# Patient Record
Sex: FEMALE | Race: WHITE | Employment: UNEMPLOYED | URBAN - METROPOLITAN AREA
[De-identification: names, ages, dates, MRNs, and addresses within clinical notes are randomized per-mention and may not be internally consistent; named-entity substitution may affect disease eponyms.]

---

## 2017-01-05 ENCOUNTER — TELEPHONE (OUTPATIENT)
Dept: INTERNAL MEDICINE CLINIC | Facility: CLINIC | Age: 27
End: 2017-01-05

## 2017-01-05 NOTE — TELEPHONE ENCOUNTER
Iram Dee from [facility] called to inform Dr Fredy Santos pt had Rx from Dr Matthew Howard dentist for codeine 300/30 #28 filled on 12/21 , Trey Hansen #120 filled from Dr Fredy Santos on 12/15, on 12/11 oral surgeon Dr Maegan Pacheco #12 and today Dr Marybeth Pritchard 10/325 #20.    Al

## 2017-01-05 NOTE — TELEPHONE ENCOUNTER
I agree. Do not fill. Clif Cortez. Jessica Vogel MD  Diplomate, American Board of Internal Medicine  [facility]  [address] [facility],4Th Floor, [address], [facility], [address] [place] [address]  T: [phone]; F: Alen 5

## 2017-01-07 ENCOUNTER — TELEPHONE (OUTPATIENT)
Dept: INTERNAL MEDICINE CLINIC | Facility: CLINIC | Age: 27
End: 2017-01-07

## 2017-01-07 ENCOUNTER — OFFICE VISIT (OUTPATIENT)
Dept: ENDOCRINOLOGY CLINIC | Facility: CLINIC | Age: 27
End: 2017-01-07

## 2017-01-07 ENCOUNTER — PATIENT MESSAGE (OUTPATIENT)
Dept: ENDOCRINOLOGY CLINIC | Facility: CLINIC | Age: 27
End: 2017-01-07

## 2017-01-07 VITALS
RESPIRATION RATE: 18 BRPM | BODY MASS INDEX: 32.6 KG/M2 | HEART RATE: 92 BPM | HEIGHT: 63 IN | TEMPERATURE: 98 F | SYSTOLIC BLOOD PRESSURE: 110 MMHG | DIASTOLIC BLOOD PRESSURE: 60 MMHG | WEIGHT: 184 LBS

## 2017-01-07 DIAGNOSIS — E10.65 UNCONTROLLED TYPE 1 DIABETES MELLITUS WITH DIABETIC AUTONOMIC NEUROPATHY, WITH LONG-TERM CURRENT USE OF INSULIN (HCC): Primary | ICD-10-CM

## 2017-01-07 DIAGNOSIS — E10.43 UNCONTROLLED TYPE 1 DIABETES MELLITUS WITH DIABETIC AUTONOMIC NEUROPATHY, WITH LONG-TERM CURRENT USE OF INSULIN (HCC): Primary | ICD-10-CM

## 2017-01-07 DIAGNOSIS — F32.A DEPRESSION, UNSPECIFIED DEPRESSION TYPE: ICD-10-CM

## 2017-01-07 PROCEDURE — 99214 OFFICE O/P EST MOD 30 MIN: CPT | Performed by: NURSE PRACTITIONER

## 2017-01-07 RX ORDER — ESCITALOPRAM OXALATE 10 MG/1
10 TABLET ORAL DAILY
Qty: 30 TABLET | Refills: 0 | Status: SHIPPED | OUTPATIENT
Start: 2017-01-07 | End: 2017-02-04

## 2017-01-07 NOTE — TELEPHONE ENCOUNTER
Call patient. I ordered up Diabetes-related labs to get a baseline for the start of calendar year 2017. Have patient get these done fasting ASAP. Dennis Nielsen. Christopher Amado MD  Diplomate, American Board of Internal Medicine  [facility]  [address]

## 2017-01-07 NOTE — PROGRESS NOTES
CC: Patient presents with:  Diabetes: follow up. did bring meter, dexcom and new pump not inserted. HPI:   HPI: 27year old y/o female who presents for follow up diabetes visit. Stated she is feeling depressed for the past 2 weeks PHQ9 20.  She does not Medication: none     Hyperlipidemia:  LDL:  102      Medication:  Atorvastatin 20  SE denies     ROS:   Constitutional: Negative for fever and fatigue. No distress. Eyes: Negative for visual disturbance.  Last eye exam 4/27/16  with SAAD Gonzales mg total) by mouth 2 (two) times daily with meals. , Disp: 60 tablet, Rfl: 2  •  gabapentin 250 MG/5ML Oral Solution, Take 300 mg by mouth nightly.  , Disp: , Rfl:   •  Blood Glucose Monitoring Suppl (FREESTYLE FREEDOM LITE) W/DEVICE Does not apply Kit, 1 D Resp 18  Ht 63\"  Wt 184 lb  BMI 32.60 kg/m2  LMP 01/02/2017  Breastfeeding? No  Constitutional: Oriented to person, place, and time. No distress, pale appearance. HEENT: Normocephalic and atraumatic.      Eyes: Conjunctivae are normal.   Neck: Normal ra defined types were placed in this encounter. Meds & Refills for this Visit:  Signed Prescriptions Disp Refills    escitalopram (LEXAPRO) 10 MG Oral Tab 30 tablet 0      Sig: Take 1 tablet (10 mg total) by mouth daily.       Acetone, Urine, Test (KETOS

## 2017-01-09 ENCOUNTER — PATIENT MESSAGE (OUTPATIENT)
Dept: ENDOCRINOLOGY CLINIC | Facility: CLINIC | Age: 27
End: 2017-01-09

## 2017-01-09 ENCOUNTER — TELEPHONE (OUTPATIENT)
Dept: INTERNAL MEDICINE CLINIC | Facility: CLINIC | Age: 27
End: 2017-01-09

## 2017-01-09 DIAGNOSIS — E10.65 UNCONTROLLED TYPE 1 DIABETES MELLITUS WITH DIABETIC AUTONOMIC NEUROPATHY, WITH LONG-TERM CURRENT USE OF INSULIN (HCC): Primary | ICD-10-CM

## 2017-01-09 DIAGNOSIS — E10.43 UNCONTROLLED TYPE 1 DIABETES MELLITUS WITH DIABETIC AUTONOMIC NEUROPATHY, WITH LONG-TERM CURRENT USE OF INSULIN (HCC): Primary | ICD-10-CM

## 2017-01-09 RX ORDER — NORGESTIMATE-ETHINYL ESTRADIOL 7DAYSX3 28
1 TABLET ORAL DAILY
Qty: 28 TABLET | Refills: 5 | Status: SHIPPED | OUTPATIENT
Start: 2017-01-09 | End: 2017-01-13

## 2017-01-09 RX ORDER — INSULIN DETEMIR 100 [IU]/ML
INJECTION, SOLUTION SUBCUTANEOUS
Qty: 90 ML | Refills: 0 | COMMUNITY
Start: 2017-01-09 | End: 2017-02-09 | Stop reason: ALTCHOICE

## 2017-01-09 RX ORDER — NORGESTIMATE-ETHINYL ESTRADIOL 7DAYSX3 28
TABLET ORAL
Qty: 28 TABLET | Refills: 0 | Status: SHIPPED | OUTPATIENT
Start: 2017-01-09 | End: 2017-02-03

## 2017-01-09 NOTE — TELEPHONE ENCOUNTER
Called pt stated that her Nolia Mackintosh is 20 she check last night tania change her levemir to 30 in the morning and 28 at night and for novolog not to take it at night only during the day.     Sugars  1/8/17   4:00 pm -400  6:00 pm-100  10:00 pm 320  11:45 pm-20

## 2017-01-09 NOTE — TELEPHONE ENCOUNTER
From: Subha Monsivais  To: Hayley Weir MD  Sent: 1/9/2017 10:36 AM CST  Subject: Medication Renewal Request    Original authorizing provider: MD Subha Drummond would like a refill of the following medications:  TRINESSA, 28, 0.18

## 2017-01-10 NOTE — TELEPHONE ENCOUNTER
Mat Max RN 1/10/2017 7:49 AM CST        ----- Message -----   From: Ghanshyam Kate   Sent: 1/9/2017 11:13 PM   To: [facility] Clinical Staff  Subject: RE: Other     My sugar is now 400 I just took 18 units of novalog I will email u in morning  ----

## 2017-01-10 NOTE — TELEPHONE ENCOUNTER
Lanre Chin, RN 1/10/2017 7:47 AM CST        ----- Message -----   From: James Rai   Sent: 1/9/2017 10:57 PM   To: [facility] Clinical Staff  Subject: Non-Urgent Medical Question     Just thought I'd message you and let you know after I spoke Elba

## 2017-01-13 ENCOUNTER — OFFICE VISIT (OUTPATIENT)
Dept: SURGERY | Facility: CLINIC | Age: 27
End: 2017-01-13

## 2017-01-13 ENCOUNTER — OFFICE VISIT (OUTPATIENT)
Dept: INTERNAL MEDICINE CLINIC | Facility: CLINIC | Age: 27
End: 2017-01-13

## 2017-01-13 VITALS
SYSTOLIC BLOOD PRESSURE: 130 MMHG | WEIGHT: 196.25 LBS | RESPIRATION RATE: 20 BRPM | BODY MASS INDEX: 34.77 KG/M2 | DIASTOLIC BLOOD PRESSURE: 86 MMHG | TEMPERATURE: 98 F | HEART RATE: 80 BPM | HEIGHT: 63 IN

## 2017-01-13 VITALS
HEART RATE: 95 BPM | RESPIRATION RATE: 15 BRPM | BODY MASS INDEX: 33.66 KG/M2 | WEIGHT: 190 LBS | DIASTOLIC BLOOD PRESSURE: 86 MMHG | SYSTOLIC BLOOD PRESSURE: 130 MMHG | HEIGHT: 63 IN

## 2017-01-13 DIAGNOSIS — G89.29 CHRONIC DENTAL PAIN: ICD-10-CM

## 2017-01-13 DIAGNOSIS — E66.9 OBESITY (BMI 30-39.9): Primary | ICD-10-CM

## 2017-01-13 DIAGNOSIS — M25.511 CHRONIC RIGHT SHOULDER PAIN: ICD-10-CM

## 2017-01-13 DIAGNOSIS — K02.9 DENTAL CARIES: ICD-10-CM

## 2017-01-13 DIAGNOSIS — F11.20 UNCOMPLICATED OPIOID DEPENDENCE (HCC): ICD-10-CM

## 2017-01-13 DIAGNOSIS — G89.29 CHRONIC DENTAL PAIN: Primary | ICD-10-CM

## 2017-01-13 DIAGNOSIS — G89.29 CHRONIC RIGHT SHOULDER PAIN: ICD-10-CM

## 2017-01-13 DIAGNOSIS — M47.816 FACET ARTHROPATHY, LUMBAR: ICD-10-CM

## 2017-01-13 DIAGNOSIS — Z51.81 THERAPEUTIC DRUG MONITORING: ICD-10-CM

## 2017-01-13 DIAGNOSIS — K08.9 CHRONIC DENTAL PAIN: Primary | ICD-10-CM

## 2017-01-13 DIAGNOSIS — M47.812 FACET ARTHROPATHY, CERVICAL: ICD-10-CM

## 2017-01-13 DIAGNOSIS — K08.9 POOR DENTITION: ICD-10-CM

## 2017-01-13 DIAGNOSIS — F33.1 MDD (MAJOR DEPRESSIVE DISORDER), RECURRENT EPISODE, MODERATE (HCC): ICD-10-CM

## 2017-01-13 DIAGNOSIS — K08.9 CHRONIC DENTAL PAIN: ICD-10-CM

## 2017-01-13 PROCEDURE — 99214 OFFICE O/P EST MOD 30 MIN: CPT | Performed by: INTERNAL MEDICINE

## 2017-01-13 PROCEDURE — 99215 OFFICE O/P EST HI 40 MIN: CPT | Performed by: PHYSICIAN ASSISTANT

## 2017-01-13 RX ORDER — PHENTERMINE HYDROCHLORIDE 37.5 MG/1
TABLET ORAL
Qty: 30 TABLET | Refills: 0 | Status: SHIPPED | OUTPATIENT
Start: 2017-01-13 | End: 2017-03-03

## 2017-01-13 RX ORDER — METHOCARBAMOL 500 MG/1
500 TABLET, FILM COATED ORAL 3 TIMES DAILY
Qty: 90 TABLET | Refills: 0 | Status: SHIPPED | OUTPATIENT
Start: 2017-01-13 | End: 2018-08-03 | Stop reason: ALTCHOICE

## 2017-01-13 NOTE — PATIENT INSTRUCTIONS
Refill policies:    • Allow 2 business days for refills; controlled substances may take longer.   • Contact your pharmacy at least 5 days prior to running out of medication and have them send an electronic request or submit request through the “request re your physician has recommended that you have a procedure or additional testing performed. [facility] will contact your insurance carrier to obtain pre-certification or prior authorization.     Unfortunately, JARED has seen an increas

## 2017-01-13 NOTE — PROGRESS NOTES
HPI:    Patient ID: Ryan Arizmendi is a 27year old female.     HPI    Review of Systems         Current Outpatient Prescriptions:  Phentermine HCl 37.5 MG Oral Tab TAKE 1 TABLET BY MOUTH EVERY MORNING BEFORE BREAKFAST Disp: 30 tablet Rfl: 0   methoca Glucose Blood (SUMAN CONTOUR NEXT TEST) In Vitro Strip Test before meals and at bedtime Disp: 400 strip Rfl: 1   Clobetasol Propionate 0.05 % External Ointment Apply 1 Application topically twice a week.  Disp: 1 Tube Rfl: 0   Montelukast Sodium 10 MG Oral

## 2017-01-13 NOTE — PROGRESS NOTES
Patient presents with:  Weight Check: 1-month f/u MD-supervised medical weight loss programming  Other: chronic dental pain/dental caries/poor dentition/opioid dependence/therapeutic drug monitoring  Other: depression, was started on Lexapro 10 mg daily ea started by Diabetes APN. ROS: No CP, SOB, palps, abd pain. No withdrawal symptoms.       Patient Active Problem List:     Uncontrolled type 1 diabetes mellitus with diabetic autonomic neuropathy, with long-term current use of insulin ([address]     Hyperchol times daily. Use as directed., Disp: 1 kit, Rfl: 0  •  Insulin Pen Needle (BD PEN NEEDLE CARLOS U/F) 32G X 4 MM Does not apply Misc, Inject 1 pen into the skin 4 (four) times daily. , Disp: 2 Box, Rfl: 3  •  Albuterol Sulfate HFA (PROAIR HFA) 108 (90 BASE) MC dentition  Dental caries  Uncomplicated opioid dependence (hcc)  Mdd (major depressive disorder), recurrent episode, moderate (hcc)    1. Obesity/Elevated BMI/therapeutic drug monitoring - Overall, she has lost weight over the last 10 months.   We'll keep t

## 2017-01-13 NOTE — H&P
Name: Wayne Sellers   : 1990   DOS: 2017     Chief complaint: Patient presents with:  Low Back Pain  Neck Pain      History of present illness:  Wayne Sellers is a 27year old female complaining of chronic teeth and oral pain, n insulin aspart (NOVOLOG FLEXPEN) 100 UNIT/ML Subcutaneous Solution Pen-injector INJECT 17 UNITS WITH BREAKFAST 17 UNITS WITH LUNCH Disp: 30 mL Rfl: 0   LEVEMIR FLEXTOUCH 100 UNIT/ML Subcutaneous Solution Pen-injector INJECT 30 UNITS IN THE MORNING 28 UNITS Past Surgical History    OTHER SURGICAL HISTORY Right     Comment R arm surgery after trauma          Comment x3      Family History   Problem Relation Age of Onset   • Multiple sclerosis[other] [OTHER] Mother    • Cancer Mother      uterine The patient is awake, alert, oriented and corporative. Pt has a normal affect. The patient ambulates with normal gait. HEENT: No gross lesion noted. PEERL. No icterus.   Neck and Upper Extremity: moderate pain and tenderness over b/l cfj,  Motor Examinatio

## 2017-01-20 ENCOUNTER — MED REC SCAN ONLY (OUTPATIENT)
Dept: INTERNAL MEDICINE CLINIC | Facility: CLINIC | Age: 27
End: 2017-01-20

## 2017-01-26 ENCOUNTER — TELEPHONE (OUTPATIENT)
Dept: ENDOCRINOLOGY CLINIC | Facility: CLINIC | Age: 27
End: 2017-01-26

## 2017-01-26 DIAGNOSIS — E10.43 UNCONTROLLED TYPE 1 DIABETES MELLITUS WITH DIABETIC AUTONOMIC NEUROPATHY, WITH LONG-TERM CURRENT USE OF INSULIN (HCC): Primary | ICD-10-CM

## 2017-01-26 DIAGNOSIS — E10.65 UNCONTROLLED TYPE 1 DIABETES MELLITUS WITH DIABETIC AUTONOMIC NEUROPATHY, WITH LONG-TERM CURRENT USE OF INSULIN (HCC): Primary | ICD-10-CM

## 2017-02-03 RX ORDER — ESCITALOPRAM OXALATE 10 MG/1
TABLET ORAL
Qty: 30 TABLET | Refills: 0 | Status: CANCELLED | OUTPATIENT
Start: 2017-02-03

## 2017-02-03 RX ORDER — NORGESTIMATE-ETHINYL ESTRADIOL 7DAYSX3 28
TABLET ORAL
Qty: 28 TABLET | Refills: 5 | Status: SHIPPED | OUTPATIENT
Start: 2017-02-03 | End: 2017-06-01

## 2017-02-03 NOTE — TELEPHONE ENCOUNTER
Pt does not meet refill protocol for trinessa. Last refill 1/9/17. Medication pending, ok to refill?

## 2017-02-04 ENCOUNTER — DIABETIC EDUCATION (OUTPATIENT)
Dept: ENDOCRINOLOGY CLINIC | Facility: CLINIC | Age: 27
End: 2017-02-04

## 2017-02-04 VITALS — HEIGHT: 63 IN | WEIGHT: 199 LBS | BODY MASS INDEX: 35.26 KG/M2

## 2017-02-04 DIAGNOSIS — E10.65 UNCONTROLLED TYPE 1 DIABETES MELLITUS WITH DIABETIC AUTONOMIC NEUROPATHY, WITH LONG-TERM CURRENT USE OF INSULIN (HCC): Primary | ICD-10-CM

## 2017-02-04 DIAGNOSIS — F32.A DEPRESSION, UNSPECIFIED DEPRESSION TYPE: Primary | ICD-10-CM

## 2017-02-04 DIAGNOSIS — E10.43 UNCONTROLLED TYPE 1 DIABETES MELLITUS WITH DIABETIC AUTONOMIC NEUROPATHY, WITH LONG-TERM CURRENT USE OF INSULIN (HCC): Primary | ICD-10-CM

## 2017-02-04 PROCEDURE — G0108 DIAB MANAGE TRN  PER INDIV: HCPCS | Performed by: DIETITIAN, REGISTERED

## 2017-02-04 RX ORDER — ESCITALOPRAM OXALATE 10 MG/1
10 TABLET ORAL DAILY
Qty: 30 TABLET | Refills: 2 | Status: SHIPPED | OUTPATIENT
Start: 2017-02-04 | End: 2017-06-04

## 2017-02-04 NOTE — PROGRESS NOTES
Ryan Arizmendi  : 1990 was seen for Saline Start:    Date: 2017  Start time: 8:00   End time: 10:00    Currently using insulin pens.    38u Toujeo daily; 20-25 units Novolog at meals  FBS today was 259 mg/dl    Glucose continues to run hi

## 2017-02-06 ENCOUNTER — TELEPHONE (OUTPATIENT)
Dept: SURGERY | Facility: CLINIC | Age: 27
End: 2017-02-06

## 2017-02-07 ENCOUNTER — TELEPHONE (OUTPATIENT)
Dept: INTERNAL MEDICINE CLINIC | Facility: CLINIC | Age: 27
End: 2017-02-07

## 2017-02-09 ENCOUNTER — DIABETIC EDUCATION (OUTPATIENT)
Dept: ENDOCRINOLOGY CLINIC | Facility: CLINIC | Age: 27
End: 2017-02-09

## 2017-02-09 DIAGNOSIS — E10.43 UNCONTROLLED TYPE 1 DIABETES MELLITUS WITH DIABETIC AUTONOMIC NEUROPATHY, WITH LONG-TERM CURRENT USE OF INSULIN (HCC): Primary | ICD-10-CM

## 2017-02-09 DIAGNOSIS — E10.65 UNCONTROLLED TYPE 1 DIABETES MELLITUS WITH DIABETIC AUTONOMIC NEUROPATHY, WITH LONG-TERM CURRENT USE OF INSULIN (HCC): Primary | ICD-10-CM

## 2017-02-09 RX ORDER — INSULIN ASPART 100 [IU]/ML
INJECTION, SOLUTION INTRAVENOUS; SUBCUTANEOUS
Qty: 1 CARTRIDGE | Refills: 0 | COMMUNITY
Start: 2017-02-09 | End: 2017-03-06

## 2017-02-10 ENCOUNTER — TELEPHONE (OUTPATIENT)
Dept: ENDOCRINOLOGY CLINIC | Facility: CLINIC | Age: 27
End: 2017-02-10

## 2017-02-13 DIAGNOSIS — E66.9 OBESITY (BMI 30-39.9): ICD-10-CM

## 2017-02-13 DIAGNOSIS — Z51.81 THERAPEUTIC DRUG MONITORING: ICD-10-CM

## 2017-02-13 NOTE — TELEPHONE ENCOUNTER
From: Tristin Mcdonald  To: Lali Perez MD  Sent: 2/13/2017 10:53 AM CST  Subject: Medication Renewal Request    Original authorizing provider: MD Tristin Mazariegos would like a refill of the following medications:  Phentermine HCl 3

## 2017-02-14 ENCOUNTER — TELEPHONE (OUTPATIENT)
Dept: ENDOCRINOLOGY CLINIC | Facility: CLINIC | Age: 27
End: 2017-02-14

## 2017-02-14 RX ORDER — PHENTERMINE HYDROCHLORIDE 37.5 MG/1
TABLET ORAL
Qty: 30 TABLET | Refills: 0 | OUTPATIENT
Start: 2017-02-14

## 2017-02-16 ENCOUNTER — PATIENT MESSAGE (OUTPATIENT)
Dept: ENDOCRINOLOGY CLINIC | Facility: CLINIC | Age: 27
End: 2017-02-16

## 2017-02-17 ENCOUNTER — TELEPHONE (OUTPATIENT)
Dept: ENDOCRINOLOGY CLINIC | Facility: CLINIC | Age: 27
End: 2017-02-17

## 2017-02-18 NOTE — TELEPHONE ENCOUNTER
Yanira Encarnacion RN 2/16/2017 11:23 AM CST        ----- Message -----   From: My Davis   Sent: 2/16/2017 11:04 AM   To: [facility] Clinical Staff  Subject: Prescription Question     I have used the whole sample of the vial of novolog sample you gav

## 2017-02-22 ENCOUNTER — TELEPHONE (OUTPATIENT)
Dept: INTERNAL MEDICINE CLINIC | Facility: CLINIC | Age: 27
End: 2017-02-22

## 2017-02-22 NOTE — TELEPHONE ENCOUNTER
Called patient, explained she had a missed appointment 02/21/17, it is her second one and there will be a charge. Patient said she thought it was on 02/22/17. Patient has an upcoming appointment on 03/03/17. First no show was on 11/07/16.

## 2017-02-24 ENCOUNTER — TELEPHONE (OUTPATIENT)
Dept: SURGERY | Facility: CLINIC | Age: 27
End: 2017-02-24

## 2017-03-01 ENCOUNTER — TELEPHONE (OUTPATIENT)
Dept: ENDOCRINOLOGY CLINIC | Facility: CLINIC | Age: 27
End: 2017-03-01

## 2017-03-01 NOTE — TELEPHONE ENCOUNTER
Allyson called with glucose readings at 400 mg/dl since early this morning, but that after she did a set change glucose was starting to come down. When I spoke with her it was 340 mg/dl and continuing to decrease.   Instructed her to keep a close eye on he

## 2017-03-03 ENCOUNTER — OFFICE VISIT (OUTPATIENT)
Dept: INTERNAL MEDICINE CLINIC | Facility: CLINIC | Age: 27
End: 2017-03-03

## 2017-03-03 VITALS
OXYGEN SATURATION: 97 % | DIASTOLIC BLOOD PRESSURE: 82 MMHG | BODY MASS INDEX: 36.77 KG/M2 | TEMPERATURE: 98 F | WEIGHT: 207.5 LBS | HEART RATE: 82 BPM | SYSTOLIC BLOOD PRESSURE: 120 MMHG | HEIGHT: 63 IN

## 2017-03-03 DIAGNOSIS — E66.01 SEVERE OBESITY (BMI 35.0-39.9) WITH COMORBIDITY (HCC): Primary | ICD-10-CM

## 2017-03-03 DIAGNOSIS — Z51.81 THERAPEUTIC DRUG MONITORING: ICD-10-CM

## 2017-03-03 DIAGNOSIS — F33.1 MDD (MAJOR DEPRESSIVE DISORDER), RECURRENT EPISODE, MODERATE (HCC): ICD-10-CM

## 2017-03-03 PROCEDURE — 99214 OFFICE O/P EST MOD 30 MIN: CPT | Performed by: INTERNAL MEDICINE

## 2017-03-03 RX ORDER — PHENTERMINE HYDROCHLORIDE 37.5 MG/1
TABLET ORAL
Qty: 30 TABLET | Refills: 0 | Status: SHIPPED | OUTPATIENT
Start: 2017-03-03 | End: 2017-05-01

## 2017-03-04 NOTE — PROGRESS NOTES
Patient presents with: Other: Obesity f/u; MDD f/u       HPI: The pt presents today primarily for physician-supervised medical weight loss programming and assessment for the diagnosis of overweight and/or obesity status, but she's also here for MDD f/u. novolog via T flex insulin pump, Disp: 1 Cartridge, Rfl: 0  •  escitalopram (LEXAPRO) 10 MG Oral Tab, Take 1 tablet (10 mg total) by mouth daily. , Disp: 30 tablet, Rfl: 2  •  TRINESSA, 28, 0.18/0.215/0.25 MG-35 MCG Oral Tab, TAKE 1 TABLET BY MOUTH DAILY, D Never Used                        Alcohol Use: Yes           1.2 oz/week       2 Standard drinks or equivalent per week       Comment: 1-2/month      PE:  /82 mmHg  Pulse 82  Temp(Src) 98.1 °F (36.7 °C) (Oral)  Ht 63\"  Wt 207 lb 8 oz  BMI 36.77 kg/m

## 2017-03-06 ENCOUNTER — TELEPHONE (OUTPATIENT)
Dept: ENDOCRINOLOGY CLINIC | Facility: CLINIC | Age: 27
End: 2017-03-06

## 2017-03-06 ENCOUNTER — OFFICE VISIT (OUTPATIENT)
Dept: ENDOCRINOLOGY CLINIC | Facility: CLINIC | Age: 27
End: 2017-03-06

## 2017-03-06 VITALS
BODY MASS INDEX: 36.14 KG/M2 | RESPIRATION RATE: 18 BRPM | WEIGHT: 204 LBS | DIASTOLIC BLOOD PRESSURE: 72 MMHG | SYSTOLIC BLOOD PRESSURE: 118 MMHG | HEIGHT: 63 IN | TEMPERATURE: 98 F | HEART RATE: 62 BPM

## 2017-03-06 DIAGNOSIS — E10.43 UNCONTROLLED TYPE 1 DIABETES MELLITUS WITH DIABETIC AUTONOMIC NEUROPATHY, WITH LONG-TERM CURRENT USE OF INSULIN (HCC): Primary | ICD-10-CM

## 2017-03-06 DIAGNOSIS — E10.65 UNCONTROLLED TYPE 1 DIABETES MELLITUS WITH DIABETIC AUTONOMIC NEUROPATHY, WITH LONG-TERM CURRENT USE OF INSULIN (HCC): Primary | ICD-10-CM

## 2017-03-06 LAB
CARTRIDGE LOT#: 671 NUMERIC
HEMOGLOBIN A1C: 8.9 % (ref 4.3–5.6)

## 2017-03-06 PROCEDURE — 99214 OFFICE O/P EST MOD 30 MIN: CPT | Performed by: NURSE PRACTITIONER

## 2017-03-06 RX ORDER — INSULIN ASPART 100 [IU]/ML
INJECTION, SOLUTION INTRAVENOUS; SUBCUTANEOUS
Qty: 30 ML | Refills: 2 | Status: SHIPPED | OUTPATIENT
Start: 2017-03-06 | End: 2017-03-06

## 2017-03-06 RX ORDER — INSULIN ASPART 100 [IU]/ML
INJECTION, SOLUTION INTRAVENOUS; SUBCUTANEOUS
Qty: 10 ML | Refills: 2 | COMMUNITY
Start: 2017-03-06 | End: 2017-03-16

## 2017-03-06 NOTE — PROGRESS NOTES
CC: Patient presents with:  Diabetes: follow up. pt did bring pump and dexcom.       Follow up  Visit     HISTORY:  Past Medical History   Diagnosis Date   • Diabetes ([facility] [address]    • Diabetic neuropathy ([facility] [facility]    • Hyperlipidemia       Family History   Problem Re visit/encounter  Has had multiple changes from CDE to help stablilize BG via phone     TDD up to 80 units   Insulin Novolog   Glucometer:Ryan Contour Next      Time  12am 6:00 am 11:30am    ICR 1:10 1:10      ISF 1:25 1:20     Basal 1.25 1.3         Targe 0  •  escitalopram (LEXAPRO) 10 MG Oral Tab, Take 1 tablet (10 mg total) by mouth daily. , Disp: 30 tablet, Rfl: 2  •  TRINESSA, 28, 0.18/0.215/0.25 MG-35 MCG Oral Tab, TAKE 1 TABLET BY MOUTH DAILY, Disp: 28 tablet, Rfl: 5  •  methocarbamol 500 MG Oral Tab, No  Constitutional: Oriented to person, place, and time. No distress. HEENT: Normocephalic and atraumatic. Eyes: Conjunctivae are normal.   Neck: Normal range of motion. Neck supple. Normal carotid pulses. No mass or thyromegaly present.   Jose Angel Daft at goal   Lipids: not at goal recheck lab and cont statin   Tobacco: not ready to quit   Flu vaccine: up to date   Pneumonia vaccine: up to date   Depression screen: up to date f/u with PCP     Check glucoses 4 times daily - fasting, premeals and/or bedtim

## 2017-03-14 ENCOUNTER — APPOINTMENT (OUTPATIENT)
Dept: LAB | Age: 27
End: 2017-03-14
Attending: INTERNAL MEDICINE

## 2017-03-14 DIAGNOSIS — E10.43 UNCONTROLLED TYPE 1 DIABETES MELLITUS WITH DIABETIC AUTONOMIC NEUROPATHY, WITH LONG-TERM CURRENT USE OF INSULIN (HCC): ICD-10-CM

## 2017-03-14 DIAGNOSIS — E10.65 UNCONTROLLED TYPE 1 DIABETES MELLITUS WITH DIABETIC AUTONOMIC NEUROPATHY, WITH LONG-TERM CURRENT USE OF INSULIN (HCC): ICD-10-CM

## 2017-03-14 LAB
ALBUMIN SERPL-MCNC: 3.3 G/DL (ref 3.5–4.8)
ALP LIVER SERPL-CCNC: 76 U/L (ref 37–98)
ALT SERPL-CCNC: 22 U/L (ref 14–54)
AST SERPL-CCNC: 11 U/L (ref 15–41)
BILIRUB SERPL-MCNC: 0.3 MG/DL (ref 0.1–2)
BUN BLD-MCNC: 10 MG/DL (ref 8–20)
CALCIUM BLD-MCNC: 9.2 MG/DL (ref 8.3–10.3)
CHLORIDE: 108 MMOL/L (ref 101–111)
CHOLEST SMN-MCNC: 149 MG/DL (ref ?–190)
CO2: 31 MMOL/L (ref 22–32)
CREAT BLD-MCNC: 0.58 MG/DL (ref 0.55–1.02)
CREAT UR-SCNC: 133 MG/DL
EST. AVERAGE GLUCOSE BLD GHB EST-MCNC: 217 MG/DL (ref 68–126)
GLUCOSE BLD-MCNC: 114 MG/DL (ref 70–99)
HBA1C MFR BLD HPLC: 9.2 % (ref ?–5.7)
HDLC SERPL-MCNC: 50 MG/DL (ref 45–?)
HDLC SERPL: 2.98 {RATIO} (ref ?–4.44)
LDLC SERPL CALC-MCNC: 88 MG/DL (ref ?–120)
M PROTEIN MFR SERPL ELPH: 6.8 G/DL (ref 6.1–8.3)
MICROALBUMIN UR-MCNC: 0.79 MG/DL
MICROALBUMIN/CREAT 24H UR-RTO: 5.9 UG/MG (ref ?–30)
NONHDLC SERPL-MCNC: 99 MG/DL (ref ?–150)
POTASSIUM SERPL-SCNC: 4 MMOL/L (ref 3.6–5.1)
SODIUM SERPL-SCNC: 143 MMOL/L (ref 136–144)
TRIGLYCERIDES: 56 MG/DL (ref ?–115)
VLDL: 11 MG/DL (ref 5–40)

## 2017-03-14 PROCEDURE — 80061 LIPID PANEL: CPT

## 2017-03-14 PROCEDURE — 83036 HEMOGLOBIN GLYCOSYLATED A1C: CPT

## 2017-03-14 PROCEDURE — 80053 COMPREHEN METABOLIC PANEL: CPT

## 2017-03-14 PROCEDURE — 36415 COLL VENOUS BLD VENIPUNCTURE: CPT

## 2017-03-14 PROCEDURE — 82570 ASSAY OF URINE CREATININE: CPT

## 2017-03-14 PROCEDURE — 82043 UR ALBUMIN QUANTITATIVE: CPT

## 2017-03-15 ENCOUNTER — OFFICE VISIT (OUTPATIENT)
Dept: ENDOCRINOLOGY CLINIC | Facility: CLINIC | Age: 27
End: 2017-03-15

## 2017-03-15 VITALS
DIASTOLIC BLOOD PRESSURE: 80 MMHG | HEIGHT: 63 IN | TEMPERATURE: 98 F | RESPIRATION RATE: 18 BRPM | SYSTOLIC BLOOD PRESSURE: 122 MMHG | WEIGHT: 205 LBS | HEART RATE: 72 BPM | BODY MASS INDEX: 36.32 KG/M2

## 2017-03-15 DIAGNOSIS — E10.65 UNCONTROLLED TYPE 1 DIABETES MELLITUS WITH DIABETIC AUTONOMIC NEUROPATHY, WITH LONG-TERM CURRENT USE OF INSULIN (HCC): Primary | ICD-10-CM

## 2017-03-15 DIAGNOSIS — E10.43 UNCONTROLLED TYPE 1 DIABETES MELLITUS WITH DIABETIC AUTONOMIC NEUROPATHY, WITH LONG-TERM CURRENT USE OF INSULIN (HCC): Primary | ICD-10-CM

## 2017-03-15 PROCEDURE — 95251 CONT GLUC MNTR ANALYSIS I&R: CPT | Performed by: NURSE PRACTITIONER

## 2017-03-15 PROCEDURE — 99213 OFFICE O/P EST LOW 20 MIN: CPT | Performed by: NURSE PRACTITIONER

## 2017-03-15 NOTE — PROGRESS NOTES
CC: Patient presents with:  Diabetes: follow up. pt did bring pump and dexcom.       HISTORY:  Past Medical History   Diagnosis Date   • Diabetes ([facility] [address]    • Diabetic neuropathy ([facility] [address]    • Hyperlipidemia       Family History   Problem Relation Age of Onset abdomen mainly for pump sites     Hypertension:  Blood Pressure: At goal    Medication: none      Hyperlipidemia:  LDL:  88 was 102      Medication:  Atorvastatin 20  SE denies     ROS:   Constitutional: Negative for fever, chills and fatigue.  No distress meals., Disp: 60 tablet, Rfl: 2  •  gabapentin 250 MG/5ML Oral Solution, Take 300 mg by mouth nightly.  , Disp: , Rfl:   •  Blood Glucose Monitoring Suppl (FREESTYLE FREEDOM LITE) W/DEVICE Does not apply Kit, 1 Device by Other route 2 (two) times daily.  Us long-term current use of insulin (hcc)  (primary encounter diagnosis): wants disability to not work, asked to come in weekly until BG more stable.  Plan decreased am ICR for breakfast and decreased target BG   New Pump Settings:     Time  12am 6:00 am 1pm

## 2017-03-16 RX ORDER — INSULIN ASPART 100 [IU]/ML
INJECTION, SOLUTION INTRAVENOUS; SUBCUTANEOUS
Qty: 10 ML | Refills: 2 | COMMUNITY
Start: 2017-03-16 | End: 2017-03-22

## 2017-03-22 ENCOUNTER — OFFICE VISIT (OUTPATIENT)
Dept: ENDOCRINOLOGY CLINIC | Facility: CLINIC | Age: 27
End: 2017-03-22

## 2017-03-22 VITALS
BODY MASS INDEX: 37.21 KG/M2 | HEART RATE: 84 BPM | HEIGHT: 63 IN | SYSTOLIC BLOOD PRESSURE: 128 MMHG | RESPIRATION RATE: 18 BRPM | DIASTOLIC BLOOD PRESSURE: 68 MMHG | WEIGHT: 210 LBS | TEMPERATURE: 98 F

## 2017-03-22 DIAGNOSIS — E10.65 UNCONTROLLED TYPE 1 DIABETES MELLITUS WITH DIABETIC AUTONOMIC NEUROPATHY, WITH LONG-TERM CURRENT USE OF INSULIN (HCC): Primary | ICD-10-CM

## 2017-03-22 DIAGNOSIS — E10.43 UNCONTROLLED TYPE 1 DIABETES MELLITUS WITH DIABETIC AUTONOMIC NEUROPATHY, WITH LONG-TERM CURRENT USE OF INSULIN (HCC): Primary | ICD-10-CM

## 2017-03-22 PROCEDURE — 99214 OFFICE O/P EST MOD 30 MIN: CPT | Performed by: NURSE PRACTITIONER

## 2017-03-22 RX ORDER — INSULIN LISPRO 100 [IU]/ML
INJECTION, SOLUTION INTRAVENOUS; SUBCUTANEOUS
Qty: 30 ML | Refills: 3 | Status: SHIPPED | OUTPATIENT
Start: 2017-03-22 | End: 2017-05-19

## 2017-03-22 NOTE — PROGRESS NOTES
CC: Patient presents with:  Diabetes: follow up. pt did bring pump and dexcom.       HISTORY:  Past Medical History   Diagnosis Date   • Diabetes ([facility] [address]    • Diabetic neuropathy ([facility] [address]    • Hyperlipidemia       Family History   Problem Relation Age of Onset download still shows poor control   Average Fasting glucose  mg/dl   Average post meal glucose  's to 400's  's   Basal%/Bolus% overcorrecting  44/56    Hypoglycemia frequency has at erratic times   Lipodystrophy:throughout body, using ab Atorvastatin Calcium 20 MG Oral Tab, Take 1 tablet (20 mg total) by mouth nightly., Disp: 30 tablet, Rfl: 2  •  MetFORMIN HCl 1000 MG Oral Tab, Take 1 tablet (1,000 mg total) by mouth 2 (two) times daily with meals. , Disp: 60 tablet, Rfl: 2  •  gabapentin lipodystrophy deposits throughout extremities, to have surgery to remove. Abdomen, dexom and pump site CDI  Skin: Skin is warm and dry. No rash noted. No erythema. No pallor. No open wounds noted. Psychiatric: Normal mood and affect.      Assessment and Pl

## 2017-03-24 DIAGNOSIS — E10.43 UNCONTROLLED TYPE 1 DIABETES MELLITUS WITH DIABETIC AUTONOMIC NEUROPATHY, WITH LONG-TERM CURRENT USE OF INSULIN (HCC): Primary | ICD-10-CM

## 2017-03-24 DIAGNOSIS — E10.65 UNCONTROLLED TYPE 1 DIABETES MELLITUS WITH DIABETIC AUTONOMIC NEUROPATHY, WITH LONG-TERM CURRENT USE OF INSULIN (HCC): Primary | ICD-10-CM

## 2017-03-28 ENCOUNTER — OFFICE VISIT (OUTPATIENT)
Dept: ENDOCRINOLOGY CLINIC | Facility: CLINIC | Age: 27
End: 2017-03-28

## 2017-03-28 VITALS
HEIGHT: 63 IN | BODY MASS INDEX: 36.32 KG/M2 | SYSTOLIC BLOOD PRESSURE: 110 MMHG | DIASTOLIC BLOOD PRESSURE: 78 MMHG | RESPIRATION RATE: 18 BRPM | TEMPERATURE: 98 F | WEIGHT: 205 LBS | HEART RATE: 88 BPM

## 2017-03-28 DIAGNOSIS — E10.65 UNCONTROLLED TYPE 1 DIABETES MELLITUS WITH DIABETIC AUTONOMIC NEUROPATHY, WITH LONG-TERM CURRENT USE OF INSULIN (HCC): Primary | ICD-10-CM

## 2017-03-28 DIAGNOSIS — E10.43 UNCONTROLLED TYPE 1 DIABETES MELLITUS WITH DIABETIC AUTONOMIC NEUROPATHY, WITH LONG-TERM CURRENT USE OF INSULIN (HCC): Primary | ICD-10-CM

## 2017-03-28 PROCEDURE — 99213 OFFICE O/P EST LOW 20 MIN: CPT | Performed by: NURSE PRACTITIONER

## 2017-03-28 NOTE — PROGRESS NOTES
CC: Patient presents with:  Diabetes: follow up. pt does have pump and meter. not using dexcom right now.       HISTORY:  Past Medical History   Diagnosis Date   • Diabetes ([facility] [address]    • Diabetic neuropathy ([facility] [address]    • Hyperlipidemia       Family History   Prob 's (498) -356 mg/dl   PD  mg/dl (30, 471)   Basal%/Bolus% overcorrecting has impoved   42/53    Hypoglycemia frequency has at erratic times but significantly less   Lipodystrophy:throughout body, using abdomen mainly for pump sites last p Rfl: 0  •  Insulin Lispro (HUMALOG) 100 UNIT/ML Subcutaneous Solution, Use up to 80 units daily via insulin pump, Disp: 30 mL, Rfl: 3  •  Phentermine HCl 37.5 MG Oral Tab, TAKE 1 TABLET BY MOUTH EVERY MORNING BEFORE BREAKFAST, Disp: 30 tablet, Rfl: 0  •  e Breastfeeding? No  Constitutional: Oriented to person, place, and time. No distress. HEENT: Normocephalic and atraumatic. Eyes: Conjunctivae are normal.   Neck: Normal range of motion.  Neck supple  Cardiovascular: Normal rate, regular rhythm and intac date f/u with PCP     Check glucoses 4 times daily - fasting, premeals and/or bedtime. Call/fax/email pump download or return for f/u in 7 days. Return in about 10 days (around 4/7/2017) for diabetes.     Pt verbalized understanding and has no further

## 2017-03-31 ENCOUNTER — TELEPHONE (OUTPATIENT)
Dept: INTERNAL MEDICINE CLINIC | Facility: CLINIC | Age: 27
End: 2017-03-31

## 2017-03-31 NOTE — TELEPHONE ENCOUNTER
Called patient regarding no show/missed appointment. Patient stated she thought she had cancelled it through [address], but pt was notified that her appointment was still on the doctors schedule.  Told pt she could always call the office to confirm cancellati

## 2017-04-06 ENCOUNTER — PATIENT MESSAGE (OUTPATIENT)
Dept: INTERNAL MEDICINE CLINIC | Facility: CLINIC | Age: 27
End: 2017-04-06

## 2017-04-07 RX ORDER — FLUCONAZOLE 150 MG/1
TABLET ORAL
Qty: 2 TABLET | Refills: 0 | Status: SHIPPED | OUTPATIENT
Start: 2017-04-07 | End: 2017-05-01 | Stop reason: ALTCHOICE

## 2017-04-07 NOTE — TELEPHONE ENCOUNTER
Script for Diflucan sent to her pharmacy. Alexis Miller. Deep Tinsley MD  Diplomate, American Board of Internal Medicine  [facility]  [address] [facility],[address], [address], [facility], [address] [place] [address]  T: [phone]; F: Joseeti 5

## 2017-04-07 NOTE — TELEPHONE ENCOUNTER
From: Edwin Gross  To: Jaiden Henry MD  Sent: 4/6/2017 6:41 PM CDT  Subject: Non-Urgent Medical Question    Hello is there anything you can prescribe me for yeast infection it hurts when I pee and I'm really itchy and aggervated in vaginal area.

## 2017-04-08 ENCOUNTER — HOSPITAL ENCOUNTER (OUTPATIENT)
Age: 27
Discharge: HOME | End: 2017-04-08
Attending: FAMILY MEDICINE
Payer: COMMERCIAL

## 2017-04-08 VITALS
BODY MASS INDEX: 36.32 KG/M2 | HEIGHT: 63 IN | DIASTOLIC BLOOD PRESSURE: 90 MMHG | WEIGHT: 205 LBS | HEART RATE: 87 BPM | SYSTOLIC BLOOD PRESSURE: 141 MMHG | OXYGEN SATURATION: 96 % | TEMPERATURE: 99 F | RESPIRATION RATE: 20 BRPM

## 2017-04-08 DIAGNOSIS — R30.0 DYSURIA: ICD-10-CM

## 2017-04-08 DIAGNOSIS — K02.9 DENTAL CARIES: ICD-10-CM

## 2017-04-08 DIAGNOSIS — B37.3 YEAST VAGINITIS: ICD-10-CM

## 2017-04-08 DIAGNOSIS — J20.9 ACUTE BRONCHITIS, UNSPECIFIED ORGANISM: Primary | ICD-10-CM

## 2017-04-08 PROCEDURE — 87510 GARDNER VAG DNA DIR PROBE: CPT | Performed by: FAMILY MEDICINE

## 2017-04-08 PROCEDURE — 81025 URINE PREGNANCY TEST: CPT | Performed by: FAMILY MEDICINE

## 2017-04-08 PROCEDURE — 99204 OFFICE O/P NEW MOD 45 MIN: CPT

## 2017-04-08 PROCEDURE — 99214 OFFICE O/P EST MOD 30 MIN: CPT

## 2017-04-08 PROCEDURE — 87480 CANDIDA DNA DIR PROBE: CPT | Performed by: FAMILY MEDICINE

## 2017-04-08 PROCEDURE — 87086 URINE CULTURE/COLONY COUNT: CPT | Performed by: FAMILY MEDICINE

## 2017-04-08 PROCEDURE — 81002 URINALYSIS NONAUTO W/O SCOPE: CPT | Performed by: FAMILY MEDICINE

## 2017-04-08 PROCEDURE — 87660 TRICHOMONAS VAGIN DIR PROBE: CPT | Performed by: FAMILY MEDICINE

## 2017-04-08 RX ORDER — CEFUROXIME AXETIL 250 MG/1
250 TABLET ORAL 2 TIMES DAILY
Qty: 14 TABLET | Refills: 0 | Status: SHIPPED | OUTPATIENT
Start: 2017-04-08 | End: 2017-05-01 | Stop reason: ALTCHOICE

## 2017-04-08 NOTE — ED INITIAL ASSESSMENT (HPI)
Patient presents with trifold complaints:firstly,vaginal itching,dysuria,white disharge;secondly,top left three teeth rotten and unable to see dentist.Cold/hot sensitive. +Cough after URI-prod of green sputum. No fever. +Flu shot.

## 2017-04-09 NOTE — ED NOTES
Positive vaginitis panel sent to physician to review. Patient was prescribed Ceftin from [facility] provider and had a prescription for Diflucan from her primary physician.

## 2017-04-11 ENCOUNTER — OFFICE VISIT (OUTPATIENT)
Dept: ENDOCRINOLOGY CLINIC | Facility: CLINIC | Age: 27
End: 2017-04-11

## 2017-04-11 VITALS
BODY MASS INDEX: 35.97 KG/M2 | HEIGHT: 63 IN | RESPIRATION RATE: 18 BRPM | SYSTOLIC BLOOD PRESSURE: 110 MMHG | TEMPERATURE: 98 F | DIASTOLIC BLOOD PRESSURE: 80 MMHG | WEIGHT: 203 LBS | HEART RATE: 80 BPM

## 2017-04-11 DIAGNOSIS — E10.43 UNCONTROLLED TYPE 1 DIABETES MELLITUS WITH DIABETIC AUTONOMIC NEUROPATHY, WITH LONG-TERM CURRENT USE OF INSULIN (HCC): Primary | ICD-10-CM

## 2017-04-11 DIAGNOSIS — E10.65 UNCONTROLLED TYPE 1 DIABETES MELLITUS WITH DIABETIC AUTONOMIC NEUROPATHY, WITH LONG-TERM CURRENT USE OF INSULIN (HCC): Primary | ICD-10-CM

## 2017-04-11 PROCEDURE — 99213 OFFICE O/P EST LOW 20 MIN: CPT | Performed by: NURSE PRACTITIONER

## 2017-04-11 NOTE — PROGRESS NOTES
CC: Patient presents with:  Diabetes: follow up.       HISTORY:  Past Medical History   Diagnosis Date   • Diabetes ([facility] [address]    • Diabetic neuropathy ([facility] [address]    • Hyperlipidemia       Family History   Problem Relation Age of Onset   • Multiple sclerosis[other] [ -360 mg/dl   Basal%/Bolus% 54/46 only 3% food bolus    Hypoglycemia frequency has at erratic times but significantly less   Lipodystrophy:throughout body, using abdomen mainly for pump sites    Hypertension:  Blood Pressure:   At goal    Medication: n USE TO TEST BLOOD SUGAR BEFORE MEALS AND AT BEDTIME, Disp: 400 strip, Rfl: 0  •  Insulin Lispro (HUMALOG) 100 UNIT/ML Subcutaneous Solution, Use up to 80 units daily via insulin pump, Disp: 30 mL, Rfl: 3  •  escitalopram (LEXAPRO) 10 MG Oral Tab, Take 1 ta atraumatic. Eyes: Conjunctivae are normal.   Neck: Normal range of motion. Neck supple  Cardiovascular: Normal rate, regular rhythm and intact distal pulses. No murmur, rubs or gallops.    Pulmonary/Chest: Effort normal and breath sounds normal. No respi time.    Drew DUARTE,IVETTE

## 2017-04-15 ENCOUNTER — CHARTING TRANS (OUTPATIENT)
Dept: URGENT CARE | Age: 27
End: 2017-04-15

## 2017-04-15 ASSESSMENT — PAIN SCALES - GENERAL: PAINLEVEL_OUTOF10: 8

## 2017-04-27 ENCOUNTER — TELEPHONE (OUTPATIENT)
Dept: INTERNAL MEDICINE CLINIC | Facility: CLINIC | Age: 27
End: 2017-04-27

## 2017-04-27 RX ORDER — PHENTERMINE HYDROCHLORIDE 37.5 MG/1
TABLET ORAL
Qty: 30 TABLET | Refills: 0 | OUTPATIENT
Start: 2017-04-27

## 2017-04-27 NOTE — TELEPHONE ENCOUNTER
Patient had an appointment scheduled today at 1:00 PM. Patient called earlier and stated she was running late and the person whom she spoke with confirmed w/dr that pt can still come in and she will be seen.  Patient later called in around 1:50 PM and state

## 2017-05-01 ENCOUNTER — TELEPHONE (OUTPATIENT)
Dept: INTERNAL MEDICINE CLINIC | Facility: CLINIC | Age: 27
End: 2017-05-01

## 2017-05-01 NOTE — PROGRESS NOTES
Calvin Martinez is a 27year old female. HPI:   Patient presents with:   Follow - Up: pt have really bad panic attack and anxiety, can't sleep, head is spinning  along with a back pain   Medication Request: phentermine refill  Patient presents to d ULTRAFINE) 31G X 15/64\" 0.5 ML Does not apply Misc, 1 each by Does not apply route daily as needed.  In case of insulin pump failure, Disp: 100 each, Rfl: 0  •  SUMAN CONTOUR NEXT TEST In Vitro Strip, USE TO TEST BLOOD SUGAR BEFORE MEALS AND AT BEDTIME, Logan Poe Encounters:  05/01/17 : 212 lb 8 oz  04/11/17 : 203 lb  04/08/17 : 205 lb  03/28/17 : 205 lb  03/22/17 : 210 lb  03/15/17 : 205 lb    EXAM:   /80 mmHg  Pulse 120  Temp(Src) 97.6 °F (36.4 °C) (Oral)  Resp 16  Ht 63\"  Wt 212 lb 8 oz  BMI 37.65 kg/m2 issues, namely treatment options for anxiety and depression, lack of utility of long-term benzodiazepines for anxiety.       Patient Care Team:  Jaiden Henry MD as PCP - General (Internal Medicine)  Yenifer Pappas MD ([facility])  Rasheed Thakkar

## 2017-05-01 NOTE — TELEPHONE ENCOUNTER
Patient had an appointment scheduled today and did not come in. Status: No show. Called patient to inform of no show/charge fee. No answer. Unable to leave voice message; Mailbox is full.

## 2017-05-01 NOTE — PATIENT INSTRUCTIONS
- Stop Lexapro  - Start Duloxetine (Cymbalta). Take 1 capsule twice daily  - Take xanax as needed for acute anxiety/panic attacks, no more than two tablets in a day  - I have refilled your phentermine.    - Follow up with Dr. Justice Zavaleta in 1 month.

## 2017-05-01 NOTE — TELEPHONE ENCOUNTER
Patient called back stating she received a phone call with no voice message. Informed pt the reasoning of the call. Also told pt that since she no showed once again for her appointment, she will be charged another $50.00 fee.    Patient understood and said

## 2017-05-08 PROBLEM — Z72.0 CURRENT TOBACCO USE: Status: ACTIVE | Noted: 2017-05-08

## 2017-05-08 PROBLEM — G47.00 INSOMNIA: Status: ACTIVE | Noted: 2017-05-08

## 2017-05-08 NOTE — PATIENT INSTRUCTIONS
Vira,    1. Stop the Lexapro. 2. Continue the Phentermine. 3. Add new agent of Wellbutrin which is designed for both depression and weight loss.   4. Double up on the Alprazolam (2 pills at a time) to increase the milligrams which will hopefully improv

## 2017-05-08 NOTE — PROGRESS NOTES
Patient presents with: Follow - Up: 1-month f/u depression and anxiety      HPI: The pt presents today for 1-month f/u mainly for depression, but she also has anxiety as well:    1. MDD, moderate and recurrent - No change in status.   After her last visit oz  05/01/17 : 212 lb 8 oz  04/11/17 : 203 lb  04/08/17 : 205 lb  03/28/17 : 205 lb  03/22/17 : 210 lb  Gen - A&Ox3, NAD  Lungs - CTAB  CV - RRR, nl s1, s2  Abd - soft, NABS, NT, ND  Ext - no c/c/e  Neuro - CNs 2-12 grossly intact, no focal deficits; 2+ DT

## 2017-05-11 ENCOUNTER — TELEPHONE (OUTPATIENT)
Dept: INTERNAL MEDICINE CLINIC | Facility: CLINIC | Age: 27
End: 2017-05-11

## 2017-05-11 NOTE — TELEPHONE ENCOUNTER
Pt stated that she can not come in today so she will be no show her car broke down on the way to the appointment.

## 2017-05-17 ENCOUNTER — TELEPHONE (OUTPATIENT)
Dept: INTERNAL MEDICINE CLINIC | Facility: CLINIC | Age: 27
End: 2017-05-17

## 2017-05-17 NOTE — TELEPHONE ENCOUNTER
I don't know who accepts Medicaid as far as local physicians. She'll need to contact her insurance to see who is contracted. Another option would be to try her local [facility]. Sometimes the Optometrists there may take her insurance. Dave Winston.  Alexa Shah MD

## 2017-05-17 NOTE — TELEPHONE ENCOUNTER
Patient was called to schedule her Diabetic eye exam. She has not schedule it and she needs a referral to someone that accept medicaid. Can you please write this referral for her and send it to her through her my chart.

## 2017-05-18 ENCOUNTER — PATIENT MESSAGE (OUTPATIENT)
Dept: ENDOCRINOLOGY CLINIC | Facility: CLINIC | Age: 27
End: 2017-05-18

## 2017-05-18 DIAGNOSIS — E10.65 UNCONTROLLED TYPE 1 DIABETES MELLITUS WITH DIABETIC AUTONOMIC NEUROPATHY, WITH LONG-TERM CURRENT USE OF INSULIN (HCC): Primary | ICD-10-CM

## 2017-05-18 DIAGNOSIS — E10.43 UNCONTROLLED TYPE 1 DIABETES MELLITUS WITH DIABETIC AUTONOMIC NEUROPATHY, WITH LONG-TERM CURRENT USE OF INSULIN (HCC): Primary | ICD-10-CM

## 2017-05-19 ENCOUNTER — OFFICE VISIT (OUTPATIENT)
Dept: ENDOCRINOLOGY CLINIC | Facility: CLINIC | Age: 27
End: 2017-05-19

## 2017-05-19 VITALS
HEIGHT: 63 IN | RESPIRATION RATE: 18 BRPM | HEART RATE: 84 BPM | BODY MASS INDEX: 37.56 KG/M2 | DIASTOLIC BLOOD PRESSURE: 80 MMHG | TEMPERATURE: 98 F | WEIGHT: 212 LBS | SYSTOLIC BLOOD PRESSURE: 124 MMHG

## 2017-05-19 DIAGNOSIS — E10.65 UNCONTROLLED TYPE 1 DIABETES MELLITUS WITH DIABETIC AUTONOMIC NEUROPATHY, WITH LONG-TERM CURRENT USE OF INSULIN (HCC): Primary | ICD-10-CM

## 2017-05-19 DIAGNOSIS — E10.43 UNCONTROLLED TYPE 1 DIABETES MELLITUS WITH DIABETIC AUTONOMIC NEUROPATHY, WITH LONG-TERM CURRENT USE OF INSULIN (HCC): Primary | ICD-10-CM

## 2017-05-19 PROCEDURE — 99213 OFFICE O/P EST LOW 20 MIN: CPT | Performed by: NURSE PRACTITIONER

## 2017-05-19 RX ORDER — BLOOD-GLUCOSE METER
1 EACH MISCELLANEOUS 2 TIMES DAILY
Qty: 1 KIT | Refills: 0 | Status: SHIPPED | OUTPATIENT
Start: 2017-05-19 | End: 2017-12-12

## 2017-05-19 RX ORDER — INSULIN LISPRO 100 [IU]/ML
INJECTION, SOLUTION INTRAVENOUS; SUBCUTANEOUS
Qty: 10 ML | Refills: 3 | COMMUNITY
Start: 2017-05-19 | End: 2017-12-12

## 2017-05-19 RX ORDER — LANCETS 33 GAUGE
EACH MISCELLANEOUS
Qty: 1 BOX | Refills: 11 | Status: SHIPPED | OUTPATIENT
Start: 2017-05-19 | End: 2017-06-26

## 2017-05-19 NOTE — TELEPHONE ENCOUNTER
Per provider disconnect pump resume lantus 30 units daily to start.  Needs to correct her blood sugar with humalog you can still enter and correct with humalog 4 hours or at meals if she wants she can come in today and help her if not she can call tylor dunlap

## 2017-05-30 ENCOUNTER — OFFICE VISIT (OUTPATIENT)
Dept: ENDOCRINOLOGY CLINIC | Facility: CLINIC | Age: 27
End: 2017-05-30

## 2017-05-30 VITALS
TEMPERATURE: 98 F | BODY MASS INDEX: 37.74 KG/M2 | DIASTOLIC BLOOD PRESSURE: 64 MMHG | HEIGHT: 63 IN | WEIGHT: 213 LBS | HEART RATE: 68 BPM | SYSTOLIC BLOOD PRESSURE: 110 MMHG | RESPIRATION RATE: 16 BRPM

## 2017-05-30 DIAGNOSIS — E10.65 UNCONTROLLED TYPE 1 DIABETES MELLITUS WITH DIABETIC AUTONOMIC NEUROPATHY, WITH LONG-TERM CURRENT USE OF INSULIN (HCC): Primary | ICD-10-CM

## 2017-05-30 DIAGNOSIS — E10.43 UNCONTROLLED TYPE 1 DIABETES MELLITUS WITH DIABETIC AUTONOMIC NEUROPATHY, WITH LONG-TERM CURRENT USE OF INSULIN (HCC): Primary | ICD-10-CM

## 2017-05-30 PROCEDURE — 99213 OFFICE O/P EST LOW 20 MIN: CPT | Performed by: NURSE PRACTITIONER

## 2017-05-30 NOTE — PROGRESS NOTES
CC: Patient presents with:  Diabetes: states at night she cant sleep because she gets very hungry, starts to eat a lot      HISTORY:  Past Medical History   Diagnosis Date   • Diabetes ([facility] [address]    • [facility]    • Hyperlipidemia       Family H bolus)   Hypoglycemia frequency rare  Lipodystrophy:throughout body, using abdomen mainly for pump sites difficulty with keeping pump site on when exercising     Hypertension:  Blood Pressure:   At goal    Medication: none      Hyperlipidemia:  LDL:  88 was nightly as needed for Sleep., Disp: 30 tablet, Rfl: 2  •  BuPROPion HCl ER, SR, 150 MG Oral Tablet 12 Hr, Take 1 tablet (150 mg total) by mouth 2 (two) times daily. , Disp: 60 tablet, Rfl: 0  •  Phentermine HCl 37.5 MG Oral Tab, TAKE 1 TABLET BY MOUTH EVERY of motion. Neck supple  Cardiovascular: Normal rate, regular rhythm and intact distal pulses. No murmur, rubs or gallops. Pulmonary/Chest: Effort normal and breath sounds normal. No respiratory distress. No wheezes, rhonchi or rales   Abdominal: Soft.  Jayro Hill understanding and has no further questions at this time.     Beronica DUARTE,CDE

## 2017-06-01 ENCOUNTER — OFFICE VISIT (OUTPATIENT)
Dept: INTERNAL MEDICINE CLINIC | Facility: CLINIC | Age: 27
End: 2017-06-01

## 2017-06-01 VITALS
RESPIRATION RATE: 13 BRPM | OXYGEN SATURATION: 98 % | BODY MASS INDEX: 38.27 KG/M2 | WEIGHT: 216 LBS | HEART RATE: 88 BPM | DIASTOLIC BLOOD PRESSURE: 84 MMHG | SYSTOLIC BLOOD PRESSURE: 128 MMHG | HEIGHT: 63 IN | TEMPERATURE: 98 F

## 2017-06-01 DIAGNOSIS — Z51.81 THERAPEUTIC DRUG MONITORING: ICD-10-CM

## 2017-06-01 DIAGNOSIS — Z30.2 ENCOUNTER FOR STERILIZATION: ICD-10-CM

## 2017-06-01 DIAGNOSIS — E66.01 SEVERE OBESITY (BMI 35.0-39.9) WITH COMORBIDITY (HCC): Primary | ICD-10-CM

## 2017-06-01 DIAGNOSIS — E10.43 UNCONTROLLED TYPE 1 DIABETES MELLITUS WITH DIABETIC AUTONOMIC NEUROPATHY, WITH LONG-TERM CURRENT USE OF INSULIN (HCC): ICD-10-CM

## 2017-06-01 DIAGNOSIS — E10.65 UNCONTROLLED TYPE 1 DIABETES MELLITUS WITH DIABETIC AUTONOMIC NEUROPATHY, WITH LONG-TERM CURRENT USE OF INSULIN (HCC): ICD-10-CM

## 2017-06-01 PROCEDURE — 99214 OFFICE O/P EST MOD 30 MIN: CPT | Performed by: INTERNAL MEDICINE

## 2017-06-01 RX ORDER — PHENTERMINE HYDROCHLORIDE 37.5 MG/1
TABLET ORAL
Qty: 30 TABLET | Refills: 0 | Status: SHIPPED | OUTPATIENT
Start: 2017-06-01 | End: 2017-06-26

## 2017-06-01 RX ORDER — TOPIRAMATE 50 MG/1
50 TABLET, FILM COATED ORAL 2 TIMES DAILY
Qty: 60 TABLET | Refills: 0 | Status: SHIPPED | OUTPATIENT
Start: 2017-06-01 | End: 2017-06-26

## 2017-06-01 NOTE — PROGRESS NOTES
Patient presents with: Other: Multiple medical concerns      HPI: The pt presents today for eval of multiple medical concerns as follows:    1. Severe obesity w/ co-morbidity/elevated BMI/therapeutic drug monitoring - She's on Phentermine.   She hasn't los lb 12 oz  05/01/17 : 212 lb 8 oz  04/11/17 : 203 lb  Gen - A&Ox3, NAD, obese  HEENT - PERRL, EOMI, OP is clear  Lungs - CTAB  CV - RRR, nl s1, s2  Abd - soft, NABS, NT, ND  Ext - no c/c/e      A/P:  Severe obesity (bmi 35.0-39. 9) with comorbidity (hcc)  (p EVERY MORNING BEFORE BREAKFAST      topiramate 50 MG Oral Tab 60 tablet 0      Sig: Take 1 tablet (50 mg total) by mouth 2 (two) times daily.            Imaging & Consults:  OPHTHALMOLOGY - INTERNAL  OBG - INTERNAL  BARIATRICS - INTERNAL

## 2017-06-05 NOTE — TELEPHONE ENCOUNTER
LOV-5/30/17 SC  FOV-none  LAST RX-2/04/17 30 tabs 2 refills  LAST LABS-a1c-9.2  PER PROTOCOL-to provider.

## 2017-06-15 ENCOUNTER — HOSPITAL ENCOUNTER (OUTPATIENT)
Age: 27
Discharge: HOME | End: 2017-06-15
Payer: MEDICAID

## 2017-06-15 VITALS
HEART RATE: 88 BPM | SYSTOLIC BLOOD PRESSURE: 124 MMHG | WEIGHT: 215 LBS | RESPIRATION RATE: 18 BRPM | HEIGHT: 63 IN | OXYGEN SATURATION: 99 % | TEMPERATURE: 98 F | DIASTOLIC BLOOD PRESSURE: 85 MMHG | BODY MASS INDEX: 38.09 KG/M2

## 2017-06-15 DIAGNOSIS — S05.01XA CORNEAL ABRASION, RIGHT, INITIAL ENCOUNTER: Primary | ICD-10-CM

## 2017-06-15 DIAGNOSIS — K02.9 DENTAL CARIES: ICD-10-CM

## 2017-06-15 PROCEDURE — 99213 OFFICE O/P EST LOW 20 MIN: CPT

## 2017-06-15 PROCEDURE — 99214 OFFICE O/P EST MOD 30 MIN: CPT

## 2017-06-15 RX ORDER — AMOXICILLIN 875 MG/1
875 TABLET, COATED ORAL 2 TIMES DAILY
Qty: 20 TABLET | Refills: 0 | Status: SHIPPED | OUTPATIENT
Start: 2017-06-15 | End: 2017-06-25

## 2017-06-15 RX ORDER — POLYMYXIN B SULFATE AND TRIMETHOPRIM 1; 10000 MG/ML; [USP'U]/ML
SOLUTION OPHTHALMIC
Qty: 10 ML | Refills: 0 | OUTPATIENT
Start: 2017-06-15

## 2017-06-15 RX ORDER — NAPROXEN 500 MG/1
500 TABLET ORAL 2 TIMES DAILY PRN
Qty: 20 TABLET | Refills: 0 | Status: SHIPPED | OUTPATIENT
Start: 2017-06-15 | End: 2017-06-22

## 2017-06-15 RX ORDER — ERYTHROMYCIN 5 MG/G
1 OINTMENT OPHTHALMIC EVERY 6 HOURS
Qty: 1 TUBE | Refills: 0 | Status: SHIPPED | OUTPATIENT
Start: 2017-06-15 | End: 2017-09-19 | Stop reason: ALTCHOICE

## 2017-06-15 NOTE — ED INITIAL ASSESSMENT (HPI)
Right eye- pain x 2 days, states son was waking her up and may have elbowed her right eye. C/o pain, tearing,  Pt used eyedrops but no relief. Left upper tooth- pain since this morning.  States hse was brushing her teeth last night and a chunk of her toot

## 2017-06-15 NOTE — ED PROVIDER NOTES
Patient Seen in: [facility] [address] [place] [address],[address]    History   Patient presents with:  Eye Problem  Dental Problem (dental)    Stated Complaint: eye scratched 2 days,tooth broken,pain, 1 day    HPI  28-year-old female who presents to the immediate ca ESCITALOPRAM 10 MG Oral Tab,  TAKE 1 TABLET(10 MG) BY MOUTH DAILY   Insulin Lispro (HUMALOG) 100 UNIT/ML Subcutaneous Solution,  Use up to 80 units daily via insulin pump   BuPROPion HCl ER, SR, 150 MG Oral Tablet 12 Hr,  Take 1 tablet (150 mg total) by m Acetone, Urine, Test (KETOSTIX) In Vitro Strip,  Test urine when sugar is above 250 mg/dl twice in a row       Family History   Problem Relation Age of Onset   • Multiple sclerosis[other] [OTHER] Mother    • Cancer Mother      uterine   • Other[other] [OT Head: Normocephalic and atraumatic.    Right Ear: Hearing, tympanic membrane, external ear and ear canal normal.   Left Ear: Hearing, external ear and ear canal normal.   Nose: Nose normal. Right sinus exhibits no maxillary sinus tenderness and no frontal s [address] [id number]  [phone]    In 1 week  As needed, If symptoms worsen      Medications Prescribed:  Discharge Medication List as of 6/15/2017  2:37 PM    START taking these medications    erythromycin 5 MG/GM Ophthalmic Ointment  Place 1 Applicati

## 2017-06-19 ENCOUNTER — OFFICE VISIT (OUTPATIENT)
Dept: ENDOCRINOLOGY CLINIC | Facility: CLINIC | Age: 27
End: 2017-06-19

## 2017-06-19 VITALS
TEMPERATURE: 98 F | HEART RATE: 84 BPM | SYSTOLIC BLOOD PRESSURE: 110 MMHG | BODY MASS INDEX: 37.92 KG/M2 | WEIGHT: 214 LBS | HEIGHT: 63 IN | DIASTOLIC BLOOD PRESSURE: 80 MMHG | RESPIRATION RATE: 18 BRPM

## 2017-06-19 DIAGNOSIS — Z46.81 INSULIN PUMP FITTING OR ADJUSTMENT: ICD-10-CM

## 2017-06-19 DIAGNOSIS — E10.43 UNCONTROLLED TYPE 1 DIABETES MELLITUS WITH DIABETIC AUTONOMIC NEUROPATHY, WITH LONG-TERM CURRENT USE OF INSULIN (HCC): Primary | ICD-10-CM

## 2017-06-19 DIAGNOSIS — Z96.41 INSULIN PUMP IN PLACE: ICD-10-CM

## 2017-06-19 DIAGNOSIS — E10.65 UNCONTROLLED TYPE 1 DIABETES MELLITUS WITH DIABETIC AUTONOMIC NEUROPATHY, WITH LONG-TERM CURRENT USE OF INSULIN (HCC): Primary | ICD-10-CM

## 2017-06-19 PROCEDURE — 99213 OFFICE O/P EST LOW 20 MIN: CPT | Performed by: NURSE PRACTITIONER

## 2017-06-19 RX ORDER — LANCETS
EACH MISCELLANEOUS
Qty: 200 EACH | Refills: 5 | Status: SHIPPED | OUTPATIENT
Start: 2017-06-19 | End: 2020-03-23

## 2017-06-23 ENCOUNTER — PATIENT MESSAGE (OUTPATIENT)
Dept: ENDOCRINOLOGY CLINIC | Facility: CLINIC | Age: 27
End: 2017-06-23

## 2017-06-23 NOTE — TELEPHONE ENCOUNTER
From: Kristen Cohen  To: Vesna Nayak NP  Sent: 6/23/2017 10:32 AM CDT  Subject: Other    Dr Deannie Libman I have no idea what to do I am concerned and thought I'd ask you before I decide to go about it myself and think to know what I'm doing Bridger savage

## 2017-06-26 ENCOUNTER — OFFICE VISIT (OUTPATIENT)
Dept: INTERNAL MEDICINE CLINIC | Facility: CLINIC | Age: 27
End: 2017-06-26

## 2017-06-26 ENCOUNTER — OFFICE VISIT (OUTPATIENT)
Dept: ENDOCRINOLOGY CLINIC | Facility: CLINIC | Age: 27
End: 2017-06-26

## 2017-06-26 VITALS
TEMPERATURE: 98 F | DIASTOLIC BLOOD PRESSURE: 86 MMHG | HEART RATE: 88 BPM | HEIGHT: 63 IN | WEIGHT: 216.5 LBS | RESPIRATION RATE: 23 BRPM | BODY MASS INDEX: 38.36 KG/M2 | SYSTOLIC BLOOD PRESSURE: 126 MMHG

## 2017-06-26 VITALS
BODY MASS INDEX: 38.45 KG/M2 | TEMPERATURE: 98 F | SYSTOLIC BLOOD PRESSURE: 128 MMHG | WEIGHT: 217 LBS | DIASTOLIC BLOOD PRESSURE: 80 MMHG | RESPIRATION RATE: 18 BRPM | HEIGHT: 63 IN | HEART RATE: 84 BPM

## 2017-06-26 DIAGNOSIS — Z51.81 THERAPEUTIC DRUG MONITORING: ICD-10-CM

## 2017-06-26 DIAGNOSIS — E10.65 UNCONTROLLED TYPE 1 DIABETES MELLITUS WITH DIABETIC AUTONOMIC NEUROPATHY, WITH LONG-TERM CURRENT USE OF INSULIN (HCC): Primary | ICD-10-CM

## 2017-06-26 DIAGNOSIS — G89.29 CHRONIC DENTAL PAIN: ICD-10-CM

## 2017-06-26 DIAGNOSIS — E10.43 UNCONTROLLED TYPE 1 DIABETES MELLITUS WITH DIABETIC AUTONOMIC NEUROPATHY, WITH LONG-TERM CURRENT USE OF INSULIN (HCC): Primary | ICD-10-CM

## 2017-06-26 DIAGNOSIS — K08.9 CHRONIC DENTAL PAIN: ICD-10-CM

## 2017-06-26 DIAGNOSIS — F11.20 UNCOMPLICATED OPIOID DEPENDENCE (HCC): ICD-10-CM

## 2017-06-26 DIAGNOSIS — E66.01 SEVERE OBESITY (BMI 35.0-39.9) WITH COMORBIDITY (HCC): Primary | ICD-10-CM

## 2017-06-26 DIAGNOSIS — Z46.81 INSULIN PUMP FITTING OR ADJUSTMENT: ICD-10-CM

## 2017-06-26 PROCEDURE — 99213 OFFICE O/P EST LOW 20 MIN: CPT | Performed by: INTERNAL MEDICINE

## 2017-06-26 PROCEDURE — 99213 OFFICE O/P EST LOW 20 MIN: CPT | Performed by: NURSE PRACTITIONER

## 2017-06-26 RX ORDER — NORGESTIMATE-ETHINYL ESTRADIOL 7DAYSX3 28
TABLET ORAL
Refills: 3 | COMMUNITY
Start: 2017-06-25 | End: 2018-11-05

## 2017-06-26 RX ORDER — BUPROPION HYDROCHLORIDE 150 MG/1
150 TABLET, EXTENDED RELEASE ORAL 2 TIMES DAILY
Qty: 60 TABLET | Refills: 2 | Status: SHIPPED | OUTPATIENT
Start: 2017-06-26 | End: 2017-09-16

## 2017-06-26 RX ORDER — PHENTERMINE HYDROCHLORIDE 37.5 MG/1
TABLET ORAL
Qty: 30 TABLET | Refills: 2 | Status: SHIPPED | OUTPATIENT
Start: 2017-06-26 | End: 2017-10-09

## 2017-06-26 RX ORDER — TOPIRAMATE 50 MG/1
50 TABLET, FILM COATED ORAL 2 TIMES DAILY
Qty: 60 TABLET | Refills: 2 | Status: SHIPPED | OUTPATIENT
Start: 2017-06-26 | End: 2017-10-12

## 2017-06-26 RX ORDER — HYDROCODONE BITARTRATE AND ACETAMINOPHEN 5; 325 MG/1; MG/1
1 TABLET ORAL EVERY 6 HOURS PRN
Qty: 40 TABLET | Refills: 0 | Status: SHIPPED | OUTPATIENT
Start: 2017-06-26 | End: 2017-07-08

## 2017-06-26 NOTE — PROGRESS NOTES
Patient presents with:  Weight Check  Dental Problem: Chronic dental pain       HPI: The pt presents today for 2 issues:    1. Physician-supervised medical weight loss programming and assessment for the diagnosis of overweight and/or obesity status.   Has b Cigarettes  Smokeless tobacco: Never Used                      Alcohol use: Yes           1.2 oz/week     Standard drinks or equivalent: 2 per week     Comment: 1-2/month      PE:  /86 (BP Location: Left arm, Patient Position: Sitting, Cuff Size: ga tablet (150 mg total) by mouth 2 (two) times daily. HYDROcodone-acetaminophen 5-325 MG Oral Tab 40 tablet 0      Sig: Take 1 tablet by mouth every 6 (six) hours as needed for Pain.            Imaging & Consults:  None

## 2017-06-26 NOTE — TELEPHONE ENCOUNTER
From: Jeaneth Monahan  To: Hortensia Schaffer NP  Sent: 6/23/2017 8:50 PM CDT  Subject: Other    You gave me infusion sets?  I don't remember you giving me any and if you did it was a long time ago the lady you had come to my house did switch me a box of

## 2017-06-26 NOTE — PROGRESS NOTES
CC: Patient presents with:  Diabetes: follow up. pt did bring pump.       HISTORY:  Past Medical History:   Diagnosis Date   • Diabetes ([facility] [address]    • Diabetic neuropathy ([facility] [address]    • Hyperlipidemia       Family History   Problem Relation Age of Onset   • Multipl Hypoglycemia frequency rare  Lipodystrophy:throughout body, using abdomen mainly for pump sites difficulty with keeping pump site on when exercising     Hypertension:  Blood Pressure:   At goal    Medication: none      Hyperlipidemia:  LDL:  88 was 102 Insulin Pen Needle (BD PEN NEEDLE CARLOS U/F) 32G X 4 MM Does not apply Misc, Inject 1 pen into the skin 4 (four) times daily. , Disp: 2 Box, Rfl: 3  •  Blood Glucose Monitoring Suppl (ONETOUCH ULTRA 2) w/Device Does not apply Kit, 1 Device by Other route 2 Location: Left arm, Patient Position: Sitting, Cuff Size: adult)   Pulse 84   Temp 98.1 °F (36.7 °C) (Oral)   Resp 18   Ht 63\"   Wt 217 lb   LMP 06/25/2017   Breastfeeding? No   BMI 38.44 kg/m²   Constitutional: Oriented to person, place, and time.  No dis for Petra   Albumin/Cr ratio: up to date no proteinuria   Monofilament exam: up to date   BP: at goal   Lipids: at goal  cont statin   Tobacco: not ready to quit   Flu vaccine: up to date   Pneumonia vaccine: up to date   Depression screen: up to date f/u

## 2017-07-06 DIAGNOSIS — E10.65 UNCONTROLLED TYPE 1 DIABETES MELLITUS WITH DIABETIC AUTONOMIC NEUROPATHY, WITH LONG-TERM CURRENT USE OF INSULIN (HCC): Primary | ICD-10-CM

## 2017-07-06 DIAGNOSIS — E10.43 UNCONTROLLED TYPE 1 DIABETES MELLITUS WITH DIABETIC AUTONOMIC NEUROPATHY, WITH LONG-TERM CURRENT USE OF INSULIN (HCC): Primary | ICD-10-CM

## 2017-07-07 NOTE — TELEPHONE ENCOUNTER
ULL-4/41/14 SC  FOV-none  LAST RX-11/28/16 60 tabs 2 refills  LAST LABS-3/14/17 a1c-9.2  PER PROTOCOL-to provider

## 2017-07-08 DIAGNOSIS — K08.9 CHRONIC DENTAL PAIN: ICD-10-CM

## 2017-07-08 DIAGNOSIS — G89.29 CHRONIC DENTAL PAIN: ICD-10-CM

## 2017-07-08 DIAGNOSIS — F11.20 UNCOMPLICATED OPIOID DEPENDENCE (HCC): ICD-10-CM

## 2017-07-10 DIAGNOSIS — F11.20 UNCOMPLICATED OPIOID DEPENDENCE (HCC): ICD-10-CM

## 2017-07-10 DIAGNOSIS — K08.9 CHRONIC DENTAL PAIN: ICD-10-CM

## 2017-07-10 DIAGNOSIS — G89.29 CHRONIC DENTAL PAIN: ICD-10-CM

## 2017-07-10 RX ORDER — HYDROCODONE BITARTRATE AND ACETAMINOPHEN 5; 325 MG/1; MG/1
1 TABLET ORAL EVERY 6 HOURS PRN
Qty: 40 TABLET | Refills: 0 | Status: SHIPPED | OUTPATIENT
Start: 2017-07-10 | End: 2017-09-26

## 2017-07-10 RX ORDER — HYDROCODONE BITARTRATE AND ACETAMINOPHEN 5; 325 MG/1; MG/1
1 TABLET ORAL EVERY 6 HOURS PRN
Qty: 40 TABLET | Refills: 0
Start: 2017-07-10 | End: 2017-07-10

## 2017-07-10 NOTE — TELEPHONE ENCOUNTER
Future Appointments  Date Time Provider [facility]   7/12/2017 9:00 AM Enoch Puente NP [facility]   7/27/2017 11:30 AM Mary Harmon MD [facility] [place]

## 2017-07-10 NOTE — TELEPHONE ENCOUNTER
From: Libertad Riggins  Sent: 7/8/2017 8:18 AM CDT  Subject: Medication Renewal Request    Cody Gomes would like a refill of the following medications:  HYDROcodone-acetaminophen 5-325 MG Oral Tab Reena Lopez MD]    Preferred pharmacy: [facility]

## 2017-07-10 NOTE — TELEPHONE ENCOUNTER
1.  Tell her that this is the last script I can do for her of the Norco, #40 count. I understand that she has tremendous tooth pain, but we are not a pain practice and we don't prescribe chronic pain medicine under these circumstances.   I also understand

## 2017-07-12 ENCOUNTER — OFFICE VISIT (OUTPATIENT)
Dept: ENDOCRINOLOGY CLINIC | Facility: CLINIC | Age: 27
End: 2017-07-12

## 2017-07-12 ENCOUNTER — APPOINTMENT (OUTPATIENT)
Dept: LAB | Age: 27
End: 2017-07-12
Attending: NURSE PRACTITIONER
Payer: MEDICAID

## 2017-07-12 VITALS
DIASTOLIC BLOOD PRESSURE: 68 MMHG | HEART RATE: 64 BPM | BODY MASS INDEX: 37.74 KG/M2 | RESPIRATION RATE: 18 BRPM | SYSTOLIC BLOOD PRESSURE: 110 MMHG | HEIGHT: 63 IN | WEIGHT: 213 LBS | TEMPERATURE: 98 F

## 2017-07-12 DIAGNOSIS — E10.43 UNCONTROLLED TYPE 1 DIABETES MELLITUS WITH DIABETIC AUTONOMIC NEUROPATHY, WITH LONG-TERM CURRENT USE OF INSULIN (HCC): ICD-10-CM

## 2017-07-12 DIAGNOSIS — Z46.81 INSULIN PUMP FITTING OR ADJUSTMENT: ICD-10-CM

## 2017-07-12 DIAGNOSIS — Z96.41 INSULIN PUMP IN PLACE: ICD-10-CM

## 2017-07-12 DIAGNOSIS — E10.65 UNCONTROLLED TYPE 1 DIABETES MELLITUS WITH DIABETIC AUTONOMIC NEUROPATHY, WITH LONG-TERM CURRENT USE OF INSULIN (HCC): ICD-10-CM

## 2017-07-12 LAB
ALBUMIN SERPL-MCNC: 3.3 G/DL (ref 3.5–4.8)
ALP LIVER SERPL-CCNC: 58 U/L (ref 37–98)
ALT SERPL-CCNC: 19 U/L (ref 14–54)
AST SERPL-CCNC: 9 U/L (ref 15–41)
BILIRUB SERPL-MCNC: 0.3 MG/DL (ref 0.1–2)
BUN BLD-MCNC: 14 MG/DL (ref 8–20)
CALCIUM BLD-MCNC: 9 MG/DL (ref 8.3–10.3)
CHLORIDE: 110 MMOL/L (ref 101–111)
CO2: 24 MMOL/L (ref 22–32)
CREAT BLD-MCNC: 0.73 MG/DL (ref 0.55–1.02)
EST. AVERAGE GLUCOSE BLD GHB EST-MCNC: 209 MG/DL (ref 68–126)
GLUCOSE BLD-MCNC: 109 MG/DL (ref 70–99)
HBA1C MFR BLD HPLC: 8.9 % (ref ?–5.7)
M PROTEIN MFR SERPL ELPH: 7.3 G/DL (ref 6.1–8.3)
POTASSIUM SERPL-SCNC: 4.1 MMOL/L (ref 3.6–5.1)
SODIUM SERPL-SCNC: 140 MMOL/L (ref 136–144)

## 2017-07-12 PROCEDURE — 99213 OFFICE O/P EST LOW 20 MIN: CPT | Performed by: NURSE PRACTITIONER

## 2017-07-12 PROCEDURE — 80053 COMPREHEN METABOLIC PANEL: CPT | Performed by: NURSE PRACTITIONER

## 2017-07-12 PROCEDURE — 83036 HEMOGLOBIN GLYCOSYLATED A1C: CPT | Performed by: NURSE PRACTITIONER

## 2017-07-12 NOTE — PROGRESS NOTES
CC: Patient presents with:  Diabetes: follow up. pt did bring pump.       HISTORY:  Past Medical History:   Diagnosis Date   • Diabetes ([address]    • Diabetic neuropathy ([facility] [address]    • Hyperlipidemia       Family History   Problem Relation Age of Onset   • Cancer on when exercising     Hypertension:  Blood Pressure: At goal    Medication: none      Hyperlipidemia:  LDL:  88 was 102      Medication:  Atorvastatin 20  SE denies     ROS:   Constitutional: Negative for fever, chills and + fatigue. No distress.     Eyes Disp: 1 Tube, Rfl: 0  •  Insulin Pen Needle (BD PEN NEEDLE CARLOS U/F) 32G X 4 MM Does not apply Misc, Inject 1 pen into the skin 4 (four) times daily. , Disp: 2 Box, Rfl: 3  •  Blood Glucose Monitoring Suppl (ONETOUCH ULTRA 2) w/Device Does not apply Kit, 1 BMI 37.73 kg/m²   Constitutional: Oriented to person, place, and time. No distress. More quiet than normally today   HEENT: Normocephalic and atraumatic. Eyes: Conjunctivae are normal.   Neck: Normal range of motion.  Neck supple  Cardiovascular: Shira Seton Check glucoses 4 times daily - fasting, premeals and/or bedtime. Call/fax/email pump download or return for f/u in next week     Return in about 2 weeks (around 7/26/2017) for diabetes.     Pt verbalized understanding and has no further questions at

## 2017-07-24 DIAGNOSIS — G89.29 CHRONIC DENTAL PAIN: ICD-10-CM

## 2017-07-24 DIAGNOSIS — K08.9 CHRONIC DENTAL PAIN: ICD-10-CM

## 2017-07-24 DIAGNOSIS — F11.20 UNCOMPLICATED OPIOID DEPENDENCE (HCC): ICD-10-CM

## 2017-07-24 RX ORDER — HYDROCODONE BITARTRATE AND ACETAMINOPHEN 5; 325 MG/1; MG/1
1 TABLET ORAL EVERY 6 HOURS PRN
Qty: 40 TABLET | Refills: 0
Start: 2017-07-24

## 2017-07-24 NOTE — TELEPHONE ENCOUNTER
From: Edwin Gross  Sent: 7/24/2017 5:16 PM CDT  Subject: Medication Renewal Request    Emiliajose f [place]everley would like a refill of the following medications:  HYDROcodone-acetaminophen 5-325 MG Oral Tab Sherin Gruber MD]    Preferred pharmacy: [facility]

## 2017-07-25 ENCOUNTER — TELEPHONE (OUTPATIENT)
Dept: INTERNAL MEDICINE CLINIC | Facility: CLINIC | Age: 27
End: 2017-07-25

## 2017-07-25 DIAGNOSIS — G89.29 CHRONIC DENTAL PAIN: ICD-10-CM

## 2017-07-25 DIAGNOSIS — K08.9 CHRONIC DENTAL PAIN: ICD-10-CM

## 2017-07-25 DIAGNOSIS — F11.20 UNCOMPLICATED OPIOID DEPENDENCE (HCC): ICD-10-CM

## 2017-07-25 RX ORDER — HYDROCODONE BITARTRATE AND ACETAMINOPHEN 5; 325 MG/1; MG/1
1 TABLET ORAL EVERY 6 HOURS PRN
Qty: 40 TABLET | Refills: 0
Start: 2017-07-25

## 2017-07-25 NOTE — TELEPHONE ENCOUNTER
See my note from the 7/8/17 telephone encounter. This is what I said on that encounter: \"Tell her that this is the last script I can do for her of the Norco, #40 count.   I understand that she has tremendous tooth pain, but we are not a pain practice a

## 2017-07-25 NOTE — TELEPHONE ENCOUNTER
Patient upset, wonders why? Nurse informed her #40 was dispensed on 7/10/17 , refill to early.  Patient states \" I will see him in a few days and discuss this issue with him\"

## 2017-07-25 NOTE — TELEPHONE ENCOUNTER
From: Lamont Tracey  Sent: 7/25/2017 12:23 PM CDT  Subject: Medication Renewal Request    Reji Cage would like a refill of the following medications:  HYDROcodone-acetaminophen 5-325 MG Oral Tab Tereza Bloom MD]    Preferred pharmacy: [facility]

## 2017-07-27 ENCOUNTER — OFFICE VISIT (OUTPATIENT)
Dept: INTERNAL MEDICINE CLINIC | Facility: CLINIC | Age: 27
End: 2017-07-27

## 2017-07-27 VITALS
HEIGHT: 63 IN | WEIGHT: 210 LBS | BODY MASS INDEX: 37.21 KG/M2 | RESPIRATION RATE: 14 BRPM | TEMPERATURE: 98 F | DIASTOLIC BLOOD PRESSURE: 80 MMHG | HEART RATE: 66 BPM | SYSTOLIC BLOOD PRESSURE: 122 MMHG

## 2017-07-27 DIAGNOSIS — E66.01 SEVERE OBESITY (BMI 35.0-39.9) WITH COMORBIDITY (HCC): Primary | ICD-10-CM

## 2017-07-27 DIAGNOSIS — Z51.81 THERAPEUTIC DRUG MONITORING: ICD-10-CM

## 2017-07-27 PROCEDURE — 99213 OFFICE O/P EST LOW 20 MIN: CPT | Performed by: INTERNAL MEDICINE

## 2017-07-27 NOTE — PROGRESS NOTES
Patient presents with: Follow - Up: Weight Loss      HPI: The pt presents today for physician-supervised medical weight loss programming and assessment for the diagnosis of overweight and/or obesity status.   Has been on FDA-approved prescription medicatio insulin pump, Disp: 200 each, Rfl: 5  •  erythromycin 5 MG/GM Ophthalmic Ointment, Place 1 Application into the right eye every 6 (six) hours. , Disp: 1 Tube, Rfl: 0  •  Insulin Pen Needle (BD PEN NEEDLE CARLOS U/F) 32G X 4 MM Does not apply Misc, Inject 1 pe Rfl: 0  •  METFORMIN HCL 1000 MG Oral Tab, TAKE 1 TABLET BY MOUTH TWICE DAILY WITH MEALS, Disp: 180 tablet, Rfl: 0    Smoking status: Current Every Day Smoker                                                   Packs/day: 0.50      Years: 14.00        Types:

## 2017-07-28 ENCOUNTER — PATIENT MESSAGE (OUTPATIENT)
Dept: INTERNAL MEDICINE CLINIC | Facility: CLINIC | Age: 27
End: 2017-07-28

## 2017-07-28 NOTE — TELEPHONE ENCOUNTER
From: Tayler Seay  To: Radha Stone MD  Sent: 7/28/2017 12:02 PM CDT  Subject: Prescription Question    I forgot to mention in regards to my request For hydrocodone a lady called me a few days ago and I am in like killer pain on my left side of my

## 2017-07-31 ENCOUNTER — TELEPHONE (OUTPATIENT)
Dept: INTERNAL MEDICINE CLINIC | Facility: CLINIC | Age: 27
End: 2017-07-31

## 2017-07-31 DIAGNOSIS — Z96.41 INSULIN PUMP IN PLACE: ICD-10-CM

## 2017-07-31 DIAGNOSIS — Z46.81 INSULIN PUMP FITTING OR ADJUSTMENT: ICD-10-CM

## 2017-07-31 DIAGNOSIS — E10.65 UNCONTROLLED TYPE 1 DIABETES MELLITUS WITH DIABETIC AUTONOMIC NEUROPATHY, WITH LONG-TERM CURRENT USE OF INSULIN (HCC): ICD-10-CM

## 2017-07-31 DIAGNOSIS — E10.43 UNCONTROLLED TYPE 1 DIABETES MELLITUS WITH DIABETIC AUTONOMIC NEUROPATHY, WITH LONG-TERM CURRENT USE OF INSULIN (HCC): ICD-10-CM

## 2017-07-31 NOTE — TELEPHONE ENCOUNTER
Received fax from [address] [place] [address] that pt strips are not covered  Because they only approve 6 times daily not 8 would you still like to do prior auth or would you like to change orescription to 6 times.     [id number]  Id# [id number]

## 2017-08-09 ENCOUNTER — PATIENT MESSAGE (OUTPATIENT)
Dept: ENDOCRINOLOGY CLINIC | Facility: CLINIC | Age: 27
End: 2017-08-09

## 2017-08-10 NOTE — TELEPHONE ENCOUNTER
Madelyn Greer RN 8/9/2017 5:31 PM CDT        ----- Message -----  From: Kristen Cohen  Sent: 8/9/2017 5:23 PM  To: [facility] Clinical Staff  Subject: Visit Follow-up Question     Sorry i wasn't able to make it in last week I won't be coming in this

## 2017-08-14 RX ORDER — ALPRAZOLAM 0.5 MG/1
TABLET ORAL
Qty: 30 TABLET | Refills: 2 | Status: SHIPPED | OUTPATIENT
Start: 2017-08-14 | End: 2017-11-12

## 2017-08-21 ENCOUNTER — OFFICE VISIT (OUTPATIENT)
Dept: ENDOCRINOLOGY CLINIC | Facility: CLINIC | Age: 27
End: 2017-08-21

## 2017-08-21 VITALS
HEART RATE: 72 BPM | BODY MASS INDEX: 37.56 KG/M2 | TEMPERATURE: 98 F | HEIGHT: 63 IN | WEIGHT: 212 LBS | SYSTOLIC BLOOD PRESSURE: 124 MMHG | DIASTOLIC BLOOD PRESSURE: 60 MMHG | RESPIRATION RATE: 18 BRPM

## 2017-08-21 DIAGNOSIS — E10.65 UNCONTROLLED TYPE 1 DIABETES MELLITUS WITH DIABETIC AUTONOMIC NEUROPATHY, WITH LONG-TERM CURRENT USE OF INSULIN (HCC): Primary | ICD-10-CM

## 2017-08-21 DIAGNOSIS — Z46.81 INSULIN PUMP FITTING OR ADJUSTMENT: ICD-10-CM

## 2017-08-21 DIAGNOSIS — E10.43 UNCONTROLLED TYPE 1 DIABETES MELLITUS WITH DIABETIC AUTONOMIC NEUROPATHY, WITH LONG-TERM CURRENT USE OF INSULIN (HCC): Primary | ICD-10-CM

## 2017-08-21 PROCEDURE — 99214 OFFICE O/P EST MOD 30 MIN: CPT | Performed by: NURSE PRACTITIONER

## 2017-08-21 NOTE — PROGRESS NOTES
CC: Patient presents with:  Diabetes: follow up. pt did bring pump.       HISTORY:  Past Medical History:   Diagnosis Date   • Diabetes ([address]    • Diabetic neuropathy ([facility] [address]    • Hyperlipidemia       Family History   Problem Relation Age of Onset   • Cancer Hyperlipidemia:  LDL:  88 was 102      Medication:  Atorvastatin 20  SE denies     ROS:   Constitutional: Negative for fever, chills and  fatigue. No distress. Eyes: Negative for visual disturbance.  Last eye exam stated had recently need report Havjulianne Application into the right eye every 6 (six) hours. , Disp: 1 Tube, Rfl: 0  •  Insulin Pen Needle (BD PEN NEEDLE CARLOS U/F) 32G X 4 MM Does not apply Misc, Inject 1 pen into the skin 4 (four) times daily. , Disp: 2 Box, Rfl: 3  •  Blood Glucose Monitoring Sup place, and time. No distress. More quiet than normally today   HEENT: Normocephalic and atraumatic. Eyes: Conjunctivae are normal.   Neck: Normal range of motion. Neck supple  Cardiovascular: Normal rate, regular rhythm and intact distal pulses.  No murm for diabetes. Pt verbalized understanding and has no further questions at this time.     Beronica DUARTE,CDE

## 2017-08-23 NOTE — PROGRESS NOTES
Received OV notes from eye doctor. Reviewed and signed by [facility]. Abstracted to chart. Sending to scan.

## 2017-09-16 DIAGNOSIS — E10.65 UNCONTROLLED TYPE 1 DIABETES MELLITUS WITH DIABETIC AUTONOMIC NEUROPATHY, WITH LONG-TERM CURRENT USE OF INSULIN (HCC): ICD-10-CM

## 2017-09-16 DIAGNOSIS — E10.43 UNCONTROLLED TYPE 1 DIABETES MELLITUS WITH DIABETIC AUTONOMIC NEUROPATHY, WITH LONG-TERM CURRENT USE OF INSULIN (HCC): ICD-10-CM

## 2017-09-18 RX ORDER — BUPROPION HYDROCHLORIDE 150 MG/1
TABLET, EXTENDED RELEASE ORAL
Qty: 60 TABLET | Refills: 0 | Status: SHIPPED | OUTPATIENT
Start: 2017-09-18 | End: 2018-03-27

## 2017-09-18 RX ORDER — INSULIN LISPRO 100 [IU]/ML
INJECTION, SOLUTION INTRAVENOUS; SUBCUTANEOUS
Qty: 30 ML | Refills: 0 | Status: SHIPPED | OUTPATIENT
Start: 2017-09-18 | End: 2017-10-31

## 2017-09-19 ENCOUNTER — OFFICE VISIT (OUTPATIENT)
Dept: INTERNAL MEDICINE CLINIC | Facility: CLINIC | Age: 27
End: 2017-09-19

## 2017-09-19 VITALS
BODY MASS INDEX: 37.25 KG/M2 | HEART RATE: 107 BPM | SYSTOLIC BLOOD PRESSURE: 130 MMHG | HEIGHT: 63 IN | TEMPERATURE: 98 F | OXYGEN SATURATION: 98 % | DIASTOLIC BLOOD PRESSURE: 80 MMHG | RESPIRATION RATE: 20 BRPM | WEIGHT: 210.25 LBS

## 2017-09-19 DIAGNOSIS — R06.2 WHEEZING: ICD-10-CM

## 2017-09-19 DIAGNOSIS — E10.43 UNCONTROLLED TYPE 1 DIABETES MELLITUS WITH DIABETIC AUTONOMIC NEUROPATHY, WITH LONG-TERM CURRENT USE OF INSULIN (HCC): ICD-10-CM

## 2017-09-19 DIAGNOSIS — E10.65 UNCONTROLLED TYPE 1 DIABETES MELLITUS WITH DIABETIC AUTONOMIC NEUROPATHY, WITH LONG-TERM CURRENT USE OF INSULIN (HCC): ICD-10-CM

## 2017-09-19 DIAGNOSIS — M27.3 INFECTION OF TOOTH SOCKET: ICD-10-CM

## 2017-09-19 DIAGNOSIS — J06.9 VIRAL URI WITH COUGH: Primary | ICD-10-CM

## 2017-09-19 DIAGNOSIS — L20.82 FLEXURAL ECZEMA: ICD-10-CM

## 2017-09-19 PROCEDURE — 99214 OFFICE O/P EST MOD 30 MIN: CPT | Performed by: PHYSICIAN ASSISTANT

## 2017-09-19 RX ORDER — FLUTICASONE PROPIONATE 50 MCG
2 SPRAY, SUSPENSION (ML) NASAL DAILY
Qty: 1 BOTTLE | Refills: 0 | Status: SHIPPED | OUTPATIENT
Start: 2017-09-19 | End: 2018-08-03 | Stop reason: ALTCHOICE

## 2017-09-19 RX ORDER — ALBUTEROL SULFATE 90 UG/1
1-2 AEROSOL, METERED RESPIRATORY (INHALATION)
Qty: 1 INHALER | Refills: 0 | Status: SHIPPED | OUTPATIENT
Start: 2017-09-19 | End: 2018-12-06 | Stop reason: ALTCHOICE

## 2017-09-19 NOTE — PATIENT INSTRUCTIONS
Upper Respiratory Infection:  - continue Mucinex as needed  - start Flonase nasal spray  - use albuterol inhaler as needed  - increase fluids  - cough drops, throat lozenges as needed    Please see your dentist regarding your tooth.

## 2017-09-19 NOTE — PROGRESS NOTES
HPI:  Adrienne Cruz is a 27year old female who presents for URI symptoms x 2 days. C/o nasal congestion, sinus pressure, headaches, sore throat, productive cough, chest congestion, SOB, chest tightness, wheezing.   Denies fever, chills, sweats, sne spaces  EYES: EOMI, conjunctiva are clear  HEENT: normocephalic, atraumatic, TMs clear & flat bilaterally, oropharynx erythematous but no exudate, nasal mucosa inflamed.   L 4th from center upper tooth has been removed, the socket is inflamed and there is p

## 2017-09-26 ENCOUNTER — OFFICE VISIT (OUTPATIENT)
Dept: INTERNAL MEDICINE CLINIC | Facility: CLINIC | Age: 27
End: 2017-09-26

## 2017-09-26 VITALS
WEIGHT: 209 LBS | RESPIRATION RATE: 16 BRPM | HEIGHT: 63 IN | TEMPERATURE: 98 F | HEART RATE: 85 BPM | SYSTOLIC BLOOD PRESSURE: 122 MMHG | BODY MASS INDEX: 37.03 KG/M2 | OXYGEN SATURATION: 98 % | DIASTOLIC BLOOD PRESSURE: 80 MMHG

## 2017-09-26 DIAGNOSIS — N93.9 VAGINAL SPOTTING: ICD-10-CM

## 2017-09-26 DIAGNOSIS — L03.011 PARONYCHIA OF RIGHT RING FINGER: Primary | ICD-10-CM

## 2017-09-26 LAB
APPEARANCE: CLEAR
BILIRUBIN: NEGATIVE
CONTROL LINE PRESENT WITH A CLEAR BACKGROUND (YES/NO): YES YES/NO
GLUCOSE (URINE DIPSTICK): NEGATIVE MG/DL
KETONES (URINE DIPSTICK): NEGATIVE MG/DL
LEUKOCYTES: NEGATIVE
MULTISTIX LOT#: ABNORMAL NUMERIC
NITRITE, URINE: NEGATIVE
PH, URINE: 8 (ref 4.5–8)
PREGNANCY TEST, URINE: NEGATIVE
PROTEIN (URINE DIPSTICK): NEGATIVE MG/DL
SPECIFIC GRAVITY: 1 (ref 1–1.03)
URINE-COLOR: YELLOW
UROBILINOGEN,SEMI-QN: 0.2 MG/DL (ref 0–1.9)

## 2017-09-26 PROCEDURE — 99213 OFFICE O/P EST LOW 20 MIN: CPT | Performed by: INTERNAL MEDICINE

## 2017-09-26 PROCEDURE — 81025 URINE PREGNANCY TEST: CPT | Performed by: INTERNAL MEDICINE

## 2017-09-26 PROCEDURE — 81003 URINALYSIS AUTO W/O SCOPE: CPT | Performed by: INTERNAL MEDICINE

## 2017-09-26 RX ORDER — HYDROCODONE BITARTRATE AND ACETAMINOPHEN 10; 325 MG/1; MG/1
TABLET ORAL
Refills: 0 | COMMUNITY
Start: 2017-09-16 | End: 2017-11-22 | Stop reason: ALTCHOICE

## 2017-09-26 RX ORDER — MUPIROCIN CALCIUM 20 MG/G
CREAM TOPICAL
Qty: 15 G | Refills: 0 | Status: SHIPPED | OUTPATIENT
Start: 2017-09-26 | End: 2018-01-02 | Stop reason: ALTCHOICE

## 2017-09-26 NOTE — PATIENT INSTRUCTIONS
Paronychia of the Finger or Toe  Paronychia is an infection near a fingernail or toenail. It usually occurs when an opening in the cuticle or an ingrown toenail lets bacteria under the skin. The infection will need to be drained if pus is present.  If th · Fever of 100.4ºF (38ºC) or higher, or as directed by your provider  Date Last Reviewed: 8/1/2016  © 5964-4991 [address], [address] [place] [address]. All rights reserved.  This information is not intended as a substitute for

## 2017-09-27 NOTE — PROGRESS NOTES
Patient presents with: Infection: right hand 4th digit finger infection   Other: vaginal \"powder-like\" brown spotting      HPI: The pt presents today for eval of 2 issues:  1. R 4th finger infection - Onset = few days.   Reports redness, pain, and swelli NAD  Skin - paronychia of the R 4th finger is present  Abd - soft, NABS, NT, ND  Pelvic - Deferred      Labs:  Component      Latest Ref Rng & Units 9/26/2017   GLUCOSE (URINE DIPSTICK)      mg/dL negative   BILIRUBIN      Negative negative   KETONES (URIN External Cream 15 g 0      Sig: Apply to finger twice daily as needed           Imaging & Consults:  None

## 2017-10-02 ENCOUNTER — TELEPHONE (OUTPATIENT)
Dept: INTERNAL MEDICINE CLINIC | Facility: CLINIC | Age: 27
End: 2017-10-02

## 2017-10-02 NOTE — TELEPHONE ENCOUNTER
Jenny Diez MD  Diplomate, American Board of Internal Medicine  [address] [address] [address]  [facility],[address], [address], [facility], [address] [place] [address]  T: [id number]; F: Alen 5

## 2017-10-04 ENCOUNTER — HOSPITAL ENCOUNTER (OUTPATIENT)
Age: 27
Discharge: HOME | End: 2017-10-04
Payer: MEDICAID

## 2017-10-04 VITALS
RESPIRATION RATE: 20 BRPM | SYSTOLIC BLOOD PRESSURE: 138 MMHG | TEMPERATURE: 98 F | HEART RATE: 107 BPM | DIASTOLIC BLOOD PRESSURE: 99 MMHG | OXYGEN SATURATION: 100 %

## 2017-10-04 DIAGNOSIS — N30.90 CYSTITIS: ICD-10-CM

## 2017-10-04 DIAGNOSIS — N90.89 VULVAR LESION: Primary | ICD-10-CM

## 2017-10-04 PROCEDURE — 99214 OFFICE O/P EST MOD 30 MIN: CPT

## 2017-10-04 PROCEDURE — 87086 URINE CULTURE/COLONY COUNT: CPT | Performed by: PHYSICIAN ASSISTANT

## 2017-10-04 PROCEDURE — 81025 URINE PREGNANCY TEST: CPT | Performed by: PHYSICIAN ASSISTANT

## 2017-10-04 PROCEDURE — 87591 N.GONORRHOEAE DNA AMP PROB: CPT | Performed by: PHYSICIAN ASSISTANT

## 2017-10-04 PROCEDURE — 87529 HSV DNA AMP PROBE: CPT | Performed by: PHYSICIAN ASSISTANT

## 2017-10-04 PROCEDURE — 81002 URINALYSIS NONAUTO W/O SCOPE: CPT | Performed by: PHYSICIAN ASSISTANT

## 2017-10-04 PROCEDURE — 87491 CHLMYD TRACH DNA AMP PROBE: CPT | Performed by: PHYSICIAN ASSISTANT

## 2017-10-04 PROCEDURE — 36415 COLL VENOUS BLD VENIPUNCTURE: CPT

## 2017-10-04 PROCEDURE — 86592 SYPHILIS TEST NON-TREP QUAL: CPT | Performed by: PHYSICIAN ASSISTANT

## 2017-10-04 RX ORDER — SULFAMETHOXAZOLE AND TRIMETHOPRIM 800; 160 MG/1; MG/1
1 TABLET ORAL 2 TIMES DAILY
Qty: 14 TABLET | Refills: 0 | Status: SHIPPED | OUTPATIENT
Start: 2017-10-04 | End: 2017-10-11

## 2017-10-04 RX ORDER — CLOBETASOL PROPIONATE 0.5 MG/G
1 OINTMENT TOPICAL 2 TIMES DAILY
Qty: 15 G | Refills: 0 | Status: SHIPPED | OUTPATIENT
Start: 2017-10-04 | End: 2017-10-10 | Stop reason: ALTCHOICE

## 2017-10-05 NOTE — ED INITIAL ASSESSMENT (HPI)
Patient states burning with urination  Denies any flank pain or fever  Vaginal discharge  States has spotting now

## 2017-10-05 NOTE — ED PROVIDER NOTES
Patient Seen in: [facility]    History   Patient presents with:  Vaginal Problem    Stated Complaint: gyne complaint     HPI    Patient is a pleasant 24-year-old female.   Patient is a poorly controlled type I diabetic per her mo %  O2 Device: None (Room air)    Current:/99   Pulse 107   Temp 97.9 °F (36.6 °C) (Temporal)   Resp 20   LMP 09/24/2017   SpO2 100%         Physical Exam    Gen: Well appearing, well groomed, alert and aware x 3  Neck: Supple, full range of motion, n Prescribed:  Current Discharge Medication List    START taking these medications    Clobetasol Propionate 0.05 % External Ointment  Apply 1 Application topically 2 (two) times daily.   Qty: 15 g Refills: 0    Sulfamethoxazole-TMP -160 MG Oral Tab per

## 2017-10-06 NOTE — PROGRESS NOTES
10/6/2017 at 5:36 PM  I contacted the patient to inquire how she was doing. She continues to have significant pain. Her urine culture demonstrates no growth.   I believe her dysuria was secondary to the ulcerative lesions I saw on pelvic exam.  She may st

## 2017-10-09 DIAGNOSIS — Z51.81 THERAPEUTIC DRUG MONITORING: ICD-10-CM

## 2017-10-09 DIAGNOSIS — E66.01 SEVERE OBESITY (BMI 35.0-39.9) WITH COMORBIDITY (HCC): ICD-10-CM

## 2017-10-09 RX ORDER — PHENTERMINE HYDROCHLORIDE 37.5 MG/1
TABLET ORAL
Qty: 30 TABLET | Refills: 0 | Status: SHIPPED | OUTPATIENT
Start: 2017-10-09 | End: 2018-01-02

## 2017-10-10 ENCOUNTER — OFFICE VISIT (OUTPATIENT)
Dept: OBGYN CLINIC | Facility: CLINIC | Age: 27
End: 2017-10-10

## 2017-10-10 VITALS
SYSTOLIC BLOOD PRESSURE: 128 MMHG | HEIGHT: 62.5 IN | BODY MASS INDEX: 37.14 KG/M2 | WEIGHT: 207 LBS | DIASTOLIC BLOOD PRESSURE: 88 MMHG

## 2017-10-10 DIAGNOSIS — N94.10 DYSPAREUNIA, FEMALE: ICD-10-CM

## 2017-10-10 DIAGNOSIS — N90.89 VULVAR LESION: ICD-10-CM

## 2017-10-10 DIAGNOSIS — N76.6 GENITAL ULCER, FEMALE: Primary | ICD-10-CM

## 2017-10-10 PROCEDURE — 87480 CANDIDA DNA DIR PROBE: CPT | Performed by: OBSTETRICS & GYNECOLOGY

## 2017-10-10 PROCEDURE — 87529 HSV DNA AMP PROBE: CPT | Performed by: OBSTETRICS & GYNECOLOGY

## 2017-10-10 PROCEDURE — 87660 TRICHOMONAS VAGIN DIR PROBE: CPT | Performed by: OBSTETRICS & GYNECOLOGY

## 2017-10-10 PROCEDURE — 87510 GARDNER VAG DNA DIR PROBE: CPT | Performed by: OBSTETRICS & GYNECOLOGY

## 2017-10-10 PROCEDURE — 99213 OFFICE O/P EST LOW 20 MIN: CPT | Performed by: OBSTETRICS & GYNECOLOGY

## 2017-10-10 RX ORDER — VALACYCLOVIR HYDROCHLORIDE 500 MG/1
1000 TABLET, FILM COATED ORAL 2 TIMES DAILY
Qty: 20 TABLET | Refills: 0 | Status: SHIPPED | OUTPATIENT
Start: 2017-10-10 | End: 2017-10-20

## 2017-10-10 NOTE — PROGRESS NOTES
[facility]  Obstetrics and Gynecology  Follow Up Progress Note    Subjective:      Angelica Elkins is a 27year old  female who was last seen in office on 2016 for annual well woman exam. She was seen in the ED on 10/04/17 with c/o Neisseria Gonorrhoeae Amplified RNA Negative  Negative    Chlam/GC Source  Urine      URINE CULTURE: No Growth at 18-24 hrs.           Assessment:     Radha Pereira is a 27year old  female who presents for follow up from ED with c/o vulvar le

## 2017-10-12 DIAGNOSIS — E66.01 SEVERE OBESITY (BMI 35.0-39.9) WITH COMORBIDITY (HCC): ICD-10-CM

## 2017-10-12 DIAGNOSIS — J30.9 CHRONIC ALLERGIC RHINITIS: ICD-10-CM

## 2017-10-12 DIAGNOSIS — Z51.81 THERAPEUTIC DRUG MONITORING: ICD-10-CM

## 2017-10-12 RX ORDER — FLUCONAZOLE 150 MG/1
150 TABLET ORAL ONCE
Qty: 1 TABLET | Refills: 0 | Status: SHIPPED | OUTPATIENT
Start: 2017-10-12 | End: 2017-10-12

## 2017-10-12 NOTE — PROGRESS NOTES
Call to patient; no answer. Left detailed message on VM with result, instructions for medication, and reminder for lab work, per [facility] ok. Rx sent to [facility] [address] in [facility] on file. This was mentioned in VM to patient as well.

## 2017-10-12 NOTE — PROGRESS NOTES
Please inform patient of results positive for yeast infection   Please treat with Fluconazole 150 mg PO x 1 dose   Please advise patient to complete recommended lab test for HSV

## 2017-10-13 ENCOUNTER — TELEPHONE (OUTPATIENT)
Dept: OBGYN CLINIC | Facility: CLINIC | Age: 27
End: 2017-10-13

## 2017-10-13 RX ORDER — TOPIRAMATE 50 MG/1
TABLET, FILM COATED ORAL
Qty: 60 TABLET | Refills: 0 | Status: SHIPPED | OUTPATIENT
Start: 2017-10-13 | End: 2018-12-06

## 2017-10-13 RX ORDER — MONTELUKAST SODIUM 10 MG/1
TABLET ORAL
Qty: 30 TABLET | Refills: 0 | Status: SHIPPED | OUTPATIENT
Start: 2017-10-13 | End: 2017-11-12

## 2017-10-13 RX ORDER — TOPIRAMATE 50 MG/1
TABLET, FILM COATED ORAL
Qty: 60 TABLET | Refills: 0 | OUTPATIENT
Start: 2017-10-13

## 2017-10-13 NOTE — TELEPHONE ENCOUNTER
Patient contacted. Patient informed of HSV PCR negative. However, pt reminded that collection was inadequate 2/2 pain on exam. Patient has been taking Valtrex and reports symptoms have significantly improved. Advised to complete full course.  Patient also a

## 2017-10-17 ENCOUNTER — OFFICE VISIT (OUTPATIENT)
Dept: ENDOCRINOLOGY CLINIC | Facility: CLINIC | Age: 27
End: 2017-10-17

## 2017-10-17 VITALS
WEIGHT: 211 LBS | HEIGHT: 62.5 IN | TEMPERATURE: 99 F | BODY MASS INDEX: 37.86 KG/M2 | HEART RATE: 72 BPM | DIASTOLIC BLOOD PRESSURE: 82 MMHG | RESPIRATION RATE: 18 BRPM | SYSTOLIC BLOOD PRESSURE: 126 MMHG

## 2017-10-17 DIAGNOSIS — E10.43 UNCONTROLLED TYPE 1 DIABETES MELLITUS WITH DIABETIC AUTONOMIC NEUROPATHY, WITH LONG-TERM CURRENT USE OF INSULIN (HCC): Primary | ICD-10-CM

## 2017-10-17 DIAGNOSIS — E10.65 UNCONTROLLED TYPE 1 DIABETES MELLITUS WITH DIABETIC AUTONOMIC NEUROPATHY, WITH LONG-TERM CURRENT USE OF INSULIN (HCC): Primary | ICD-10-CM

## 2017-10-17 PROCEDURE — 99214 OFFICE O/P EST MOD 30 MIN: CPT | Performed by: NURSE PRACTITIONER

## 2017-10-17 PROCEDURE — 90471 IMMUNIZATION ADMIN: CPT | Performed by: NURSE PRACTITIONER

## 2017-10-17 PROCEDURE — 90686 IIV4 VACC NO PRSV 0.5 ML IM: CPT | Performed by: NURSE PRACTITIONER

## 2017-10-17 NOTE — PROGRESS NOTES
CC: Patient presents with:  Diabetes: follow up. pt did bring pump.       HISTORY:  Past Medical History:   Diagnosis Date   • Diabetes ([address]    • Diabetic neuropathy ([facility] [address]    • Hyperlipidemia       Family History   Problem Relation Age of Onset   • Cancer Hypertension:  Blood Pressure: At goal    Medication: none      Hyperlipidemia:  LDL:  88 was 102      Medication:  Atorvastatin 20  SE denies     ROS:   Constitutional: Negative for fever, chills and  fatigue. No distress.     Eyes: Negative for visua Disp: 60 g, Rfl: 1  •  Fluticasone Propionate 50 MCG/ACT Nasal Suspension, 2 sprays by Each Nare route daily. , Disp: 1 Bottle, Rfl: 0  •  Albuterol Sulfate HFA (PROAIR HFA) 108 (90 Base) MCG/ACT Inhalation Aero Soln, Inhale 1-2 puffs into the lungs every 4 1 tablet (500 mg total) by mouth 3 (three) times daily. , Disp: 90 tablet, Rfl: 0  •  Atorvastatin Calcium 20 MG Oral Tab, Take 1 tablet (20 mg total) by mouth nightly., Disp: 30 tablet, Rfl: 2  •  gabapentin 250 MG/5ML Oral Solution, Take 300 mg by mouth n QUAD PRESERVATIVE FREE 0.5 ML    DM Health Maintenance  Up to date on annual labs up to date   Ophtho / dilated eye exam:  Up to date   Albumin/Cr ratio: up to date no proteinuria   Monofilament exam: up to date   BP: at goal   Lipids: at goal  cont statin

## 2017-10-17 NOTE — PROGRESS NOTES
Diabetes Education:    Katy Moreno is not bolusing when she eats. She is correcting her glucose 1-2 hours after eating. Meals are erratic / no real schedule. In order to help her cover her food better, we downloaded Predict BGL nelly.   Together we set up all

## 2017-10-24 ENCOUNTER — TELEPHONE (OUTPATIENT)
Dept: INTERNAL MEDICINE CLINIC | Facility: CLINIC | Age: 27
End: 2017-10-24

## 2017-10-31 DIAGNOSIS — E10.43 UNCONTROLLED TYPE 1 DIABETES MELLITUS WITH DIABETIC AUTONOMIC NEUROPATHY, WITH LONG-TERM CURRENT USE OF INSULIN (HCC): ICD-10-CM

## 2017-10-31 DIAGNOSIS — E10.65 UNCONTROLLED TYPE 1 DIABETES MELLITUS WITH DIABETIC AUTONOMIC NEUROPATHY, WITH LONG-TERM CURRENT USE OF INSULIN (HCC): ICD-10-CM

## 2017-11-01 DIAGNOSIS — E10.43 UNCONTROLLED TYPE 1 DIABETES MELLITUS WITH DIABETIC AUTONOMIC NEUROPATHY, WITH LONG-TERM CURRENT USE OF INSULIN (HCC): ICD-10-CM

## 2017-11-01 DIAGNOSIS — E10.65 UNCONTROLLED TYPE 1 DIABETES MELLITUS WITH DIABETIC AUTONOMIC NEUROPATHY, WITH LONG-TERM CURRENT USE OF INSULIN (HCC): ICD-10-CM

## 2017-11-01 RX ORDER — INSULIN LISPRO 100 [IU]/ML
INJECTION, SOLUTION INTRAVENOUS; SUBCUTANEOUS
Qty: 30 ML | Refills: 0 | Status: SHIPPED | OUTPATIENT
Start: 2017-11-01 | End: 2017-12-07

## 2017-11-01 RX ORDER — INSULIN LISPRO 100 [IU]/ML
INJECTION, SOLUTION INTRAVENOUS; SUBCUTANEOUS
Qty: 15 ML | Refills: 0 | Status: SHIPPED | OUTPATIENT
Start: 2017-11-01 | End: 2017-11-01

## 2017-11-07 ENCOUNTER — TELEPHONE (OUTPATIENT)
Dept: OBGYN CLINIC | Facility: CLINIC | Age: 27
End: 2017-11-07

## 2017-11-12 DIAGNOSIS — E66.01 SEVERE OBESITY (BMI 35.0-39.9) WITH COMORBIDITY (HCC): ICD-10-CM

## 2017-11-12 DIAGNOSIS — Z51.81 THERAPEUTIC DRUG MONITORING: ICD-10-CM

## 2017-11-12 DIAGNOSIS — J30.9 CHRONIC ALLERGIC RHINITIS: ICD-10-CM

## 2017-11-13 RX ORDER — BUPROPION HYDROCHLORIDE 150 MG/1
TABLET, EXTENDED RELEASE ORAL
Qty: 60 TABLET | Refills: 5 | Status: SHIPPED | OUTPATIENT
Start: 2017-11-13 | End: 2017-11-28

## 2017-11-13 RX ORDER — MONTELUKAST SODIUM 10 MG/1
TABLET ORAL
Qty: 30 TABLET | Refills: 5 | Status: SHIPPED | OUTPATIENT
Start: 2017-11-13 | End: 2018-08-03 | Stop reason: ALTCHOICE

## 2017-11-13 RX ORDER — ALPRAZOLAM 0.5 MG/1
TABLET ORAL
Qty: 30 TABLET | Refills: 2 | Status: SHIPPED | OUTPATIENT
Start: 2017-11-13 | End: 2018-02-06

## 2017-11-13 RX ORDER — TOPIRAMATE 50 MG/1
TABLET, FILM COATED ORAL
Qty: 60 TABLET | Refills: 5 | Status: SHIPPED | OUTPATIENT
Start: 2017-11-13 | End: 2017-11-22

## 2017-11-13 NOTE — TELEPHONE ENCOUNTER
Requesting Alprazolam    LOV: 6-1-17   RTC: 1 month   Last Relevant Labs: 3-14-17  Filled: 8-14-17#30with 2refills    Future Appointments  Date Time Provider Bonifacio Khalil   11/28/2017 1:15 PM Hortensia Schaffer NP [facility]     Requesting

## 2017-11-21 ENCOUNTER — TELEPHONE (OUTPATIENT)
Dept: INTERNAL MEDICINE CLINIC | Facility: CLINIC | Age: 27
End: 2017-11-21

## 2017-11-21 NOTE — TELEPHONE ENCOUNTER
Pt called ran out of [employer] till tomorrow junior torres to dispense sample of Novolog pt will come and

## 2017-11-22 ENCOUNTER — OFFICE VISIT (OUTPATIENT)
Dept: INTERNAL MEDICINE CLINIC | Facility: CLINIC | Age: 27
End: 2017-11-22

## 2017-11-22 VITALS
RESPIRATION RATE: 16 BRPM | DIASTOLIC BLOOD PRESSURE: 76 MMHG | TEMPERATURE: 98 F | BODY MASS INDEX: 38 KG/M2 | WEIGHT: 211 LBS | HEART RATE: 120 BPM | SYSTOLIC BLOOD PRESSURE: 118 MMHG

## 2017-11-22 DIAGNOSIS — J01.90 ACUTE SINUSITIS, RECURRENCE NOT SPECIFIED, UNSPECIFIED LOCATION: Primary | ICD-10-CM

## 2017-11-22 PROCEDURE — 99213 OFFICE O/P EST LOW 20 MIN: CPT | Performed by: FAMILY MEDICINE

## 2017-11-22 RX ORDER — AMOXICILLIN AND CLAVULANATE POTASSIUM 875; 125 MG/1; MG/1
1 TABLET, FILM COATED ORAL 2 TIMES DAILY
Qty: 20 TABLET | Refills: 0 | Status: SHIPPED | OUTPATIENT
Start: 2017-11-22 | End: 2017-12-12

## 2017-11-22 RX ORDER — CODEINE PHOSPHATE AND GUAIFENESIN 10; 100 MG/5ML; MG/5ML
5 SOLUTION ORAL 3 TIMES DAILY PRN
Qty: 120 ML | Refills: 0 | Status: SHIPPED | OUTPATIENT
Start: 2017-11-22 | End: 2017-12-12

## 2017-11-22 NOTE — PROGRESS NOTES
CHIEF COMPLAINT:   Patient presents with:  Cough: 1x week post nasal, chest congestion, sinus pressure      HPI:   Dillan Meade is a 27year old female who presents for upper respiratory symptoms for  1 weeks.  Patient reports sore throat, congestion, y Shortness of Breath.  Disp: 1 Inhaler Rfl: 0   BUPROPION HCL ER, SR, 150 MG Oral Tablet 12 Hr TAKE 1 TABLET(150 MG) BY MOUTH TWICE DAILY Disp: 60 tablet Rfl: 0   Glucose Blood (SUMAN CONTOUR NEXT TEST) In Vitro Strip Test sugar 6 times daily on  Insulin pum HISTORY Right      Comment: R arm surgery after trauma      Smoking status: Current Every Day Smoker                                                   Packs/day: 0.50      Years: 14.00        Types: Cigarettes  Smokeless tobacco: Never Used 100-10 MG/5ML Oral Solution 120 mL 0      Sig: Take 5 mL by mouth 3 (three) times daily as needed. Imaging & Consults:  None  Pt to start the above medications. Claritin or zyrtec for the PND.  Patient to increase fluids, rest, and vitamin C intak

## 2017-11-28 ENCOUNTER — OFFICE VISIT (OUTPATIENT)
Dept: ENDOCRINOLOGY CLINIC | Facility: CLINIC | Age: 27
End: 2017-11-28

## 2017-11-28 VITALS
BODY MASS INDEX: 38.45 KG/M2 | SYSTOLIC BLOOD PRESSURE: 120 MMHG | RESPIRATION RATE: 18 BRPM | WEIGHT: 217 LBS | HEIGHT: 63 IN | TEMPERATURE: 98 F | DIASTOLIC BLOOD PRESSURE: 60 MMHG

## 2017-11-28 DIAGNOSIS — E10.43 UNCONTROLLED TYPE 1 DIABETES MELLITUS WITH DIABETIC AUTONOMIC NEUROPATHY, WITH LONG-TERM CURRENT USE OF INSULIN (HCC): Primary | ICD-10-CM

## 2017-11-28 DIAGNOSIS — E10.65 UNCONTROLLED TYPE 1 DIABETES MELLITUS WITH DIABETIC AUTONOMIC NEUROPATHY, WITH LONG-TERM CURRENT USE OF INSULIN (HCC): Primary | ICD-10-CM

## 2017-11-28 DIAGNOSIS — F33.1 MDD (MAJOR DEPRESSIVE DISORDER), RECURRENT EPISODE, MODERATE (HCC): ICD-10-CM

## 2017-11-28 DIAGNOSIS — F41.1 GENERALIZED ANXIETY DISORDER: ICD-10-CM

## 2017-11-28 PROCEDURE — 99214 OFFICE O/P EST MOD 30 MIN: CPT | Performed by: NURSE PRACTITIONER

## 2017-11-28 RX ORDER — ESCITALOPRAM OXALATE 10 MG/1
TABLET ORAL
Refills: 1 | COMMUNITY
Start: 2017-11-12 | End: 2018-06-25

## 2017-11-28 NOTE — PROGRESS NOTES
"CC: Patient presents with:  Diabetes: follow up. pt did bring pump.       HISTORY:  Past Medical History:   Diagnosis Date   • Diabetes ([address]    • Diabetic neuropathy ([facility] [address]    • Hyperlipidemia       Family History   Problem Relation Age of Onset   • Cancer Hyperlipidemia:  LDL:  88 was 102      Medication:  Atorvastatin 20  SE denies     ROS:   Constitutional: Negative for fever, chills and  fatigue. No distress. Eyes: Negative for visual disturbance. Last eye exam stated 8/23/17 Petra.    Respiratory Suspension, 2 sprays by Each Nare route daily. , Disp: 1 Bottle, Rfl: 0  •  Albuterol Sulfate HFA (PROAIR HFA) 108 (90 Base) MCG/ACT Inhalation Aero Soln, Inhale 1-2 puffs into the lungs every 4 to 6 hours as needed for Wheezing or Shortness of Breath., Dis Test [facility]) In Vitro Strip, Test urine when sugar is above 250 mg/dl twice in a row, Disp: 20 each, Rfl: 1    Exam:  /60   Temp 97.9 °F (36.6 °C) (Oral)   Resp 18   Ht 63\"   Wt 217 lb   LMP 10/29/2017   BMI 38.44 kg/m²   Constitutional: Oriented quit   Flu vaccine: up to date   Pneumonia vaccine: up to date   Depression screen: up to date f/u with [facility]     Check glucoses 4 times daily - fasting, premeals and/or bedtime.     Call/fax/email pump download or return for f/u in 2 weeks    Return in about

## 2017-11-30 ENCOUNTER — PATIENT MESSAGE (OUTPATIENT)
Dept: ENDOCRINOLOGY CLINIC | Facility: CLINIC | Age: 27
End: 2017-11-30

## 2017-12-01 NOTE — TELEPHONE ENCOUNTER
From: Lashanda Carrillo  To: Hanna Torres NP  Sent: 11/30/2017 6:10 PM CST  Subject: Other    My sugar is 48 since frank left I been putting my sugar in my carb my sugar was at 378 I put it in 2hrs ago n since than it dropped to 48

## 2017-12-01 NOTE — TELEPHONE ENCOUNTER
Called pt, she changed I:C from 1:8 to 1:15 and CF from 1:26 to 1:35 for all 5 time segments: 12A, 4A, 1P, 7P and 11P. She asked what about the basal rate--any changes and told her no, then she asked what a basal rate was so explained that to her.

## 2017-12-01 NOTE — TELEPHONE ENCOUNTER
Spoke to [facility], CB will talk to pt and walk through setting changes on pt's pump. Transferred call to CB.

## 2017-12-07 DIAGNOSIS — E10.65 UNCONTROLLED TYPE 1 DIABETES MELLITUS WITH DIABETIC AUTONOMIC NEUROPATHY, WITH LONG-TERM CURRENT USE OF INSULIN (HCC): ICD-10-CM

## 2017-12-07 DIAGNOSIS — E10.43 UNCONTROLLED TYPE 1 DIABETES MELLITUS WITH DIABETIC AUTONOMIC NEUROPATHY, WITH LONG-TERM CURRENT USE OF INSULIN (HCC): ICD-10-CM

## 2017-12-07 RX ORDER — INSULIN LISPRO 100 [IU]/ML
INJECTION, SOLUTION INTRAVENOUS; SUBCUTANEOUS
Qty: 30 ML | Refills: 0 | Status: SHIPPED | OUTPATIENT
Start: 2017-12-07 | End: 2018-01-09

## 2017-12-12 ENCOUNTER — OFFICE VISIT (OUTPATIENT)
Dept: ENDOCRINOLOGY CLINIC | Facility: CLINIC | Age: 27
End: 2017-12-12

## 2017-12-12 VITALS
WEIGHT: 207 LBS | SYSTOLIC BLOOD PRESSURE: 100 MMHG | HEART RATE: 72 BPM | BODY MASS INDEX: 38.09 KG/M2 | RESPIRATION RATE: 18 BRPM | TEMPERATURE: 98 F | DIASTOLIC BLOOD PRESSURE: 64 MMHG | HEIGHT: 62 IN

## 2017-12-12 DIAGNOSIS — E10.65 UNCONTROLLED TYPE 1 DIABETES MELLITUS WITH DIABETIC AUTONOMIC NEUROPATHY, WITH LONG-TERM CURRENT USE OF INSULIN (HCC): ICD-10-CM

## 2017-12-12 DIAGNOSIS — E10.43 UNCONTROLLED TYPE 1 DIABETES MELLITUS WITH DIABETIC AUTONOMIC NEUROPATHY, WITH LONG-TERM CURRENT USE OF INSULIN (HCC): ICD-10-CM

## 2017-12-12 PROCEDURE — 99214 OFFICE O/P EST MOD 30 MIN: CPT | Performed by: NURSE PRACTITIONER

## 2017-12-12 NOTE — PROGRESS NOTES
CC: Patient presents with:  Diabetes: follow up. pt did bring pump.       HISTORY:  Past Medical History:   Diagnosis Date   • Diabetes ([address]    • Diabetic neuropathy ([facility] [address]    • Hyperlipidemia       Family History   Problem Relation Age of Onset   • Cancer notices BG higher when she is in these areas. Hypertension:  Blood Pressure:   At goal    Medication: none      Hyperlipidemia:  LDL:  88 was 102      Medication:  Atorvastatin 20  SE denies     ROS:   Constitutional: Negative for fever, chills and  fat affected area twice a day as needed, Disp: 60 g, Rfl: 1  •  Fluticasone Propionate 50 MCG/ACT Nasal Suspension, 2 sprays by Each Nare route daily. , Disp: 1 Bottle, Rfl: 0  •  BUPROPION HCL ER, SR, 150 MG Oral Tablet 12 Hr, TAKE 1 TABLET(150 MG) BY MOUTH TW HEENT: Normocephalic and atraumatic. Eyes: Conjunctivae are normal.   Neck: Normal range of motion. Neck supple  Cardiovascular: Normal rate, regular rhythm and intact distal pulses. No murmur, rubs or gallops.    Pulmonary/Chest: Effort normal and jose for f/u in 2 weeks    Return in about 3 weeks (around 1/2/2018) for diabetes. Pt verbalized understanding and has no further questions at this time.     Beronica DUARTE,PARTHE

## 2018-01-02 ENCOUNTER — OFFICE VISIT (OUTPATIENT)
Dept: INTERNAL MEDICINE CLINIC | Facility: CLINIC | Age: 28
End: 2018-01-02

## 2018-01-02 VITALS
SYSTOLIC BLOOD PRESSURE: 110 MMHG | BODY MASS INDEX: 36.11 KG/M2 | HEIGHT: 62.5 IN | HEART RATE: 103 BPM | TEMPERATURE: 98 F | DIASTOLIC BLOOD PRESSURE: 80 MMHG | RESPIRATION RATE: 23 BRPM | WEIGHT: 201.25 LBS

## 2018-01-02 DIAGNOSIS — G98.8 NEUROLOGIC DISORDER: ICD-10-CM

## 2018-01-02 DIAGNOSIS — E66.01 SEVERE OBESITY (BMI 35.0-39.9) WITH COMORBIDITY (HCC): Primary | ICD-10-CM

## 2018-01-02 DIAGNOSIS — H10.33 ACUTE CONJUNCTIVITIS OF BOTH EYES, UNSPECIFIED ACUTE CONJUNCTIVITIS TYPE: ICD-10-CM

## 2018-01-02 DIAGNOSIS — L30.9 ECZEMA, UNSPECIFIED TYPE: ICD-10-CM

## 2018-01-02 DIAGNOSIS — Z51.81 THERAPEUTIC DRUG MONITORING: ICD-10-CM

## 2018-01-02 PROCEDURE — 99214 OFFICE O/P EST MOD 30 MIN: CPT | Performed by: INTERNAL MEDICINE

## 2018-01-02 RX ORDER — BETAMETHASONE DIPROPIONATE 0.5 MG/G
CREAM TOPICAL
Qty: 45 G | Refills: 0 | Status: SHIPPED | OUTPATIENT
Start: 2018-01-02 | End: 2018-08-03 | Stop reason: ALTCHOICE

## 2018-01-02 RX ORDER — PHENTERMINE HYDROCHLORIDE 37.5 MG/1
TABLET ORAL
Qty: 30 TABLET | Refills: 0 | Status: SHIPPED | OUTPATIENT
Start: 2018-01-02 | End: 2018-02-27

## 2018-01-02 RX ORDER — NEOMYCIN/POLYMYXIN B/HYDROCORT 3.5-10K-1
1 SUSPENSION, DROPS(FINAL DOSAGE FORM)(ML) OPHTHALMIC (EYE) 3 TIMES DAILY
Qty: 7.5 ML | Refills: 0 | Status: SHIPPED | OUTPATIENT
Start: 2018-01-02 | End: 2018-05-29 | Stop reason: ALTCHOICE

## 2018-01-02 NOTE — PROGRESS NOTES
Patient presents with: Other: multiple medical concerns      HPI: The pt presents today for eval of the following medical concerns as follows:    1.  Severe obesity w/ co-morbidity and elevated BMI along w/ therapeutic drug monitoring - Continues w/ Phente or equivalent: 2 per week     Comment: 1-2/month      PE:  /80 (BP Location: Left arm, Patient Position: Sitting, Cuff Size: adult)   Pulse 103   Temp 97.7 °F (36.5 °C) (Oral)   Resp 23   Ht 62.5\"   Wt 201 lb 4 oz   LMP 12/26/2017   BMI 36.22 kg/m² Sig: Place 1 drop into both eyes 3 (three) times daily.       betamethasone dipropionate 0.05 % External Cream 45 g 0      Sig: Apply to affected skin up to twice per day as needed for eczema           Imaging & Consults:  None

## 2018-01-09 DIAGNOSIS — E10.65 UNCONTROLLED TYPE 1 DIABETES MELLITUS WITH DIABETIC AUTONOMIC NEUROPATHY, WITH LONG-TERM CURRENT USE OF INSULIN (HCC): ICD-10-CM

## 2018-01-09 DIAGNOSIS — E10.43 UNCONTROLLED TYPE 1 DIABETES MELLITUS WITH DIABETIC AUTONOMIC NEUROPATHY, WITH LONG-TERM CURRENT USE OF INSULIN (HCC): ICD-10-CM

## 2018-01-10 RX ORDER — INSULIN LISPRO 100 [IU]/ML
INJECTION, SOLUTION INTRAVENOUS; SUBCUTANEOUS
Qty: 30 ML | Refills: 0 | Status: SHIPPED | OUTPATIENT
Start: 2018-01-10 | End: 2018-01-30

## 2018-01-18 ENCOUNTER — TELEPHONE (OUTPATIENT)
Dept: INTERNAL MEDICINE CLINIC | Facility: CLINIC | Age: 28
End: 2018-01-18

## 2018-01-18 NOTE — TELEPHONE ENCOUNTER
Patient spoke to Dr. Kadie Philip regarding getting the Dexcom or FreeStyle and Dr. Kadie Philip stated that patient needed to contact [place].   Please advise

## 2018-01-22 NOTE — TELEPHONE ENCOUNTER
I can't get it covered, she may be able to through her , if so I will gladly sign whatever is required.  I have looked into this multiple times and nothing has changed with coverage according to dexcom and [facility], but can try to order it f

## 2018-01-30 ENCOUNTER — OFFICE VISIT (OUTPATIENT)
Dept: ENDOCRINOLOGY CLINIC | Facility: CLINIC | Age: 28
End: 2018-01-30

## 2018-01-30 VITALS
SYSTOLIC BLOOD PRESSURE: 120 MMHG | HEIGHT: 62.5 IN | TEMPERATURE: 98 F | WEIGHT: 198 LBS | HEART RATE: 72 BPM | RESPIRATION RATE: 18 BRPM | BODY MASS INDEX: 35.52 KG/M2 | DIASTOLIC BLOOD PRESSURE: 70 MMHG

## 2018-01-30 DIAGNOSIS — E10.43 UNCONTROLLED TYPE 1 DIABETES MELLITUS WITH DIABETIC AUTONOMIC NEUROPATHY, WITH LONG-TERM CURRENT USE OF INSULIN (HCC): Primary | ICD-10-CM

## 2018-01-30 DIAGNOSIS — E78.00 HYPERCHOLESTEROLEMIA: ICD-10-CM

## 2018-01-30 DIAGNOSIS — E10.65 UNCONTROLLED TYPE 1 DIABETES MELLITUS WITH DIABETIC AUTONOMIC NEUROPATHY, WITH LONG-TERM CURRENT USE OF INSULIN (HCC): Primary | ICD-10-CM

## 2018-01-30 LAB
CARTRIDGE LOT#: ABNORMAL NUMERIC
HEMOGLOBIN A1C: 8.9 % (ref 4.3–5.6)

## 2018-01-30 PROCEDURE — 99214 OFFICE O/P EST MOD 30 MIN: CPT | Performed by: NURSE PRACTITIONER

## 2018-01-30 PROCEDURE — 83036 HEMOGLOBIN GLYCOSYLATED A1C: CPT | Performed by: NURSE PRACTITIONER

## 2018-01-30 RX ORDER — INSULIN LISPRO 100 [IU]/ML
INJECTION, SOLUTION INTRAVENOUS; SUBCUTANEOUS
Qty: 30 ML | Refills: 1 | Status: SHIPPED | OUTPATIENT
Start: 2018-01-30 | End: 2018-04-13

## 2018-01-30 NOTE — PROGRESS NOTES
CC: Patient presents with:  Diabetes: follow up. pt did bring pump.       HISTORY:  Past Medical History:   Diagnosis Date   • Diabetes ([address]    • Diabetic neuropathy ([address]    • Hyperlipidemia       Family History   Problem Relation Age of Onset   • Cancer abdomen mainly for pump sites difficulty with keeping pump site on, must change site after 2 days or gets hardening under skin and BG spike. Hypertension:  Blood Pressure:   At goal    Medication: none      Hyperlipidemia:  LDL:  88 was 102      Medica Oral Tab, TAKE 1 TABLET BY MOUTH EVERY NIGHT AT BEDTIME, Disp: 30 tablet, Rfl: 2  •  MONTELUKAST SODIUM 10 MG Oral Tab, TAKE 1 TABLET(10 MG) BY MOUTH EVERY NIGHT, Disp: 30 tablet, Rfl: 5  •  TOPIRAMATE 50 MG Oral Tab, TAKE 1 TABLET(50 MG) BY MOUTH TWICE DA Exam:  /70   Pulse 72   Temp 97.6 °F (36.4 °C) (Oral)   Resp 18   Ht 62.5\"   Wt 198 lb   LMP 01/28/2018   BMI 35.64 kg/m²   Constitutional: Oriented to person, place, and time. No distress. HEENT: Normocephalic and atraumatic.     Eyes: Conjunc Pen-injector 9 mL 0      Sig: Inject 1.8 mg into the skin daily.       Insulin Lispro (HUMALOG) 100 UNIT/ML Subcutaneous Solution 30 mL 1      Sig: INJECT UP TO 80 UNITS DAILY VIA INSULIN PUMP      Insulin Pen Needle 32G X 5 MM Does not apply Misc 100 each

## 2018-02-07 ENCOUNTER — TELEPHONE (OUTPATIENT)
Dept: INTERNAL MEDICINE CLINIC | Facility: CLINIC | Age: 28
End: 2018-02-07

## 2018-02-07 RX ORDER — NORGESTIMATE-ETHINYL ESTRADIOL 7DAYSX3 28
1 TABLET ORAL DAILY
Qty: 28 TABLET | Refills: 5 | Status: SHIPPED | OUTPATIENT
Start: 2018-02-07 | End: 2018-05-29

## 2018-02-07 NOTE — TELEPHONE ENCOUNTER
Patient would like to speak with nurse in regards to not taking her birth control for 4 days.  Patient states due to not having her refill she has not taken birth control for 4 days and would like a suggestion on what to do

## 2018-02-07 NOTE — TELEPHONE ENCOUNTER
Script sent, notify pt. Take as directed. Alexis Miller. Deep Tinsley MD  Diplomate, American Board of Internal Medicine  [address] [facility]  [address]  [address] [facility],4Th Floor, [address], [facility], [address]  T: [phone]; F: Alen 5

## 2018-02-07 NOTE — TELEPHONE ENCOUNTER
From: Amparo Molina  Sent: 2/6/2018 9:55 PM CST  Subject: Medication Renewal Request    Amparo Molina would like a refill of the following medications:     ALPRAZOLAM 0.5 MG Oral Tab Yasmani Pike MD]    [facility], [address] [facility] [facility] [place] [place], [phone], [phone]

## 2018-02-08 RX ORDER — ALPRAZOLAM 0.5 MG/1
0.5 TABLET ORAL
Qty: 30 TABLET | Refills: 2
Start: 2018-02-08

## 2018-02-08 RX ORDER — ALPRAZOLAM 0.5 MG/1
TABLET ORAL
Qty: 30 TABLET | Refills: 5 | Status: SHIPPED | OUTPATIENT
Start: 2018-02-08 | End: 2018-05-29 | Stop reason: DRUGHIGH

## 2018-02-15 ENCOUNTER — PATIENT MESSAGE (OUTPATIENT)
Dept: INTERNAL MEDICINE CLINIC | Facility: CLINIC | Age: 28
End: 2018-02-15

## 2018-02-16 ENCOUNTER — HOSPITAL ENCOUNTER (OUTPATIENT)
Age: 28
Discharge: HOME | End: 2018-02-16
Attending: FAMILY MEDICINE
Payer: MEDICAID

## 2018-02-16 VITALS
HEART RATE: 94 BPM | DIASTOLIC BLOOD PRESSURE: 80 MMHG | TEMPERATURE: 99 F | RESPIRATION RATE: 16 BRPM | SYSTOLIC BLOOD PRESSURE: 125 MMHG | OXYGEN SATURATION: 100 %

## 2018-02-16 DIAGNOSIS — N39.0 UTI (URINARY TRACT INFECTION), UNCOMPLICATED: Primary | ICD-10-CM

## 2018-02-16 LAB
POCT BILIRUBIN URINE: NEGATIVE
POCT GLUCOSE URINE: NEGATIVE MG/DL
POCT KETONE URINE: NEGATIVE MG/DL
POCT NITRITE URINE: NEGATIVE
POCT PH URINE: 6 (ref 5–8)
POCT SPECIFIC GRAVITY URINE: 1.03
POCT URINE COLOR: YELLOW
POCT URINE PREGNANCY: NEGATIVE
POCT UROBILINOGEN URINE: 0.2 MG/DL

## 2018-02-16 PROCEDURE — 87186 SC STD MICRODIL/AGAR DIL: CPT | Performed by: FAMILY MEDICINE

## 2018-02-16 PROCEDURE — 87077 CULTURE AEROBIC IDENTIFY: CPT | Performed by: FAMILY MEDICINE

## 2018-02-16 PROCEDURE — 99214 OFFICE O/P EST MOD 30 MIN: CPT

## 2018-02-16 PROCEDURE — 81025 URINE PREGNANCY TEST: CPT | Performed by: FAMILY MEDICINE

## 2018-02-16 PROCEDURE — 87086 URINE CULTURE/COLONY COUNT: CPT | Performed by: FAMILY MEDICINE

## 2018-02-16 PROCEDURE — 81002 URINALYSIS NONAUTO W/O SCOPE: CPT | Performed by: FAMILY MEDICINE

## 2018-02-16 RX ORDER — PHENAZOPYRIDINE HYDROCHLORIDE 200 MG/1
200 TABLET, FILM COATED ORAL 3 TIMES DAILY PRN
Qty: 6 TABLET | Refills: 0 | Status: SHIPPED | OUTPATIENT
Start: 2018-02-16 | End: 2018-02-18

## 2018-02-16 RX ORDER — CEFUROXIME AXETIL 250 MG/1
250 TABLET ORAL 2 TIMES DAILY
Qty: 14 TABLET | Refills: 0 | Status: SHIPPED | OUTPATIENT
Start: 2018-02-16 | End: 2018-05-29 | Stop reason: ALTCHOICE

## 2018-02-16 RX ORDER — PHENAZOPYRIDINE HYDROCHLORIDE 200 MG/1
200 TABLET, FILM COATED ORAL ONCE
Status: COMPLETED | OUTPATIENT
Start: 2018-02-16 | End: 2018-02-16

## 2018-02-16 NOTE — ED PROVIDER NOTES
Patient Seen in: [address] [facility]    History   Patient presents with:  Urinary Symptoms (urologic)    Stated Complaint: UTI X 3 DAYS    HPI    This 37-year-old female presents the office with 3 days of dysuria, urinary urgency and karen normal.  NOSE: Turbinates normal, no bleeding noted. PHARYNX:  No eythema or exudates, tonsils normal size, airway patent, uvula midline  NECK:  No cervical lymphadenopathy. No thyromegaly,  HEART: Regular rate and rhythm, no S3, S4 or murmur noted.   LUNG (250 mg total) by mouth 2 (two) times daily. TAKE WITH FOOD.   Qty: 14 tablet Refills: 0  Associated Diagnoses:UTI (urinary tract infection), uncomplicated    Phenazopyridine HCl 200 MG Oral Tab  Take 1 tablet (200 mg total) by mouth 3 (three) times daily a

## 2018-02-27 DIAGNOSIS — E66.01 SEVERE OBESITY (BMI 35.0-39.9) WITH COMORBIDITY (HCC): ICD-10-CM

## 2018-02-27 DIAGNOSIS — Z51.81 THERAPEUTIC DRUG MONITORING: ICD-10-CM

## 2018-02-28 NOTE — TELEPHONE ENCOUNTER
From: Javier De Leon  Sent: 2/27/2018 11:39 PM CST  Subject: Medication Renewal Request    Javier De Leon would like a refill of the following medications:     Phentermine HCl 37.5 MG Oral Tab Carlos Sutton MD]    Preferred pharmacy: [facility]

## 2018-03-01 RX ORDER — PHENTERMINE HYDROCHLORIDE 37.5 MG/1
TABLET ORAL
Qty: 30 TABLET | Refills: 0
Start: 2018-03-01 | End: 2018-03-06

## 2018-03-06 ENCOUNTER — OFFICE VISIT (OUTPATIENT)
Dept: INTERNAL MEDICINE CLINIC | Facility: CLINIC | Age: 28
End: 2018-03-06

## 2018-03-06 ENCOUNTER — LAB ENCOUNTER (OUTPATIENT)
Dept: LAB | Age: 28
End: 2018-03-06
Attending: NURSE PRACTITIONER
Payer: MEDICAID

## 2018-03-06 VITALS
WEIGHT: 197.5 LBS | DIASTOLIC BLOOD PRESSURE: 78 MMHG | BODY MASS INDEX: 35.43 KG/M2 | SYSTOLIC BLOOD PRESSURE: 122 MMHG | HEART RATE: 64 BPM | RESPIRATION RATE: 20 BRPM | HEIGHT: 62.5 IN | TEMPERATURE: 98 F

## 2018-03-06 DIAGNOSIS — K92.1 BLACK STOOLS: ICD-10-CM

## 2018-03-06 DIAGNOSIS — R53.83 FATIGUE, UNSPECIFIED TYPE: ICD-10-CM

## 2018-03-06 DIAGNOSIS — Z51.81 THERAPEUTIC DRUG MONITORING: ICD-10-CM

## 2018-03-06 DIAGNOSIS — K52.9 GASTROENTERITIS: Primary | ICD-10-CM

## 2018-03-06 DIAGNOSIS — E66.01 SEVERE OBESITY (BMI 35.0-39.9) WITH COMORBIDITY (HCC): ICD-10-CM

## 2018-03-06 LAB
BASOPHILS # BLD AUTO: 0.05 X10(3) UL (ref 0–0.1)
BASOPHILS NFR BLD AUTO: 0.5 %
EOSINOPHIL # BLD AUTO: 0.79 X10(3) UL (ref 0–0.3)
EOSINOPHIL NFR BLD AUTO: 7.1 %
ERYTHROCYTE [DISTWIDTH] IN BLOOD BY AUTOMATED COUNT: 13.4 % (ref 11.5–16)
HAV AB SERPL IA-ACNC: 364 PG/ML (ref 193–986)
HCT VFR BLD AUTO: 42 % (ref 34–50)
HGB BLD-MCNC: 13.2 G/DL (ref 12–16)
IMMATURE GRANULOCYTE COUNT: 0.03 X10(3) UL (ref 0–1)
IMMATURE GRANULOCYTE RATIO %: 0.3 %
LYMPHOCYTES # BLD AUTO: 2.74 X10(3) UL (ref 0.9–4)
LYMPHOCYTES NFR BLD AUTO: 24.8 %
MCH RBC QN AUTO: 28 PG (ref 27–33.2)
MCHC RBC AUTO-ENTMCNC: 31.4 G/DL (ref 31–37)
MCV RBC AUTO: 89.2 FL (ref 81–100)
MONOCYTES # BLD AUTO: 0.78 X10(3) UL (ref 0.1–1)
MONOCYTES NFR BLD AUTO: 7 %
NEUTROPHIL ABS PRELIM: 6.68 X10 (3) UL (ref 1.3–6.7)
NEUTROPHILS # BLD AUTO: 6.68 X10(3) UL (ref 1.3–6.7)
NEUTROPHILS NFR BLD AUTO: 60.3 %
PLATELET # BLD AUTO: 212 10(3)UL (ref 150–450)
RBC # BLD AUTO: 4.71 X10(6)UL (ref 3.8–5.1)
RED CELL DISTRIBUTION WIDTH-SD: 44.1 FL (ref 35.1–46.3)
WBC # BLD AUTO: 11.1 X10(3) UL (ref 4–13)

## 2018-03-06 PROCEDURE — 82306 VITAMIN D 25 HYDROXY: CPT | Performed by: NURSE PRACTITIONER

## 2018-03-06 PROCEDURE — 82607 VITAMIN B-12: CPT | Performed by: NURSE PRACTITIONER

## 2018-03-06 PROCEDURE — 85025 COMPLETE CBC W/AUTO DIFF WBC: CPT | Performed by: NURSE PRACTITIONER

## 2018-03-06 PROCEDURE — 99213 OFFICE O/P EST LOW 20 MIN: CPT | Performed by: NURSE PRACTITIONER

## 2018-03-06 PROCEDURE — 36415 COLL VENOUS BLD VENIPUNCTURE: CPT | Performed by: NURSE PRACTITIONER

## 2018-03-06 RX ORDER — PHENAZOPYRIDINE HYDROCHLORIDE 200 MG/1
TABLET, FILM COATED ORAL
Refills: 0 | COMMUNITY
Start: 2018-02-17 | End: 2018-08-03 | Stop reason: ALTCHOICE

## 2018-03-06 RX ORDER — PHENTERMINE HYDROCHLORIDE 37.5 MG/1
TABLET ORAL
Qty: 30 TABLET | Refills: 0 | Status: SHIPPED | OUTPATIENT
Start: 2018-03-06 | End: 2018-06-25

## 2018-03-06 NOTE — PROGRESS NOTES
Sara Song is a 24year old female who presents with . HPI:   The patient complaints of diarrhea. Has had for 3 days. Stool is described as watery. Pt started treating symptoms with pepto bismol and then noted stools were black.  Pt has pain, locat Axetil (CEFTIN) 250 MG Oral Tab Take 1 tablet (250 mg total) by mouth 2 (two) times daily. TAKE WITH FOOD.  Disp: 14 tablet Rfl: 0   ALPRAZOLAM 0.5 MG Oral Tab TAKE 1 TABLET BY MOUTH EVERY NIGHT AT BEDTIME Disp: 30 tablet Rfl: 5   Liraglutide (VICTOZA) 18 M failure Disp: 100 each Rfl: 0   methocarbamol 500 MG Oral Tab Take 1 tablet (500 mg total) by mouth 3 (three) times daily.  Disp: 90 tablet Rfl: 0   Acetone, Urine, Test (KETOSTIX) In Vitro Strip Test urine when sugar is above 250 mg/dl twice in a row Disp: headaches      EXAM:   /78 (BP Location: Left arm, Patient Position: Sitting, Cuff Size: adult)   Pulse 64   Temp 97.8 °F (36.6 °C) (Oral)   Resp 20   Ht 62.5\"   Wt 197 lb 8 oz   LMP 03/03/2018   BMI 35.55 kg/m²   Body mass index is 35.55 kg/m².    G

## 2018-03-07 LAB — 25-HYDROXYVITAMIN D (TOTAL): 17 NG/ML (ref 30–100)

## 2018-03-09 ENCOUNTER — TELEPHONE (OUTPATIENT)
Dept: INTERNAL MEDICINE CLINIC | Facility: CLINIC | Age: 28
End: 2018-03-09

## 2018-03-09 NOTE — TELEPHONE ENCOUNTER
Patient called requesting a call back from nurse. Faxed form from [place] and patient would like to discuss. Please call.

## 2018-03-12 NOTE — TELEPHONE ENCOUNTER
Kaya Horta received paperwork from [place] and  ashley in [employer] in [place].

## 2018-03-15 ENCOUNTER — TELEPHONE (OUTPATIENT)
Dept: INTERNAL MEDICINE CLINIC | Facility: CLINIC | Age: 28
End: 2018-03-15

## 2018-03-16 ENCOUNTER — LAB ENCOUNTER (OUTPATIENT)
Dept: LAB | Age: 28
End: 2018-03-16
Attending: NURSE PRACTITIONER
Payer: MEDICAID

## 2018-03-16 ENCOUNTER — OFFICE VISIT (OUTPATIENT)
Dept: ENDOCRINOLOGY CLINIC | Facility: CLINIC | Age: 28
End: 2018-03-16

## 2018-03-16 VITALS
SYSTOLIC BLOOD PRESSURE: 110 MMHG | WEIGHT: 203 LBS | HEART RATE: 68 BPM | DIASTOLIC BLOOD PRESSURE: 68 MMHG | RESPIRATION RATE: 18 BRPM | TEMPERATURE: 97 F | HEIGHT: 62.5 IN | BODY MASS INDEX: 36.42 KG/M2

## 2018-03-16 DIAGNOSIS — E10.65 UNCONTROLLED TYPE 1 DIABETES MELLITUS WITH DIABETIC AUTONOMIC NEUROPATHY, WITH LONG-TERM CURRENT USE OF INSULIN (HCC): ICD-10-CM

## 2018-03-16 DIAGNOSIS — E55.9 VITAMIN D DEFICIENCY: Primary | ICD-10-CM

## 2018-03-16 DIAGNOSIS — E10.43 UNCONTROLLED TYPE 1 DIABETES MELLITUS WITH DIABETIC AUTONOMIC NEUROPATHY, WITH LONG-TERM CURRENT USE OF INSULIN (HCC): Primary | ICD-10-CM

## 2018-03-16 DIAGNOSIS — E78.00 HYPERCHOLESTEROLEMIA: ICD-10-CM

## 2018-03-16 DIAGNOSIS — Z13.0 ENCOUNTER FOR SCREENING FOR DISEASES OF THE BLOOD AND BLOOD-FORMING ORGANS AND CERTAIN DISORDERS INVOLVING THE IMMUNE MECHANISM: ICD-10-CM

## 2018-03-16 DIAGNOSIS — E10.43 UNCONTROLLED TYPE 1 DIABETES MELLITUS WITH DIABETIC AUTONOMIC NEUROPATHY, WITH LONG-TERM CURRENT USE OF INSULIN (HCC): ICD-10-CM

## 2018-03-16 DIAGNOSIS — E10.65 UNCONTROLLED TYPE 1 DIABETES MELLITUS WITH DIABETIC AUTONOMIC NEUROPATHY, WITH LONG-TERM CURRENT USE OF INSULIN (HCC): Primary | ICD-10-CM

## 2018-03-16 LAB
ALBUMIN SERPL-MCNC: 3.3 G/DL (ref 3.5–4.8)
ALP LIVER SERPL-CCNC: 76 U/L (ref 37–98)
ALT SERPL-CCNC: 16 U/L (ref 14–54)
AST SERPL-CCNC: 11 U/L (ref 15–41)
BASOPHILS # BLD AUTO: 0.04 X10(3) UL (ref 0–0.1)
BASOPHILS NFR BLD AUTO: 0.6 %
BILIRUB SERPL-MCNC: 0.3 MG/DL (ref 0.1–2)
BUN BLD-MCNC: 10 MG/DL (ref 8–20)
CALCIUM BLD-MCNC: 8.8 MG/DL (ref 8.3–10.3)
CHLORIDE: 106 MMOL/L (ref 101–111)
CHOLEST SMN-MCNC: 177 MG/DL (ref ?–200)
CO2: 27 MMOL/L (ref 22–32)
CREAT BLD-MCNC: 0.64 MG/DL (ref 0.55–1.02)
CREAT UR-SCNC: 250 MG/DL
EOSINOPHIL # BLD AUTO: 0.72 X10(3) UL (ref 0–0.3)
EOSINOPHIL NFR BLD AUTO: 10.7 %
ERYTHROCYTE [DISTWIDTH] IN BLOOD BY AUTOMATED COUNT: 13.6 % (ref 11.5–16)
GLUCOSE BLD-MCNC: 183 MG/DL (ref 70–99)
HCT VFR BLD AUTO: 39.8 % (ref 34–50)
HDLC SERPL-MCNC: 55 MG/DL (ref 45–?)
HDLC SERPL: 3.22 {RATIO} (ref ?–4.44)
HGB BLD-MCNC: 12.7 G/DL (ref 12–16)
IMMATURE GRANULOCYTE COUNT: 0.01 X10(3) UL (ref 0–1)
IMMATURE GRANULOCYTE RATIO %: 0.1 %
LDLC SERPL CALC-MCNC: 110 MG/DL (ref ?–130)
LYMPHOCYTES # BLD AUTO: 2.52 X10(3) UL (ref 0.9–4)
LYMPHOCYTES NFR BLD AUTO: 37.3 %
M PROTEIN MFR SERPL ELPH: 7.1 G/DL (ref 6.1–8.3)
MCH RBC QN AUTO: 28.8 PG (ref 27–33.2)
MCHC RBC AUTO-ENTMCNC: 31.9 G/DL (ref 31–37)
MCV RBC AUTO: 90.2 FL (ref 81–100)
MICROALBUMIN UR-MCNC: 1.61 MG/DL
MICROALBUMIN/CREAT 24H UR-RTO: 6.4 UG/MG (ref ?–30)
MONOCYTES # BLD AUTO: 0.57 X10(3) UL (ref 0.1–1)
MONOCYTES NFR BLD AUTO: 8.4 %
NEUTROPHIL ABS PRELIM: 2.9 X10 (3) UL (ref 1.3–6.7)
NEUTROPHILS # BLD AUTO: 2.9 X10(3) UL (ref 1.3–6.7)
NEUTROPHILS NFR BLD AUTO: 42.9 %
NONHDLC SERPL-MCNC: 122 MG/DL (ref ?–130)
PLATELET # BLD AUTO: 199 10(3)UL (ref 150–450)
POTASSIUM SERPL-SCNC: 4 MMOL/L (ref 3.6–5.1)
RBC # BLD AUTO: 4.41 X10(6)UL (ref 3.8–5.1)
RED CELL DISTRIBUTION WIDTH-SD: 45.1 FL (ref 35.1–46.3)
SODIUM SERPL-SCNC: 141 MMOL/L (ref 136–144)
TRIGL SERPL-MCNC: 59 MG/DL (ref ?–150)
VLDLC SERPL CALC-MCNC: 12 MG/DL (ref 5–40)
WBC # BLD AUTO: 6.8 X10(3) UL (ref 4–13)

## 2018-03-16 PROCEDURE — 82570 ASSAY OF URINE CREATININE: CPT | Performed by: NURSE PRACTITIONER

## 2018-03-16 PROCEDURE — 80053 COMPREHEN METABOLIC PANEL: CPT | Performed by: NURSE PRACTITIONER

## 2018-03-16 PROCEDURE — 85025 COMPLETE CBC W/AUTO DIFF WBC: CPT | Performed by: NURSE PRACTITIONER

## 2018-03-16 PROCEDURE — 99214 OFFICE O/P EST MOD 30 MIN: CPT | Performed by: NURSE PRACTITIONER

## 2018-03-16 PROCEDURE — 82043 UR ALBUMIN QUANTITATIVE: CPT | Performed by: NURSE PRACTITIONER

## 2018-03-16 PROCEDURE — 80061 LIPID PANEL: CPT | Performed by: NURSE PRACTITIONER

## 2018-03-16 RX ORDER — ERGOCALCIFEROL 1.25 MG/1
50000 CAPSULE ORAL WEEKLY
Qty: 12 CAPSULE | Refills: 0 | Status: SHIPPED | OUTPATIENT
Start: 2018-03-16 | End: 2018-08-03 | Stop reason: ALTCHOICE

## 2018-03-16 RX ORDER — FLASH GLUCOSE SENSOR
1 KIT MISCELLANEOUS ONCE
Qty: 1 DEVICE | Refills: 0 | Status: SHIPPED | OUTPATIENT
Start: 2018-03-16 | End: 2018-03-16

## 2018-03-16 RX ORDER — FLASH GLUCOSE SENSOR
3 KIT MISCELLANEOUS
Qty: 1 EACH | Refills: 11 | Status: SHIPPED | OUTPATIENT
Start: 2018-03-16 | End: 2019-11-20

## 2018-03-16 NOTE — PROGRESS NOTES
CC: Patient presents with:  Diabetes: follow up. pt did bring pump.       HISTORY:  Past Medical History:   Diagnosis Date   • Anxiety    • Depression    • Diabetes ([address]    • Diabetic neuropathy ([address]    • Hyperlipidemia       Family History   Problem Rela none since lov   Lipodystrophy:throughout body, using abdomen mainly for pump sites difficulty with keeping pump site on, must change site after 2 days or gets hardening under skin and BG spike. Hypertension:  Blood Pressure:   At goal    Medication: n MOUTH EVERY NIGHT AT BEDTIME, Disp: 30 tablet, Rfl: 5  •  TRINESSA, 28, 0.18/0.215/0.25 MG-35 MCG Oral Tab, TAKE 1 TABLET BY MOUTH DAILY, Disp: 28 tablet, Rfl: 5  •  Liraglutide (VICTOZA) 18 MG/3ML Subcutaneous Solution Pen-injector, Inject 1.8 mg into the 32G X 4 MM Does not apply Misc, Inject 1 pen into the skin 4 (four) times daily. , Disp: 2 Box, Rfl: 3  •  Insulin Syringe-Needle U-100 (BD INSULIN SYRINGE ULTRAFINE) 31G X 15/64\" 0.5 ML Does not apply Misc, 1 each by Does not apply route daily as needed. prior cbc. Return in 2 weeks after getting back into med routine, gave script for out of pocket sridevi sensor at her request, will need training. Stressed needs to test BG minimal 4 times daily. Fasting labs today.          Orders Placed This Encounter

## 2018-03-22 ENCOUNTER — OFFICE VISIT (OUTPATIENT)
Dept: INTERNAL MEDICINE CLINIC | Facility: CLINIC | Age: 28
End: 2018-03-22

## 2018-03-22 VITALS
BODY MASS INDEX: 37.81 KG/M2 | DIASTOLIC BLOOD PRESSURE: 80 MMHG | HEIGHT: 62 IN | HEART RATE: 84 BPM | WEIGHT: 205.5 LBS | RESPIRATION RATE: 12 BRPM | SYSTOLIC BLOOD PRESSURE: 120 MMHG | TEMPERATURE: 99 F

## 2018-03-22 DIAGNOSIS — T50.905A MEDICATION SIDE EFFECT, INITIAL ENCOUNTER: Primary | ICD-10-CM

## 2018-03-22 PROCEDURE — 99213 OFFICE O/P EST LOW 20 MIN: CPT | Performed by: INTERNAL MEDICINE

## 2018-03-22 NOTE — PROGRESS NOTES
Patient presents with:   Follow - Up: stopped victoza, causing diahrrhea/belching that smells like eggs -- Believes this is a medication side effect      HPI: The pt presents today for evaluation of a constellation of symptoms that she's attributing to Vict Medication SE from Victoza. Start Trulicty which has reported less GI side effects. 2. RTC PRN or at next regularly scheduled OV. Patient verbally agrees to and understands the plan as outlined above.   Patient was also afforded the time and opportunity

## 2018-03-26 ENCOUNTER — TELEPHONE (OUTPATIENT)
Dept: INTERNAL MEDICINE CLINIC | Facility: CLINIC | Age: 28
End: 2018-03-26

## 2018-03-27 DIAGNOSIS — E10.43 UNCONTROLLED TYPE 1 DIABETES MELLITUS WITH DIABETIC AUTONOMIC NEUROPATHY, WITH LONG-TERM CURRENT USE OF INSULIN (HCC): ICD-10-CM

## 2018-03-27 DIAGNOSIS — E10.65 UNCONTROLLED TYPE 1 DIABETES MELLITUS WITH DIABETIC AUTONOMIC NEUROPATHY, WITH LONG-TERM CURRENT USE OF INSULIN (HCC): ICD-10-CM

## 2018-03-27 RX ORDER — LIRAGLUTIDE 6 MG/ML
INJECTION SUBCUTANEOUS
Qty: 9 ML | Refills: 0 | OUTPATIENT
Start: 2018-03-27

## 2018-03-27 RX ORDER — BUPROPION HYDROCHLORIDE 150 MG/1
TABLET, EXTENDED RELEASE ORAL
Qty: 60 TABLET | Refills: 0 | Status: SHIPPED | OUTPATIENT
Start: 2018-03-27 | End: 2020-01-09

## 2018-03-27 NOTE — TELEPHONE ENCOUNTER
Bupropion  mg 1 tab BID filled 9-18-17 60 with 0 refills     LOV 1-2-18     MDD (major depressive disorder), recurrent episode, moderate

## 2018-04-05 ENCOUNTER — MED REC SCAN ONLY (OUTPATIENT)
Dept: INTERNAL MEDICINE CLINIC | Facility: CLINIC | Age: 28
End: 2018-04-05

## 2018-04-11 ENCOUNTER — TELEPHONE (OUTPATIENT)
Dept: INTERNAL MEDICINE CLINIC | Facility: CLINIC | Age: 28
End: 2018-04-11

## 2018-04-11 NOTE — TELEPHONE ENCOUNTER
Patient was scheduled for an appointment today and then rescheduled because of the meeting.  She agreed to it but had another appointment  Could someone give her a call their is no availability for a long time

## 2018-04-13 DIAGNOSIS — E10.65 UNCONTROLLED TYPE 1 DIABETES MELLITUS WITH DIABETIC AUTONOMIC NEUROPATHY, WITH LONG-TERM CURRENT USE OF INSULIN (HCC): ICD-10-CM

## 2018-04-13 DIAGNOSIS — E10.43 UNCONTROLLED TYPE 1 DIABETES MELLITUS WITH DIABETIC AUTONOMIC NEUROPATHY, WITH LONG-TERM CURRENT USE OF INSULIN (HCC): ICD-10-CM

## 2018-04-13 RX ORDER — INSULIN LISPRO 100 [IU]/ML
INJECTION, SOLUTION INTRAVENOUS; SUBCUTANEOUS
Qty: 30 ML | Refills: 0 | Status: SHIPPED | OUTPATIENT
Start: 2018-04-13 | End: 2018-05-12

## 2018-04-13 NOTE — TELEPHONE ENCOUNTER
Medication(s) to Refill:   Pending Prescriptions Disp Refills    HUMALOG 100 UNIT/ML Subcutaneous Solution [Pharmacy Med Name: HUMALOG INSULIN (VL-7510) 10ML] 30 mL 0     Sig: INJECT UP TO 80 UNITS DAILY VIA INSULIN PUMP             Reason for Medication R

## 2018-04-24 ENCOUNTER — TELEPHONE (OUTPATIENT)
Dept: INTERNAL MEDICINE CLINIC | Facility: CLINIC | Age: 28
End: 2018-04-24

## 2018-04-24 NOTE — TELEPHONE ENCOUNTER
Patient called stating that she called the office at 11:01am to cancel her appointment and spoke to [facility]" who stated that there was no one in [facility] office today? ?  There was no record of call and patient was told that we have been answering calls since

## 2018-04-24 NOTE — TELEPHONE ENCOUNTER
I think at this point she at least should see an educator but no shows are an issue and continuing to see her is becoming hard with her level of no shows and late arrivals as it is unfair to other patients who need those slots and are on time.    Thanks   S

## 2018-05-12 DIAGNOSIS — E10.43 UNCONTROLLED TYPE 1 DIABETES MELLITUS WITH DIABETIC AUTONOMIC NEUROPATHY, WITH LONG-TERM CURRENT USE OF INSULIN (HCC): ICD-10-CM

## 2018-05-12 DIAGNOSIS — E10.65 UNCONTROLLED TYPE 1 DIABETES MELLITUS WITH DIABETIC AUTONOMIC NEUROPATHY, WITH LONG-TERM CURRENT USE OF INSULIN (HCC): ICD-10-CM

## 2018-05-12 DIAGNOSIS — Z51.81 THERAPEUTIC DRUG MONITORING: ICD-10-CM

## 2018-05-12 DIAGNOSIS — E66.01 SEVERE OBESITY (BMI 35.0-39.9) WITH COMORBIDITY (HCC): ICD-10-CM

## 2018-05-12 RX ORDER — INSULIN LISPRO 100 [IU]/ML
INJECTION, SOLUTION INTRAVENOUS; SUBCUTANEOUS
Qty: 30 ML | Refills: 0 | Status: SHIPPED | OUTPATIENT
Start: 2018-05-12 | End: 2018-05-12

## 2018-05-12 NOTE — TELEPHONE ENCOUNTER
Medication(s) to Refill:   Pending Prescriptions Disp Refills    Phentermine HCl 37.5 MG Oral Tab 30 tablet 0     Sig: TAKE 1 TABLET BY MOUTH EVERY MORNING BEFORE BREAKFAST             Reason for Medication Refill being sent to Provider / Reason Protocol Failed:  [] 90 day refill has already been granted  [] Blood Pressure out of range  [] Labs Abnormal  [] Medication not previously prescribed by Provider  [x] Non-Protocol Medication  [] Prescription needs to be printed at site and faxed to pharmacy  [] Controlled Substance - needs to be printed at the site, site to notify patient when ready    Last Time Medication was Filled: 3/22/2018      Last Office Visit with PCP: 3/22/2018      Future Appointments:  No future appointments.       Last Blood Pressures:  BP Readings from Last 2 Encounters:  03/22/18 : 120/80  03/16/18 : 110/68

## 2018-05-12 NOTE — PROGRESS NOTES
Patient out of cartridges for insulin pump - needs Humalog prescription. Prescription sent in to [facility].

## 2018-05-12 NOTE — TELEPHONE ENCOUNTER
From: Amanda Valladares  Sent: 5/12/2018 7:32 AM CDT  Subject: Medication Renewal Request    Amanda Valladares would like a refill of the following medications:     Phentermine HCl 37.5 MG Oral Tab Heather Flores MD]    Preferred pharmacy: [id number] - [facility], [address] [phone], [phone]

## 2018-05-12 NOTE — PROGRESS NOTES
Patient ran out of cartridges for insulin pump, requesting temporary Humalog pen prescription. Sent to [facility].

## 2018-05-14 RX ORDER — INSULIN LISPRO 100 [IU]/ML
INJECTION, SOLUTION INTRAVENOUS; SUBCUTANEOUS
Qty: 30 ML | Refills: 0 | Status: SHIPPED | OUTPATIENT
Start: 2018-05-14 | End: 2018-06-12

## 2018-05-14 RX ORDER — INSULIN LISPRO 100 [IU]/ML
INJECTION, SOLUTION INTRAVENOUS; SUBCUTANEOUS
Qty: 30 ML | Refills: 0 | Status: SHIPPED | OUTPATIENT
Start: 2018-05-14 | End: 2018-05-14

## 2018-05-14 NOTE — TELEPHONE ENCOUNTER
Call pt. I received a communication for refill of Phentermine. She will need an updated OV for weight loss f/u.  15 min slot. Letta Space. Rodolfo Verdugo MD  Diplomate, American Board of Internal Medicine  [address] [address] [address]  [address] [place] [address],4Th Floor, [address], [facility], [address] [place] [address]  T: [phone]; F: [address]

## 2018-05-15 RX ORDER — PHENTERMINE HYDROCHLORIDE 37.5 MG/1
TABLET ORAL
Qty: 30 TABLET | Refills: 0
Start: 2018-05-15

## 2018-05-16 NOTE — TELEPHONE ENCOUNTER
Patient scheduled appointment with Ramy Freitas.     Future Appointments  Date Time Provider Bonifacio Khalil   5/21/2018 11:15 AM Camden Mcdaniel MD [facility] [place]   6/12/2018 10:45 AM Flavia Al NP [facility] [address]

## 2018-05-29 ENCOUNTER — OFFICE VISIT (OUTPATIENT)
Dept: INTERNAL MEDICINE CLINIC | Facility: CLINIC | Age: 28
End: 2018-05-29

## 2018-05-29 VITALS
RESPIRATION RATE: 16 BRPM | SYSTOLIC BLOOD PRESSURE: 112 MMHG | HEIGHT: 62 IN | HEART RATE: 72 BPM | TEMPERATURE: 98 F | WEIGHT: 208.75 LBS | BODY MASS INDEX: 38.42 KG/M2 | DIASTOLIC BLOOD PRESSURE: 74 MMHG

## 2018-05-29 DIAGNOSIS — E10.65 UNCONTROLLED TYPE 1 DIABETES MELLITUS WITH DIABETIC AUTONOMIC NEUROPATHY, WITH LONG-TERM CURRENT USE OF INSULIN (HCC): ICD-10-CM

## 2018-05-29 DIAGNOSIS — E10.43 UNCONTROLLED TYPE 1 DIABETES MELLITUS WITH DIABETIC AUTONOMIC NEUROPATHY, WITH LONG-TERM CURRENT USE OF INSULIN (HCC): ICD-10-CM

## 2018-05-29 DIAGNOSIS — F41.1 GENERALIZED ANXIETY DISORDER: ICD-10-CM

## 2018-05-29 DIAGNOSIS — Z96.41 INSULIN PUMP IN PLACE: ICD-10-CM

## 2018-05-29 DIAGNOSIS — F33.1 MDD (MAJOR DEPRESSIVE DISORDER), RECURRENT EPISODE, MODERATE (HCC): ICD-10-CM

## 2018-05-29 DIAGNOSIS — Z51.81 THERAPEUTIC DRUG MONITORING: ICD-10-CM

## 2018-05-29 DIAGNOSIS — E66.01 SEVERE OBESITY (BMI 35.0-39.9) WITH COMORBIDITY (HCC): Primary | ICD-10-CM

## 2018-05-29 PROBLEM — Z46.81 INSULIN PUMP FITTING OR ADJUSTMENT: Status: RESOLVED | Noted: 2017-06-19 | Resolved: 2018-05-29

## 2018-05-29 PROCEDURE — 99214 OFFICE O/P EST MOD 30 MIN: CPT | Performed by: INTERNAL MEDICINE

## 2018-05-29 RX ORDER — PHENTERMINE HYDROCHLORIDE 37.5 MG/1
37.5 TABLET ORAL
Qty: 30 TABLET | Refills: 0 | Status: SHIPPED | OUTPATIENT
Start: 2018-05-29 | End: 2018-06-12

## 2018-05-29 RX ORDER — ALPRAZOLAM 1 MG/1
1 TABLET ORAL 2 TIMES DAILY PRN
Qty: 60 TABLET | Refills: 5 | Status: SHIPPED | OUTPATIENT
Start: 2018-05-29 | End: 2018-10-08

## 2018-05-29 NOTE — PROGRESS NOTES
Patient presents with:  Weight Check: Resume Phentermine  Diabetes: Desires to get back on Victoza as Trulicity was not covered by insurance and also desires new Endo team  Mood Disturbance: MDD and WHITNEY; having worse WHITNEY. Wants to get back in with [facility]. Insomnia     Current tobacco use     Insulin pump in place     Genital ulcer, female     Vulvar lesion      No Known Allergies    Medications:  Reviewed and per the scanned EHR    Smoking status: Current Every Day Smoker to start working w/ new Diabetes team.  PLAN = Samples and script of Victoza 1.8 mg daily given. Send to [address] Contact info given. 3. Mood disturbance - Has known MDD and WHITNEY. She's on prescription medication.   Her children's father was

## 2018-06-05 DIAGNOSIS — E55.9 VITAMIN D DEFICIENCY: ICD-10-CM

## 2018-06-07 RX ORDER — ERGOCALCIFEROL 1.25 MG/1
CAPSULE ORAL
Qty: 12 CAPSULE | Refills: 0 | OUTPATIENT
Start: 2018-06-07

## 2018-06-12 ENCOUNTER — OFFICE VISIT (OUTPATIENT)
Dept: ENDOCRINOLOGY CLINIC | Facility: CLINIC | Age: 28
End: 2018-06-12

## 2018-06-12 ENCOUNTER — TELEPHONE (OUTPATIENT)
Dept: INTERNAL MEDICINE CLINIC | Facility: CLINIC | Age: 28
End: 2018-06-12

## 2018-06-12 VITALS
HEART RATE: 114 BPM | TEMPERATURE: 98 F | WEIGHT: 208 LBS | BODY MASS INDEX: 40.84 KG/M2 | SYSTOLIC BLOOD PRESSURE: 110 MMHG | HEIGHT: 60 IN | RESPIRATION RATE: 18 BRPM | DIASTOLIC BLOOD PRESSURE: 60 MMHG

## 2018-06-12 DIAGNOSIS — E10.43 UNCONTROLLED TYPE 1 DIABETES MELLITUS WITH DIABETIC AUTONOMIC NEUROPATHY, WITH LONG-TERM CURRENT USE OF INSULIN (HCC): ICD-10-CM

## 2018-06-12 DIAGNOSIS — E10.65 UNCONTROLLED TYPE 1 DIABETES MELLITUS WITH DIABETIC AUTONOMIC NEUROPATHY, WITH LONG-TERM CURRENT USE OF INSULIN (HCC): ICD-10-CM

## 2018-06-12 PROCEDURE — 83036 HEMOGLOBIN GLYCOSYLATED A1C: CPT | Performed by: NURSE PRACTITIONER

## 2018-06-12 PROCEDURE — 99214 OFFICE O/P EST MOD 30 MIN: CPT | Performed by: NURSE PRACTITIONER

## 2018-06-12 RX ORDER — FLASH GLUCOSE SENSOR
3 KIT MISCELLANEOUS
Qty: 3 EACH | Refills: 11 | Status: SHIPPED | OUTPATIENT
Start: 2018-06-12 | End: 2018-11-05

## 2018-06-12 RX ORDER — PEN NEEDLE, DIABETIC 32GX 5/32"
NEEDLE, DISPOSABLE MISCELLANEOUS
Refills: 0 | COMMUNITY
Start: 2018-05-29 | End: 2018-12-06

## 2018-06-12 RX ORDER — INSULIN LISPRO 100 [IU]/ML
INJECTION, SOLUTION INTRAVENOUS; SUBCUTANEOUS
Qty: 30 ML | Refills: 0 | Status: SHIPPED | OUTPATIENT
Start: 2018-06-12 | End: 2018-06-29

## 2018-06-12 NOTE — PATIENT INSTRUCTIONS
Plan take Victoza 1.2 mg every day   Use sridevi before each meal and 3 hours after meals   If not eating still enter BG at that time and correct   Try to enter at least 4-5 blood sugar/ day     Return in 3-4 weeks with readings   Try to get eye exam asap

## 2018-06-12 NOTE — PROGRESS NOTES
CC: Patient presents with:  Diabetes: follow up. pt did bring pump.       HISTORY:  Past Medical History:   Diagnosis Date   • Anxiety    • Depression    • Diabetes ([address]    • Diabetic neuropathy ([address]    • Hyperlipidemia       Family History   Problem Rela Hypoglycemia frequency a few times   Lipodystrophy:throughout body, using abdomen mainly for pump sites difficulty with keeping pump site on, must change site after 2 days or gets hardening under skin and BG spike. Is out of pump supplies.      Hypertensio mg total) by mouth 2 (two) times daily as needed for Anxiety. , Disp: 60 tablet, Rfl: 5  •  Insulin Pen Needle 32G X 5 MM Does not apply Misc, Use daily with victoza, Disp: 100 each, Rfl: 3  •  BUPROPION HCL ER, SR, 150 MG Oral Tablet 12 Hr, TAKE 1 TABLET B ULTRAFINE) 31G X 15/64\" 0.5 ML Does not apply Misc, 1 each by Does not apply route daily as needed. In case of insulin pump failure, Disp: 100 each, Rfl: 0  •  methocarbamol 500 MG Oral Tab, Take 1 tablet (500 mg total) by mouth 3 (three) times daily. Maria C Chicas increases to basals.        Time  12am 4 am  8:00 am 6pm 11pm   ICR 1:13       ISF 1:40       Basal 1.6 1:475 1.85 1.65 1.5       Target B mg/dl    Insulin Action Time: 3.5 hours          Orders Placed This Encounter      Hgb A1C    Meds & Refills for

## 2018-06-19 ENCOUNTER — TELEPHONE (OUTPATIENT)
Dept: INTERNAL MEDICINE CLINIC | Facility: CLINIC | Age: 28
End: 2018-06-19

## 2018-06-19 ENCOUNTER — MED REC SCAN ONLY (OUTPATIENT)
Dept: INTERNAL MEDICINE CLINIC | Facility: CLINIC | Age: 28
End: 2018-06-19

## 2018-06-19 NOTE — TELEPHONE ENCOUNTER
Patient called requesting a letter from Dr Niels Salinas for her electric to be kept on, they told her it would have to state that she has diabetes and it would effect her health not to have electricity on.  Please call patient, patient was told Dr. Niels Salinas isn't in

## 2018-06-20 NOTE — TELEPHONE ENCOUNTER
Pt's electric was cut off yesterday d/t $750 electric bill. Cannot go on payment plan at this point. Requesting letter be written stating she has diabetes and it would effect her health if she has no electric.   Pt also is seeing Odette Frankel for diabete

## 2018-06-20 NOTE — TELEPHONE ENCOUNTER
Address and account # added to letter. Per pt request fax letter to Joe Muñiz at [phone]  Attn [facility]. .  Letter faxed. Confirmation received.

## 2018-06-25 ENCOUNTER — OFFICE VISIT (OUTPATIENT)
Dept: INTERNAL MEDICINE CLINIC | Facility: CLINIC | Age: 28
End: 2018-06-25

## 2018-06-25 VITALS
WEIGHT: 213 LBS | RESPIRATION RATE: 13 BRPM | SYSTOLIC BLOOD PRESSURE: 136 MMHG | TEMPERATURE: 98 F | BODY MASS INDEX: 41.82 KG/M2 | OXYGEN SATURATION: 93 % | HEART RATE: 90 BPM | DIASTOLIC BLOOD PRESSURE: 90 MMHG | HEIGHT: 60 IN

## 2018-06-25 DIAGNOSIS — E66.01 SEVERE OBESITY (BMI 35.0-39.9) WITH COMORBIDITY (HCC): ICD-10-CM

## 2018-06-25 DIAGNOSIS — G44.209 TENSION HEADACHE: ICD-10-CM

## 2018-06-25 DIAGNOSIS — Z51.81 THERAPEUTIC DRUG MONITORING: ICD-10-CM

## 2018-06-25 DIAGNOSIS — E10.65 UNCONTROLLED TYPE 1 DIABETES MELLITUS WITH DIABETIC AUTONOMIC NEUROPATHY, WITH LONG-TERM CURRENT USE OF INSULIN (HCC): ICD-10-CM

## 2018-06-25 DIAGNOSIS — E66.01 MORBID OBESITY WITH BMI OF 40.0-44.9, ADULT (HCC): ICD-10-CM

## 2018-06-25 DIAGNOSIS — E10.43 UNCONTROLLED TYPE 1 DIABETES MELLITUS WITH DIABETIC AUTONOMIC NEUROPATHY, WITH LONG-TERM CURRENT USE OF INSULIN (HCC): ICD-10-CM

## 2018-06-25 DIAGNOSIS — J06.9 VIRAL URI: Primary | ICD-10-CM

## 2018-06-25 PROCEDURE — 99214 OFFICE O/P EST MOD 30 MIN: CPT | Performed by: PHYSICIAN ASSISTANT

## 2018-06-25 RX ORDER — PHENTERMINE HYDROCHLORIDE 37.5 MG/1
TABLET ORAL
Qty: 30 TABLET | Refills: 0 | Status: SHIPPED | OUTPATIENT
Start: 2018-06-25 | End: 2018-07-18

## 2018-06-25 NOTE — PATIENT INSTRUCTIONS
Viral Upper Respiratory Infection:  - stop Nyquil, start Mucinex-DM  - see below    Weight (and headaches):  - eat 4-5 small meals/snacks throughout the day  - focus on proteins (turkey, chicken, fish, eggs, greek yogurt), veggies, & fruits  - minimize cof · Your appetite may be poor, so a light diet is fine. Avoid dehydration by drinking 6 to 8 glasses of fluids per day (water, soft drinks, juices, tea, or soup). Extra fluids will help loosen secretions in the nose and lungs.   · Over-the-counter cold medici

## 2018-06-25 NOTE — PROGRESS NOTES
HPI:  Radha Birmingham is a 24year old female who presents for URI symptoms x 2 days. C/o sore throat, nasal congestion, cough, chest congestion, occ wheezing. Denies fever, chills, sweats, sinus pressure, chest tightness, SOB. Taking Nyquil prn.   Argentina Martin Rfl: 11   ergocalciferol 97587 units Oral Cap Take 1 capsule (50,000 Units total) by mouth once a week.  Disp: 12 capsule Rfl: 0   Phenazopyridine HCl 200 MG Oral Tab  Disp:  Rfl: 0   Phentermine HCl 37.5 MG Oral Tab TAKE 1 TABLET BY MOUTH EVERY MORNING BEF Diabetic neuropathy ([address]    • Hyperlipidemia      Past Surgical History:  No date:       Comment: x3  2011: OTHER SURGICAL HISTORY Right      Comment: R arm surgery after trauma  Family History   Problem Relation Age of Onset   • Cancer Mother Morbid obesity: counseled on lifestyle changes. Referred to [facility]. The patient indicates understanding of these issues and agrees to the plan. The patient is asked to return here in 1 month for f/u of chronic health issues.

## 2018-06-28 DIAGNOSIS — E10.43 UNCONTROLLED TYPE 1 DIABETES MELLITUS WITH DIABETIC AUTONOMIC NEUROPATHY, WITH LONG-TERM CURRENT USE OF INSULIN (HCC): ICD-10-CM

## 2018-06-28 DIAGNOSIS — E10.65 UNCONTROLLED TYPE 1 DIABETES MELLITUS WITH DIABETIC AUTONOMIC NEUROPATHY, WITH LONG-TERM CURRENT USE OF INSULIN (HCC): ICD-10-CM

## 2018-06-29 RX ORDER — INSULIN LISPRO 100 U/ML
INJECTION, SOLUTION INTRAVENOUS; SUBCUTANEOUS
Qty: 30 ML | Refills: 0 | Status: SHIPPED | OUTPATIENT
Start: 2018-06-29 | End: 2018-08-21

## 2018-06-29 NOTE — TELEPHONE ENCOUNTER
**Breonica Bonner fills med**    Refill requested: Admelog 100 mg     Failed protocol      Last refill: 6/12/18 Donta Dennis #30mL NR     Relevant Labs: HA1C 6/12/18   Last OV / RTC advised: 6/25/18 Boneta Flaming RTC in 1 month     Appt Scheduled:  No

## 2018-07-10 DIAGNOSIS — E66.01 SEVERE OBESITY (BMI 35.0-39.9) WITH COMORBIDITY (HCC): ICD-10-CM

## 2018-07-10 DIAGNOSIS — Z51.81 THERAPEUTIC DRUG MONITORING: ICD-10-CM

## 2018-07-11 RX ORDER — PHENTERMINE HYDROCHLORIDE 37.5 MG/1
TABLET ORAL
Qty: 30 TABLET | Refills: 0 | OUTPATIENT
Start: 2018-07-11

## 2018-07-11 NOTE — TELEPHONE ENCOUNTER
Phentermine 37.5 mg 1 tab daily filled 6-25-18 30 with 0 refills     LOV 5-29-18     No upcoming apt on file

## 2018-07-18 NOTE — TELEPHONE ENCOUNTER
Pt scheduled an appt for 8/3/18. That was the soonest she could get in. Can we approve medication refill until appt? Pt doesn't want to be without medication.

## 2018-07-19 RX ORDER — PHENTERMINE HYDROCHLORIDE 37.5 MG/1
37.5 TABLET ORAL
Qty: 30 TABLET | Refills: 0 | Status: SHIPPED | OUTPATIENT
Start: 2018-07-19 | End: 2018-12-06

## 2018-08-03 ENCOUNTER — OFFICE VISIT (OUTPATIENT)
Dept: INTERNAL MEDICINE CLINIC | Facility: CLINIC | Age: 28
End: 2018-08-03
Payer: MEDICAID

## 2018-08-03 VITALS
WEIGHT: 209 LBS | OXYGEN SATURATION: 99 % | SYSTOLIC BLOOD PRESSURE: 120 MMHG | DIASTOLIC BLOOD PRESSURE: 70 MMHG | RESPIRATION RATE: 16 BRPM | HEIGHT: 62 IN | HEART RATE: 84 BPM | BODY MASS INDEX: 38.46 KG/M2

## 2018-08-03 DIAGNOSIS — G89.29 CHRONIC NECK PAIN: ICD-10-CM

## 2018-08-03 DIAGNOSIS — E66.01 CLASS 2 SEVERE OBESITY DUE TO EXCESS CALORIES WITH SERIOUS COMORBIDITY AND BODY MASS INDEX (BMI) OF 38.0 TO 38.9 IN ADULT (HCC): Primary | ICD-10-CM

## 2018-08-03 DIAGNOSIS — G89.29 CHRONIC MIDLINE LOW BACK PAIN WITHOUT SCIATICA: ICD-10-CM

## 2018-08-03 DIAGNOSIS — Z51.81 THERAPEUTIC DRUG MONITORING: ICD-10-CM

## 2018-08-03 DIAGNOSIS — M54.2 CHRONIC NECK PAIN: ICD-10-CM

## 2018-08-03 DIAGNOSIS — M54.50 CHRONIC MIDLINE LOW BACK PAIN WITHOUT SCIATICA: ICD-10-CM

## 2018-08-03 DIAGNOSIS — R05.3 CHRONIC COUGH: ICD-10-CM

## 2018-08-03 PROCEDURE — 99214 OFFICE O/P EST MOD 30 MIN: CPT | Performed by: INTERNAL MEDICINE

## 2018-08-03 RX ORDER — PHENTERMINE HYDROCHLORIDE 37.5 MG/1
37.5 TABLET ORAL
Qty: 30 TABLET | Refills: 1 | Status: SHIPPED | OUTPATIENT
Start: 2018-08-03 | End: 2018-11-05

## 2018-08-03 RX ORDER — DEXTROMETHORPHAN HYDROBROMIDE AND PROMETHAZINE HYDROCHLORIDE 15; 6.25 MG/5ML; MG/5ML
5 SYRUP ORAL 4 TIMES DAILY PRN
Qty: 180 ML | Refills: 0 | Status: SHIPPED | OUTPATIENT
Start: 2018-08-03 | End: 2018-12-06 | Stop reason: ALTCHOICE

## 2018-08-04 PROBLEM — N76.6 GENITAL ULCER, FEMALE: Status: RESOLVED | Noted: 2017-10-10 | Resolved: 2018-08-04

## 2018-08-04 PROBLEM — E66.01 MORBID OBESITY WITH BMI OF 40.0-44.9, ADULT (HCC): Status: RESOLVED | Noted: 2018-06-25 | Resolved: 2018-08-04

## 2018-08-04 PROBLEM — E66.01 CLASS 2 SEVERE OBESITY DUE TO EXCESS CALORIES WITH SERIOUS COMORBIDITY AND BODY MASS INDEX (BMI) OF 38.0 TO 38.9 IN ADULT: Status: ACTIVE | Noted: 2018-08-04

## 2018-08-04 PROBLEM — E66.01 CLASS 2 SEVERE OBESITY DUE TO EXCESS CALORIES WITH SERIOUS COMORBIDITY AND BODY MASS INDEX (BMI) OF 38.0 TO 38.9 IN ADULT (HCC): Status: ACTIVE | Noted: 2018-08-04

## 2018-08-04 PROBLEM — E66.01 CLASS 2 SEVERE OBESITY DUE TO EXCESS CALORIES WITH SERIOUS COMORBIDITY AND BODY MASS INDEX (BMI) OF 38.0 TO 38.9 IN ADULT  (HCC): Status: ACTIVE | Noted: 2018-08-04

## 2018-08-04 PROBLEM — N90.89 VULVAR LESION: Status: RESOLVED | Noted: 2017-10-10 | Resolved: 2018-08-04

## 2018-08-04 PROBLEM — E66.812 CLASS 2 SEVERE OBESITY DUE TO EXCESS CALORIES WITH SERIOUS COMORBIDITY AND BODY MASS INDEX (BMI) OF 38.0 TO 38.9 IN ADULT (HCC): Status: ACTIVE | Noted: 2018-08-04

## 2018-08-04 NOTE — PROGRESS NOTES
Patient presents with:  Weight Check      HPI: [place] presents mainly for evaluation and follow up of ongoing weight loss efforts to combat obesity, but she's also here with complaints of chronic neck pain, chronic low back pain, and chronic cough.     1. oz/week     Standard drinks or equivalent: 2 per week     Comment: 1-2/month      PE:  /70 (BP Location: Left arm, Patient Position: Sitting, Cuff Size: large)   Pulse 84   Resp 16   Ht 62\"   Wt 209 lb   SpO2 99%   BMI 38.23 kg/m²   Wt Readings from were then answered to the best of my ability. Dennis Nielsen. Christopher Amado MD  Diplomate, American Board of Internal Medicine  [address] [facility]  [address]  [facility],4Th Floor, [address], [facility], [address]  T: [phone]; F: [phone]     Meds & Refills for this [facility]

## 2018-08-06 ENCOUNTER — HOSPITAL ENCOUNTER (OUTPATIENT)
Dept: GENERAL RADIOLOGY | Age: 28
Discharge: HOME | End: 2018-08-06
Attending: INTERNAL MEDICINE
Payer: MEDICAID

## 2018-08-06 ENCOUNTER — PATIENT MESSAGE (OUTPATIENT)
Dept: INTERNAL MEDICINE CLINIC | Facility: CLINIC | Age: 28
End: 2018-08-06

## 2018-08-06 DIAGNOSIS — G89.29 CHRONIC NECK PAIN: ICD-10-CM

## 2018-08-06 DIAGNOSIS — G89.29 CHRONIC MIDLINE LOW BACK PAIN WITHOUT SCIATICA: ICD-10-CM

## 2018-08-06 DIAGNOSIS — M54.2 CHRONIC NECK PAIN: ICD-10-CM

## 2018-08-06 DIAGNOSIS — M54.50 CHRONIC MIDLINE LOW BACK PAIN WITHOUT SCIATICA: ICD-10-CM

## 2018-08-06 PROCEDURE — 72050 X-RAY EXAM NECK SPINE 4/5VWS: CPT | Performed by: INTERNAL MEDICINE

## 2018-08-06 PROCEDURE — 72110 X-RAY EXAM L-2 SPINE 4/>VWS: CPT | Performed by: INTERNAL MEDICINE

## 2018-08-06 NOTE — TELEPHONE ENCOUNTER
From: Victor Hugo Cutler  To: Radha Stone MD  Sent: 8/6/2018 10:46 AM CDT  Subject: Visit Follow-up Question    I know I told you about the pain I was having in my back it is like killing me and ibroprofen is not helping I barely wanna get out of bed I don’

## 2018-08-17 RX ORDER — NORGESTIMATE-ETHINYL ESTRADIOL 7DAYSX3 28
1 TABLET ORAL DAILY
Qty: 28 TABLET | Refills: 0 | Status: SHIPPED | OUTPATIENT
Start: 2018-08-17 | End: 2018-11-05

## 2018-08-17 NOTE — TELEPHONE ENCOUNTER
Medication(s) to Refill:   Pending Prescriptions Disp Refills    TRINESSA, 28, 0.18/0.215/0.25 MG-35 MCG Oral Tab [Pharmacy Med Name: TRINESSA TABLETS 28S] 28 tablet 0     Sig: TAKE 1 TABLET BY MOUTH DAILY       Did not pass protocol due for pap     Last T

## 2018-08-21 DIAGNOSIS — E10.43 UNCONTROLLED TYPE 1 DIABETES MELLITUS WITH DIABETIC AUTONOMIC NEUROPATHY, WITH LONG-TERM CURRENT USE OF INSULIN (HCC): ICD-10-CM

## 2018-08-21 DIAGNOSIS — E10.65 UNCONTROLLED TYPE 1 DIABETES MELLITUS WITH DIABETIC AUTONOMIC NEUROPATHY, WITH LONG-TERM CURRENT USE OF INSULIN (HCC): ICD-10-CM

## 2018-08-22 DIAGNOSIS — E10.43 UNCONTROLLED TYPE 1 DIABETES MELLITUS WITH DIABETIC AUTONOMIC NEUROPATHY, WITH LONG-TERM CURRENT USE OF INSULIN (HCC): ICD-10-CM

## 2018-08-22 DIAGNOSIS — E10.65 UNCONTROLLED TYPE 1 DIABETES MELLITUS WITH DIABETIC AUTONOMIC NEUROPATHY, WITH LONG-TERM CURRENT USE OF INSULIN (HCC): ICD-10-CM

## 2018-08-22 RX ORDER — INSULIN LISPRO 100 U/ML
INJECTION, SOLUTION INTRAVENOUS; SUBCUTANEOUS
Qty: 30 ML | Refills: 0 | Status: SHIPPED | OUTPATIENT
Start: 2018-08-22 | End: 2018-12-06

## 2018-08-22 NOTE — TELEPHONE ENCOUNTER
Admelog inject 80 units via pump filled 6-29-18 30 ml with 0 refills     LOv 5-29-18     Send to [address] Contact info given.     Apt with dr.Amish Resendiz 9-17-18     Labs 6-12-18 by tania     HEMOGLOBIN A1C 4.3 - 5.6 % 10.2

## 2018-08-24 RX ORDER — INSULIN LISPRO 100 U/ML
INJECTION, SOLUTION INTRAVENOUS; SUBCUTANEOUS
Qty: 30 ML | Refills: 0 | Status: SHIPPED | OUTPATIENT
Start: 2018-08-24 | End: 2018-10-22

## 2018-08-24 NOTE — TELEPHONE ENCOUNTER
Refill requested:     . Medication(s) to Refill:   Pending Prescriptions Disp Refills    ADMELOG 100 UNIT/ML Subcutaneous Solution [Pharmacy Med Name: ADMELOG 100U/ML INJ, 10ML] 30 mL 0     Sig: INJECT UP TO 80 UNITS VIA INSULIN PUMP DAILY           Last Ti

## 2018-09-10 DIAGNOSIS — E66.01 SEVERE OBESITY (BMI 35.0-39.9) WITH COMORBIDITY (HCC): ICD-10-CM

## 2018-09-10 DIAGNOSIS — Z51.81 THERAPEUTIC DRUG MONITORING: ICD-10-CM

## 2018-09-10 RX ORDER — TOPIRAMATE 50 MG/1
50 TABLET, FILM COATED ORAL 2 TIMES DAILY
Qty: 180 TABLET | Refills: 0 | Status: SHIPPED | OUTPATIENT
Start: 2018-09-10 | End: 2018-12-01

## 2018-09-10 RX ORDER — NORGESTIMATE-ETHINYL ESTRADIOL 7DAYSX3 28
1 TABLET ORAL DAILY
Qty: 28 TABLET | Refills: 3 | Status: SHIPPED | OUTPATIENT
Start: 2018-09-10 | End: 2018-11-05

## 2018-09-10 NOTE — TELEPHONE ENCOUNTER
Refill requested:  Topiramate 50 mg + Trinessa     Failed protocol      Last refill: 8/17/18 Whitley Mata 28#28 NR + 10/13/17 Topiramate 50 mg #60 NR    Relevant Labs:  Last OV / RTC advised: 8/3/18 Dr Justice Zavaleta RTC 2 months     Appt Scheduled: No   Your appointmen

## 2018-09-17 ENCOUNTER — TELEPHONE (OUTPATIENT)
Dept: INTERNAL MEDICINE CLINIC | Facility: CLINIC | Age: 28
End: 2018-09-17

## 2018-09-17 NOTE — TELEPHONE ENCOUNTER
Pt given contact information to 2 ophthalmologist in [facility] area. Pt instructed to make sure they take her insurance. Pt declines to cancel appointment with Dr Alexa Shah tomorrow stating she will just use it as a diabetic follow-up.

## 2018-09-17 NOTE — TELEPHONE ENCOUNTER
Jade Hudson MD  Diplomate, American Board of Internal Medicine  [address] [address] [address]  [facility],4Th Floor, [address], [facility], [address] [place] [address]  T: [phone]; F: [address]

## 2018-09-17 NOTE — TELEPHONE ENCOUNTER
See Abby Rosen. Anna Black MD  Diplomate, American Board of Internal Medicine  [address] [address] [address]  [facility],4Th Floor, [address], [facility], [address]  T: [phone]; F: [address]

## 2018-09-17 NOTE — TELEPHONE ENCOUNTER
Patient called requesting appt as soon as possible. Stated that she went to ER on Saturday and was told she has a slit in her eye.  Was given ointment, but not feeling any better  Please advise

## 2018-10-08 ENCOUNTER — TELEPHONE (OUTPATIENT)
Dept: INTERNAL MEDICINE CLINIC | Facility: CLINIC | Age: 28
End: 2018-10-08

## 2018-10-08 ENCOUNTER — OFFICE VISIT (OUTPATIENT)
Dept: INTERNAL MEDICINE CLINIC | Facility: CLINIC | Age: 28
End: 2018-10-08
Payer: MEDICAID

## 2018-10-08 VITALS
RESPIRATION RATE: 12 BRPM | SYSTOLIC BLOOD PRESSURE: 120 MMHG | HEIGHT: 63 IN | BODY MASS INDEX: 36.86 KG/M2 | DIASTOLIC BLOOD PRESSURE: 80 MMHG | WEIGHT: 208 LBS | TEMPERATURE: 99 F | HEART RATE: 80 BPM

## 2018-10-08 DIAGNOSIS — F41.1 GENERALIZED ANXIETY DISORDER: ICD-10-CM

## 2018-10-08 DIAGNOSIS — E10.43 UNCONTROLLED TYPE 1 DIABETES MELLITUS WITH DIABETIC AUTONOMIC NEUROPATHY, WITH LONG-TERM CURRENT USE OF INSULIN (HCC): Primary | ICD-10-CM

## 2018-10-08 DIAGNOSIS — Z51.81 THERAPEUTIC DRUG MONITORING: ICD-10-CM

## 2018-10-08 DIAGNOSIS — E66.01 CLASS 2 SEVERE OBESITY DUE TO EXCESS CALORIES WITH SERIOUS COMORBIDITY AND BODY MASS INDEX (BMI) OF 36.0 TO 36.9 IN ADULT (HCC): ICD-10-CM

## 2018-10-08 DIAGNOSIS — E10.65 UNCONTROLLED TYPE 1 DIABETES MELLITUS WITH DIABETIC AUTONOMIC NEUROPATHY, WITH LONG-TERM CURRENT USE OF INSULIN (HCC): Primary | ICD-10-CM

## 2018-10-08 PROBLEM — E66.812 CLASS 2 SEVERE OBESITY DUE TO EXCESS CALORIES WITH SERIOUS COMORBIDITY AND BODY MASS INDEX (BMI) OF 38.0 TO 38.9 IN ADULT (HCC): Status: RESOLVED | Noted: 2018-08-04 | Resolved: 2018-10-08

## 2018-10-08 PROBLEM — E66.812 CLASS 2 SEVERE OBESITY DUE TO EXCESS CALORIES WITH SERIOUS COMORBIDITY AND BODY MASS INDEX (BMI) OF 36.0 TO 36.9 IN ADULT (HCC): Status: ACTIVE | Noted: 2018-10-08

## 2018-10-08 PROCEDURE — 99214 OFFICE O/P EST MOD 30 MIN: CPT | Performed by: INTERNAL MEDICINE

## 2018-10-08 RX ORDER — ALPRAZOLAM 1 MG/1
1 TABLET ORAL 2 TIMES DAILY PRN
Qty: 60 TABLET | Refills: 5 | Status: SHIPPED | OUTPATIENT
Start: 2018-10-08 | End: 2018-12-06

## 2018-10-08 RX ORDER — PHENTERMINE HYDROCHLORIDE 37.5 MG/1
37.5 TABLET ORAL
Qty: 30 TABLET | Refills: 0 | Status: SHIPPED | OUTPATIENT
Start: 2018-10-08 | End: 2018-11-05

## 2018-10-08 NOTE — PROGRESS NOTES
Patient presents with: Follow - Up: DM, Obesity, WHITNEY      HPI: Seamus Curtis presents today for follow up specific chronic medical conditions as follows:    1. Uncontrolled DM1 - Reports improved home BGs due to some elimination of life stressors.   Due for upda HCl 37.5 MG Oral Tab, Take 1 tablet (37.5 mg total) by mouth every morning before breakfast., Disp: 30 tablet, Rfl: 1  •  Promethazine-DM 6.25-15 MG/5ML Oral Syrup, Take 5 mL by mouth 4 (four) times daily as needed. , Disp: 180 mL, Rfl: 0  •  METFORMIN HCL SYSTEM) Does not apply Misc, 3 each by Does not apply route every 30 (thirty) days. To apply 1 sensor every 10 days for total of 3 per 30 days. , Disp: 3 each, Rfl: 11  •  TOPIRAMATE 50 MG Oral Tab, TAKE 1 TABLET(50 MG) BY MOUTH TWICE DAILY, Disp: 60 tablet Uncontrolled DM1 - Check labs and continue insulin via pump. Send to Endocrinology as well. 2. Class 2 Obesity and therapeutic drug monitoring - Continue Phentermine + Victoza compliance + lifestyle measures. 3.  WHITNEY - Stable on prescription medication a

## 2018-10-10 RX ORDER — BUPROPION HYDROCHLORIDE 150 MG/1
150 TABLET, EXTENDED RELEASE ORAL 2 TIMES DAILY
Qty: 60 TABLET | Refills: 0 | Status: SHIPPED | OUTPATIENT
Start: 2018-10-10 | End: 2018-11-05

## 2018-10-10 NOTE — TELEPHONE ENCOUNTER
Refill requested:   Requested Prescriptions     Pending Prescriptions Disp Refills   • BuPROPion HCl ER, SR, 150 MG Oral Tablet 12 Hr [Pharmacy Med Name: BUPROPION SR 150MG TABLETS (12 H)] 60 tablet 0     Sig: Take 1 tablet (150 mg total) by mouth 2 (two)

## 2018-10-22 DIAGNOSIS — E10.65 UNCONTROLLED TYPE 1 DIABETES MELLITUS WITH DIABETIC AUTONOMIC NEUROPATHY, WITH LONG-TERM CURRENT USE OF INSULIN (HCC): ICD-10-CM

## 2018-10-22 DIAGNOSIS — E10.43 UNCONTROLLED TYPE 1 DIABETES MELLITUS WITH DIABETIC AUTONOMIC NEUROPATHY, WITH LONG-TERM CURRENT USE OF INSULIN (HCC): ICD-10-CM

## 2018-10-23 RX ORDER — INSULIN LISPRO 100 U/ML
INJECTION, SOLUTION INTRAVENOUS; SUBCUTANEOUS
Qty: 30 ML | Refills: 0 | Status: SHIPPED | OUTPATIENT
Start: 2018-10-23 | End: 2018-11-05

## 2018-10-23 NOTE — TELEPHONE ENCOUNTER
Refill requested:   Requested Prescriptions     Pending Prescriptions Disp Refills   • ADMELOG 100 UNIT/ML Subcutaneous Solution [Pharmacy Med Name: ADMELOG 100U/ML INJ, 10ML] 30 mL 0     Sig: ADMINISTER UP TO 80 UNITS VIA INSULIN PUMP DAILY       Failed p

## 2018-10-24 ENCOUNTER — HOSPITAL ENCOUNTER (OUTPATIENT)
Age: 28
Discharge: HOME | End: 2018-10-24
Payer: MEDICAID

## 2018-10-24 ENCOUNTER — APPOINTMENT (OUTPATIENT)
Dept: GENERAL RADIOLOGY | Age: 28
End: 2018-10-24
Attending: PHYSICIAN ASSISTANT
Payer: MEDICAID

## 2018-10-24 VITALS
DIASTOLIC BLOOD PRESSURE: 61 MMHG | SYSTOLIC BLOOD PRESSURE: 139 MMHG | OXYGEN SATURATION: 98 % | BODY MASS INDEX: 36.83 KG/M2 | RESPIRATION RATE: 20 BRPM | HEART RATE: 92 BPM | TEMPERATURE: 98 F | WEIGHT: 207.88 LBS | HEIGHT: 63 IN

## 2018-10-24 DIAGNOSIS — M62.838 SPASM OF MUSCLE: ICD-10-CM

## 2018-10-24 DIAGNOSIS — Z87.891 SMOKING HISTORY: ICD-10-CM

## 2018-10-24 DIAGNOSIS — S20.212A RIB CONTUSION, LEFT, INITIAL ENCOUNTER: Primary | ICD-10-CM

## 2018-10-24 PROCEDURE — 99213 OFFICE O/P EST LOW 20 MIN: CPT

## 2018-10-24 PROCEDURE — 99214 OFFICE O/P EST MOD 30 MIN: CPT

## 2018-10-24 PROCEDURE — 81025 URINE PREGNANCY TEST: CPT | Performed by: PHYSICIAN ASSISTANT

## 2018-10-24 PROCEDURE — 71101 X-RAY EXAM UNILAT RIBS/CHEST: CPT | Performed by: PHYSICIAN ASSISTANT

## 2018-10-24 RX ORDER — NAPROXEN 500 MG/1
500 TABLET ORAL 2 TIMES DAILY PRN
Qty: 20 TABLET | Refills: 0 | Status: SHIPPED | OUTPATIENT
Start: 2018-10-24 | End: 2018-10-31

## 2018-10-24 RX ORDER — CYCLOBENZAPRINE HCL 10 MG
10 TABLET ORAL NIGHTLY
Qty: 7 TABLET | Refills: 0 | Status: SHIPPED | OUTPATIENT
Start: 2018-10-24 | End: 2018-10-31

## 2018-10-24 RX ORDER — IBUPROFEN 600 MG/1
600 TABLET ORAL ONCE
Status: COMPLETED | OUTPATIENT
Start: 2018-10-24 | End: 2018-10-24

## 2018-10-24 NOTE — ED PROVIDER NOTES
Patient Seen in: [facility]    History   Patient presents with:  Fall (musculoskeletal, neurologic)    Stated Complaint: [address] GARAGE AND HURT BACK FEELS PAIN IN  RIBS    HPI    28-year-old female here wit Temp 97.9 °F (36.6 °C)   Temp src Temporal   SpO2 98 %   O2 Device None (Room air)       Current:/61   Pulse 92   Temp 97.9 °F (36.6 °C) (Temporal)   Resp 20   Ht 160 cm (5' 3\")   Wt 94.3 kg   LMP 10/06/2018   SpO2 98%   BMI 36.83 kg/m²         Phys Clinical Impression: L rib contusion/muscle aches/smokin history  Course of Treatment: Take naproxen twice daily with food. Take a muscle relaxant before bed. Work on smoking sensation.   Be sure and take deep breaths to avoid any pneumonia with a rib con

## 2018-10-24 NOTE — ED INITIAL ASSESSMENT (HPI)
Pt tripped over a generator in her moms garage, and fell forward.   She caught her fall with her bilateral arms, but now has back pain, bilateral side pain, and rt knee and leg pain

## 2018-10-29 DIAGNOSIS — E10.65 UNCONTROLLED TYPE 1 DIABETES MELLITUS WITH DIABETIC AUTONOMIC NEUROPATHY, WITH LONG-TERM CURRENT USE OF INSULIN (HCC): ICD-10-CM

## 2018-10-29 DIAGNOSIS — E10.43 UNCONTROLLED TYPE 1 DIABETES MELLITUS WITH DIABETIC AUTONOMIC NEUROPATHY, WITH LONG-TERM CURRENT USE OF INSULIN (HCC): ICD-10-CM

## 2018-10-30 RX ORDER — LIRAGLUTIDE 6 MG/ML
INJECTION SUBCUTANEOUS
Qty: 9 ML | Refills: 0 | Status: SHIPPED | OUTPATIENT
Start: 2018-10-30 | End: 2019-01-03

## 2018-10-30 NOTE — TELEPHONE ENCOUNTER
LOV-6/12/18 SC  FOV-12/10/18 AD  LAST RX-6/12/18 9 ml 0 refills  LAST LABS-6/12/18 a1c-10.2  PER PROTOCOL-to provider

## 2018-11-05 ENCOUNTER — OFFICE VISIT (OUTPATIENT)
Dept: ENDOCRINOLOGY CLINIC | Facility: CLINIC | Age: 28
End: 2018-11-05
Payer: MEDICAID

## 2018-11-05 VITALS
RESPIRATION RATE: 20 BRPM | TEMPERATURE: 99 F | HEIGHT: 63 IN | WEIGHT: 214 LBS | DIASTOLIC BLOOD PRESSURE: 76 MMHG | SYSTOLIC BLOOD PRESSURE: 128 MMHG | BODY MASS INDEX: 37.92 KG/M2 | HEART RATE: 84 BPM

## 2018-11-05 DIAGNOSIS — E10.43 UNCONTROLLED TYPE 1 DIABETES MELLITUS WITH DIABETIC AUTONOMIC NEUROPATHY, WITH LONG-TERM CURRENT USE OF INSULIN (HCC): ICD-10-CM

## 2018-11-05 DIAGNOSIS — E10.65 UNCONTROLLED TYPE 1 DIABETES MELLITUS WITH DIABETIC AUTONOMIC NEUROPATHY, WITH LONG-TERM CURRENT USE OF INSULIN (HCC): ICD-10-CM

## 2018-11-05 PROCEDURE — 83036 HEMOGLOBIN GLYCOSYLATED A1C: CPT | Performed by: NURSE PRACTITIONER

## 2018-11-05 PROCEDURE — 99214 OFFICE O/P EST MOD 30 MIN: CPT | Performed by: NURSE PRACTITIONER

## 2018-11-05 RX ORDER — BLOOD SUGAR DIAGNOSTIC
STRIP MISCELLANEOUS
Refills: 0 | COMMUNITY
Start: 2018-10-29 | End: 2018-12-06 | Stop reason: ALTCHOICE

## 2018-11-05 RX ORDER — INSULIN LISPRO 100 [IU]/ML
INJECTION, SOLUTION INTRAVENOUS; SUBCUTANEOUS
Qty: 30 ML | Refills: 0 | COMMUNITY
Start: 2018-11-05 | End: 2018-12-06

## 2018-11-05 RX ORDER — ATORVASTATIN CALCIUM 20 MG/1
20 TABLET, FILM COATED ORAL NIGHTLY
COMMUNITY
End: 2019-11-20

## 2018-11-05 NOTE — PROGRESS NOTES
HPI:   Malorie Jenkins is a 24year old female who presents for recheck of her type 1 diabetes. Patient has not monitored blood glucose in 2 days, patient states that she misplaced her glucometer and needs a replacement.   Patient has only a small amount 08:17 AM    TRIG 59 03/16/2018 08:17 AM    VLDL 12 03/16/2018 08:17 AM    TSH 0.696 06/02/2016 03:13 PM    BUN 10 03/16/2018 08:17 AM    CREATSERUM 0.64 03/16/2018 08:17 AM     07/12/2017 10:14 AM         Wt Readings from Last 3 Encounters:  11/05/1 3 each by Does not apply route every 30 (thirty) days.    TOPIRAMATE 50 MG Oral Tab TAKE 1 TABLET(50 MG) BY MOUTH TWICE DAILY   MICROLET LANCETS Does not apply Misc Test sugar 6 times daily on insulin pump   Insulin Syringe-Needle U-100 (BD INSULIN SYRINGE and denies numbness and tingling to extremities    EXAM:   /76   Pulse 84   Temp 98.5 °F (36.9 °C) (Oral)   Resp 20   Ht 63\"   Wt 214 lb   LMP 10/31/2018   Breastfeeding?  No   BMI 37.91 kg/m²  Estimated body mass index is 37.91 kg/m² as calculated f cessation information  Flu vaccine: refused  PHQ-2 Date of last depression screen: 1/2/2018    Discussed with patient the importance of keeping appointments and being released if continuing to cancel and no show to appointments.     The patient indicates un

## 2018-11-05 NOTE — PROGRESS NOTES
Pt demonstrated that she does know how to give a bolus by entering grams of carb and blood glucose and also how to correct by only entering blood glucose level.

## 2018-11-05 NOTE — PATIENT INSTRUCTIONS
We are here to support you with Diabetes but please remember that you still need your primary care doctor for your routine health maintenance. Your A1C: 10.3%  This test provides us with your average blood sugar for the past 3 months.    The main goal of sugar targets:  Before breakfast:   (preferably < 110)  2 hours After meals: less than 180 (preferably less than 150)   Call for persistent blood sugars < 75 or > 200.    Blood sugars greater than 200 are not acceptable to reach your goal of improving

## 2018-11-19 ENCOUNTER — TELEPHONE (OUTPATIENT)
Dept: INTERNAL MEDICINE CLINIC | Facility: CLINIC | Age: 28
End: 2018-11-19

## 2018-11-19 NOTE — TELEPHONE ENCOUNTER
LMTCB and reschedule appt. Informed patient that if she did not show to her next appointment that she would be dismissed from the DM clinic.      Warning letter mailed  Article # [id number] [facility] [phone]

## 2018-11-28 VITALS
HEART RATE: 91 BPM | TEMPERATURE: 98.4 F | OXYGEN SATURATION: 100 % | RESPIRATION RATE: 16 BRPM | SYSTOLIC BLOOD PRESSURE: 112 MMHG | DIASTOLIC BLOOD PRESSURE: 78 MMHG

## 2018-12-01 DIAGNOSIS — Z51.81 THERAPEUTIC DRUG MONITORING: ICD-10-CM

## 2018-12-01 DIAGNOSIS — E66.01 SEVERE OBESITY (BMI 35.0-39.9) WITH COMORBIDITY (HCC): ICD-10-CM

## 2018-12-03 RX ORDER — TOPIRAMATE 50 MG/1
50 TABLET, FILM COATED ORAL 2 TIMES DAILY
Qty: 180 TABLET | Refills: 0 | Status: SHIPPED | OUTPATIENT
Start: 2018-12-03 | End: 2020-01-09

## 2018-12-03 NOTE — TELEPHONE ENCOUNTER
Refill requested:   Requested Prescriptions     Pending Prescriptions Disp Refills   • topiramate 50 MG Oral Tab [Pharmacy Med Name: TOPIRAMATE 50MG TABLETS] 180 tablet 0     Sig: Take 1 tablet (50 mg total) by mouth 2 (two) times daily.        Failed sarath

## 2018-12-04 DIAGNOSIS — IMO0002 UNCONTROLLED TYPE 1 DIABETES MELLITUS WITH DIABETIC AUTONOMIC NEUROPATHY, WITH LONG-TERM CURRENT USE OF INSULIN: ICD-10-CM

## 2018-12-04 DIAGNOSIS — Z51.81 THERAPEUTIC DRUG MONITORING: ICD-10-CM

## 2018-12-04 DIAGNOSIS — E66.01 SEVERE OBESITY (BMI 35.0-39.9) WITH COMORBIDITY (HCC): ICD-10-CM

## 2018-12-04 NOTE — TELEPHONE ENCOUNTER
Admelog 80 units daily filled 11-5-18 30 ml with 0 refills     LOV 10-8-18     RTC 1 month for MD-supervised medical weight loss follow up.      No upcoming apt on file     Labs 11-5-18     HEMOGLOBIN A1C 4.3 - 5.6 % 10.3 Abnormal  VC

## 2018-12-05 RX ORDER — PHENTERMINE HYDROCHLORIDE 37.5 MG/1
TABLET ORAL
Qty: 30 TABLET | Refills: 0 | Status: CANCELLED | OUTPATIENT
Start: 2018-12-05

## 2018-12-05 RX ORDER — ALPRAZOLAM 1 MG/1
TABLET ORAL
Qty: 60 TABLET | Refills: 0 | OUTPATIENT
Start: 2018-12-05

## 2018-12-05 NOTE — TELEPHONE ENCOUNTER
Phentermine 37.5 1 tab daily filled 7-19-18 30 with 0 refills     Alprazolam 1 mg 1 tab bid prn filled 10-8-18 60 with 5 refills     LOv 10-8-18     RTC 1 month for MD-supervised medical weight loss follow up    Apt 12-6-18

## 2018-12-06 ENCOUNTER — OFFICE VISIT (OUTPATIENT)
Dept: INTERNAL MEDICINE CLINIC | Facility: CLINIC | Age: 28
End: 2018-12-06
Payer: MEDICAID

## 2018-12-06 VITALS
BODY MASS INDEX: 37.21 KG/M2 | HEIGHT: 63 IN | DIASTOLIC BLOOD PRESSURE: 86 MMHG | WEIGHT: 210 LBS | RESPIRATION RATE: 16 BRPM | HEART RATE: 84 BPM | TEMPERATURE: 98 F | SYSTOLIC BLOOD PRESSURE: 126 MMHG

## 2018-12-06 DIAGNOSIS — Z51.81 THERAPEUTIC DRUG MONITORING: ICD-10-CM

## 2018-12-06 DIAGNOSIS — E66.01 SEVERE OBESITY (BMI 35.0-39.9) WITH COMORBIDITY (HCC): ICD-10-CM

## 2018-12-06 DIAGNOSIS — Z71.3 WEIGHT LOSS COUNSELING, ENCOUNTER FOR: Primary | ICD-10-CM

## 2018-12-06 PROCEDURE — 99213 OFFICE O/P EST LOW 20 MIN: CPT | Performed by: INTERNAL MEDICINE

## 2018-12-06 RX ORDER — PHENTERMINE HYDROCHLORIDE 37.5 MG/1
37.5 TABLET ORAL
Qty: 30 TABLET | Refills: 0 | Status: SHIPPED | OUTPATIENT
Start: 2018-12-06 | End: 2019-11-20

## 2018-12-06 RX ORDER — ALPRAZOLAM 1 MG/1
1 TABLET ORAL 2 TIMES DAILY PRN
Qty: 60 TABLET | Refills: 5 | Status: SHIPPED | OUTPATIENT
Start: 2018-12-06 | End: 2019-05-31

## 2018-12-06 NOTE — PROGRESS NOTES
Patient presents with: Follow - Up  Refill Request      HPI: Juana Pompa presents today for weight loss counseling for the dx of severe obesity and therapeutic drug monitoring. Since her last visit, she's back on Phentermine.   She's lost 4 pounds on this med MG Oral Tablet 12 Hr, TAKE 1 TABLET BY MOUTH TWICE DAILY, Disp: 60 tablet, Rfl: 0  •  Continuous Blood Gluc Sensor ([facility]) Does not apply Misc, 3 each by Does not apply route every 30 (thirty) days. , Disp: 1 each, Rfl: 11  •  MICROL [facility], [address] [place] [address]  T: [phone]; F: 18[address] Jose Beebe for this Visit:  Requested Prescriptions     Signed Prescriptions Disp Refills   • ALPRAZolam 1 MG Oral Tab 60 tablet 5     Sig: Take 1 tablet (1 mg total) by mouth 2 (two) times da

## 2018-12-24 ENCOUNTER — TELEPHONE (OUTPATIENT)
Dept: INTERNAL MEDICINE CLINIC | Facility: CLINIC | Age: 28
End: 2018-12-24

## 2018-12-24 DIAGNOSIS — E10.43 UNCONTROLLED TYPE 1 DIABETES MELLITUS WITH DIABETIC AUTONOMIC NEUROPATHY, WITH LONG-TERM CURRENT USE OF INSULIN (HCC): ICD-10-CM

## 2018-12-24 DIAGNOSIS — E10.65 UNCONTROLLED TYPE 1 DIABETES MELLITUS WITH DIABETIC AUTONOMIC NEUROPATHY, WITH LONG-TERM CURRENT USE OF INSULIN (HCC): ICD-10-CM

## 2018-12-25 NOTE — TELEPHONE ENCOUNTER
I was paged by the pharmacist @ [facility] on this patient. Patient is insisting on insulin now so here's the plan:    1. Admelog pen 26 units TID AC.  2. Toujeo Solostar pen 48 units q HS. 3. Lizett pen needles, #1 box = quantity #100 count.     Verbal order

## 2018-12-25 NOTE — TELEPHONE ENCOUNTER
I was paged by the patient. She calls me to tell me that she's completely out of her Lispro (Admelog) for her insulin pump. She tells me that her pharmacy does not have anymore vials.   I then called her pharmacist.  She last filled her insulin on 12/5/18

## 2019-01-03 ENCOUNTER — TELEPHONE (OUTPATIENT)
Dept: ENDOCRINOLOGY CLINIC | Facility: CLINIC | Age: 29
End: 2019-01-03

## 2019-01-03 ENCOUNTER — OFFICE VISIT (OUTPATIENT)
Dept: ENDOCRINOLOGY CLINIC | Facility: CLINIC | Age: 29
End: 2019-01-03
Payer: MEDICAID

## 2019-01-03 VITALS
WEIGHT: 206 LBS | HEART RATE: 108 BPM | HEIGHT: 62 IN | BODY MASS INDEX: 37.91 KG/M2 | DIASTOLIC BLOOD PRESSURE: 70 MMHG | TEMPERATURE: 98 F | SYSTOLIC BLOOD PRESSURE: 108 MMHG | RESPIRATION RATE: 20 BRPM

## 2019-01-03 DIAGNOSIS — E10.43 UNCONTROLLED TYPE 1 DIABETES MELLITUS WITH DIABETIC AUTONOMIC NEUROPATHY, WITH LONG-TERM CURRENT USE OF INSULIN (HCC): ICD-10-CM

## 2019-01-03 DIAGNOSIS — E10.65 UNCONTROLLED TYPE 1 DIABETES MELLITUS WITH DIABETIC AUTONOMIC NEUROPATHY, WITH LONG-TERM CURRENT USE OF INSULIN (HCC): ICD-10-CM

## 2019-01-03 PROCEDURE — 99214 OFFICE O/P EST MOD 30 MIN: CPT | Performed by: NURSE PRACTITIONER

## 2019-01-03 RX ORDER — BLOOD SUGAR DIAGNOSTIC
STRIP MISCELLANEOUS
Refills: 0 | COMMUNITY
Start: 2018-12-28 | End: 2020-08-12 | Stop reason: ALTCHOICE

## 2019-01-03 NOTE — TELEPHONE ENCOUNTER
Pt. Signed & dated AOB & Release of information form. Faxed to [employer] : Latasha Multani @[phone]. Confirmation received.

## 2019-01-03 NOTE — PROGRESS NOTES
HPI:   Neftaly Arenas is a 34year old female who presents for recheck of her type 1 diabetes. Patient wears Tslim insulin pump. Is interested in [employer] CGM. Patient presents with:  Diabetes: room-14 cf. pt did bring pump.     Diabetes: uncontrolled, alcohol per week. She reports that she does not use drugs. She has No Known Allergies.      Current Outpatient Medications on File Prior to Visit:  Phentermine HCl 37.5 MG Oral Tab Take 1 tablet (37.5 mg total) by mouth every morning before breakfast. 03/16/2018 08:17 AM    TRIG 59 03/16/2018 08:17 AM    HDL 55 03/16/2018 08:17 AM     03/16/2018 08:17 AM    NONHDLC 122 03/16/2018 08:17 AM          Diabetes  (most recent labs)   Lab Results   Component Value Date/Time    A1C 10.3 (A) 11/05/2018 01 observation, needs improvement, patient poorly compliant, needs to follow diet more regularly. Recommendations are: lose weight by increased dietary compliance and exercise, see ophthalmology soon and check feet daily.     Changes to insulin pump settin current use of insulin (HCC)  -     Liraglutide (VICTOZA) 18 MG/3ML Subcutaneous Solution Pen-injector; ADMINISTER 1.8 MG UNDER THE SKIN DAILY  -     MetFORMIN HCl 1000 MG Oral Tab; Take 1 tablet (1,000 mg total) by mouth 2 (two) times daily with meals.

## 2019-01-03 NOTE — PROGRESS NOTES
Pt. Referred for [facility] insertion:    [place] SN:[id number]  Sensor Lot: [id number]  Sensor SN: [id number]  Expiration: 19  Sensor placed: Lt. arm    Patient verbalized understanding of the followin.  Sensor should be worn for a maximum of 14 d

## 2019-01-04 NOTE — TELEPHONE ENCOUNTER
Declined pt should have refills with pharmacy.     Refill requested:   Requested Prescriptions     Pending Prescriptions Disp Refills   • Insulin Lispro (ADMELOG SOLOSTAR) 100 UNIT/ML Subcutaneous Solution Pen-injector [Pharmacy Med Name: ADMELOG 100U/ML SO

## 2019-01-16 ENCOUNTER — TELEPHONE (OUTPATIENT)
Dept: ENDOCRINOLOGY CLINIC | Facility: CLINIC | Age: 29
End: 2019-01-16

## 2019-01-16 NOTE — TELEPHONE ENCOUNTER
Submitted PA for Victoza 1.8mg/3ml at [facility]. Key [id number]  Sent to plan at . Jonnie 122.

## 2019-01-24 ENCOUNTER — OFFICE VISIT (OUTPATIENT)
Dept: ENDOCRINOLOGY CLINIC | Facility: CLINIC | Age: 29
End: 2019-01-24
Payer: MEDICAID

## 2019-01-24 VITALS
TEMPERATURE: 98 F | DIASTOLIC BLOOD PRESSURE: 76 MMHG | WEIGHT: 206 LBS | HEART RATE: 100 BPM | SYSTOLIC BLOOD PRESSURE: 120 MMHG | RESPIRATION RATE: 20 BRPM | BODY MASS INDEX: 36.5 KG/M2 | HEIGHT: 63 IN

## 2019-01-24 DIAGNOSIS — Z51.81 THERAPEUTIC DRUG MONITORING: ICD-10-CM

## 2019-01-24 DIAGNOSIS — Z96.41 INSULIN PUMP IN PLACE: ICD-10-CM

## 2019-01-24 DIAGNOSIS — E10.65 TYPE 1 DIABETES MELLITUS WITH HYPERGLYCEMIA (HCC): Primary | ICD-10-CM

## 2019-01-24 DIAGNOSIS — E66.01 SEVERE OBESITY (BMI 35.0-39.9) WITH COMORBIDITY (HCC): ICD-10-CM

## 2019-01-24 PROCEDURE — 99214 OFFICE O/P EST MOD 30 MIN: CPT | Performed by: NURSE PRACTITIONER

## 2019-01-24 RX ORDER — FLASH GLUCOSE SENSOR
1 KIT MISCELLANEOUS
Qty: 2 EACH | Refills: 12 | Status: SHIPPED | OUTPATIENT
Start: 2019-01-24 | End: 2019-11-20

## 2019-01-24 NOTE — PATIENT INSTRUCTIONS
We are here to support you with Diabetes but please remember that you still need your primary care doctor for your routine health maintenance. Your current A1C: 10.3%  This test provides us with your average blood sugar for the past 3 months.    The main your goal of improving diabetes    Health Maintenance:   1. LABS: It is important to monitor your kidney function (blood and urine protein levels) , liver function tests and cholesterol levels when you have diabetes.      2. FOOT EXAMS:  daily foot inspecti

## 2019-01-24 NOTE — PROGRESS NOTES
HPI:   Dillan Meade is a 34year old female who presents for recheck of her type 1 diabetes. Professional Kwesi Yanfisher was placed on 1/3/2019, patient was to return 2 weeks later for interpretation.   Patient arrived to appointment without sensor or She  has a past medical history of Anxiety, Depression, Diabetes ([address] Diabetic neuropathy ([facility] [address] and Hyperlipidemia. Her family history includes Cancer in her mother; Multiple sclerosis in her mother; Other in her father.    She  reports that she has Lab Results   Component Value Date/Time     (H) 03/16/2018 08:17 AM    BUN 10 03/16/2018 08:17 AM    CREATSERUM 0.64 03/16/2018 08:17 AM    GFRNAA 124 03/16/2018 08:17 AM    GFRAA 143 03/16/2018 08:17 AM    CA 8.8 03/16/2018 08:17 AM    ALKPHO 76 affect. ASSESSMENT AND PLAN:   As for her Diabetes, it is poorly controlled, needs further observation, needs improvement, patient poorly compliant, needs to follow diet more regularly.      Recommendations are: continue present meds, lose weight by IVETTE Degroot

## 2019-01-24 NOTE — PROGRESS NOTES
Pt. referred for [address] [place] [address] CGM training:  . Werner Malcolm  : 1990 was seen for Personal Continuous Glucose Monitoring Training/Start:    Sensor Type: [address] [place] [id number] Exp.date: 19    Patient verbalized understanding of the fo

## 2019-01-25 RX ORDER — PHENTERMINE HYDROCHLORIDE 37.5 MG/1
37.5 TABLET ORAL
Qty: 30 TABLET | Refills: 0 | OUTPATIENT
Start: 2019-01-25

## 2019-01-28 DIAGNOSIS — E10.65 UNCONTROLLED TYPE 1 DIABETES MELLITUS WITH DIABETIC AUTONOMIC NEUROPATHY, WITH LONG-TERM CURRENT USE OF INSULIN (HCC): ICD-10-CM

## 2019-01-28 DIAGNOSIS — Z46.81 INSULIN PUMP FITTING OR ADJUSTMENT: ICD-10-CM

## 2019-01-28 DIAGNOSIS — E10.43 UNCONTROLLED TYPE 1 DIABETES MELLITUS WITH DIABETIC AUTONOMIC NEUROPATHY, WITH LONG-TERM CURRENT USE OF INSULIN (HCC): ICD-10-CM

## 2019-01-28 DIAGNOSIS — Z96.41 INSULIN PUMP IN PLACE: ICD-10-CM

## 2019-01-28 RX ORDER — BLOOD SUGAR DIAGNOSTIC
STRIP MISCELLANEOUS
Qty: 200 STRIP | Refills: 0 | Status: SHIPPED | OUTPATIENT
Start: 2019-01-28 | End: 2019-03-25

## 2019-02-07 ENCOUNTER — TELEPHONE (OUTPATIENT)
Dept: ENDOCRINOLOGY CLINIC | Facility: CLINIC | Age: 29
End: 2019-02-07

## 2019-02-07 NOTE — TELEPHONE ENCOUNTER
GARTH and resched as pt had appt with Ann Marie STEELE today. Also reminded her that she has appt to see [facility] in 30 min and that I should be able to see her afterwards if she is able to stay.

## 2019-03-02 DIAGNOSIS — Z51.81 THERAPEUTIC DRUG MONITORING: ICD-10-CM

## 2019-03-02 DIAGNOSIS — E66.01 SEVERE OBESITY (BMI 35.0-39.9) WITH COMORBIDITY (HCC): ICD-10-CM

## 2019-03-04 RX ORDER — TOPIRAMATE 50 MG/1
TABLET, FILM COATED ORAL
Qty: 180 TABLET | Refills: 0 | OUTPATIENT
Start: 2019-03-04

## 2019-03-04 RX ORDER — BUPROPION HYDROCHLORIDE 150 MG/1
TABLET, EXTENDED RELEASE ORAL
Qty: 60 TABLET | Refills: 0 | OUTPATIENT
Start: 2019-03-04

## 2019-03-04 NOTE — TELEPHONE ENCOUNTER
Bupropion er 150 mg 1 tab bid filled 3-27-18 60 with 0 refills     Topiramate 50 mg 1 tab bid filled 12-3-18 180 with 0 refills     LOV 12-6-18     . RTC 1 month for f/u.     No upcoming apt on file

## 2019-03-15 ENCOUNTER — TELEPHONE (OUTPATIENT)
Dept: ENDOCRINOLOGY CLINIC | Facility: CLINIC | Age: 29
End: 2019-03-15

## 2019-03-15 NOTE — TELEPHONE ENCOUNTER
PT has been dismissed from the DM Clinic due to non-compliance and multiple reschedules, cancels and/or no shows. Certified letter has been sent to patient - Article # [phone].

## 2019-03-16 NOTE — TELEPHONE ENCOUNTER
Luis Payan MD  Diplomate, American Board of Internal Medicine  [address] [address] [address]  [facility],4Th Floor, [address], [facility], [address] [place] [address]  T: [phone]; F: Alen 5

## 2019-03-25 DIAGNOSIS — Z96.41 INSULIN PUMP IN PLACE: ICD-10-CM

## 2019-03-25 DIAGNOSIS — E10.43 UNCONTROLLED TYPE 1 DIABETES MELLITUS WITH DIABETIC AUTONOMIC NEUROPATHY, WITH LONG-TERM CURRENT USE OF INSULIN (HCC): ICD-10-CM

## 2019-03-25 DIAGNOSIS — Z46.81 INSULIN PUMP FITTING OR ADJUSTMENT: ICD-10-CM

## 2019-03-25 DIAGNOSIS — E10.65 UNCONTROLLED TYPE 1 DIABETES MELLITUS WITH DIABETIC AUTONOMIC NEUROPATHY, WITH LONG-TERM CURRENT USE OF INSULIN (HCC): ICD-10-CM

## 2019-03-25 RX ORDER — BLOOD SUGAR DIAGNOSTIC
STRIP MISCELLANEOUS
Qty: 200 STRIP | Refills: 0 | Status: SHIPPED | OUTPATIENT
Start: 2019-03-25 | End: 2020-08-12 | Stop reason: ALTCHOICE

## 2019-04-18 DIAGNOSIS — E10.43 UNCONTROLLED TYPE 1 DIABETES MELLITUS WITH DIABETIC AUTONOMIC NEUROPATHY, WITH LONG-TERM CURRENT USE OF INSULIN (HCC): ICD-10-CM

## 2019-04-18 DIAGNOSIS — E10.65 UNCONTROLLED TYPE 1 DIABETES MELLITUS WITH DIABETIC AUTONOMIC NEUROPATHY, WITH LONG-TERM CURRENT USE OF INSULIN (HCC): ICD-10-CM

## 2019-04-18 RX ORDER — INSULIN LISPRO 100 U/ML
INJECTION, SOLUTION INTRAVENOUS; SUBCUTANEOUS
Qty: 30 ML | Refills: 0 | Status: SHIPPED | OUTPATIENT
Start: 2019-04-18 | End: 2019-11-20

## 2019-04-18 NOTE — TELEPHONE ENCOUNTER
Admelog 100 units administer up to 80 units via insulin pump daily filled 12-4-18 30 ml with 3 refills     LOv 12-6-18     RTC 1 month for f/u.     No upcoming apt on file     Labs 11-5-18     HEMOGLOBIN A1C 4.3 - 5.6 % 10.3Abnormal  VC

## 2019-04-23 RX ORDER — PHENTERMINE HYDROCHLORIDE 37.5 MG/1
TABLET ORAL
Qty: 30 TABLET | Refills: 0 | OUTPATIENT
Start: 2019-04-23

## 2019-05-22 RX ORDER — ALPRAZOLAM 1 MG/1
TABLET ORAL
Qty: 60 TABLET | Refills: 0 | OUTPATIENT
Start: 2019-05-22

## 2019-05-22 NOTE — TELEPHONE ENCOUNTER
Alprazolam 1 mg 1 tab bid prn filled 12/6/18 60 with 5 refills     LOV 12/6/18   RTC 1 month for f/u.    No upcoming apt on file   Labs 3/16/18

## 2019-05-31 RX ORDER — ALPRAZOLAM 1 MG/1
TABLET ORAL
Qty: 60 TABLET | Refills: 0 | Status: SHIPPED | OUTPATIENT
Start: 2019-05-31 | End: 2019-11-20

## 2019-05-31 NOTE — TELEPHONE ENCOUNTER
Refill requested:   Requested Prescriptions     Pending Prescriptions Disp Refills   • ALPRAZOLAM 1 MG Oral Tab [Pharmacy Med Name: ALPRAZOLAM 1MG TABLETS] 60 tablet 0     Sig: TAKE 1 TABLET BY MOUTH TWICE DAILY AS NEEDED FOR ANXIETY     Non protocol medic

## 2019-06-18 ENCOUNTER — TELEPHONE (OUTPATIENT)
Dept: ENDOCRINOLOGY CLINIC | Facility: CLINIC | Age: 29
End: 2019-06-18

## 2019-06-18 ENCOUNTER — MED REC SCAN ONLY (OUTPATIENT)
Dept: ENDOCRINOLOGY CLINIC | Facility: CLINIC | Age: 29
End: 2019-06-18

## 2019-06-18 NOTE — TELEPHONE ENCOUNTER
Received Urgent request for new CMN for insulin pump supplies from [facility]. Pt. has been dismissed from Diabetes Services due to non-compliance & multiple missed appt.'s. The request will be faxed to your attention. Thank you.

## 2019-06-18 NOTE — PROGRESS NOTES
Received faxed LMN  from [facility]. Pt. Discharged from Diabetes Services. Forms faxed to PCP w/confirmation received.

## 2019-06-19 NOTE — TELEPHONE ENCOUNTER
Did we receive anything? Travon Medal. Zena Payan MD  Diplomate, American Board of Internal Medicine  [address] [address] [address]  [address] [facility],4Th Floor, [address], [facility], [address] [place] [address]  T: [phone]; F: Haf[address] 5

## 2019-06-25 RX ORDER — ALPRAZOLAM 1 MG/1
TABLET ORAL
Qty: 60 TABLET | Refills: 0 | OUTPATIENT
Start: 2019-06-25

## 2019-06-26 RX ORDER — ALPRAZOLAM 0.5 MG/1
TABLET ORAL
Qty: 30 TABLET | Refills: 0 | OUTPATIENT
Start: 2019-06-26

## 2019-11-20 ENCOUNTER — OFFICE VISIT (OUTPATIENT)
Dept: INTERNAL MEDICINE CLINIC | Facility: CLINIC | Age: 29
End: 2019-11-20
Payer: MEDICAID

## 2019-11-20 VITALS
HEART RATE: 92 BPM | WEIGHT: 209.5 LBS | DIASTOLIC BLOOD PRESSURE: 64 MMHG | BODY MASS INDEX: 37.12 KG/M2 | RESPIRATION RATE: 16 BRPM | TEMPERATURE: 98 F | HEIGHT: 63 IN | SYSTOLIC BLOOD PRESSURE: 110 MMHG

## 2019-11-20 DIAGNOSIS — R21 RASH AND NONSPECIFIC SKIN ERUPTION: ICD-10-CM

## 2019-11-20 DIAGNOSIS — F41.1 GENERALIZED ANXIETY DISORDER: ICD-10-CM

## 2019-11-20 DIAGNOSIS — E10.43 UNCONTROLLED TYPE 1 DIABETES MELLITUS WITH DIABETIC AUTONOMIC NEUROPATHY, WITH LONG-TERM CURRENT USE OF INSULIN (HCC): Primary | ICD-10-CM

## 2019-11-20 DIAGNOSIS — Z51.81 THERAPEUTIC DRUG MONITORING: ICD-10-CM

## 2019-11-20 DIAGNOSIS — E10.65 UNCONTROLLED TYPE 1 DIABETES MELLITUS WITH DIABETIC AUTONOMIC NEUROPATHY, WITH LONG-TERM CURRENT USE OF INSULIN (HCC): Primary | ICD-10-CM

## 2019-11-20 DIAGNOSIS — E66.01 SEVERE OBESITY (BMI 35.0-39.9) WITH COMORBIDITY (HCC): ICD-10-CM

## 2019-11-20 DIAGNOSIS — E78.00 HYPERCHOLESTEROLEMIA: ICD-10-CM

## 2019-11-20 DIAGNOSIS — Z96.41 INSULIN PUMP IN PLACE: ICD-10-CM

## 2019-11-20 PROCEDURE — 99214 OFFICE O/P EST MOD 30 MIN: CPT | Performed by: INTERNAL MEDICINE

## 2019-11-20 RX ORDER — INSULIN LISPRO 100 [IU]/ML
INJECTION, SOLUTION INTRAVENOUS; SUBCUTANEOUS
Qty: 30 ML | Refills: 0 | Status: SHIPPED | OUTPATIENT
Start: 2019-11-20 | End: 2019-12-29

## 2019-11-20 RX ORDER — ALPRAZOLAM 1 MG/1
1 TABLET ORAL 2 TIMES DAILY PRN
Qty: 60 TABLET | Refills: 0 | Status: SHIPPED | OUTPATIENT
Start: 2019-11-20 | End: 2019-12-13

## 2019-11-20 RX ORDER — PHENTERMINE HYDROCHLORIDE 37.5 MG/1
37.5 TABLET ORAL
Qty: 30 TABLET | Refills: 0 | Status: SHIPPED | OUTPATIENT
Start: 2019-11-20 | End: 2019-12-13

## 2019-11-20 NOTE — PROGRESS NOTES
Dilip Avendano is a 34year old female. HPI:   Patient presents with: Follow - Up    Patient presents for follow up on several issues. She was at the [facility] emergency department 3 weeks ago, was having issues with her insulin pump.   They check l times daily. , Disp: 80 g, Rfl: 0  •  CONTOUR NEXT TEST In Vitro Strip, USE AS DIRECTED SIX TIMES A DAY, Disp: 200 strip, Rfl: 0  •  CONTOUR NEXT TEST In Vitro Strip, TEST SIX TIMES DAILY, Disp: , Rfl: 0  •  Liraglutide (VICTOZA) 18 MG/3ML Subcutaneous Solu 110/64 (BP Location: Left arm, Patient Position: Sitting, Cuff Size: adult)   Pulse 92   Temp 97.8 °F (36.6 °C) (Oral)   Resp 16   Ht 63\"   Wt 209 lb 8 oz (95 kg)   LMP 10/15/2019   BMI 37.11 kg/m²   GENERAL: Alert and oriented, well developed, well lucy Medicine)  Danielle Clay MD ([facility])  Regina Hong MD (Anesthesiology)  BALDO Gallo (Nurse Practitioner)  Janis Adair PA-C (Physician Assistant)  BALDO Barrera (Nurse Practitioner Family)  Isabel Reveles, [address] [place] [address] (

## 2019-11-20 NOTE — PATIENT INSTRUCTIONS
- Insulin, Xanax, phentermine refilled  - Schedule appointment with Dr. Fredy Santos for 1-2 months  - Schedule appointment with Margoth Sweeney (Diabetes education) to look into your insulin pump. You will need to see her here in [facility].   She is here every W

## 2019-11-21 NOTE — PROGRESS NOTES
Sintia Hughes is a 34year old female. HPI:   Patient presents with: Follow - Up    Patient presents for follow up on several issues. She was at the [facility] emergency department 3 weeks ago, was having issues with her insulin pump.   They check l times daily. , Disp: 80 g, Rfl: 0  •  CONTOUR NEXT TEST In Vitro Strip, USE AS DIRECTED SIX TIMES A DAY, Disp: 200 strip, Rfl: 0  •  CONTOUR NEXT TEST In Vitro Strip, TEST SIX TIMES DAILY, Disp: , Rfl: 0  •  Liraglutide (VICTOZA) 18 MG/3ML Subcutaneous Solu 110/64 (BP Location: Left arm, Patient Position: Sitting, Cuff Size: adult)   Pulse 92   Temp 97.8 °F (36.6 °C) (Oral)   Resp 16   Ht 63\"   Wt 209 lb 8 oz (95 kg)   LMP 10/15/2019   BMI 37.11 kg/m²   GENERAL: Alert and oriented, well developed, well lucy monitoring  Body mass index is 37.11 kg/m². Will resume phentermine therapy. Follow up with Dr. Alejandra Charlton in 1 month. - Phentermine HCl 37.5 MG Oral Tab; Take 1 tablet (37.5 mg total) by mouth every morning before breakfast.  Dispense: 30 tablet;  Refill: 0

## 2019-11-25 ENCOUNTER — TELEPHONE (OUTPATIENT)
Dept: INTERNAL MEDICINE CLINIC | Facility: CLINIC | Age: 29
End: 2019-11-25

## 2019-11-25 NOTE — TELEPHONE ENCOUNTER
Patient faxing paperwork to office to be completed. Paperwork from [employer]. Fax number to return [employer] when completed included in paperwork.

## 2019-12-13 DIAGNOSIS — F41.1 GENERALIZED ANXIETY DISORDER: ICD-10-CM

## 2019-12-13 DIAGNOSIS — Z51.81 THERAPEUTIC DRUG MONITORING: ICD-10-CM

## 2019-12-13 DIAGNOSIS — E66.01 SEVERE OBESITY (BMI 35.0-39.9) WITH COMORBIDITY (HCC): ICD-10-CM

## 2019-12-16 DIAGNOSIS — E66.01 SEVERE OBESITY (BMI 35.0-39.9) WITH COMORBIDITY (HCC): ICD-10-CM

## 2019-12-16 DIAGNOSIS — Z51.81 THERAPEUTIC DRUG MONITORING: ICD-10-CM

## 2019-12-16 RX ORDER — PHENTERMINE HYDROCHLORIDE 37.5 MG/1
TABLET ORAL
Qty: 30 TABLET | Refills: 0 | OUTPATIENT
Start: 2019-12-16

## 2019-12-16 RX ORDER — ALPRAZOLAM 1 MG/1
1 TABLET ORAL 2 TIMES DAILY PRN
Qty: 60 TABLET | Refills: 0 | Status: SHIPPED | OUTPATIENT
Start: 2019-12-16 | End: 2020-01-09

## 2019-12-16 RX ORDER — PHENTERMINE HYDROCHLORIDE 37.5 MG/1
37.5 TABLET ORAL
Qty: 30 TABLET | Refills: 0 | Status: SHIPPED | OUTPATIENT
Start: 2019-12-16 | End: 2020-01-09 | Stop reason: ALTCHOICE

## 2019-12-16 NOTE — TELEPHONE ENCOUNTER
ALPRAZolam 1 MG Oral Tab    Last OV relevant to medication: 11/20/2019  Last refill date: 11/20/2019     #/refills: #60 w/ 0 refills   When pt was asked to return for OV: 1 month  Upcoming appt/reason: no future appointments         Phentermine HCl 37.5 MG

## 2019-12-29 ENCOUNTER — MOBILE ENCOUNTER (OUTPATIENT)
Dept: FAMILY MEDICINE CLINIC | Facility: CLINIC | Age: 29
End: 2019-12-29

## 2019-12-29 DIAGNOSIS — E10.65 UNCONTROLLED TYPE 1 DIABETES MELLITUS WITH DIABETIC AUTONOMIC NEUROPATHY, WITH LONG-TERM CURRENT USE OF INSULIN (HCC): ICD-10-CM

## 2019-12-29 DIAGNOSIS — E10.43 UNCONTROLLED TYPE 1 DIABETES MELLITUS WITH DIABETIC AUTONOMIC NEUROPATHY, WITH LONG-TERM CURRENT USE OF INSULIN (HCC): ICD-10-CM

## 2019-12-29 RX ORDER — INSULIN LISPRO 100 [IU]/ML
INJECTION, SOLUTION INTRAVENOUS; SUBCUTANEOUS
Qty: 30 ML | Refills: 0 | Status: SHIPPED | OUTPATIENT
Start: 2019-12-29 | End: 2020-01-06

## 2019-12-31 ENCOUNTER — TELEPHONE (OUTPATIENT)
Dept: INTERNAL MEDICINE CLINIC | Facility: CLINIC | Age: 29
End: 2019-12-31

## 2019-12-31 NOTE — TELEPHONE ENCOUNTER
NotedBoby Jefferson Solano MD  Diplomate, American Board of Internal Medicine  Member, [employer] of [address] [facility]  [address]  [address] [facility],4Th Floor, [address], [address]  T: [phone]; F: Alen 5

## 2020-01-03 ENCOUNTER — TELEPHONE (OUTPATIENT)
Dept: INTERNAL MEDICINE CLINIC | Facility: CLINIC | Age: 30
End: 2020-01-03

## 2020-01-03 DIAGNOSIS — Z96.41 INSULIN PUMP IN PLACE: ICD-10-CM

## 2020-01-03 DIAGNOSIS — E10.43 UNCONTROLLED TYPE 1 DIABETES MELLITUS WITH DIABETIC AUTONOMIC NEUROPATHY, WITH LONG-TERM CURRENT USE OF INSULIN (HCC): ICD-10-CM

## 2020-01-03 DIAGNOSIS — E10.65 UNCONTROLLED TYPE 1 DIABETES MELLITUS WITH DIABETIC AUTONOMIC NEUROPATHY, WITH LONG-TERM CURRENT USE OF INSULIN (HCC): ICD-10-CM

## 2020-01-03 DIAGNOSIS — Z46.81 INSULIN PUMP FITTING OR ADJUSTMENT: ICD-10-CM

## 2020-01-03 NOTE — TELEPHONE ENCOUNTER
Patient had to cancel appointment she was squeezed into today with Dr Jose Luis Parra because she is taking her grandfather to appointment at cancer center and will not get out in time to be at our office at 11:30am.  Cancelled this appointment and per triage need t

## 2020-01-03 NOTE — TELEPHONE ENCOUNTER
Patient called to follow up. Notified that Dr. Harjit Muñoz is out of the office. Requesting to be seen tomorrow if possible.  Please advise

## 2020-01-03 NOTE — TELEPHONE ENCOUNTER
I'm already overbooked for tomorrow. 15 min slot for next week if available, otherwise can see APC. Anna Campos. Gus Diez MD  Diplomate, American Board of Internal Medicine  Member, [employer] of [address]  [address]

## 2020-01-05 RX ORDER — BLOOD SUGAR DIAGNOSTIC
STRIP MISCELLANEOUS
Qty: 200 STRIP | Refills: 0 | OUTPATIENT
Start: 2020-01-05

## 2020-01-06 ENCOUNTER — TELEPHONE (OUTPATIENT)
Dept: INTERNAL MEDICINE CLINIC | Facility: CLINIC | Age: 30
End: 2020-01-06

## 2020-01-06 ENCOUNTER — NURSE ONLY (OUTPATIENT)
Dept: INTERNAL MEDICINE CLINIC | Facility: CLINIC | Age: 30
End: 2020-01-06
Payer: MEDICAID

## 2020-01-06 DIAGNOSIS — Z11.1 SCREENING-PULMONARY TB: Primary | ICD-10-CM

## 2020-01-06 PROCEDURE — 86580 TB INTRADERMAL TEST: CPT | Performed by: INTERNAL MEDICINE

## 2020-01-06 RX ORDER — INSULIN GLARGINE 100 [IU]/ML
25 INJECTION, SOLUTION SUBCUTANEOUS 2 TIMES DAILY
Qty: 45 ML | Refills: 0 | Status: SHIPPED | OUTPATIENT
Start: 2020-01-06 | End: 2020-03-13

## 2020-01-06 NOTE — TELEPHONE ENCOUNTER
Patient was at the [address] [place] [address] on 1/1/20 w/high sugar because of Fentimine according to the hospital. Wanted her to f/u w/ but doesn't have an appt until 1/9/20 w/[place]. Sugar is still in the 200's and needs to be triaged per Nguyễn Maldonado.

## 2020-01-06 NOTE — TELEPHONE ENCOUNTER
Pt needs long acting insulin to pharmacy. Pt discharged on  Levemir 25 units in am and 25 units pm which is not covered buy insurance. Pt no longer using insulin pump and has enough of her other meds until office visit on 1/9.    Please send to [facility]

## 2020-01-07 ENCOUNTER — TELEPHONE (OUTPATIENT)
Dept: INTERNAL MEDICINE CLINIC | Facility: CLINIC | Age: 30
End: 2020-01-07

## 2020-01-07 NOTE — TELEPHONE ENCOUNTER
JAMIA Dyer(did not want to leave first name) would like to inform Dr. Damien Golden that Pt was discharged from [facility] 12.31.19

## 2020-01-07 NOTE — TELEPHONE ENCOUNTER
Aaron Vogel MD  Diplomate, American Board of Internal Medicine  Member, [employer] of [address] [facility]  [address]  [address] [facility],4Th Floor, [address], [facility], [address] [place] [address]  T: [phone]; F: Alen 5

## 2020-01-07 NOTE — TELEPHONE ENCOUNTER
Script for 10X10 Room 25 units BID sent to pharmacy, notify pt. Hopefully, this medication is formulary preferred. Please just verify that she has needles. Benjamin Crow.  Alejandra Charlton MD  Diplomate, American Board of Internal Medicine  Member, [facility] [employer] [place]

## 2020-01-08 DIAGNOSIS — F41.1 GENERALIZED ANXIETY DISORDER: ICD-10-CM

## 2020-01-09 ENCOUNTER — OFFICE VISIT (OUTPATIENT)
Dept: INTERNAL MEDICINE CLINIC | Facility: CLINIC | Age: 30
End: 2020-01-09
Payer: MEDICAID

## 2020-01-09 VITALS
SYSTOLIC BLOOD PRESSURE: 106 MMHG | TEMPERATURE: 98 F | BODY MASS INDEX: 37.83 KG/M2 | DIASTOLIC BLOOD PRESSURE: 76 MMHG | HEART RATE: 96 BPM | WEIGHT: 213.5 LBS | HEIGHT: 63 IN

## 2020-01-09 DIAGNOSIS — E10.43 UNCONTROLLED TYPE 1 DIABETES MELLITUS WITH DIABETIC AUTONOMIC NEUROPATHY, WITH LONG-TERM CURRENT USE OF INSULIN (HCC): ICD-10-CM

## 2020-01-09 DIAGNOSIS — E10.65 TYPE 1 DIABETES MELLITUS WITH HYPERGLYCEMIA (HCC): Primary | ICD-10-CM

## 2020-01-09 DIAGNOSIS — F41.1 GENERALIZED ANXIETY DISORDER: ICD-10-CM

## 2020-01-09 DIAGNOSIS — E10.65 UNCONTROLLED TYPE 1 DIABETES MELLITUS WITH DIABETIC AUTONOMIC NEUROPATHY, WITH LONG-TERM CURRENT USE OF INSULIN (HCC): ICD-10-CM

## 2020-01-09 DIAGNOSIS — E66.01 CLASS 2 SEVERE OBESITY DUE TO EXCESS CALORIES WITH SERIOUS COMORBIDITY AND BODY MASS INDEX (BMI) OF 37.0 TO 37.9 IN ADULT (HCC): ICD-10-CM

## 2020-01-09 DIAGNOSIS — G47.30 SLEEP APNEA, UNSPECIFIED TYPE: ICD-10-CM

## 2020-01-09 DIAGNOSIS — Z51.81 THERAPEUTIC DRUG MONITORING: ICD-10-CM

## 2020-01-09 PROBLEM — E66.812 CLASS 2 SEVERE OBESITY DUE TO EXCESS CALORIES WITH SERIOUS COMORBIDITY AND BODY MASS INDEX (BMI) OF 37.0 TO 37.9 IN ADULT (HCC): Status: ACTIVE | Noted: 2018-10-08

## 2020-01-09 PROBLEM — Z96.41 INSULIN PUMP IN PLACE: Status: RESOLVED | Noted: 2017-06-19 | Resolved: 2020-01-09

## 2020-01-09 PROCEDURE — 99214 OFFICE O/P EST MOD 30 MIN: CPT | Performed by: INTERNAL MEDICINE

## 2020-01-09 RX ORDER — INSULIN LISPRO 100 U/ML
12 INJECTION, SOLUTION SUBCUTANEOUS
COMMUNITY
End: 2020-01-09

## 2020-01-09 RX ORDER — TOPIRAMATE 50 MG/1
50 TABLET, FILM COATED ORAL 2 TIMES DAILY
Qty: 180 TABLET | Refills: 0 | Status: SHIPPED | OUTPATIENT
Start: 2020-01-09 | End: 2020-03-31

## 2020-01-09 RX ORDER — ALPRAZOLAM 1 MG/1
TABLET ORAL
Qty: 60 TABLET | Refills: 0 | OUTPATIENT
Start: 2020-01-09

## 2020-01-09 RX ORDER — ALPRAZOLAM 1 MG/1
1 TABLET ORAL 2 TIMES DAILY PRN
Qty: 180 TABLET | Refills: 0 | Status: SHIPPED | OUTPATIENT
Start: 2020-01-09 | End: 2020-03-03

## 2020-01-09 RX ORDER — BUPROPION HYDROCHLORIDE 150 MG/1
150 TABLET, EXTENDED RELEASE ORAL 2 TIMES DAILY
Qty: 180 TABLET | Refills: 0 | Status: SHIPPED | OUTPATIENT
Start: 2020-01-09 | End: 2020-03-31

## 2020-01-09 RX ORDER — LIRAGLUTIDE 6 MG/ML
1.8 INJECTION SUBCUTANEOUS DAILY
Qty: 9 ML | Refills: 0 | Status: SHIPPED | OUTPATIENT
Start: 2020-01-09 | End: 2020-02-05

## 2020-01-09 RX ORDER — INSULIN LISPRO 100 U/ML
12 INJECTION, SOLUTION SUBCUTANEOUS
Qty: 15 ML | Refills: 0 | Status: SHIPPED | OUTPATIENT
Start: 2020-01-09 | End: 2020-03-03

## 2020-01-10 ENCOUNTER — TELEPHONE (OUTPATIENT)
Dept: INTERNAL MEDICINE CLINIC | Facility: CLINIC | Age: 30
End: 2020-01-10

## 2020-01-10 LAB — INDURATION (): 0 MM (ref 0–11)

## 2020-01-10 NOTE — TELEPHONE ENCOUNTER
Per MA forms not sign yet by Dr Chandrika Butts. Will try to get them completed today. TB results given to pt yesterday at appointment.

## 2020-01-10 NOTE — TELEPHONE ENCOUNTER
Patient was seen yesterday office visit and states that she was not given paperwork for her employer before she left. She is calling today because she was \"told yesterday by nurse? That we would call her on status of TB results. \"  Not clear on specifics

## 2020-01-11 NOTE — PROGRESS NOTES
Patient presents with: Follow - Up      HPI: Zaira Alexander presents for ongoing f/u of select chronic medical conditions as follows:    1.  Uncontrolled Type 1 DM w/ hyperglycemia, neuropathy, and insulin - Since her last visit w/ me, she's been off of her insul Rfl: 0  •  VICTOZA 18 MG/3ML Subcutaneous Solution Pen-injector, Inject 1.8 mg into the skin daily.  ADMINISTER 1.8 MG UNDER THE SKIN DAILY, Disp: 9 mL, Rfl: 0  •  metFORMIN HCl 1000 MG Oral Tab, Take 1 tablet (1,000 mg total) by mouth 2 (two) times daily w (96.8 kg)   LMP 12/26/2019   BMI 37.82 kg/m²   Wt Readings from Last 6 Encounters:  01/09/20 : 213 lb 8 oz (96.8 kg)  11/20/19 : 209 lb 8 oz (95 kg)  01/24/19 : 206 lb (93.4 kg)  01/03/19 : 206 lb (93.4 kg)  12/06/18 : 210 lb (95.3 kg)  11/05/18 : 214 lb ( occasions that she has sleep apnea but has never gone for formal confirmatory testing. Here for next steps. Send for sleep study. 5. RTC 3 months. Vira verbally agrees to and understands the plan as outlined above.   She was also afforded the time an

## 2020-01-13 ENCOUNTER — HOSPITAL ENCOUNTER (OUTPATIENT)
Age: 30
Discharge: HOME | End: 2020-01-13
Attending: EMERGENCY MEDICINE
Payer: MEDICAID

## 2020-01-13 ENCOUNTER — MOBILE ENCOUNTER (OUTPATIENT)
Dept: INTERNAL MEDICINE CLINIC | Facility: CLINIC | Age: 30
End: 2020-01-13

## 2020-01-13 VITALS
TEMPERATURE: 98 F | DIASTOLIC BLOOD PRESSURE: 79 MMHG | RESPIRATION RATE: 18 BRPM | SYSTOLIC BLOOD PRESSURE: 111 MMHG | OXYGEN SATURATION: 100 % | HEART RATE: 94 BPM

## 2020-01-13 DIAGNOSIS — K02.9 DENTAL CARIES: Primary | ICD-10-CM

## 2020-01-13 DIAGNOSIS — K08.89 PAIN, DENTAL: ICD-10-CM

## 2020-01-13 PROCEDURE — 99213 OFFICE O/P EST LOW 20 MIN: CPT

## 2020-01-13 PROCEDURE — 64400 NJX AA&/STRD TRIGEMINAL NRV: CPT

## 2020-01-13 RX ORDER — ACETAMINOPHEN AND CODEINE PHOSPHATE 300; 30 MG/1; MG/1
1-2 TABLET ORAL EVERY 6 HOURS PRN
Qty: 10 TABLET | Refills: 0 | Status: SHIPPED | OUTPATIENT
Start: 2020-01-13 | End: 2020-01-20

## 2020-01-13 RX ORDER — AMOXICILLIN 875 MG/1
875 TABLET, COATED ORAL 2 TIMES DAILY
Qty: 20 TABLET | Refills: 0 | Status: SHIPPED | OUTPATIENT
Start: 2020-01-13 | End: 2020-03-10

## 2020-01-13 RX ORDER — LIDOCAINE HYDROCHLORIDE 20 MG/ML
10 SOLUTION OROPHARYNGEAL ONCE
Status: COMPLETED | OUTPATIENT
Start: 2020-01-13 | End: 2020-01-13

## 2020-01-13 RX ORDER — BUPIVACAINE HYDROCHLORIDE 5 MG/ML
10 INJECTION, SOLUTION EPIDURAL; INTRACAUDAL ONCE
Status: COMPLETED | OUTPATIENT
Start: 2020-01-13 | End: 2020-01-13

## 2020-01-13 RX ORDER — LIDOCAINE HYDROCHLORIDE 10 MG/ML
10 INJECTION, SOLUTION EPIDURAL; INFILTRATION; INTRACAUDAL; PERINEURAL ONCE
Status: COMPLETED | OUTPATIENT
Start: 2020-01-13 | End: 2020-01-13

## 2020-01-13 RX ORDER — ACETAMINOPHEN AND CODEINE PHOSPHATE 300; 30 MG/1; MG/1
1-2 TABLET ORAL EVERY 6 HOURS PRN
Qty: 10 TABLET | Refills: 0 | Status: SHIPPED | OUTPATIENT
Start: 2020-01-13 | End: 2020-01-13

## 2020-01-13 RX ORDER — AMOXICILLIN 875 MG/1
875 TABLET, COATED ORAL 2 TIMES DAILY
Qty: 20 TABLET | Refills: 0 | Status: SHIPPED | OUTPATIENT
Start: 2020-01-13 | End: 2020-01-13

## 2020-01-13 NOTE — ED INITIAL ASSESSMENT (HPI)
States right bottom tooth cracked six months ago. Here today with complaints of tooth pain since this morning.

## 2020-01-14 NOTE — ED PROVIDER NOTES
Patient Seen in: [facility] In [facility]      History   Patient presents with:  Dental Problem    Stated Complaint: RIGHT SIDED FACIAL PAIN     HPI    43-year-old presents to the care with a chief complaint of right sided lower dental pain. No significant swelling or periapical abscesses noted. No trismus    ED Course   Labs Reviewed - No data to display       Patient given some viscous lidocaine to swish, right inferior alveolar block placed.   Total of 8 cc of mixed 1% lidocaine with epinep

## 2020-01-14 NOTE — PROGRESS NOTES
Received call from patient’s mother at 18:05   stating patient having severe tooth pain. Requesting antibiotic and pain medication be called in to pharmacy. States patient’s BS has been high all day due to likely infection. Hx of DKA.   Per [facility] records it

## 2020-01-27 ENCOUNTER — TELEPHONE (OUTPATIENT)
Dept: INTERNAL MEDICINE CLINIC | Facility: CLINIC | Age: 30
End: 2020-01-27

## 2020-01-27 NOTE — TELEPHONE ENCOUNTER
Pt with cracked lower right tooth and seen in [facility] on 1/13 and [facility] dental on 1/23. [facility] will not extract tooth d/t no hospital facility on campus. Pt high risk d/t diabetes and A1C level.   Pt has not seen oral surgeon as insurance w

## 2020-01-27 NOTE — TELEPHONE ENCOUNTER
Pt with severe teeth and jaw pain. No fever, using OTC motrin 600mg every 6 hours, no longer helping with pain. [facility] 1/13/20. [facility], 1/23/20, dentist refused to treat d/t uncontrolled DM. Using insulin as directed.

## 2020-01-28 RX ORDER — TRAMADOL HYDROCHLORIDE 50 MG/1
50 TABLET ORAL EVERY 12 HOURS PRN
Qty: 20 TABLET | Refills: 0 | Status: SHIPPED | OUTPATIENT
Start: 2020-01-28 | End: 2020-02-01

## 2020-01-28 NOTE — TELEPHONE ENCOUNTER
Tough situation. I can only do a short-term supply of Tramadol 1 tab PO BID PRN #20 and I can't do any refills since it is considered an opioid. We do have an [facility] as well too that helps manage patients with severe pain.   This is the best I

## 2020-01-28 NOTE — TELEPHONE ENCOUNTER
Script sent. Lieutenant Cornell. Sarita Martin MD  Diplomate, American Board of Internal Medicine  Member, [employer] of [address] [facility]  [address]  [address] [facility],4Th Floor, [address], [facility], [address] [place] [address]  T: [phone]; F: Joseeti 5

## 2020-02-01 RX ORDER — TRAMADOL HYDROCHLORIDE 50 MG/1
50 TABLET ORAL EVERY 12 HOURS PRN
Qty: 20 TABLET | Refills: 0 | Status: SHIPPED | OUTPATIENT
Start: 2020-02-01 | End: 2020-03-31

## 2020-02-04 DIAGNOSIS — E10.65 UNCONTROLLED TYPE 1 DIABETES MELLITUS WITH DIABETIC AUTONOMIC NEUROPATHY, WITH LONG-TERM CURRENT USE OF INSULIN (HCC): ICD-10-CM

## 2020-02-04 DIAGNOSIS — E10.43 UNCONTROLLED TYPE 1 DIABETES MELLITUS WITH DIABETIC AUTONOMIC NEUROPATHY, WITH LONG-TERM CURRENT USE OF INSULIN (HCC): ICD-10-CM

## 2020-02-05 RX ORDER — LIRAGLUTIDE 6 MG/ML
INJECTION SUBCUTANEOUS
Qty: 9 ML | Refills: 0 | Status: SHIPPED | OUTPATIENT
Start: 2020-02-05 | End: 2020-03-03

## 2020-02-18 ENCOUNTER — TELEPHONE (OUTPATIENT)
Dept: INTERNAL MEDICINE CLINIC | Facility: CLINIC | Age: 30
End: 2020-02-18

## 2020-02-18 NOTE — TELEPHONE ENCOUNTER
1. Needs resources from [facility] regarding therapy/counseling for anxiety in relation to her having Medicaid for insurance. 2. Last Phentermine script on 12/16/19. Refill not appropriate.   I think until she gets her stress/anxiety under control this medicat

## 2020-02-18 NOTE — TELEPHONE ENCOUNTER
From: Jennyfer King  To:  Veronica Mckeon MD  Sent: 2/17/2020  9:53 PM CST  Subject: Non-Urgent Medical Question    I have been having bad last couple weeks I am restless I don’t find the want to do nothing than I’m getting anxious at times and I’m just feel

## 2020-03-01 DIAGNOSIS — E10.43 UNCONTROLLED TYPE 1 DIABETES MELLITUS WITH DIABETIC AUTONOMIC NEUROPATHY, WITH LONG-TERM CURRENT USE OF INSULIN (HCC): ICD-10-CM

## 2020-03-01 DIAGNOSIS — E10.65 UNCONTROLLED TYPE 1 DIABETES MELLITUS WITH DIABETIC AUTONOMIC NEUROPATHY, WITH LONG-TERM CURRENT USE OF INSULIN (HCC): ICD-10-CM

## 2020-03-03 DIAGNOSIS — F41.1 GENERALIZED ANXIETY DISORDER: ICD-10-CM

## 2020-03-03 RX ORDER — ALPRAZOLAM 1 MG/1
TABLET ORAL
Qty: 180 TABLET | Refills: 0 | Status: SHIPPED | OUTPATIENT
Start: 2020-03-03 | End: 2020-06-02

## 2020-03-03 RX ORDER — LIRAGLUTIDE 6 MG/ML
INJECTION SUBCUTANEOUS
Qty: 9 ML | Refills: 0 | Status: SHIPPED | OUTPATIENT
Start: 2020-03-03 | End: 2020-03-31

## 2020-03-03 RX ORDER — INSULIN LISPRO 100 U/ML
INJECTION, SOLUTION SUBCUTANEOUS
Qty: 15 ML | Refills: 0 | Status: SHIPPED | OUTPATIENT
Start: 2020-03-03 | End: 2020-04-20

## 2020-03-03 NOTE — TELEPHONE ENCOUNTER
ALPRAZOLAM 1 MG     Last OV relevant to medication: 1-9-2020    Last refill date: 1-9-2020 # 180 tabs with 0 refills     When pt was asked to return for OV: 3 months     Upcoming appt/reason:  3-    Labs: 3/2018    WHITNEY - Still dealing w/ a lot of on

## 2020-03-03 NOTE — TELEPHONE ENCOUNTER
Admelog inject 12 units tid filled 1-9-20 15 ml with 0 refills. onetouch test strips four times a day filled 2-5-20 100 strips with 0 refills   Victoza 1.8 mg once a day filled 2-5-20 9 ml with 0 refills     LOV 1/9/20   RTC 3 months.    Next apt 3/31/20

## 2020-03-10 ENCOUNTER — OFFICE VISIT (OUTPATIENT)
Dept: INTERNAL MEDICINE CLINIC | Facility: CLINIC | Age: 30
End: 2020-03-10
Payer: MEDICAID

## 2020-03-10 VITALS
WEIGHT: 208 LBS | RESPIRATION RATE: 18 BRPM | BODY MASS INDEX: 36.86 KG/M2 | HEIGHT: 63 IN | SYSTOLIC BLOOD PRESSURE: 120 MMHG | OXYGEN SATURATION: 99 % | HEART RATE: 91 BPM | TEMPERATURE: 98 F | DIASTOLIC BLOOD PRESSURE: 78 MMHG

## 2020-03-10 DIAGNOSIS — H60.501 ACUTE OTITIS EXTERNA OF RIGHT EAR, UNSPECIFIED TYPE: Primary | ICD-10-CM

## 2020-03-10 DIAGNOSIS — H66.90 ACUTE OTITIS MEDIA, UNSPECIFIED OTITIS MEDIA TYPE: ICD-10-CM

## 2020-03-10 PROCEDURE — 99213 OFFICE O/P EST LOW 20 MIN: CPT | Performed by: INTERNAL MEDICINE

## 2020-03-10 RX ORDER — AMOXICILLIN 875 MG/1
875 TABLET, COATED ORAL 2 TIMES DAILY
Qty: 20 TABLET | Refills: 0 | Status: SHIPPED | OUTPATIENT
Start: 2020-03-10 | End: 2020-03-20

## 2020-03-10 RX ORDER — BLOOD-GLUCOSE METER
EACH MISCELLANEOUS
COMMUNITY
Start: 2020-01-08 | End: 2020-12-11

## 2020-03-10 NOTE — PATIENT INSTRUCTIONS
- Start antibiotics capsules (amoxicillin). Take 1 capsule twice daily with food for the next 10 days.    - Start antibiotics drops (neomycin-polymyxin).   Apply 3 drops in right ear 3 times a day  - Use over the counter saline nasal spray ([place] Efren

## 2020-03-10 NOTE — PROGRESS NOTES
Dillan Meade is a 34year old female. HPI:   Patient presents with:  Ear Pain: ear pain lasting 1 week  Sore Throat: 3 days ago  Nose Problem: dry and sore    Patient presents with acute upper respiratory symptoms for the past week.   She has been anisha (twelve) hours as needed for Pain., Disp: 20 tablet, Rfl: 0  •  metFORMIN HCl 1000 MG Oral Tab, Take 1 tablet (1,000 mg total) by mouth 2 (two) times daily with meals. , Disp: 180 tablet, Rfl: 0  •  topiramate 50 MG Oral Tab, Take 1 tablet (50 mg total) by lb (94.3 kg)  01/09/20 : 213 lb 8 oz (96.8 kg)  11/20/19 : 209 lb 8 oz (95 kg)  01/24/19 : 206 lb (93.4 kg)  01/03/19 : 206 lb (93.4 kg)  12/06/18 : 210 lb (95.3 kg)    EXAM:   /78   Pulse 91   Temp 97.5 °F (36.4 °C) (Oral)   Resp 18   Ht 63\"   Wt 2 plan.  The patient is asked to return to clinic in 1 month with Dr. Reena Lopez MD for follow up on chronic issues, or earlier if acute issues arise.     Lamar Guo MD

## 2020-03-13 RX ORDER — INSULIN GLARGINE 100 [IU]/ML
25 INJECTION, SOLUTION SUBCUTANEOUS 2 TIMES DAILY
Qty: 45 ML | Refills: 0 | Status: SHIPPED | OUTPATIENT
Start: 2020-03-13 | End: 2020-03-31

## 2020-03-13 RX ORDER — INSULIN GLARGINE 100 [IU]/ML
25 INJECTION, SOLUTION SUBCUTANEOUS 2 TIMES DAILY
Qty: 45 ML | Refills: 0 | Status: CANCELLED | OUTPATIENT
Start: 2020-03-13 | End: 2020-06-11

## 2020-03-13 NOTE — TELEPHONE ENCOUNTER
Basaglar inject 25 units bid 1-6-20 45 ml with 0 refills     LOV 1-9-20   .  RTC 3 months  Next apt 3-31-20   Labs 11-5-18   HEMOGLOBIN A1C  4.3 - 5.6 % 10.3Abnormal  VC

## 2020-03-13 NOTE — TELEPHONE ENCOUNTER
Patient called requesting refill for: [facility] 100 UNIT/ML Subcutaneous Solution Pen-injector    [facility] #[id number] Kiyaeduardo Peralesburt, [address] [place] [address] Andry KRUSE, [phone], [phone]

## 2020-03-21 DIAGNOSIS — Z46.81 INSULIN PUMP FITTING OR ADJUSTMENT: ICD-10-CM

## 2020-03-21 DIAGNOSIS — E10.65 UNCONTROLLED TYPE 1 DIABETES MELLITUS WITH DIABETIC AUTONOMIC NEUROPATHY, WITH LONG-TERM CURRENT USE OF INSULIN (HCC): ICD-10-CM

## 2020-03-21 DIAGNOSIS — Z96.41 INSULIN PUMP IN PLACE: ICD-10-CM

## 2020-03-21 DIAGNOSIS — E10.43 UNCONTROLLED TYPE 1 DIABETES MELLITUS WITH DIABETIC AUTONOMIC NEUROPATHY, WITH LONG-TERM CURRENT USE OF INSULIN (HCC): ICD-10-CM

## 2020-03-23 RX ORDER — LANCETS 33 GAUGE
EACH MISCELLANEOUS
Qty: 400 EACH | Refills: 2 | Status: SHIPPED | OUTPATIENT
Start: 2020-03-23

## 2020-03-23 RX ORDER — NORGESTIMATE AND ETHINYL ESTRADIOL 7DAYSX3 28
KIT ORAL
Qty: 28 TABLET | Refills: 3 | Status: SHIPPED | OUTPATIENT
Start: 2020-03-23 | End: 2020-06-30

## 2020-03-23 NOTE — TELEPHONE ENCOUNTER
Tri-sprintec filled   onetouch lancets filled test sugar 6 times daily on insulin pump filled 6-19-17 200 each with 5 refills     LOV 1-9-20   RTC 3 months  Next apt 3-31-20   Labs

## 2020-03-31 ENCOUNTER — VIRTUAL PHONE E/M (OUTPATIENT)
Dept: INTERNAL MEDICINE CLINIC | Facility: CLINIC | Age: 30
End: 2020-03-31
Payer: MEDICAID

## 2020-03-31 DIAGNOSIS — F41.1 GENERALIZED ANXIETY DISORDER: ICD-10-CM

## 2020-03-31 DIAGNOSIS — E10.65 UNCONTROLLED TYPE 1 DIABETES MELLITUS WITH DIABETIC AUTONOMIC NEUROPATHY, WITH LONG-TERM CURRENT USE OF INSULIN (HCC): ICD-10-CM

## 2020-03-31 DIAGNOSIS — E10.43 UNCONTROLLED TYPE 1 DIABETES MELLITUS WITH DIABETIC AUTONOMIC NEUROPATHY, WITH LONG-TERM CURRENT USE OF INSULIN (HCC): ICD-10-CM

## 2020-03-31 DIAGNOSIS — F33.1 MDD (MAJOR DEPRESSIVE DISORDER), RECURRENT EPISODE, MODERATE (HCC): ICD-10-CM

## 2020-03-31 DIAGNOSIS — E66.01 CLASS 2 SEVERE OBESITY DUE TO EXCESS CALORIES WITH SERIOUS COMORBIDITY AND BODY MASS INDEX (BMI) OF 37.0 TO 37.9 IN ADULT (HCC): ICD-10-CM

## 2020-03-31 PROCEDURE — 99213 OFFICE O/P EST LOW 20 MIN: CPT | Performed by: INTERNAL MEDICINE

## 2020-03-31 RX ORDER — LIRAGLUTIDE 6 MG/ML
INJECTION SUBCUTANEOUS
Qty: 9 ML | Refills: 2 | Status: SHIPPED | OUTPATIENT
Start: 2020-03-31 | End: 2021-01-05

## 2020-03-31 RX ORDER — BUPROPION HYDROCHLORIDE 150 MG/1
150 TABLET, EXTENDED RELEASE ORAL 2 TIMES DAILY
Qty: 180 TABLET | Refills: 0 | Status: SHIPPED | OUTPATIENT
Start: 2020-03-31 | End: 2021-02-18

## 2020-03-31 RX ORDER — PHENTERMINE HYDROCHLORIDE 37.5 MG/1
37.5 TABLET ORAL
Qty: 30 TABLET | Refills: 2 | Status: SHIPPED | OUTPATIENT
Start: 2020-03-31 | End: 2020-09-20

## 2020-03-31 RX ORDER — INSULIN GLARGINE 100 [IU]/ML
25 INJECTION, SOLUTION SUBCUTANEOUS 2 TIMES DAILY
Qty: 45 ML | Refills: 0 | Status: SHIPPED | OUTPATIENT
Start: 2020-03-31 | End: 2020-05-24

## 2020-03-31 RX ORDER — TOPIRAMATE 50 MG/1
50 TABLET, FILM COATED ORAL 2 TIMES DAILY
Qty: 180 TABLET | Refills: 0 | Status: SHIPPED | OUTPATIENT
Start: 2020-03-31 | End: 2020-07-02

## 2020-03-31 NOTE — PROGRESS NOTES
Virtual/Telephone Check-In    Celina Lal verbally consents to a Virtual/Telephone Check-In service on 03/31/20. Patient understands and accepts financial responsibility for any deductible, co-insurance and/or co-pays associated with this service.

## 2020-04-06 ENCOUNTER — PATIENT MESSAGE (OUTPATIENT)
Dept: INTERNAL MEDICINE CLINIC | Facility: CLINIC | Age: 30
End: 2020-04-06

## 2020-04-06 NOTE — TELEPHONE ENCOUNTER
From: Radha Birmingham  To:  Latia Muñiz MD  Sent: 4/6/2020 4:07 PM CDT  Subject: Other    I am having a little ache behind my right breast as of 2 days ago and today a little heavier breathing hurts I’m scared I don’t know maybe smoking to much I was wonde

## 2020-04-08 RX ORDER — NICOTINE 21-14-7MG
KIT TRANSDERMAL
Qty: 1 KIT | Refills: 0 | Status: SHIPPED | OUTPATIENT
Start: 2020-04-08 | End: 2020-08-12

## 2020-04-19 DIAGNOSIS — E10.43 UNCONTROLLED TYPE 1 DIABETES MELLITUS WITH DIABETIC AUTONOMIC NEUROPATHY, WITH LONG-TERM CURRENT USE OF INSULIN (HCC): ICD-10-CM

## 2020-04-19 DIAGNOSIS — E10.65 UNCONTROLLED TYPE 1 DIABETES MELLITUS WITH DIABETIC AUTONOMIC NEUROPATHY, WITH LONG-TERM CURRENT USE OF INSULIN (HCC): ICD-10-CM

## 2020-04-20 RX ORDER — INSULIN LISPRO 100 U/ML
INJECTION, SOLUTION SUBCUTANEOUS
Qty: 15 ML | Refills: 5 | Status: SHIPPED | OUTPATIENT
Start: 2020-04-20 | End: 2021-01-11

## 2020-04-20 RX ORDER — INSULIN LISPRO 100 [IU]/ML
INJECTION, SOLUTION INTRAVENOUS; SUBCUTANEOUS
Qty: 30 ML | Refills: 0 | OUTPATIENT
Start: 2020-04-20

## 2020-04-20 NOTE — TELEPHONE ENCOUNTER
Spoke to pt and she states she uses the pens.      Failed protocol     Last refill:  3/3/2020 Admelog 15 mL NR - 12 units TID    A1C - 11/5/2018     Last OV -   3/31/2020 Virtual Visit - RTC 2 months  No FOV scheduled

## 2020-04-29 ENCOUNTER — VIRTUAL PHONE E/M (OUTPATIENT)
Dept: INTERNAL MEDICINE CLINIC | Facility: CLINIC | Age: 30
End: 2020-04-29

## 2020-04-29 DIAGNOSIS — R51.9 ACUTE NONINTRACTABLE HEADACHE, UNSPECIFIED HEADACHE TYPE: ICD-10-CM

## 2020-04-29 DIAGNOSIS — K08.89 TOOTH PAIN: Primary | ICD-10-CM

## 2020-04-29 PROCEDURE — 99214 OFFICE O/P EST MOD 30 MIN: CPT | Performed by: INTERNAL MEDICINE

## 2020-04-29 RX ORDER — ACETAMINOPHEN AND CODEINE PHOSPHATE 300; 30 MG/1; MG/1
1 TABLET ORAL EVERY 6 HOURS PRN
Qty: 40 TABLET | Refills: 0 | Status: SHIPPED | OUTPATIENT
Start: 2020-04-29 | End: 2020-08-12 | Stop reason: ALTCHOICE

## 2020-04-29 RX ORDER — AMOXICILLIN AND CLAVULANATE POTASSIUM 875; 125 MG/1; MG/1
1 TABLET, FILM COATED ORAL 2 TIMES DAILY
Qty: 14 TABLET | Refills: 0 | Status: SHIPPED | OUTPATIENT
Start: 2020-04-29 | End: 2020-05-06

## 2020-04-29 NOTE — PATIENT INSTRUCTIONS
- Start antibiotics (Augmentin). Take 1 capsule twice daily with food. - Consider eating yogurt daily or taking over the counter probiotics (take 3-4 hours after antibiotic dose) to help prevent side effects from the antibiotic.   - Take prescription Tyle

## 2020-04-29 NOTE — PROGRESS NOTES
Virtual Telephone Check-In    Sen Colby verbally consents to a Virtual/Telephone Check-In service on 04/29/20. Patient understands and accepts financial responsibility for any deductible, co-insurance and/or co-pays associated with this service. persist/worsen, patient may need immediate care or emergency department evaluation.  - Amoxicillin-Pot Clavulanate 875-125 MG Oral Tab; Take 1 tablet by mouth 2 (two) times daily for 7 days. Dispense: 14 tablet;  Refill: 0  - Acetaminophen-Codeine 300-30 M

## 2020-05-04 ENCOUNTER — E-VISIT (OUTPATIENT)
Dept: FAMILY MEDICINE CLINIC | Facility: CLINIC | Age: 30
End: 2020-05-04

## 2020-05-04 DIAGNOSIS — H57.9 EYE PROBLEM: Primary | ICD-10-CM

## 2020-05-04 PROCEDURE — 99421 OL DIG E/M SVC 5-10 MIN: CPT | Performed by: NURSE PRACTITIONER

## 2020-05-04 RX ORDER — ERYTHROMYCIN 5 MG/G
1 OINTMENT OPHTHALMIC 3 TIMES DAILY
Qty: 1 TUBE | Refills: 0 | Status: SHIPPED | OUTPATIENT
Start: 2020-05-04 | End: 2020-05-11

## 2020-05-04 NOTE — PROGRESS NOTES
Allan Arzate is a 34year old female. HPI:   See answers to questions above. Current Outpatient Medications   Medication Sig Dispense Refill   • erythromycin 5 MG/GM Ophthalmic Ointment Apply 1 Application to eye 3 (three) times daily for 7 days. ALPRAZOLAM 1 MG Oral Tab TAKE 1 TABLET(1 MG) BY MOUTH TWICE DAILY AS NEEDED FOR ANXIETY 180 tablet 0   • triamcinolone acetonide 0.1 % External Ointment Apply 1 Application topically 2 (two) times daily.  80 g 0   • CONTOUR NEXT TEST In Vitro Strip USE AS D

## 2020-05-13 ENCOUNTER — TELEPHONE (OUTPATIENT)
Dept: INTERNAL MEDICINE CLINIC | Facility: CLINIC | Age: 30
End: 2020-05-13

## 2020-05-13 NOTE — TELEPHONE ENCOUNTER
We received notification from [facility] Patricia Jackson  Phone # [phone]   Fax# [phone]    Pt is having urgent surgery on 5-15-20 at 8am   Pt requires the surgical removal of 3 teeth with IV sedation.  Please provide any pre-operati

## 2020-05-14 ENCOUNTER — TELEPHONE (OUTPATIENT)
Dept: INTERNAL MEDICINE CLINIC | Facility: CLINIC | Age: 30
End: 2020-05-14

## 2020-05-14 NOTE — TELEPHONE ENCOUNTER
Per TE from 5/13/2020 form faxed this morning. Spoke with Naima Love and she stated they have not received any forms. Verified fax number. She stated she would contact office by 1 today if had not received anything. Per MA form faxed again.

## 2020-05-14 NOTE — TELEPHONE ENCOUNTER
[facility] office calling to see if doctor will authorize oral surgery tomorrow; they faxed over information for clearance from our office this week.   Surgery is tomorrow ; please advise

## 2020-05-14 NOTE — TELEPHONE ENCOUNTER
Called patients cell phone and LvM to call office home phone number just rings.    Diipkaelizabeth Agee was able to fill the Medical consultation request form     Phentermine, alprazolam; hold admelog on day of surgery; take 1/2 dose basaglar (12units) on morning of

## 2020-05-24 RX ORDER — INSULIN GLARGINE 100 [IU]/ML
25 INJECTION, SOLUTION SUBCUTANEOUS 2 TIMES DAILY
Qty: 45 ML | Refills: 0 | Status: SHIPPED | OUTPATIENT
Start: 2020-05-24 | End: 2020-08-22

## 2020-05-31 DIAGNOSIS — F41.1 GENERALIZED ANXIETY DISORDER: ICD-10-CM

## 2020-06-01 DIAGNOSIS — F41.1 GENERALIZED ANXIETY DISORDER: ICD-10-CM

## 2020-06-02 RX ORDER — ALPRAZOLAM 1 MG/1
TABLET ORAL
Qty: 60 TABLET | Refills: 0 | Status: SHIPPED | OUTPATIENT
Start: 2020-06-02 | End: 2020-06-04

## 2020-06-02 NOTE — TELEPHONE ENCOUNTER
Alprazolam 1 mg 1 tab bid prn filled 3-3-20 180 with 0 refills     LOV 3-31-20   We will plan on connecting her again in 2 months.   No upcoming apt on file   Labs 3-16-18

## 2020-06-02 NOTE — TELEPHONE ENCOUNTER
Patient called again stating that her [facility] is closed and that she would like it to be sent to [facility] [address] instead

## 2020-06-02 NOTE — TELEPHONE ENCOUNTER
Patient called stating she needs her medication, she said her grandfather past away and with all that is going on she says she needs her medication asap.  Please advise

## 2020-06-04 RX ORDER — BLOOD SUGAR DIAGNOSTIC
STRIP MISCELLANEOUS
Qty: 400 STRIP | Refills: 0 | Status: SHIPPED | OUTPATIENT
Start: 2020-06-04 | End: 2020-11-02

## 2020-06-04 RX ORDER — PHENTERMINE HYDROCHLORIDE 37.5 MG/1
37.5 TABLET ORAL
Qty: 30 TABLET | Refills: 0 | OUTPATIENT
Start: 2020-06-04

## 2020-06-04 RX ORDER — ALPRAZOLAM 1 MG/1
1 TABLET ORAL 2 TIMES DAILY PRN
Qty: 180 TABLET | Refills: 0 | OUTPATIENT
Start: 2020-06-04

## 2020-06-04 RX ORDER — ALPRAZOLAM 1 MG/1
TABLET ORAL
Qty: 180 TABLET | Refills: 0 | OUTPATIENT
Start: 2020-06-04

## 2020-06-04 NOTE — TELEPHONE ENCOUNTER
Phentermine 37.5 mg 1 tab daily filled 3-31-20 30 with 2 refills   Alprazolam 1 mg 1 tab bid filled 6-2-60 with 0 refills     LOV 3-31-20   connecting her again in 2 months.   No upcoming apt on file   Labs 3-16-28

## 2020-06-05 RX ORDER — PHENTERMINE HYDROCHLORIDE 37.5 MG/1
37.5 TABLET ORAL
Qty: 30 TABLET | Refills: 2 | OUTPATIENT
Start: 2020-06-05

## 2020-06-05 RX ORDER — ALPRAZOLAM 1 MG/1
1 TABLET ORAL 2 TIMES DAILY PRN
Qty: 60 TABLET | Refills: 2 | Status: SHIPPED | OUTPATIENT
Start: 2020-06-05 | End: 2020-09-20

## 2020-06-08 ENCOUNTER — TELEPHONE (OUTPATIENT)
Dept: INTERNAL MEDICINE CLINIC | Facility: CLINIC | Age: 30
End: 2020-06-08

## 2020-06-08 NOTE — TELEPHONE ENCOUNTER
[place] (Key: [id number])     Alprazolam     Your information has been submitted to [employer]. [employer] is reviewing the PA request and you will receive an electronic response.  You may check for the updated outcome later by reopening this reques

## 2020-06-09 NOTE — TELEPHONE ENCOUNTER
NotedEarneskaye Agarwal MD  Diplomate, American Board of Internal Medicine  Member, American College of [address] [facility]  [address]  [facility],[address] Floor, [address], [facility], [address] [place] [address]  T: [id number]; F: Alen 5

## 2020-06-22 DIAGNOSIS — IMO0002 UNCONTROLLED TYPE 1 DIABETES MELLITUS WITH DIABETIC AUTONOMIC NEUROPATHY, WITH LONG-TERM CURRENT USE OF INSULIN: ICD-10-CM

## 2020-06-24 RX ORDER — PHENTERMINE HYDROCHLORIDE 37.5 MG/1
TABLET ORAL
Qty: 30 TABLET | Refills: 0 | OUTPATIENT
Start: 2020-06-24

## 2020-06-24 RX ORDER — NORGESTIMATE AND ETHINYL ESTRADIOL 7DAYSX3 28
KIT ORAL
Qty: 28 TABLET | Refills: 3 | OUTPATIENT
Start: 2020-06-24

## 2020-06-24 RX ORDER — LIRAGLUTIDE 6 MG/ML
INJECTION SUBCUTANEOUS
Qty: 9 ML | Refills: 2 | OUTPATIENT
Start: 2020-06-24

## 2020-06-30 RX ORDER — NORGESTIMATE AND ETHINYL ESTRADIOL 7DAYSX3 28
1 KIT ORAL DAILY
Qty: 28 TABLET | Refills: 3 | Status: SHIPPED | OUTPATIENT
Start: 2020-06-30 | End: 2020-09-20

## 2020-06-30 NOTE — TELEPHONE ENCOUNTER
Failed protocol     Last refill:  3/23/2020 Tri Sprintec #28 3R    LOV:   4/29/2020 Dr Maisha Medina Virtual Visit RTC 4 weeks    No FOV scheduled

## 2020-07-02 DIAGNOSIS — E10.65 UNCONTROLLED TYPE 1 DIABETES MELLITUS WITH DIABETIC AUTONOMIC NEUROPATHY, WITH LONG-TERM CURRENT USE OF INSULIN (HCC): ICD-10-CM

## 2020-07-02 DIAGNOSIS — E10.43 UNCONTROLLED TYPE 1 DIABETES MELLITUS WITH DIABETIC AUTONOMIC NEUROPATHY, WITH LONG-TERM CURRENT USE OF INSULIN (HCC): ICD-10-CM

## 2020-07-02 DIAGNOSIS — E66.01 CLASS 2 SEVERE OBESITY DUE TO EXCESS CALORIES WITH SERIOUS COMORBIDITY AND BODY MASS INDEX (BMI) OF 37.0 TO 37.9 IN ADULT (HCC): ICD-10-CM

## 2020-07-02 RX ORDER — TOPIRAMATE 50 MG/1
TABLET, FILM COATED ORAL
Qty: 180 TABLET | Refills: 0 | Status: SHIPPED | OUTPATIENT
Start: 2020-07-02 | End: 2020-10-01

## 2020-07-02 NOTE — TELEPHONE ENCOUNTER
Topiramate 50 mg 1 tab bid field 3-31-20 180 with 0 refills     LOV 4-29-20   No upcoming apt on file   Labs 3-26-18

## 2020-07-02 NOTE — TELEPHONE ENCOUNTER
Metformin 1000 mg 1 tab bid filled 3-21-20 180 with 0 refills     LOV 4-29-20   No upcoming apt on file   Labs 11-5-18   HEMOGLOBIN A1C  4.3 - 5.6 % 10.3Abnormal  VC

## 2020-07-13 DIAGNOSIS — Z46.81 INSULIN PUMP FITTING OR ADJUSTMENT: ICD-10-CM

## 2020-07-13 DIAGNOSIS — E10.65 UNCONTROLLED TYPE 1 DIABETES MELLITUS WITH DIABETIC AUTONOMIC NEUROPATHY, WITH LONG-TERM CURRENT USE OF INSULIN (HCC): ICD-10-CM

## 2020-07-13 DIAGNOSIS — Z96.41 INSULIN PUMP IN PLACE: ICD-10-CM

## 2020-07-13 DIAGNOSIS — E10.43 UNCONTROLLED TYPE 1 DIABETES MELLITUS WITH DIABETIC AUTONOMIC NEUROPATHY, WITH LONG-TERM CURRENT USE OF INSULIN (HCC): ICD-10-CM

## 2020-07-13 RX ORDER — PHENTERMINE HYDROCHLORIDE 37.5 MG/1
37.5 TABLET ORAL
Qty: 30 TABLET | Refills: 2 | OUTPATIENT
Start: 2020-07-13

## 2020-07-13 NOTE — TELEPHONE ENCOUNTER
Phentermine 37.5 mg 1 tab daily filled 3-31-20 30 with 2 refills    LOV 3-31-20   plan on connecting her again in 2 months.   No upcoming apt on file

## 2020-07-22 NOTE — TELEPHONE ENCOUNTER
Department of Anesthesiology  Postprocedure Note    Patient: Semaj Cleveland  MRN: [id number]  YOB: 1927  Date of evaluation: 7/22/2020  Time:  5:34 PM     Procedure Summary     Date:  07/22/20 Room / Location:  [address] [place] [address] / Critical access hospital [facility]    Anesthesia Start:  5160 Anesthesia Stop:  8913    Procedure:  EGD BIOPSY (N/A ) Diagnosis:  (ANEMIA, GI BLEED)    Surgeon:  Bri Marvin MD Responsible Provider:  Christin Castillo MD    Anesthesia Type:  MAC ASA Status:  3          Anesthesia Type: MAC    Raisa Phase I:      Raisa Phase II: Raisa Score: 9    Last vitals: Reviewed and per EMR flowsheets.        Anesthesia Post Evaluation    Patient location during evaluation: PACU  Patient participation: complete - patient participated  Level of consciousness: awake and alert  Airway patency: patent  Nausea & Vomiting: no nausea and no vomiting  Complications: no  Cardiovascular status: hemodynamically stable  Respiratory status: acceptable  Hydration status: euvolemic From: Deonte Jasso  To: Sukh Rodriguez MD  Sent: 2/15/2018 7:05 AM CST  Subject: Other    I am having so much pain when I go to urinate and I feel like something is sitting on my bladder and it making me run to the br every 5 mins and nothing or very lit

## 2020-07-28 NOTE — TELEPHONE ENCOUNTER
Failed protocol     Last refill:  3/31/2020 Phentermine 37.5 mg #30 2R    Last virtual visit - 4/29/2020 RTC 4 weeks   No FOV scheduled

## 2020-07-29 RX ORDER — PHENTERMINE HYDROCHLORIDE 37.5 MG/1
TABLET ORAL
Qty: 30 TABLET | Refills: 0 | OUTPATIENT
Start: 2020-07-29

## 2020-08-01 DIAGNOSIS — E10.65 UNCONTROLLED TYPE 1 DIABETES MELLITUS WITH DIABETIC AUTONOMIC NEUROPATHY, WITH LONG-TERM CURRENT USE OF INSULIN (HCC): ICD-10-CM

## 2020-08-01 DIAGNOSIS — E10.43 UNCONTROLLED TYPE 1 DIABETES MELLITUS WITH DIABETIC AUTONOMIC NEUROPATHY, WITH LONG-TERM CURRENT USE OF INSULIN (HCC): ICD-10-CM

## 2020-08-03 NOTE — TELEPHONE ENCOUNTER
Failed protocol     Last refill:  7/2/2020 Metformin 1000 mg #60 NR    Televisit - 4/29/2020 Dr Maisha Medina RTC 4 weeks      3/31/2020 Dr Harjit Muñoz RTC 2 months    No FOV scheduled

## 2020-08-12 ENCOUNTER — OFFICE VISIT (OUTPATIENT)
Dept: INTERNAL MEDICINE CLINIC | Facility: CLINIC | Age: 30
End: 2020-08-12
Payer: MEDICAID

## 2020-08-12 ENCOUNTER — TELEPHONE (OUTPATIENT)
Dept: ENDOCRINOLOGY CLINIC | Facility: CLINIC | Age: 30
End: 2020-08-12

## 2020-08-12 VITALS
BODY MASS INDEX: 35.53 KG/M2 | HEART RATE: 94 BPM | HEIGHT: 63 IN | WEIGHT: 200.5 LBS | SYSTOLIC BLOOD PRESSURE: 116 MMHG | TEMPERATURE: 99 F | RESPIRATION RATE: 16 BRPM | DIASTOLIC BLOOD PRESSURE: 86 MMHG | OXYGEN SATURATION: 98 %

## 2020-08-12 DIAGNOSIS — E10.43 UNCONTROLLED TYPE 1 DIABETES MELLITUS WITH DIABETIC AUTONOMIC NEUROPATHY, WITH LONG-TERM CURRENT USE OF INSULIN (HCC): ICD-10-CM

## 2020-08-12 DIAGNOSIS — R82.81 PYURIA: ICD-10-CM

## 2020-08-12 DIAGNOSIS — R32 URINARY INCONTINENCE, UNSPECIFIED TYPE: Primary | ICD-10-CM

## 2020-08-12 DIAGNOSIS — E10.65 UNCONTROLLED TYPE 1 DIABETES MELLITUS WITH DIABETIC AUTONOMIC NEUROPATHY, WITH LONG-TERM CURRENT USE OF INSULIN (HCC): ICD-10-CM

## 2020-08-12 LAB
BILIRUBIN: NEGATIVE
GLUCOSE (URINE DIPSTICK): NEGATIVE MG/DL
KETONES (URINE DIPSTICK): NEGATIVE MG/DL
MULTISTIX LOT#: ABNORMAL NUMERIC
NITRITE, URINE: NEGATIVE
OCCULT BLOOD: NEGATIVE
PH, URINE: 8.5 (ref 4.5–8)
PROTEIN (URINE DIPSTICK): 30 MG/DL
SPECIFIC GRAVITY: 1.02 (ref 1–1.03)
URINE-COLOR: YELLOW
UROBILINOGEN,SEMI-QN: 0.2 MG/DL (ref 0–1.9)

## 2020-08-12 PROCEDURE — 87086 URINE CULTURE/COLONY COUNT: CPT | Performed by: NURSE PRACTITIONER

## 2020-08-12 PROCEDURE — 3008F BODY MASS INDEX DOCD: CPT | Performed by: NURSE PRACTITIONER

## 2020-08-12 PROCEDURE — 99214 OFFICE O/P EST MOD 30 MIN: CPT | Performed by: NURSE PRACTITIONER

## 2020-08-12 PROCEDURE — 87147 CULTURE TYPE IMMUNOLOGIC: CPT | Performed by: NURSE PRACTITIONER

## 2020-08-12 PROCEDURE — 3079F DIAST BP 80-89 MM HG: CPT | Performed by: NURSE PRACTITIONER

## 2020-08-12 PROCEDURE — 3074F SYST BP LT 130 MM HG: CPT | Performed by: NURSE PRACTITIONER

## 2020-08-12 PROCEDURE — 81003 URINALYSIS AUTO W/O SCOPE: CPT | Performed by: NURSE PRACTITIONER

## 2020-08-12 RX ORDER — PHENTERMINE HYDROCHLORIDE 37.5 MG/1
37.5 TABLET ORAL
Qty: 30 TABLET | Refills: 2 | Status: CANCELLED | OUTPATIENT
Start: 2020-08-12

## 2020-08-12 NOTE — PROGRESS NOTES
CHIEF COMPLAINT:     Patient presents with:  Urinary: Patient wet the bed and is worried because this has never happened before. HPI:   Miladis Wright is a 34year old female here with concerns of enuresis.  Pt has never had issues before but woke u every morning before breakfast. 30 tablet 2   • ONETOUCH DELICA PLUS [id number] Does not apply Misc USE AS DIRECTED 400 each 2   • Blood Glucose Monitoring Suppl (ONETOUCH ULTRA 2) w/Device Does not apply Kit TEST BEFORE MEALS AND HS AND PRN     • Insulin P palpable masses or hepatosplenomegaly. no tenderness on palpation.    : Mild suprapubic tenderness, no CVAT    Results for orders placed or performed in visit on 08/12/20   URINALYSIS, AUTO, W/O SCOPE   Result Value Ref Range    Glucose Urine Negative mg

## 2020-08-12 NOTE — TELEPHONE ENCOUNTER
Bree Guzman ordered a consult for you today for a continuous glucose monitor. I tried to call you to discuss this but your phone doesn't allow me to leave a message. Can you please call me [phone]?     Thank you,  Sandra Boland

## 2020-08-19 ENCOUNTER — TELEPHONE (OUTPATIENT)
Dept: INTERNAL MEDICINE CLINIC | Facility: CLINIC | Age: 30
End: 2020-08-19

## 2020-08-19 RX ORDER — AMOXICILLIN AND CLAVULANATE POTASSIUM 875; 125 MG/1; MG/1
1 TABLET, FILM COATED ORAL 2 TIMES DAILY
Qty: 14 TABLET | Refills: 0 | Status: SHIPPED | OUTPATIENT
Start: 2020-08-19 | End: 2020-08-26

## 2020-08-19 NOTE — TELEPHONE ENCOUNTER
Idalmis Meier, APRN  8/18/2020 11:30 AM      + UTI with group B strep. Please send Augmentin 875 mg PO BID x 7 days. Attempt #1 - phone rang several times and then went to busy tone. Rx sent to pharmacy. See result note.

## 2020-08-26 RX ORDER — PHENTERMINE HYDROCHLORIDE 37.5 MG/1
TABLET ORAL
Qty: 30 TABLET | Refills: 0 | OUTPATIENT
Start: 2020-08-26

## 2020-08-26 NOTE — TELEPHONE ENCOUNTER
Phentermine 37.5 mg 1 tab daily filled 3/31/20 30 with 2 refills     LOV 3/31/20  We will plan on connecting her again in 2 months.   No upcoming apt on file   Labs 3/16/18

## 2020-09-01 ENCOUNTER — LAB ENCOUNTER (OUTPATIENT)
Dept: LAB | Age: 30
End: 2020-09-01
Attending: NURSE PRACTITIONER
Payer: MEDICAID

## 2020-09-01 ENCOUNTER — TELEPHONE (OUTPATIENT)
Dept: INTERNAL MEDICINE CLINIC | Facility: CLINIC | Age: 30
End: 2020-09-01

## 2020-09-01 DIAGNOSIS — R32 URINARY INCONTINENCE, UNSPECIFIED TYPE: ICD-10-CM

## 2020-09-01 DIAGNOSIS — E10.43 UNCONTROLLED TYPE 1 DIABETES MELLITUS WITH DIABETIC AUTONOMIC NEUROPATHY, WITH LONG-TERM CURRENT USE OF INSULIN (HCC): Primary | ICD-10-CM

## 2020-09-01 DIAGNOSIS — E10.43 UNCONTROLLED TYPE 1 DIABETES MELLITUS WITH DIABETIC AUTONOMIC NEUROPATHY, WITH LONG-TERM CURRENT USE OF INSULIN (HCC): ICD-10-CM

## 2020-09-01 DIAGNOSIS — E10.65 UNCONTROLLED TYPE 1 DIABETES MELLITUS WITH DIABETIC AUTONOMIC NEUROPATHY, WITH LONG-TERM CURRENT USE OF INSULIN (HCC): ICD-10-CM

## 2020-09-01 DIAGNOSIS — E10.65 UNCONTROLLED TYPE 1 DIABETES MELLITUS WITH DIABETIC AUTONOMIC NEUROPATHY, WITH LONG-TERM CURRENT USE OF INSULIN (HCC): Primary | ICD-10-CM

## 2020-09-01 LAB
ALBUMIN SERPL-MCNC: 3.2 G/DL (ref 3.4–5)
ALBUMIN/GLOB SERPL: 0.8 {RATIO} (ref 1–2)
ALP LIVER SERPL-CCNC: 79 U/L (ref 37–98)
ALT SERPL-CCNC: 17 U/L (ref 13–56)
ANION GAP SERPL CALC-SCNC: 0 MMOL/L (ref 0–18)
AST SERPL-CCNC: 14 U/L (ref 15–37)
BILIRUB SERPL-MCNC: 0.4 MG/DL (ref 0.1–2)
BUN BLD-MCNC: 10 MG/DL (ref 7–18)
BUN/CREAT SERPL: 13.7 (ref 10–20)
CALCIUM BLD-MCNC: 9.6 MG/DL (ref 8.5–10.1)
CHLORIDE SERPL-SCNC: 105 MMOL/L (ref 98–112)
CHOLEST SMN-MCNC: 171 MG/DL (ref ?–200)
CO2 SERPL-SCNC: 31 MMOL/L (ref 21–32)
CREAT BLD-MCNC: 0.73 MG/DL (ref 0.55–1.02)
CREAT UR-SCNC: 173 MG/DL
EST. AVERAGE GLUCOSE BLD GHB EST-MCNC: 278 MG/DL (ref 68–126)
GLOBULIN PLAS-MCNC: 4 G/DL (ref 2.8–4.4)
GLUCOSE BLD-MCNC: 136 MG/DL (ref 70–99)
HBA1C MFR BLD HPLC: 11.3 % (ref ?–5.7)
HDLC SERPL-MCNC: 62 MG/DL (ref 40–59)
LDLC SERPL CALC-MCNC: 96 MG/DL (ref ?–100)
M PROTEIN MFR SERPL ELPH: 7.2 G/DL (ref 6.4–8.2)
MICROALBUMIN UR-MCNC: 1.07 MG/DL
MICROALBUMIN/CREAT 24H UR-RTO: 6.2 UG/MG (ref ?–30)
NONHDLC SERPL-MCNC: 109 MG/DL (ref ?–130)
OSMOLALITY SERPL CALC.SUM OF ELEC: 283 MOSM/KG (ref 275–295)
PATIENT FASTING Y/N/NP: YES
PATIENT FASTING Y/N/NP: YES
POTASSIUM SERPL-SCNC: 4.7 MMOL/L (ref 3.5–5.1)
SODIUM SERPL-SCNC: 136 MMOL/L (ref 136–145)
TRIGL SERPL-MCNC: 63 MG/DL (ref 30–149)
VLDLC SERPL CALC-MCNC: 13 MG/DL (ref 0–30)

## 2020-09-01 PROCEDURE — 82570 ASSAY OF URINE CREATININE: CPT

## 2020-09-01 PROCEDURE — 36415 COLL VENOUS BLD VENIPUNCTURE: CPT

## 2020-09-01 PROCEDURE — 80061 LIPID PANEL: CPT

## 2020-09-01 PROCEDURE — 80053 COMPREHEN METABOLIC PANEL: CPT

## 2020-09-01 PROCEDURE — 82043 UR ALBUMIN QUANTITATIVE: CPT

## 2020-09-01 PROCEDURE — 83036 HEMOGLOBIN GLYCOSYLATED A1C: CPT

## 2020-09-01 NOTE — TELEPHONE ENCOUNTER
Referral placed. BALDO Vang  [facility] Clinical Staff             Please refer pt to endocrinology Dr. Katja Longoira for DM care as she has been d/c from DM clinic and cannot return as a patient.

## 2020-09-20 DIAGNOSIS — F41.1 GENERALIZED ANXIETY DISORDER: ICD-10-CM

## 2020-09-22 RX ORDER — NORGESTIMATE AND ETHINYL ESTRADIOL 7DAYSX3 28
KIT ORAL
Qty: 28 TABLET | Refills: 3 | OUTPATIENT
Start: 2020-09-22

## 2020-09-22 RX ORDER — ALPRAZOLAM 1 MG/1
TABLET ORAL
Qty: 60 TABLET | Refills: 0 | OUTPATIENT
Start: 2020-09-22

## 2020-09-22 RX ORDER — PHENTERMINE HYDROCHLORIDE 37.5 MG/1
37.5 TABLET ORAL
Qty: 30 TABLET | Refills: 0 | Status: SHIPPED | OUTPATIENT
Start: 2020-09-22 | End: 2020-10-21

## 2020-09-22 RX ORDER — ALPRAZOLAM 1 MG/1
1 TABLET ORAL 2 TIMES DAILY PRN
Qty: 60 TABLET | Refills: 0 | Status: SHIPPED | OUTPATIENT
Start: 2020-09-22 | End: 2020-10-21

## 2020-09-22 RX ORDER — NORGESTIMATE AND ETHINYL ESTRADIOL 7DAYSX3 28
1 KIT ORAL DAILY
Qty: 28 TABLET | Refills: 0 | Status: SHIPPED | OUTPATIENT
Start: 2020-09-22 | End: 2020-11-02

## 2020-09-22 NOTE — TELEPHONE ENCOUNTER
Phentermine HCl 37.5 MG Oral Tab          Sig: Take 1 tablet (37.5 mg total) by mouth every morning before breakfast.    Disp:  30 tablet    Refills:  2    Start: 9/20/2020    Class: Normal    Non-formulary    Last ordered: 5 months ago by Dinorah Miguel MD

## 2020-09-22 NOTE — TELEPHONE ENCOUNTER
Failed protocol     Last refill:  6/5/2020 Alprazolam 1 mg #60 2R  6/30/2020 Tri Sprintec #28 tablets 3R    LOV:   8/12/2020 Eliana Kiser RTC ?   No FOV scheduled

## 2020-09-30 DIAGNOSIS — E66.01 CLASS 2 SEVERE OBESITY DUE TO EXCESS CALORIES WITH SERIOUS COMORBIDITY AND BODY MASS INDEX (BMI) OF 37.0 TO 37.9 IN ADULT (HCC): ICD-10-CM

## 2020-10-01 RX ORDER — TOPIRAMATE 50 MG/1
TABLET, FILM COATED ORAL
Qty: 180 TABLET | Refills: 0 | Status: SHIPPED | OUTPATIENT
Start: 2020-10-01 | End: 2021-01-05

## 2020-10-01 NOTE — TELEPHONE ENCOUNTER
Topiramate 50 mg 1 tab bid filled 7-2-20 180 with 0 refills     LOV 3-31-20   No upcoming apt on file   Labs 9-1-20

## 2020-10-02 ENCOUNTER — OFFICE VISIT (OUTPATIENT)
Dept: INTERNAL MEDICINE CLINIC | Facility: CLINIC | Age: 30
End: 2020-10-02
Payer: MEDICAID

## 2020-10-02 VITALS
TEMPERATURE: 99 F | DIASTOLIC BLOOD PRESSURE: 84 MMHG | HEART RATE: 99 BPM | HEIGHT: 63 IN | RESPIRATION RATE: 16 BRPM | BODY MASS INDEX: 36.11 KG/M2 | SYSTOLIC BLOOD PRESSURE: 115 MMHG | WEIGHT: 203.81 LBS | OXYGEN SATURATION: 99 %

## 2020-10-02 DIAGNOSIS — G44.201 ACUTE INTRACTABLE TENSION-TYPE HEADACHE: Primary | ICD-10-CM

## 2020-10-02 PROCEDURE — 3079F DIAST BP 80-89 MM HG: CPT | Performed by: INTERNAL MEDICINE

## 2020-10-02 PROCEDURE — 99214 OFFICE O/P EST MOD 30 MIN: CPT | Performed by: INTERNAL MEDICINE

## 2020-10-02 PROCEDURE — 3074F SYST BP LT 130 MM HG: CPT | Performed by: INTERNAL MEDICINE

## 2020-10-02 PROCEDURE — 3008F BODY MASS INDEX DOCD: CPT | Performed by: INTERNAL MEDICINE

## 2020-10-02 RX ORDER — HYDROCODONE BITARTRATE AND ACETAMINOPHEN 5; 325 MG/1; MG/1
1 TABLET ORAL EVERY 12 HOURS PRN
Qty: 15 TABLET | Refills: 0 | Status: SHIPPED | OUTPATIENT
Start: 2020-10-02 | End: 2020-10-02

## 2020-10-02 RX ORDER — HYDROCODONE BITARTRATE AND ACETAMINOPHEN 5; 325 MG/1; MG/1
1 TABLET ORAL EVERY 12 HOURS PRN
Qty: 10 TABLET | Refills: 0 | Status: SHIPPED | OUTPATIENT
Start: 2020-10-02 | End: 2020-11-24

## 2020-10-02 RX ORDER — HYDROCODONE BITARTRATE AND ACETAMINOPHEN 5; 325 MG/1; MG/1
1 TABLET ORAL EVERY 12 HOURS PRN
Qty: 20 TABLET | Refills: 0 | Status: SHIPPED | OUTPATIENT
Start: 2020-10-02 | End: 2020-10-02

## 2020-10-02 NOTE — PROGRESS NOTES
Patient Office Visit    ASSESSMENT AND PLAN:   (G44.201) Acute intractable tension-type headache  (primary encounter diagnosis)  Plan: CT BRAIN OR HEAD (42798), CBC WITH DIFFERENTIAL        WITH PLATELET, HYDROcodone-acetaminophen         (NORCO) 5-325 MG mouth every 12 (twelve) hours as needed for Pain (headache). Sigrid Love DO  CC:  Patient presents with:  Headache: For the past 2 days . Had not been wale to sleep due to the sever pain. Has tried ibuprofen 600mg and tynelol .         HPI: • Other (Other) Father         overdose     Allergies:  No Known Allergies  Current Meds:    •  TOPIRAMATE 50 MG Oral Tab, TAKE 1 TABLET(50 MG) BY MOUTH TWICE DAILY, Disp: 180 tablet, Rfl: 0    •  Phentermine HCl 37.5 MG Oral Tab, Take 1 tablet (37.5 mg sinuses and no mouth issues   Eyes: . normal vision no eye pain   Respiratory: normal respirations no cough   Cardiovascular: no CP, or palpitations   Gastrointestinal: normal bowels and no abd pains   Genitourinary:  normal urination no hematuria, no freq

## 2020-10-02 NOTE — PATIENT INSTRUCTIONS
I have ordered a CT scan and blood test to check your total blood count  I have ordered a small amount of norco  If your headache worsens in any way, please follow up with me or go to the ER

## 2020-10-13 DIAGNOSIS — G44.201 ACUTE INTRACTABLE TENSION-TYPE HEADACHE: ICD-10-CM

## 2020-10-13 RX ORDER — HYDROCODONE BITARTRATE AND ACETAMINOPHEN 5; 325 MG/1; MG/1
1 TABLET ORAL EVERY 12 HOURS PRN
Qty: 10 TABLET | Refills: 0 | OUTPATIENT
Start: 2020-10-13

## 2020-10-13 NOTE — TELEPHONE ENCOUNTER
If patient still having a headache then she should follow up or have the CT scan of the brain done that was recommended. If headache is due to migraines, we have better medications. The norco was only given for temporary relief.

## 2020-10-15 NOTE — TELEPHONE ENCOUNTER
Informed patient to complete CT scan of the brain for further evaluation. Patient was at work and was in a hurry to get off the phone. Informed her once ct scan has been scheduled/ completed to give us a call back to discuss medication. Patient verbally understood.

## 2020-10-20 DIAGNOSIS — F41.1 GENERALIZED ANXIETY DISORDER: ICD-10-CM

## 2020-10-21 RX ORDER — PHENTERMINE HYDROCHLORIDE 37.5 MG/1
TABLET ORAL
Qty: 30 TABLET | Refills: 0 | Status: SHIPPED | OUTPATIENT
Start: 2020-10-21 | End: 2021-01-21

## 2020-10-21 RX ORDER — PHENTERMINE HYDROCHLORIDE 37.5 MG/1
37.5 TABLET ORAL
Qty: 30 TABLET | Refills: 0 | OUTPATIENT
Start: 2020-10-21

## 2020-10-21 RX ORDER — ALPRAZOLAM 1 MG/1
1 TABLET ORAL 2 TIMES DAILY PRN
Qty: 60 TABLET | Refills: 0 | OUTPATIENT
Start: 2020-10-21

## 2020-10-21 RX ORDER — ALPRAZOLAM 1 MG/1
TABLET ORAL
Qty: 60 TABLET | Refills: 0 | Status: SHIPPED | OUTPATIENT
Start: 2020-10-21 | End: 2020-11-24

## 2020-10-21 NOTE — TELEPHONE ENCOUNTER
Phentermine 37.5 mg 1 tab daily filled 9-22-20 30 with 0 refills   Alprazolam 1 mg 1 tab bid prn filled 9-22-20 60 with 0 refills     LOV 3-31-20   We will plan on connecting her again in 2 months  No upcoming apt on file   Labs 9-1-20

## 2020-10-22 RX ORDER — PEN NEEDLE, DIABETIC 32GX 5/32"
NEEDLE, DISPOSABLE MISCELLANEOUS
Qty: 100 EACH | Refills: 2 | Status: SHIPPED | OUTPATIENT
Start: 2020-10-22 | End: 2021-01-11

## 2020-10-22 NOTE — TELEPHONE ENCOUNTER
Patient calling for pen needles to be refilled advised we received pharmacy request yesterday and it is pending she is out and asking if we can fill today?

## 2020-10-22 NOTE — TELEPHONE ENCOUNTER
trupuls pen needles 32g x 4 mm use as directed filled     LOV 3-31-20   We will plan on connecting her again in 2 months  No upcoming apt on file   Labs 9-1-20   HgbA1C   <5.7 % 11.3High

## 2020-11-04 RX ORDER — BLOOD SUGAR DIAGNOSTIC
300 STRIP MISCELLANEOUS DAILY
Qty: 300 EACH | Refills: 0 | Status: SHIPPED | OUTPATIENT
Start: 2020-11-04 | End: 2021-05-07

## 2020-11-04 RX ORDER — NORGESTIMATE AND ETHINYL ESTRADIOL 7DAYSX3 28
1 KIT ORAL DAILY
Qty: 28 TABLET | Refills: 0 | Status: SHIPPED | OUTPATIENT
Start: 2020-11-04 | End: 2021-01-05

## 2020-11-04 NOTE — TELEPHONE ENCOUNTER
Medication(s) to Refill:   Requested Prescriptions     Pending Prescriptions Disp Refills   • Glucose Blood (ONETOUCH ULTRA) In Vitro Strip 300 each 0     Si each by Other route daily.  BEFORE MEALS AND BEDTIME   • Norgestim-Eth Estrad Triphasic (TRI-

## 2020-11-19 DIAGNOSIS — F41.1 GENERALIZED ANXIETY DISORDER: ICD-10-CM

## 2020-11-19 RX ORDER — ALPRAZOLAM 1 MG/1
1 TABLET ORAL 2 TIMES DAILY PRN
Qty: 60 TABLET | Refills: 0 | Status: CANCELLED | OUTPATIENT
Start: 2020-11-19

## 2020-11-20 RX ORDER — ALPRAZOLAM 1 MG/1
TABLET ORAL
Qty: 60 TABLET | Refills: 0 | OUTPATIENT
Start: 2020-11-20

## 2020-11-20 NOTE — TELEPHONE ENCOUNTER
Failed protocol     Last refill:  10/21/2020 Alprazolam 1 mg #60 NR    LOV:   10/2/2020 Dr Estrella Wei RTC 1-2 weeks  No FOV scheduled

## 2020-11-23 NOTE — TELEPHONE ENCOUNTER
I do not prescribe alprazolam for anxiety. I can adjust her bupropion dose or suggest a different medication for anxiety.  I am willing to give her a 10 day supply, but no future refills for this    [address] [place] [address] DO

## 2020-11-23 NOTE — TELEPHONE ENCOUNTER
Dox Appt scheduled with Dr. Alvin Marcus  Pt unwilling to come into office over covid concerns.   Please advise if appropriate to keep dox visit for medication refill

## 2020-11-24 ENCOUNTER — TELEMEDICINE (OUTPATIENT)
Dept: INTERNAL MEDICINE CLINIC | Facility: CLINIC | Age: 30
End: 2020-11-24
Payer: MEDICAID

## 2020-11-24 DIAGNOSIS — F41.1 GENERALIZED ANXIETY DISORDER: ICD-10-CM

## 2020-11-24 PROCEDURE — 99214 OFFICE O/P EST MOD 30 MIN: CPT | Performed by: INTERNAL MEDICINE

## 2020-11-24 RX ORDER — ALPRAZOLAM 1 MG/1
1 TABLET ORAL 2 TIMES DAILY PRN
Qty: 30 TABLET | Refills: 0 | Status: SHIPPED | OUTPATIENT
Start: 2020-11-24 | End: 2021-01-21

## 2020-11-24 NOTE — PROGRESS NOTES
Virtual Telephone Check-In    This visit is conducted using Telemedicine with live, interactive video and audio.  [place]   Patient understands and accepts financial responsibility for any deductible, co-insurance and/or co-pays associat calling about her anxiety medication refill. She has been on xanax 1 mg twice a day for years and states it is for severe anxiety.  She states she has tried SSRI in the past and has tried other longer acting benzodiazepines without any relief and she only w

## 2020-12-04 ENCOUNTER — TELEPHONE (OUTPATIENT)
Dept: INTERNAL MEDICINE CLINIC | Facility: CLINIC | Age: 30
End: 2020-12-04

## 2020-12-04 DIAGNOSIS — E10.65 UNCONTROLLED TYPE 1 DIABETES MELLITUS WITH DIABETIC AUTONOMIC NEUROPATHY, WITH LONG-TERM CURRENT USE OF INSULIN (HCC): Primary | ICD-10-CM

## 2020-12-04 DIAGNOSIS — E10.65 TYPE 1 DIABETES MELLITUS WITH HYPERGLYCEMIA (HCC): ICD-10-CM

## 2020-12-04 DIAGNOSIS — E10.43 UNCONTROLLED TYPE 1 DIABETES MELLITUS WITH DIABETIC AUTONOMIC NEUROPATHY, WITH LONG-TERM CURRENT USE OF INSULIN (HCC): Primary | ICD-10-CM

## 2020-12-04 RX ORDER — BLOOD-GLUCOSE,RECEIVER,CONT
EACH MISCELLANEOUS
Qty: 1 DEVICE | Refills: 0 | Status: CANCELLED | OUTPATIENT
Start: 2020-12-04

## 2020-12-04 RX ORDER — BLOOD-GLUCOSE TRANSMITTER
EACH MISCELLANEOUS
Qty: 1 EACH | Refills: 3 | Status: SHIPPED | OUTPATIENT
Start: 2020-12-04 | End: 2021-04-22

## 2020-12-04 RX ORDER — BLOOD-GLUCOSE SENSOR
EACH MISCELLANEOUS
Qty: 3 EACH | Refills: 11 | Status: SHIPPED | OUTPATIENT
Start: 2020-12-04 | End: 2021-04-22

## 2020-12-04 RX ORDER — BLOOD-GLUCOSE,RECEIVER,CONT
EACH MISCELLANEOUS
Qty: 1 DEVICE | Refills: 0 | Status: SHIPPED | OUTPATIENT
Start: 2020-12-04

## 2020-12-04 RX ORDER — BLOOD-GLUCOSE TRANSMITTER
EACH MISCELLANEOUS
Qty: 1 EACH | Refills: 3 | Status: CANCELLED | OUTPATIENT
Start: 2020-12-04

## 2020-12-04 RX ORDER — BLOOD-GLUCOSE SENSOR
EACH MISCELLANEOUS
Qty: 3 EACH | Refills: 11 | Status: CANCELLED | OUTPATIENT
Start: 2020-12-04

## 2020-12-04 NOTE — TELEPHONE ENCOUNTER
Please have her see her endocrinologist or our diabetes educator for dexcom, her diabetes is not well controlled    Sigrid Benavides DO

## 2020-12-04 NOTE — TELEPHONE ENCOUNTER
Pt reached out to insurance to inquire about Dexcom meter - insurance is requiring PCP to place an order to  pharmacy.     Order pended    Printed scripts are to be faxed to:    [facility]  Attn: Marissa Del Castillo  [phone] (fax)  [phone]

## 2020-12-04 NOTE — TELEPHONE ENCOUNTER
Scripts printed and faxed to information provided    [address] [place] [address]  Attn: Neftaly Chun  [phone] (fax)  [phone] (phone)    Scripts placed in pod folder

## 2020-12-07 ENCOUNTER — TELEPHONE (OUTPATIENT)
Dept: OBGYN CLINIC | Facility: CLINIC | Age: 30
End: 2020-12-07

## 2020-12-07 DIAGNOSIS — IMO0002 UNCONTROLLED TYPE 1 DIABETES MELLITUS WITH DIABETIC AUTONOMIC NEUROPATHY, WITH LONG-TERM CURRENT USE OF INSULIN: ICD-10-CM

## 2020-12-07 NOTE — TELEPHONE ENCOUNTER
Patient calling to initiate prenatal care  LMP 10/19/20 Pt not 100% sure on LMP  Patient is 10 weeks on 12/28/20  NOB appointment scheduled on   Future Appointments   Date Time Provider Bonifacio Khalil   12/29/2020  1:30 PM Julio Hayden MD [facility]

## 2020-12-08 RX ORDER — LIRAGLUTIDE 6 MG/ML
INJECTION SUBCUTANEOUS
Qty: 9 ML | Refills: 2 | OUTPATIENT
Start: 2020-12-08

## 2020-12-09 NOTE — TELEPHONE ENCOUNTER
LMP: 10/19/2020 (date she stopped taking the active pill)- she got pregnant on the pill. She stated her insurance would not cover her OCP so there was a week she was not taking them.     EDC based on lmp: na    8 wks on na        Are cycles regular?: Y

## 2020-12-10 ENCOUNTER — TELEPHONE (OUTPATIENT)
Dept: OBGYN CLINIC | Facility: CLINIC | Age: 30
End: 2020-12-10

## 2020-12-10 NOTE — TELEPHONE ENCOUNTER
Per routing message from Dr. Penny Campbell, pt to be seen by endocrinology asap. Appt noted to be scheduled for tomorrow. Patient will also need dating u/s due to uncertain dates. Currently patient is scheduled for NOB only.     [facility] staff, please contact patient

## 2020-12-10 NOTE — TELEPHONE ENCOUNTER
Future Appointments   Date Time Provider Bonifacio Simran   12/11/2020  8:00 AM Fer Randolph MD [address] [place] [address]   1/5/2021 11:45 AM [facility]   1/5/2021 12:15 PM Conchis Buclkey MD [facility] [place]     Appointment sarwat

## 2020-12-10 NOTE — TELEPHONE ENCOUNTER
PA request submitted via [employer] online. Provided CPT [id number] and [id number] with Dx Z36.89    PA approved.  Valid from 12/10/2020 to 6/8/2021  Approval # [id number]

## 2020-12-10 NOTE — TELEPHONE ENCOUNTER
PA form has been completed and faxed to [facility] at [phone]       Received confirmation. Form placed on the blue accordion folder.

## 2020-12-13 ENCOUNTER — PATIENT MESSAGE (OUTPATIENT)
Dept: INTERNAL MEDICINE CLINIC | Facility: CLINIC | Age: 30
End: 2020-12-13

## 2020-12-14 ENCOUNTER — PATIENT MESSAGE (OUTPATIENT)
Dept: INTERNAL MEDICINE CLINIC | Facility: CLINIC | Age: 30
End: 2020-12-14

## 2020-12-14 RX ORDER — PNV NO.95/FERROUS FUM/FOLIC AC 28MG-0.8MG
1 TABLET ORAL DAILY
Qty: 90 TABLET | Refills: 11 | Status: SHIPPED | OUTPATIENT
Start: 2020-12-14 | End: 2021-01-13

## 2020-12-14 RX ORDER — PHENTERMINE HYDROCHLORIDE 37.5 MG/1
TABLET ORAL
Qty: 30 TABLET | Refills: 0 | OUTPATIENT
Start: 2020-12-14

## 2020-12-14 NOTE — TELEPHONE ENCOUNTER
From: Dejah Pimentel  To: [facility],   Sent: 12/14/2020 11:25 AM CST  Subject: Prescription Question    I received a message from pharmacy saying they need to verify prescription for the prenatal pills.  If you could reach out thank you

## 2020-12-14 NOTE — TELEPHONE ENCOUNTER
From: Allan Arzate  To: Viry Daly MD  Sent: 12/13/2020 4:34 PM CST  Subject: Prescription Question    Is there anyway i can get prenatal vitamin perscription

## 2020-12-14 NOTE — TELEPHONE ENCOUNTER
Rx refused - pt needs to be seen for Phentermine refill. [email or web address] message sent to pt notifying her that OV is needed for refill.      Requesting PHENTERMINE HCL 37.5 MG Oral Tab  LOV: 1/9/20  RTC: 3 months  Last Relevant Labs: 9/1/20  Filled: 10/21/20 #30 with 0 refills    Future Appointments   Date Time Provider Bonifacio Johnsoni   12/28/2020 10:00 AM Janeen Oliver [facility]   1/5/2021 11:45 AM [facility] [place]   1/5/2021 12:15 PM Nancy Ness MD [facility] [place]   1/14/2021  8:20 AM Galileo Herrera NP [address] [place] [address]

## 2021-01-05 ENCOUNTER — PATIENT MESSAGE (OUTPATIENT)
Dept: INTERNAL MEDICINE CLINIC | Facility: CLINIC | Age: 31
End: 2021-01-05

## 2021-01-05 ENCOUNTER — OFFICE VISIT (OUTPATIENT)
Dept: OBGYN CLINIC | Facility: CLINIC | Age: 31
End: 2021-01-05
Payer: MEDICAID

## 2021-01-05 ENCOUNTER — ULTRASOUND ENCOUNTER (OUTPATIENT)
Dept: OBGYN CLINIC | Facility: CLINIC | Age: 31
End: 2021-01-05
Payer: MEDICAID

## 2021-01-05 VITALS
WEIGHT: 227 LBS | SYSTOLIC BLOOD PRESSURE: 120 MMHG | BODY MASS INDEX: 40.22 KG/M2 | DIASTOLIC BLOOD PRESSURE: 74 MMHG | HEIGHT: 63 IN

## 2021-01-05 DIAGNOSIS — Z32.01 PREGNANCY EXAMINATION OR TEST, POSITIVE RESULT: ICD-10-CM

## 2021-01-05 DIAGNOSIS — N91.1 SECONDARY AMENORRHEA: Primary | ICD-10-CM

## 2021-01-05 DIAGNOSIS — E10.9 TYPE 1 DIABETES MELLITUS WITHOUT COMPLICATION (HCC): ICD-10-CM

## 2021-01-05 DIAGNOSIS — Z78.9 DATE OF LAST MENSTRUAL PERIOD (LMP) UNKNOWN: ICD-10-CM

## 2021-01-05 PROCEDURE — 3078F DIAST BP <80 MM HG: CPT | Performed by: OBSTETRICS & GYNECOLOGY

## 2021-01-05 PROCEDURE — 3074F SYST BP LT 130 MM HG: CPT | Performed by: OBSTETRICS & GYNECOLOGY

## 2021-01-05 PROCEDURE — 76856 US EXAM PELVIC COMPLETE: CPT | Performed by: OBSTETRICS & GYNECOLOGY

## 2021-01-05 PROCEDURE — 99203 OFFICE O/P NEW LOW 30 MIN: CPT | Performed by: OBSTETRICS & GYNECOLOGY

## 2021-01-05 PROCEDURE — 3008F BODY MASS INDEX DOCD: CPT | Performed by: OBSTETRICS & GYNECOLOGY

## 2021-01-05 RX ORDER — INSULIN GLARGINE 100 [IU]/ML
INJECTION, SOLUTION SUBCUTANEOUS NIGHTLY
Refills: 0 | Status: CANCELLED | OUTPATIENT
Start: 2021-01-05

## 2021-01-05 RX ORDER — INSULIN GLARGINE 100 [IU]/ML
25 INJECTION, SOLUTION SUBCUTANEOUS 2 TIMES DAILY
Qty: 45 ML | Refills: 0 | Status: SHIPPED | OUTPATIENT
Start: 2021-01-05 | End: 2021-04-22

## 2021-01-05 NOTE — TELEPHONE ENCOUNTER
Refill needed [facility] #[id number]      BASAGLAR KWIKPEN 100 UNIT/ML Subcutaneous Solution Pen-injector     Former [facility] [place] patient

## 2021-01-05 NOTE — PROGRESS NOTES
[facility]  Obstetrics and Gynecology    Subjective:      Brandi Sanchez is a 31year old  female presents with c/o secondary amenorrhea and positive pregnancy test. The patient was recommended to return for further evaluation and a p Aimeea, want to change due to insurance   - referral to [facility] end[facility] given    - needs to see MFM    - risks posed to pregnancy was reviewed today, including cardiac anomalies and IUFD as well as increased risk of miscarriage.   - advised to follow up to vikki

## 2021-01-05 NOTE — TELEPHONE ENCOUNTER
From: Chepe Saab  To:  [facility]  Sent: 1/5/2021 2:57 PM CST  Subject: Prescription Question    Simeon and i have requested refill on basaglar long acting insulin and no ones authorized script i frank been without now 24 hours

## 2021-01-05 NOTE — TELEPHONE ENCOUNTER
Last refill   5/24/2020 Dr Daniel Urbina - Subhash Espinoza 25 units BID #45 NR - ended 8/22/2020     LOV:   12/11/2020 Dr Leobardo Pendleton - Interim History:  12/10/2020: New patient to me referred for DM management. She is currently 8 weeks pregnant with her 4th pregnancy.  She di

## 2021-01-11 ENCOUNTER — PATIENT MESSAGE (OUTPATIENT)
Dept: INTERNAL MEDICINE CLINIC | Facility: CLINIC | Age: 31
End: 2021-01-11

## 2021-01-11 RX ORDER — INSULIN LISPRO 100 U/ML
INJECTION, SOLUTION SUBCUTANEOUS
Qty: 15 ML | Refills: 5 | Status: SHIPPED | OUTPATIENT
Start: 2021-01-11 | End: 2021-04-22

## 2021-01-11 RX ORDER — PEN NEEDLE, DIABETIC 32GX 5/32"
NEEDLE, DISPOSABLE MISCELLANEOUS
Qty: 100 EACH | Refills: 2 | OUTPATIENT
Start: 2021-01-11

## 2021-01-11 RX ORDER — PEN NEEDLE, DIABETIC 32GX 5/32"
1 NEEDLE, DISPOSABLE MISCELLANEOUS AS DIRECTED
Qty: 100 EACH | Refills: 2 | Status: SHIPPED | OUTPATIENT
Start: 2021-01-11 | End: 2021-04-22

## 2021-01-11 NOTE — TELEPHONE ENCOUNTER
From: Jeaneth Monahan  To:  [facility],   Sent: 1/11/2021 9:49 AM CST  Subject: Prescription Question    Morning i need a refill on my ball pen needles for mg insulin pens

## 2021-01-14 ENCOUNTER — TELEPHONE (OUTPATIENT)
Dept: ENDOCRINOLOGY CLINIC | Facility: CLINIC | Age: 31
End: 2021-01-14

## 2021-01-14 ENCOUNTER — OFFICE VISIT (OUTPATIENT)
Dept: ENDOCRINOLOGY CLINIC | Facility: CLINIC | Age: 31
End: 2021-01-14
Payer: MEDICAID

## 2021-01-14 VITALS
HEART RATE: 86 BPM | SYSTOLIC BLOOD PRESSURE: 121 MMHG | HEIGHT: 63 IN | OXYGEN SATURATION: 99 % | RESPIRATION RATE: 20 BRPM | BODY MASS INDEX: 39.69 KG/M2 | DIASTOLIC BLOOD PRESSURE: 67 MMHG | WEIGHT: 224 LBS

## 2021-01-14 DIAGNOSIS — O24.011 PRE-EXISTING TYPE 1 DIABETES MELLITUS DURING PREGNANCY IN FIRST TRIMESTER: Primary | ICD-10-CM

## 2021-01-14 DIAGNOSIS — E10.43 UNCONTROLLED TYPE 1 DIABETES MELLITUS WITH DIABETIC AUTONOMIC NEUROPATHY, WITH LONG-TERM CURRENT USE OF INSULIN (HCC): ICD-10-CM

## 2021-01-14 DIAGNOSIS — E10.65 UNCONTROLLED TYPE 1 DIABETES MELLITUS WITH DIABETIC AUTONOMIC NEUROPATHY, WITH LONG-TERM CURRENT USE OF INSULIN (HCC): ICD-10-CM

## 2021-01-14 LAB
CARTRIDGE LOT#: ABNORMAL NUMERIC
GLUCOSE BLOOD: 169
HEMOGLOBIN A1C: 9.5 % (ref 4.3–5.6)
TEST STRIP LOT #: NORMAL NUMERIC

## 2021-01-14 PROCEDURE — 3008F BODY MASS INDEX DOCD: CPT | Performed by: INTERNAL MEDICINE

## 2021-01-14 PROCEDURE — 36416 COLLJ CAPILLARY BLOOD SPEC: CPT | Performed by: INTERNAL MEDICINE

## 2021-01-14 PROCEDURE — 99243 OFF/OP CNSLTJ NEW/EST LOW 30: CPT | Performed by: INTERNAL MEDICINE

## 2021-01-14 PROCEDURE — 3074F SYST BP LT 130 MM HG: CPT | Performed by: INTERNAL MEDICINE

## 2021-01-14 PROCEDURE — 83036 HEMOGLOBIN GLYCOSYLATED A1C: CPT | Performed by: INTERNAL MEDICINE

## 2021-01-14 PROCEDURE — 3078F DIAST BP <80 MM HG: CPT | Performed by: INTERNAL MEDICINE

## 2021-01-14 PROCEDURE — 82947 ASSAY GLUCOSE BLOOD QUANT: CPT | Performed by: INTERNAL MEDICINE

## 2021-01-14 NOTE — TELEPHONE ENCOUNTER
Scheduled with Jaqueline Ramirez 1/21/21    F/U scheduled 2/4/21 - patient stated understanding to have labs drawn prior to apt

## 2021-01-14 NOTE — H&P
New Patient Evaluation - History and Physical    CONSULT - Reason for Visit:  Type 1 Diabetes Complicating Pregnancy. Requesting Physician: None. CHIEF COMPLAINT:  Patient presents with:  Consult: consult for diabetes. Patient is pregnant at 11 weeks. use: No      FAMILY HISTORY:   Family History   Problem Relation Age of Onset   • Cancer Mother         uterine   • Other (Multiple sclerosis) Mother    • Other (Other) Father         overdose       CURRENT MEDICATIONS:    Current Outpatient Medications • ONETOUCH DELICA PLUS [id number] Does not apply Misc USE AS DIRECTED 400 each 2   • Insulin Pen Needle 32G X 5 MM Does not apply Misc Use daily with victoza 100 each 3   • Insulin Syringe-Needle U-100 (BD INSULIN SYRINGE ULTRAFINE) 31G X 15/64\" 0.5 ML D symmetrical, trachea midline, no adenopathy, thyroid symmetric, not enlarged and no tenderness  HEMATOLOGIC/LYMPHATICS:  no cervical lymphadenopathy and no supraclavicular lymphadenopathy  LUNGS: clear to auscultation bilaterally, no crackles or wheezing women with preexisting type 1 or type 2 diabetes who become pregnant, the following are recommended as optimal glycemic goals, if they can be achieved without excessive hypoglycemia (104):  • Premeal, bedtime, and overnight glucose 60–99 mg/dL   • Peak pos

## 2021-01-21 ENCOUNTER — NURSE ONLY (OUTPATIENT)
Dept: ENDOCRINOLOGY CLINIC | Facility: CLINIC | Age: 31
End: 2021-01-21
Payer: MEDICAID

## 2021-01-21 ENCOUNTER — TELEPHONE (OUTPATIENT)
Dept: OBGYN CLINIC | Facility: CLINIC | Age: 31
End: 2021-01-21

## 2021-01-21 ENCOUNTER — INITIAL PRENATAL (OUTPATIENT)
Dept: OBGYN CLINIC | Facility: CLINIC | Age: 31
End: 2021-01-21
Payer: MEDICAID

## 2021-01-21 VITALS
HEIGHT: 62.5 IN | HEART RATE: 85 BPM | SYSTOLIC BLOOD PRESSURE: 120 MMHG | BODY MASS INDEX: 41.24 KG/M2 | WEIGHT: 229.88 LBS | DIASTOLIC BLOOD PRESSURE: 82 MMHG

## 2021-01-21 DIAGNOSIS — Z12.4 ENCOUNTER FOR SCREENING FOR CERVICAL CANCER: ICD-10-CM

## 2021-01-21 DIAGNOSIS — E10.65 UNCONTROLLED TYPE 1 DIABETES MELLITUS WITH HYPERGLYCEMIA (HCC): Primary | ICD-10-CM

## 2021-01-21 DIAGNOSIS — O99.210 OBESITY AFFECTING PREGNANCY, ANTEPARTUM: ICD-10-CM

## 2021-01-21 DIAGNOSIS — E10.65 TYPE 1 DIABETES MELLITUS WITH HYPERGLYCEMIA (HCC): ICD-10-CM

## 2021-01-21 DIAGNOSIS — Z34.00 SUPERVISION OF NORMAL FIRST PREGNANCY, ANTEPARTUM: Primary | ICD-10-CM

## 2021-01-21 DIAGNOSIS — Z98.891 HISTORY OF CESAREAN DELIVERY: ICD-10-CM

## 2021-01-21 LAB
GLUCOSE (URINE DIPSTICK): 250 MG/DL
MULTISTIX LOT#: 5073 NUMERIC

## 2021-01-21 PROCEDURE — 3079F DIAST BP 80-89 MM HG: CPT | Performed by: OBSTETRICS & GYNECOLOGY

## 2021-01-21 PROCEDURE — 87086 URINE CULTURE/COLONY COUNT: CPT | Performed by: OBSTETRICS & GYNECOLOGY

## 2021-01-21 PROCEDURE — 0500F INITIAL PRENATAL CARE VISIT: CPT | Performed by: OBSTETRICS & GYNECOLOGY

## 2021-01-21 PROCEDURE — 3074F SYST BP LT 130 MM HG: CPT | Performed by: OBSTETRICS & GYNECOLOGY

## 2021-01-21 PROCEDURE — 99211 OFF/OP EST MAY X REQ PHY/QHP: CPT | Performed by: INTERNAL MEDICINE

## 2021-01-21 PROCEDURE — 3008F BODY MASS INDEX DOCD: CPT | Performed by: OBSTETRICS & GYNECOLOGY

## 2021-01-21 PROCEDURE — 81002 URINALYSIS NONAUTO W/O SCOPE: CPT | Performed by: OBSTETRICS & GYNECOLOGY

## 2021-01-21 NOTE — PATIENT INSTRUCTIONS
Basaglar 26 in the morning and 24 in the evening. Admelog:  Use Carb counting nelly to help with carb counting    Take 1 unit of insulin for every 5 grams of carbs (Divide total number of carbs in your nelly by 5)    Plus current correction scale.      Apps

## 2021-01-21 NOTE — PROGRESS NOTES
Vira Meng  DOB12/21/1990 was seen for Continuous Glucose Sensor Download, Insulin adjustment, Follow-up Visit:     Date: 1/21/2021    Referring Provider: Dr. Justin Garnica    Start time: 11:30 am   End time: 12:05pm    Evansamira Rafael is a type 1 diabetic change in carb ratio to make math more manageable and also given pattern of hypoglycemia after meals.       Recommended oral medication/insulin changes:   -Decrease Lantus to 26 units in the morning and 24 units in the evening.     -I:CR 1:5 but start using

## 2021-01-21 NOTE — TELEPHONE ENCOUNTER
Pt needs PA for Level II US with MFM. Sent to Clinical Review.   DR. Juan M Smith Contact: Marlene Diez Provider Address: [address] [place] [address], [address] [place] [address] Phone Number: [phone] Fax Number: [phone]     Patient Name: Keshawn Perez Patient Id

## 2021-01-21 NOTE — PATIENT INSTRUCTIONS
[facility]- Prenatal Testing    The following information is designed to help you understand some of the optional tests that are available to you to screen for problems in your pregnancy.  Before considering any of these tests, you will need to [2] First Trimester Screening (also called Nuchal Thickness, Nuchal Translucency or NT)- This is an abdominal ultrasound done between 10 and 14 weeks by a perinatology specialist (Maternal Fetal Medicine, MFM) which measures the thickness of the skin behin [4] Quad Screen (also called AFP-plus or Tetra Screen)- This is a simple blood test which screens for both genetic abnormalities like Down Syndrome and neural tube defects (NTDs), such as spina bifida (an abnormal opening in the spine which can cause para [7] Amniocentesis- During this procedure, a needle is passed through your abdominal wall to withdrawal some amniotic fluid from around the baby. It screens for both genetic abnormalities and NTD’s, and is usually performed around 15-16 weeks.  This test has ? Fish- avoid fish that contains a high level of mercury. These include but are not limited to; Tita Hoffmann, Tile Fish, Kristyn Baumann, and [facility] Please see chart below for good fish choices in pregnancy.  Also avoid any raw, uncooked s Gas: Gas X, Gelusil, Papaya Tablets, Maalox, Mylicon or Mylanta Gas    Heartburn: Tums, Mylanta, Pepcid AC or Maalox    Sore throat: Alcohol free Chloraseptic spray or Lozenges     Nausea and Vomiting: ½ tablet Unisom plus 100mg of Vitamin B6 at bedtime (m

## 2021-01-21 NOTE — PROGRESS NOTES
[address] [place] [address]  Obstetrics and Gynecology  History & Physical    CC: Patient is here to establish prenatal care     Subjective:     HPI:  Rosa Davis is a 31year old  female at [address] [place] [address] who presents today to establish prenatal care.  Shelli Gamez Insulin Pen Needle (TRUEPLUS PEN NEEDLES) 32G X 4 MM Does not apply Misc, Take 1 Bottle by mouth As Directed., Disp: 100 each, Rfl: 2    •  insulin glargine (BASAGLAR KWIKPEN) 100 UNIT/ML Subcutaneous Solution Pen-injector, Inject 25 Units into the skin 2 Disp: 100 each, Rfl: 3    •  Insulin Syringe-Needle U-100 (BD INSULIN SYRINGE ULTRAFINE) 31G X 15/64\" 0.5 ML Does not apply Misc, 1 each by Does not apply route daily as needed.  In case of insulin pump failure, Disp: 100 each, Rfl: 0    No current facilit bruising  Psychiatric: denies depression, anxiety    Objective:     PE:  Vitals: /82   Pulse 85   Ht 62.5\"   Wt 229 lb 14.4 oz (104.3 kg)   LMP 10/19/2020 (Approximate)   BMI 41.38 kg/m²   Gen: A/O, NAD  Head/Neck:non-enlarged, symmetric  Breasts: b REORDER WITH REPEAT PAP SMEAR   - continue PNV daily given     Genetic Screening tests  - Discussion held with patient about genetic screening. Discussion including first trimester versus second trimester screening.  Patient offered Amniocentesis and declin

## 2021-01-26 NOTE — TELEPHONE ENCOUNTER
Authorization Lookup  Authorization Number: [id number]  Case Number: [id number]     Status: Approved  P2P Status:   Approval Date: 1/22/2021 12:00:00 AM  Service Code: [id number]  Service Description: OB US, [address], [address]  Site Name: [place]

## 2021-02-08 ENCOUNTER — PATIENT MESSAGE (OUTPATIENT)
Dept: ENDOCRINOLOGY CLINIC | Facility: CLINIC | Age: 31
End: 2021-02-08

## 2021-02-09 ENCOUNTER — TELEPHONE (OUTPATIENT)
Dept: ENDOCRINOLOGY CLINIC | Facility: CLINIC | Age: 31
End: 2021-02-09

## 2021-02-09 NOTE — TELEPHONE ENCOUNTER
From: Lamont Tracey  To: Barbi Garcia MD  Sent: 2/8/2021 2:19 PM CST  Subject: Visit Follow-up Question    I never received message or any information for video appt we had scheduled on 02/04 so i did resched online for 02/10 thank you

## 2021-02-09 NOTE — TELEPHONE ENCOUNTER
Received cmn form and another from from [address]   Dr Johnnie Loera signed please fill out all information needed and send back with office notes and labs thanks

## 2021-02-10 ENCOUNTER — PATIENT MESSAGE (OUTPATIENT)
Dept: ENDOCRINOLOGY CLINIC | Facility: CLINIC | Age: 31
End: 2021-02-10

## 2021-02-10 NOTE — TELEPHONE ENCOUNTER
Omnipod called in to follow up on this.  Please follow up [Initial - Scheduled] : [unfilled]  is being seen for  ~M an initial scheduled visit [FreeTextEntry3] : for family history of muscle weakness.

## 2021-02-11 NOTE — TELEPHONE ENCOUNTER
From: Ghanshyam Kate  To: Barbi Mendoza MD  Sent: 2/10/2021 11:27 AM CST  Subject: Visit Follow-up Question    Could you please do video visit today no one is answrring at office n im having car trouble.

## 2021-02-12 NOTE — TELEPHONE ENCOUNTER
Signed form received and change frequency filled in (every 2 days) - faxed to [address]) [phone]    Sent for scanning    OV from Dr. Carlos godfrey 12/11/20 faxed to [place] [address] ([address]) [phone]

## 2021-02-12 NOTE — TELEPHONE ENCOUNTER
Yancy from [address] called in to follow up on the CMN form. At the top where it states how often to change the pod there needs to be a check malcom on how often. She is requesting to speak directly to RN. Attempting to call RN.  Please follow up

## 2021-02-12 NOTE — TELEPHONE ENCOUNTER
cmn form faxed today along with last office note and aic.   Left other part for René Earnest to fax in am

## 2021-02-17 ENCOUNTER — TELEPHONE (OUTPATIENT)
Dept: ENDOCRINOLOGY CLINIC | Facility: CLINIC | Age: 31
End: 2021-02-17

## 2021-02-17 NOTE — TELEPHONE ENCOUNTER
Received two faxed from [address] [place] [address]. One stating that no prior authorization is needed for omnipod dash 5 pack pods. The other one stating that the request for this same rx is approved from 1/1/21 to 2/12/22.

## 2021-02-17 NOTE — TELEPHONE ENCOUNTER
Omnipod Rep states that she just faxed over an order form for the pt. to get the omnipod, which need to be signed, completed and faxed back.

## 2021-02-18 ENCOUNTER — ROUTINE PRENATAL (OUTPATIENT)
Dept: OBGYN CLINIC | Facility: CLINIC | Age: 31
End: 2021-02-18
Payer: MEDICAID

## 2021-02-18 ENCOUNTER — LAB ENCOUNTER (OUTPATIENT)
Dept: LAB | Age: 31
End: 2021-02-18
Attending: OBSTETRICS & GYNECOLOGY
Payer: MEDICAID

## 2021-02-18 ENCOUNTER — TELEPHONE (OUTPATIENT)
Dept: OBGYN CLINIC | Facility: CLINIC | Age: 31
End: 2021-02-18

## 2021-02-18 ENCOUNTER — OFFICE VISIT (OUTPATIENT)
Dept: ENDOCRINOLOGY CLINIC | Facility: CLINIC | Age: 31
End: 2021-02-18
Payer: MEDICAID

## 2021-02-18 VITALS
RESPIRATION RATE: 20 BRPM | WEIGHT: 233 LBS | HEIGHT: 62.5 IN | BODY MASS INDEX: 41.81 KG/M2 | SYSTOLIC BLOOD PRESSURE: 123 MMHG | HEART RATE: 90 BPM | DIASTOLIC BLOOD PRESSURE: 76 MMHG | OXYGEN SATURATION: 99 %

## 2021-02-18 VITALS — WEIGHT: 236.19 LBS | BODY MASS INDEX: 43 KG/M2 | SYSTOLIC BLOOD PRESSURE: 120 MMHG | DIASTOLIC BLOOD PRESSURE: 76 MMHG

## 2021-02-18 DIAGNOSIS — O99.210 OBESITY AFFECTING PREGNANCY, ANTEPARTUM: Primary | ICD-10-CM

## 2021-02-18 DIAGNOSIS — E10.65 TYPE 1 DIABETES MELLITUS WITH HYPERGLYCEMIA (HCC): ICD-10-CM

## 2021-02-18 DIAGNOSIS — E10.43 UNCONTROLLED TYPE 1 DIABETES MELLITUS WITH DIABETIC AUTONOMIC NEUROPATHY, WITH LONG-TERM CURRENT USE OF INSULIN (HCC): ICD-10-CM

## 2021-02-18 DIAGNOSIS — E10.65 UNCONTROLLED TYPE 1 DIABETES MELLITUS WITH DIABETIC AUTONOMIC NEUROPATHY, WITH LONG-TERM CURRENT USE OF INSULIN (HCC): ICD-10-CM

## 2021-02-18 DIAGNOSIS — O24.012 TYPE 1 DIABETES MELLITUS AFFECTING PREGNANCY IN SECOND TRIMESTER, ANTEPARTUM: ICD-10-CM

## 2021-02-18 DIAGNOSIS — Z34.80 SUPERVISION OF OTHER NORMAL PREGNANCY: ICD-10-CM

## 2021-02-18 DIAGNOSIS — O24.011 PRE-EXISTING TYPE 1 DIABETES MELLITUS DURING PREGNANCY IN FIRST TRIMESTER: ICD-10-CM

## 2021-02-18 DIAGNOSIS — O99.210 OBESITY AFFECTING PREGNANCY, ANTEPARTUM: ICD-10-CM

## 2021-02-18 DIAGNOSIS — E10.65 UNCONTROLLED TYPE 1 DIABETES MELLITUS WITH HYPERGLYCEMIA (HCC): Primary | ICD-10-CM

## 2021-02-18 DIAGNOSIS — Z34.00 SUPERVISION OF NORMAL FIRST PREGNANCY, ANTEPARTUM: ICD-10-CM

## 2021-02-18 LAB
ALBUMIN SERPL-MCNC: 2.8 G/DL (ref 3.4–5)
ALBUMIN/GLOB SERPL: 0.7 {RATIO} (ref 1–2)
ALP LIVER SERPL-CCNC: 71 U/L
ALT SERPL-CCNC: 17 U/L
ANION GAP SERPL CALC-SCNC: 6 MMOL/L (ref 0–18)
ANTIBODY SCREEN: NEGATIVE
AST SERPL-CCNC: 8 U/L (ref 15–37)
BASOPHILS # BLD AUTO: 0.02 X10(3) UL (ref 0–0.2)
BASOPHILS NFR BLD AUTO: 0.2 %
BILIRUB SERPL-MCNC: 0.2 MG/DL (ref 0.1–2)
BUN BLD-MCNC: 11 MG/DL (ref 7–18)
BUN/CREAT SERPL: 21.6 (ref 10–20)
CALCIUM BLD-MCNC: 9 MG/DL (ref 8.5–10.1)
CARTRIDGE LOT#: ABNORMAL NUMERIC
CHLORIDE SERPL-SCNC: 106 MMOL/L (ref 98–112)
CO2 SERPL-SCNC: 25 MMOL/L (ref 21–32)
CREAT BLD-MCNC: 0.51 MG/DL
CREAT UR-SCNC: 149 MG/DL
DEPRECATED RDW RBC AUTO: 43.8 FL (ref 35.1–46.3)
EOSINOPHIL # BLD AUTO: 0.26 X10(3) UL (ref 0–0.7)
EOSINOPHIL NFR BLD AUTO: 2.8 %
ERYTHROCYTE [DISTWIDTH] IN BLOOD BY AUTOMATED COUNT: 13.3 % (ref 11–15)
GLOBULIN PLAS-MCNC: 4 G/DL (ref 2.8–4.4)
GLUCOSE (URINE DIPSTICK): 500 MG/DL
GLUCOSE BLD-MCNC: 219 MG/DL (ref 70–99)
GLUCOSE BLOOD: 149
HBV SURFACE AG SER-ACNC: <0.1 [IU]/L
HBV SURFACE AG SERPL QL IA: NONREACTIVE
HCT VFR BLD AUTO: 40.3 %
HEMOGLOBIN A1C: 9.3 % (ref 4.3–5.6)
HGB BLD-MCNC: 13.1 G/DL
IMM GRANULOCYTES # BLD AUTO: 0.04 X10(3) UL (ref 0–1)
IMM GRANULOCYTES NFR BLD: 0.4 %
LYMPHOCYTES # BLD AUTO: 1.72 X10(3) UL (ref 1–4)
LYMPHOCYTES NFR BLD AUTO: 18.7 %
M PROTEIN MFR SERPL ELPH: 6.8 G/DL (ref 6.4–8.2)
MCH RBC QN AUTO: 29.2 PG (ref 26–34)
MCHC RBC AUTO-ENTMCNC: 32.5 G/DL (ref 31–37)
MCV RBC AUTO: 89.8 FL
MONOCYTES # BLD AUTO: 0.57 X10(3) UL (ref 0.1–1)
MONOCYTES NFR BLD AUTO: 6.2 %
MULTISTIX LOT#: 8027 NUMERIC
NEUTROPHILS # BLD AUTO: 6.59 X10 (3) UL (ref 1.5–7.7)
NEUTROPHILS # BLD AUTO: 6.59 X10(3) UL (ref 1.5–7.7)
NEUTROPHILS NFR BLD AUTO: 71.7 %
OSMOLALITY SERPL CALC.SUM OF ELEC: 290 MOSM/KG (ref 275–295)
PATIENT FASTING Y/N/NP: NO
PLATELET # BLD AUTO: 174 10(3)UL (ref 150–450)
POTASSIUM SERPL-SCNC: 3.6 MMOL/L (ref 3.5–5.1)
PROT UR-MCNC: 23.4 MG/DL
PROT/CREAT UR-RTO: 0.16
RBC # BLD AUTO: 4.49 X10(6)UL
RH BLOOD TYPE: POSITIVE
RUBV IGG SER QL: POSITIVE
RUBV IGG SER-ACNC: 29.1 IU/ML (ref 10–?)
SODIUM SERPL-SCNC: 137 MMOL/L (ref 136–145)
T PALLIDUM AB SER QL IA: NONREACTIVE
T4 FREE SERPL-MCNC: 0.8 NG/DL (ref 0.8–1.7)
TEST STRIP LOT #: NORMAL NUMERIC
TSI SER-ACNC: 0.81 MIU/ML (ref 0.36–3.74)
URATE SERPL-MCNC: 2.4 MG/DL
WBC # BLD AUTO: 9.2 X10(3) UL (ref 4–11)

## 2021-02-18 PROCEDURE — 86850 RBC ANTIBODY SCREEN: CPT

## 2021-02-18 PROCEDURE — 86780 TREPONEMA PALLIDUM: CPT

## 2021-02-18 PROCEDURE — 86900 BLOOD TYPING SEROLOGIC ABO: CPT

## 2021-02-18 PROCEDURE — 87389 HIV-1 AG W/HIV-1&-2 AB AG IA: CPT

## 2021-02-18 PROCEDURE — 84156 ASSAY OF PROTEIN URINE: CPT

## 2021-02-18 PROCEDURE — 87591 N.GONORRHOEAE DNA AMP PROB: CPT | Performed by: OBSTETRICS & GYNECOLOGY

## 2021-02-18 PROCEDURE — 84443 ASSAY THYROID STIM HORMONE: CPT

## 2021-02-18 PROCEDURE — 3074F SYST BP LT 130 MM HG: CPT | Performed by: OBSTETRICS & GYNECOLOGY

## 2021-02-18 PROCEDURE — 3078F DIAST BP <80 MM HG: CPT | Performed by: INTERNAL MEDICINE

## 2021-02-18 PROCEDURE — 85025 COMPLETE CBC W/AUTO DIFF WBC: CPT

## 2021-02-18 PROCEDURE — 99214 OFFICE O/P EST MOD 30 MIN: CPT | Performed by: INTERNAL MEDICINE

## 2021-02-18 PROCEDURE — 87340 HEPATITIS B SURFACE AG IA: CPT

## 2021-02-18 PROCEDURE — 84550 ASSAY OF BLOOD/URIC ACID: CPT

## 2021-02-18 PROCEDURE — 36416 COLLJ CAPILLARY BLOOD SPEC: CPT | Performed by: INTERNAL MEDICINE

## 2021-02-18 PROCEDURE — 82570 ASSAY OF URINE CREATININE: CPT

## 2021-02-18 PROCEDURE — 84439 ASSAY OF FREE THYROXINE: CPT

## 2021-02-18 PROCEDURE — 0502F SUBSEQUENT PRENATAL CARE: CPT | Performed by: OBSTETRICS & GYNECOLOGY

## 2021-02-18 PROCEDURE — 88175 CYTOPATH C/V AUTO FLUID REDO: CPT | Performed by: OBSTETRICS & GYNECOLOGY

## 2021-02-18 PROCEDURE — 3074F SYST BP LT 130 MM HG: CPT | Performed by: INTERNAL MEDICINE

## 2021-02-18 PROCEDURE — 82947 ASSAY GLUCOSE BLOOD QUANT: CPT | Performed by: INTERNAL MEDICINE

## 2021-02-18 PROCEDURE — 87491 CHLMYD TRACH DNA AMP PROBE: CPT | Performed by: OBSTETRICS & GYNECOLOGY

## 2021-02-18 PROCEDURE — 3008F BODY MASS INDEX DOCD: CPT | Performed by: INTERNAL MEDICINE

## 2021-02-18 PROCEDURE — 81002 URINALYSIS NONAUTO W/O SCOPE: CPT | Performed by: OBSTETRICS & GYNECOLOGY

## 2021-02-18 PROCEDURE — 86762 RUBELLA ANTIBODY: CPT

## 2021-02-18 PROCEDURE — 36415 COLL VENOUS BLD VENIPUNCTURE: CPT

## 2021-02-18 PROCEDURE — 80053 COMPREHEN METABOLIC PANEL: CPT

## 2021-02-18 PROCEDURE — 83036 HEMOGLOBIN GLYCOSYLATED A1C: CPT | Performed by: INTERNAL MEDICINE

## 2021-02-18 PROCEDURE — 86901 BLOOD TYPING SEROLOGIC RH(D): CPT

## 2021-02-18 PROCEDURE — 3078F DIAST BP <80 MM HG: CPT | Performed by: OBSTETRICS & GYNECOLOGY

## 2021-02-18 NOTE — PATIENT INSTRUCTIONS
Please call the Maternal-Fetal Medicine doctors office at [phone] in order to schedule the consult and the ultrasound. Call as soon as possible to allow them time to process your insurance in case you need prior authorization from your insurance.

## 2021-02-18 NOTE — TELEPHONE ENCOUNTER
Yancy checking status on message. Requesting to speak with RN - states patient keeps calling to schedule pump training. Please call. Th ank you.

## 2021-02-18 NOTE — PROGRESS NOTES
JENNIFER. 15w6d    Doing well. No complaints. Some RL pain. Denies abdominal/pelvic pain or vaginal bleeding. Labs pending  Pap repeated today, as it was not sent to lab last time.     Genetic screening - declined   Referral to [facility] for level II US 2/2 obesit

## 2021-02-18 NOTE — PROGRESS NOTES
Follow-up - Reason for Visit:  Type 1 Diabetes Complicating Pregnancy. Requesting Physician: None. CHIEF COMPLAINT:  Patient presents with:  Diabetes: Patient is 16 weeks preganant. POC BG is 149, Dexcom is 104.      DexCom- 124; HbA1c is 9.3 Today    F that her post-prandial blood sugars are still very high. She states that often she forget to bolus herself or she feels self-conscious about giving herself her insulin. She feels hopeful that when she gets the Omnipod, she won't have these issues.     PAST apply route every 3 (three) months. 1 each 3   • Continuous Blood Gluc Sensor (DEXCOM G6 SENSOR) Does not apply Misc 1 each by Does not apply route Every 10 days.  9 each 3   • Blood Glucose Monitoring Suppl (ONETOUCH ULTRA 2) w/Device Does not apply Kit Te weakness  Genito-Urinary: Negative for: dysuria, frequency  Psychiatric: Negative for:  depression, anxiety  Hematology/Lymphatics: Negative for: bruising, lower extremity edema  Endocrine: Negative for: polyuria, polydypsia  Skin: Negative for: rash, blis large for gestational age causing difficulty in delivery and can experience hypoglycemia and hyperbilirubinemia after birth. Maternal complications include increased risk of pre eclampsia, post partum hemorrhage, weight gain.   Pre conception counselling:

## 2021-02-18 NOTE — TELEPHONE ENCOUNTER
[facility] Phone Number: [phone]      Fax Number: [phone]      Patient Name: Avelina Norris Patient Id: [id number]   Insurance Carrier: [employer]        Site Name: Rebeca Cheung Site ID: [id number]   Site Address: [address] [place] [address]

## 2021-02-19 ENCOUNTER — PATIENT MESSAGE (OUTPATIENT)
Dept: ENDOCRINOLOGY CLINIC | Facility: CLINIC | Age: 31
End: 2021-02-19

## 2021-02-19 ENCOUNTER — TELEPHONE (OUTPATIENT)
Dept: ENDOCRINOLOGY CLINIC | Facility: CLINIC | Age: 31
End: 2021-02-19

## 2021-02-19 DIAGNOSIS — E10.65 UNCONTROLLED TYPE 1 DIABETES MELLITUS WITH HYPERGLYCEMIA (HCC): Primary | ICD-10-CM

## 2021-02-19 DIAGNOSIS — O24.012 TYPE 1 DIABETES MELLITUS AFFECTING PREGNANCY IN SECOND TRIMESTER, ANTEPARTUM: ICD-10-CM

## 2021-02-19 LAB
C TRACH DNA SPEC QL NAA+PROBE: NEGATIVE
N GONORRHOEA DNA SPEC QL NAA+PROBE: NEGATIVE

## 2021-02-19 NOTE — TELEPHONE ENCOUNTER
Jennifer DyeAddendum  9:13 AM                Fax- [phone]  Asking for insulin pump order to be faxed to her - request was faxed over yesterday - [facility] fax

## 2021-02-19 NOTE — TELEPHONE ENCOUNTER
Fax- [phone]  Asking for insulin pump order to be faxed to her - request was faxed over yesterday - [facility] fax

## 2021-02-22 NOTE — TELEPHONE ENCOUNTER
Signed form faxed to Franny [employer] (Luiza Addison) at [phone]  [employer] notified fax was sent     Form sent for scanning

## 2021-02-22 NOTE — TELEPHONE ENCOUNTER
Yancy checking status on message - requesting for completed forms as patient has been calling to check on supplies - requesting to have forms by tomorrow, Tuesday, 2/23/2021. Please call. Thank you.

## 2021-02-23 RX ORDER — INSULIN LISPRO 100 [IU]/ML
INJECTION, SOLUTION INTRAVENOUS; SUBCUTANEOUS
Qty: 90 ML | Refills: 1 | Status: SHIPPED | OUTPATIENT
Start: 2021-02-23 | End: 2021-02-24

## 2021-02-23 NOTE — TELEPHONE ENCOUNTER
Per chart review, paper work has been sent over to [facility] rep. Called and and confirmed she got everything she needed from the office. She will train the patient this Thursday. Dr. Nissa Hamilton -- as far as her insulin.   It looks like her admelog

## 2021-02-23 NOTE — TELEPHONE ENCOUNTER
PA was denied because authorization was already received on 1/21/21.   Lookup  Authorization Number:    [id number]  Case Number:   [id number]                     Status:  Approved  P2P Status:         Approval Date:   1/22/2021 12:00:00 AM  Service Code:

## 2021-02-23 NOTE — TELEPHONE ENCOUNTER
Hi!  I have prescribed a 90-day supply of humalog in the vial form for the patient's Omnipod.  The patient's settings will be:    Basal rate of 1.6 units/ hour    ICR ratio of 1:4    AIT : 4 hours    Correction Factor: 2 units for every 50 above 150    Goal

## 2021-02-23 NOTE — TELEPHONE ENCOUNTER
From: Brandi Sanchez  To: Barbi Sr MD  Sent: 2/19/2021 9:36 AM CST  Subject: Visit Follow-up Question    I have everything to get my omnipod started but your order for insulin and all that and Omnipod has told me they have called your off

## 2021-02-24 RX ORDER — INSULIN LISPRO 100 [IU]/ML
INJECTION, SOLUTION INTRAVENOUS; SUBCUTANEOUS
Qty: 90 ML | Refills: 1 | Status: SHIPPED | OUTPATIENT
Start: 2021-02-24 | End: 2021-06-17

## 2021-02-24 NOTE — TELEPHONE ENCOUNTER
90 mL = 100 units per day max daily dose for 90 day supply. RN sent per SY protocol. RN made pt aware via [facility].     Routed to [facility] as Rolando Cherry

## 2021-03-02 ENCOUNTER — TELEPHONE (OUTPATIENT)
Dept: ENDOCRINOLOGY CLINIC | Facility: CLINIC | Age: 31
End: 2021-03-02

## 2021-03-02 NOTE — TELEPHONE ENCOUNTER
Hi,  Please call this patient and find out how she is doing. She had elevated blood sugars last night. Thank you.

## 2021-03-09 ENCOUNTER — PATIENT MESSAGE (OUTPATIENT)
Dept: OBGYN CLINIC | Facility: CLINIC | Age: 31
End: 2021-03-09

## 2021-03-09 NOTE — TELEPHONE ENCOUNTER
Called patient to discuss. Pt reports mood swings and feeling \"down' starting 2 weeks ago. Pt feels overwhelmed with 3 kids and pregnancy. Pt denies any SI/HI.   Pt would like to speak with Felicia Bardales and given verbal permission for her contact inform

## 2021-03-09 NOTE — TELEPHONE ENCOUNTER
From: Chepe Saab  To:  Samaria Novak MD  Sent: 3/9/2021 7:41 AM CST  Subject: Other    I was wanting to see if i could be referred to someone im feeling really down like depressed i dont know how to explain it but if there like a psychiatrist i coul

## 2021-03-13 ENCOUNTER — HOSPITAL ENCOUNTER (EMERGENCY)
Facility: HOSPITAL | Age: 31
Discharge: HOME | End: 2021-03-13
Attending: EMERGENCY MEDICINE
Payer: MEDICAID

## 2021-03-13 ENCOUNTER — HOSPITAL ENCOUNTER (OUTPATIENT)
Age: 31
Discharge: ANOTHER HOSPITAL | End: 2021-03-13
Attending: EMERGENCY MEDICINE
Payer: MEDICAID

## 2021-03-13 VITALS
TEMPERATURE: 98 F | HEART RATE: 78 BPM | DIASTOLIC BLOOD PRESSURE: 100 MMHG | OXYGEN SATURATION: 99 % | RESPIRATION RATE: 14 BRPM | SYSTOLIC BLOOD PRESSURE: 150 MMHG

## 2021-03-13 VITALS
DIASTOLIC BLOOD PRESSURE: 87 MMHG | BODY MASS INDEX: 37.21 KG/M2 | HEART RATE: 70 BPM | RESPIRATION RATE: 16 BRPM | SYSTOLIC BLOOD PRESSURE: 140 MMHG | HEIGHT: 63 IN | OXYGEN SATURATION: 98 % | WEIGHT: 210 LBS

## 2021-03-13 DIAGNOSIS — R51.9 INTRACTABLE HEADACHE, UNSPECIFIED CHRONICITY PATTERN, UNSPECIFIED HEADACHE TYPE: Primary | ICD-10-CM

## 2021-03-13 DIAGNOSIS — R51.9 NONINTRACTABLE HEADACHE, UNSPECIFIED CHRONICITY PATTERN, UNSPECIFIED HEADACHE TYPE: Primary | ICD-10-CM

## 2021-03-13 DIAGNOSIS — Z3A.20 20 WEEKS GESTATION OF PREGNANCY: ICD-10-CM

## 2021-03-13 LAB
ALBUMIN SERPL-MCNC: 2.7 G/DL (ref 3.4–5)
ALBUMIN/GLOB SERPL: 0.7 {RATIO} (ref 1–2)
ALP LIVER SERPL-CCNC: 72 U/L
ALT SERPL-CCNC: 12 U/L
ANION GAP SERPL CALC-SCNC: 6 MMOL/L (ref 0–18)
AST SERPL-CCNC: 9 U/L (ref 15–37)
BASOPHILS # BLD AUTO: 0.03 X10(3) UL (ref 0–0.2)
BASOPHILS NFR BLD AUTO: 0.3 %
BILIRUB SERPL-MCNC: 0.2 MG/DL (ref 0.1–2)
BILIRUB UR QL STRIP.AUTO: NEGATIVE
BUN BLD-MCNC: 7 MG/DL (ref 7–18)
BUN/CREAT SERPL: 17.1 (ref 10–20)
CALCIUM BLD-MCNC: 8.9 MG/DL (ref 8.5–10.1)
CHLORIDE SERPL-SCNC: 109 MMOL/L (ref 98–112)
CLARITY UR REFRACT.AUTO: CLEAR
CO2 SERPL-SCNC: 22 MMOL/L (ref 21–32)
COLOR UR AUTO: YELLOW
CREAT BLD-MCNC: 0.41 MG/DL
DEPRECATED RDW RBC AUTO: 41.8 FL (ref 35.1–46.3)
EOSINOPHIL # BLD AUTO: 0.31 X10(3) UL (ref 0–0.7)
EOSINOPHIL NFR BLD AUTO: 3.3 %
ERYTHROCYTE [DISTWIDTH] IN BLOOD BY AUTOMATED COUNT: 13.4 % (ref 11–15)
GLOBULIN PLAS-MCNC: 3.7 G/DL (ref 2.8–4.4)
GLUCOSE BLD-MCNC: 218 MG/DL (ref 70–99)
GLUCOSE BLD-MCNC: 245 MG/DL (ref 70–99)
GLUCOSE UR STRIP.AUTO-MCNC: >=500 MG/DL
HCT VFR BLD AUTO: 38.1 %
HGB BLD-MCNC: 12.8 G/DL
IMM GRANULOCYTES # BLD AUTO: 0.04 X10(3) UL (ref 0–1)
IMM GRANULOCYTES NFR BLD: 0.4 %
KETONES UR STRIP.AUTO-MCNC: NEGATIVE MG/DL
LYMPHOCYTES # BLD AUTO: 1.99 X10(3) UL (ref 1–4)
LYMPHOCYTES NFR BLD AUTO: 21.1 %
M PROTEIN MFR SERPL ELPH: 6.4 G/DL (ref 6.4–8.2)
MCH RBC QN AUTO: 29 PG (ref 26–34)
MCHC RBC AUTO-ENTMCNC: 33.6 G/DL (ref 31–37)
MCV RBC AUTO: 86.2 FL
MONOCYTES # BLD AUTO: 0.67 X10(3) UL (ref 0.1–1)
MONOCYTES NFR BLD AUTO: 7.1 %
NEUTROPHILS # BLD AUTO: 6.39 X10 (3) UL (ref 1.5–7.7)
NEUTROPHILS # BLD AUTO: 6.39 X10(3) UL (ref 1.5–7.7)
NEUTROPHILS NFR BLD AUTO: 67.8 %
NITRITE UR QL STRIP.AUTO: NEGATIVE
OSMOLALITY SERPL CALC.SUM OF ELEC: 290 MOSM/KG (ref 275–295)
PH UR STRIP.AUTO: 7 [PH] (ref 5–8)
PLATELET # BLD AUTO: 179 10(3)UL (ref 150–450)
POCT BILIRUBIN URINE: NEGATIVE
POCT BLOOD URINE: NEGATIVE
POCT GLUCOSE URINE: >=1000 MG/DL
POCT KETONE URINE: NEGATIVE MG/DL
POCT LEUKOCYTE ESTERASE URINE: NEGATIVE
POCT NITRITE URINE: NEGATIVE
POCT PH URINE: 7.5 (ref 5–8)
POCT PROTEIN URINE: NEGATIVE MG/DL
POCT SPECIFIC GRAVITY URINE: 1.02
POCT URINE CLARITY: CLEAR
POCT URINE COLOR: YELLOW
POCT UROBILINOGEN URINE: 1 MG/DL
POTASSIUM SERPL-SCNC: 3.8 MMOL/L (ref 3.5–5.1)
PROT UR STRIP.AUTO-MCNC: NEGATIVE MG/DL
RBC # BLD AUTO: 4.42 X10(6)UL
RBC UR QL AUTO: NEGATIVE
SODIUM SERPL-SCNC: 137 MMOL/L (ref 136–145)
SP GR UR STRIP.AUTO: 1.03 (ref 1–1.03)
UROBILINOGEN UR STRIP.AUTO-MCNC: 2 MG/DL
WBC # BLD AUTO: 9.4 X10(3) UL (ref 4–11)

## 2021-03-13 PROCEDURE — 82962 GLUCOSE BLOOD TEST: CPT

## 2021-03-13 PROCEDURE — 99284 EMERGENCY DEPT VISIT MOD MDM: CPT

## 2021-03-13 PROCEDURE — 80053 COMPREHEN METABOLIC PANEL: CPT | Performed by: EMERGENCY MEDICINE

## 2021-03-13 PROCEDURE — 96375 TX/PRO/DX INJ NEW DRUG ADDON: CPT

## 2021-03-13 PROCEDURE — 99213 OFFICE O/P EST LOW 20 MIN: CPT | Performed by: NURSE PRACTITIONER

## 2021-03-13 PROCEDURE — 85025 COMPLETE CBC W/AUTO DIFF WBC: CPT

## 2021-03-13 PROCEDURE — 85025 COMPLETE CBC W/AUTO DIFF WBC: CPT | Performed by: EMERGENCY MEDICINE

## 2021-03-13 PROCEDURE — 81002 URINALYSIS NONAUTO W/O SCOPE: CPT | Performed by: EMERGENCY MEDICINE

## 2021-03-13 PROCEDURE — 96374 THER/PROPH/DIAG INJ IV PUSH: CPT

## 2021-03-13 PROCEDURE — 81001 URINALYSIS AUTO W/SCOPE: CPT | Performed by: EMERGENCY MEDICINE

## 2021-03-13 PROCEDURE — 80053 COMPREHEN METABOLIC PANEL: CPT

## 2021-03-13 PROCEDURE — 96361 HYDRATE IV INFUSION ADD-ON: CPT

## 2021-03-13 RX ORDER — METOCLOPRAMIDE HYDROCHLORIDE 5 MG/ML
10 INJECTION INTRAMUSCULAR; INTRAVENOUS ONCE
Status: COMPLETED | OUTPATIENT
Start: 2021-03-13 | End: 2021-03-13

## 2021-03-13 RX ORDER — DIPHENHYDRAMINE HYDROCHLORIDE 50 MG/ML
25 INJECTION INTRAMUSCULAR; INTRAVENOUS ONCE
Status: COMPLETED | OUTPATIENT
Start: 2021-03-13 | End: 2021-03-13

## 2021-03-13 NOTE — ED PROVIDER NOTES
Patient Seen in: [facility]      History   Patient presents with:  Headache    Stated Complaint: HEADACHES    HPI/Subjective:   HPI  Patient is 43-year-old pregnant female past medical history of type 2 diabetes mellitus, diabetic neuropa Smoker        Packs/day: 0.50        Types: Cigarettes        Start date: 2011      Smokeless tobacco: Never Used      Tobacco comment: smokes 1-2 day     Vaping Use      Vaping Use: Never used    Alcohol use: Not Currently      Comment: occ    Drug use: N breath sounds. Abdominal:      Tenderness: There is no right CVA tenderness or left CVA tenderness. Musculoskeletal:      Cervical back: Normal range of motion and neck supple. Tenderness (Very mild cervical paraspinal muscle tenderness.) present.  No r sudden onset of headache that started last night. She reports this is the worst headache of her life. Although she has headaches intermittently with her pregnancy this is the most severe.   Denies personal history of migraine headaches but her mother does

## 2021-03-13 NOTE — ED PROVIDER NOTES
Patient Seen in: [facility] Emergency Department      History   Patient presents with:  Headache    Stated Complaint: headache- 19 wks pregnant    HPI/Subjective:   HPI    This is a 61-year-old woman at 23 weeks pregnant, here for evaluation of head Exam        Physical Exam  Vitals signs and nursing note reviewed. General: Well-appearing young woman lying supine in the bed in no acute distress  Head: Normocephalic and atraumatic.    HEENT:  Mucous membranes are moist.   Cardiovascular:  Normal rate 19 weeks pregnant, here with complaint of headache.   Blood pressure initially elevated on arrival, patient feeling much better after IV fluids Reglan Benadryl, repeat blood pressure 112/80 urinalysis shows no evidence of proteinuria patient's liver enzymes

## 2021-03-15 ENCOUNTER — ROUTINE PRENATAL (OUTPATIENT)
Dept: OBGYN CLINIC | Facility: CLINIC | Age: 31
End: 2021-03-15
Payer: MEDICAID

## 2021-03-15 ENCOUNTER — TELEPHONE (OUTPATIENT)
Dept: OBGYN CLINIC | Facility: CLINIC | Age: 31
End: 2021-03-15

## 2021-03-15 VITALS — BODY MASS INDEX: 43 KG/M2 | WEIGHT: 240 LBS | DIASTOLIC BLOOD PRESSURE: 70 MMHG | SYSTOLIC BLOOD PRESSURE: 118 MMHG

## 2021-03-15 DIAGNOSIS — Z34.82 ENCOUNTER FOR SUPERVISION OF OTHER NORMAL PREGNANCY IN SECOND TRIMESTER: ICD-10-CM

## 2021-03-15 DIAGNOSIS — O26.892 PREGNANCY HEADACHE IN SECOND TRIMESTER: Primary | ICD-10-CM

## 2021-03-15 DIAGNOSIS — R51.9 PREGNANCY HEADACHE IN SECOND TRIMESTER: Primary | ICD-10-CM

## 2021-03-15 LAB — MULTISTIX LOT#: 6038 NUMERIC

## 2021-03-15 PROCEDURE — 3074F SYST BP LT 130 MM HG: CPT | Performed by: OBSTETRICS & GYNECOLOGY

## 2021-03-15 PROCEDURE — 99213 OFFICE O/P EST LOW 20 MIN: CPT | Performed by: OBSTETRICS & GYNECOLOGY

## 2021-03-15 PROCEDURE — 3078F DIAST BP <80 MM HG: CPT | Performed by: OBSTETRICS & GYNECOLOGY

## 2021-03-15 PROCEDURE — 81002 URINALYSIS NONAUTO W/O SCOPE: CPT | Performed by: OBSTETRICS & GYNECOLOGY

## 2021-03-15 RX ORDER — INSULIN PUMP CONTROLLER
EACH MISCELLANEOUS
COMMUNITY
Start: 2021-02-17 | End: 2021-06-04

## 2021-03-15 RX ORDER — PRENATAL VIT/IRON FUM/FOLIC AC 27MG-0.8MG
1 TABLET ORAL DAILY
COMMUNITY
Start: 2021-02-02

## 2021-03-15 NOTE — PROGRESS NOTES
Subjective:  Patient presents with:  Prenatal Care: JENNIFER     OB problem visit: Patient presents for follow up ED visit for headache that started Saturday. BP's initially elevated, but BP normalized on repeat with normal labs.   Headache has persisted, mild

## 2021-03-15 NOTE — TELEPHONE ENCOUNTER
19W3D called regarding ER follow up. She was seen in ER on 2021 for severe headache. Initial BP's were significantly elevated but normal after reglan, benadryl, and IV fluids. Denies any bleeding or pain.  Denies any history of allergies or sinus

## 2021-03-16 NOTE — TELEPHONE ENCOUNTER
Spoke to patient - patient could not sign into clarity nelly (patient not sharing code) for download.   RN tried to make vv apt for patient - patient requesting apt after 2:30pm - first available apt 4/1/21 - patient stated she will call back to schedule - pa

## 2021-03-16 NOTE — PROGRESS NOTES
Outpatient Maternal-Fetal Medicine Consultation    Dear Dr. Janice Gao,    Thank you for requesting ultrasound evaluation and maternal fetal medicine consultation on your patient Lamont Tracey.   As you are aware she is a 31year old female with a Single per day. Patient needs 90 mL of humalog for her Omnipod pump.  Thank you, Disp: 90 mL, Rfl: 1  •  ADMELOG SOLOSTAR 100 UNIT/ML Subcutaneous Solution Pen-injector, INJECT 12 UNITS UNDER THE SKIN THREE TIMES DAILY WITH MEALS, Disp: 15 mL, Rfl: 5  •  Insulin P Rfl: 2  •  Insulin Pen Needle 32G X 5 MM Does not apply Misc, Use daily with victoza, Disp: 100 each, Rfl: 3  •  Insulin Syringe-Needle U-100 (BD INSULIN SYRINGE ULTRAFINE) 31G X 15/64\" 0.5 ML Does not apply Misc, 1 each by Does not apply route daily as n are independent risk factors for preeclampsia.              Studies have found that the increased risk of  birth in obese gravidas is primarily associated with obesity-related medical and  complications, rather than an intrinsic predispositi rates of gestational diabetes, hypertensive complications, fetal macrosomia and delivery complications. Women with weight loss or insufficient weight gain have higher rates of small for gestational age infants.   We discussed that her current weight gain r maternal serum analyte results. Fetal Growth Disturbances:    Maternal diabetes mellitus may double the incidence of LGA infants; it also changes the anthropometric measurements of infants of diabetic mothers (IDMs) compared with offspring of women with markers. Intrauterine Fetal Death (IUFD): Intrauterine fetal demise is now a rare complication of diabetic pregnancy, primarily due to achievement of good glycemic control.  The fetus of the diabetic mother is at risk for hypoxia primarily from two [facility] patient. I advised regular  opthomalogical evaluation (with repeat evaluation as indicated), ECG, 24 hour urine collection for protein and creatinine clearance, as well as serum labs including a complete metabolic profile, CBC and HgB A1C.     Fetal Evaluat deliveries was 10/1000 and , respectively. The risk of placenta accreta in women with placenta previa and no  deliveries was 3 to 4 percent and was and 50 to 67 percent after 3 or more C-sections.  Both of these placental disorders increase t mechanism used in the Ebola Vaccine which as been administered during pregnancy with a good safety profile (Lyn castro al. [facility] Infect Dis 2020;26(3):541). We discussed that recommendations may shift as data emerges.  We discussed that mRNA vaccine

## 2021-03-17 DIAGNOSIS — Z23 NEED FOR VACCINATION: ICD-10-CM

## 2021-03-19 ENCOUNTER — TELEPHONE (OUTPATIENT)
Dept: OBGYN CLINIC | Facility: CLINIC | Age: 31
End: 2021-03-19

## 2021-03-19 ENCOUNTER — ULTRASOUND ENCOUNTER (OUTPATIENT)
Dept: PERINATAL CARE | Facility: HOSPITAL | Age: 31
End: 2021-03-19
Attending: OBSTETRICS & GYNECOLOGY
Payer: MEDICAID

## 2021-03-19 ENCOUNTER — LAB ENCOUNTER (OUTPATIENT)
Dept: LAB | Age: 31
End: 2021-03-19
Attending: OBSTETRICS & GYNECOLOGY
Payer: MEDICAID

## 2021-03-19 ENCOUNTER — ROUTINE PRENATAL (OUTPATIENT)
Dept: OBGYN CLINIC | Facility: CLINIC | Age: 31
End: 2021-03-19
Payer: MEDICAID

## 2021-03-19 VITALS
HEIGHT: 63 IN | WEIGHT: 240 LBS | HEART RATE: 87 BPM | SYSTOLIC BLOOD PRESSURE: 140 MMHG | BODY MASS INDEX: 42.52 KG/M2 | DIASTOLIC BLOOD PRESSURE: 84 MMHG

## 2021-03-19 VITALS — WEIGHT: 240 LBS | DIASTOLIC BLOOD PRESSURE: 76 MMHG | BODY MASS INDEX: 43 KG/M2 | SYSTOLIC BLOOD PRESSURE: 130 MMHG

## 2021-03-19 DIAGNOSIS — E66.01 MORBID OBESITY WITH BMI OF 40.0-44.9, ADULT (HCC): ICD-10-CM

## 2021-03-19 DIAGNOSIS — Z34.80 SUPERVISION OF OTHER NORMAL PREGNANCY: Primary | ICD-10-CM

## 2021-03-19 DIAGNOSIS — O24.419 GDM (GESTATIONAL DIABETES MELLITUS): ICD-10-CM

## 2021-03-19 DIAGNOSIS — E10.65 UNCONTROLLED TYPE 1 DIABETES MELLITUS WITH DIABETIC AUTONOMIC NEUROPATHY, WITH LONG-TERM CURRENT USE OF INSULIN (HCC): Primary | ICD-10-CM

## 2021-03-19 DIAGNOSIS — O99.210 OBESITY AFFECTING PREGNANCY, ANTEPARTUM: ICD-10-CM

## 2021-03-19 DIAGNOSIS — O26.892 PREGNANCY HEADACHE IN SECOND TRIMESTER: ICD-10-CM

## 2021-03-19 DIAGNOSIS — Z98.891 H/O: C-SECTION: ICD-10-CM

## 2021-03-19 DIAGNOSIS — E10.43 UNCONTROLLED TYPE 1 DIABETES MELLITUS WITH DIABETIC AUTONOMIC NEUROPATHY, WITH LONG-TERM CURRENT USE OF INSULIN (HCC): Primary | ICD-10-CM

## 2021-03-19 DIAGNOSIS — R51.9 PREGNANCY HEADACHE IN SECOND TRIMESTER: ICD-10-CM

## 2021-03-19 DIAGNOSIS — Z34.82 ENCOUNTER FOR SUPERVISION OF OTHER NORMAL PREGNANCY IN SECOND TRIMESTER: ICD-10-CM

## 2021-03-19 LAB
ALBUMIN SERPL-MCNC: 2.8 G/DL (ref 3.4–5)
ALBUMIN/GLOB SERPL: 0.7 {RATIO} (ref 1–2)
ALP LIVER SERPL-CCNC: 82 U/L
ALT SERPL-CCNC: 14 U/L
ANION GAP SERPL CALC-SCNC: 5 MMOL/L (ref 0–18)
AST SERPL-CCNC: 5 U/L (ref 15–37)
BILIRUB SERPL-MCNC: 0.2 MG/DL (ref 0.1–2)
BUN BLD-MCNC: 8 MG/DL (ref 7–18)
BUN/CREAT SERPL: 16.7 (ref 10–20)
CALCIUM BLD-MCNC: 9.1 MG/DL (ref 8.5–10.1)
CHLORIDE SERPL-SCNC: 105 MMOL/L (ref 98–112)
CO2 SERPL-SCNC: 25 MMOL/L (ref 21–32)
CREAT BLD-MCNC: 0.48 MG/DL
GLOBULIN PLAS-MCNC: 3.9 G/DL (ref 2.8–4.4)
GLUCOSE (URINE DIPSTICK): NEGATIVE MG/DL
GLUCOSE BLD-MCNC: 197 MG/DL (ref 70–99)
M PROTEIN MFR SERPL ELPH: 6.7 G/DL (ref 6.4–8.2)
MULTISTIX LOT#: 8027 NUMERIC
OSMOLALITY SERPL CALC.SUM OF ELEC: 284 MOSM/KG (ref 275–295)
PATIENT FASTING Y/N/NP: NO
POTASSIUM SERPL-SCNC: 3.7 MMOL/L (ref 3.5–5.1)
SODIUM SERPL-SCNC: 135 MMOL/L (ref 136–145)

## 2021-03-19 PROCEDURE — 76811 OB US DETAILED SNGL FETUS: CPT | Performed by: OBSTETRICS & GYNECOLOGY

## 2021-03-19 PROCEDURE — 36415 COLL VENOUS BLD VENIPUNCTURE: CPT

## 2021-03-19 PROCEDURE — 0502F SUBSEQUENT PRENATAL CARE: CPT | Performed by: OBSTETRICS & GYNECOLOGY

## 2021-03-19 PROCEDURE — 80053 COMPREHEN METABOLIC PANEL: CPT

## 2021-03-19 PROCEDURE — 3078F DIAST BP <80 MM HG: CPT | Performed by: OBSTETRICS & GYNECOLOGY

## 2021-03-19 PROCEDURE — 99205 OFFICE O/P NEW HI 60 MIN: CPT | Performed by: OBSTETRICS & GYNECOLOGY

## 2021-03-19 PROCEDURE — 81002 URINALYSIS NONAUTO W/O SCOPE: CPT | Performed by: OBSTETRICS & GYNECOLOGY

## 2021-03-19 PROCEDURE — 3075F SYST BP GE 130 - 139MM HG: CPT | Performed by: OBSTETRICS & GYNECOLOGY

## 2021-03-19 NOTE — PROGRESS NOTES
No C/O  HA for about a week, seems like tension  No FM yet  MFM scan today  DM since 2016, sees endocrine CGM  Glucose 130  Repeat CS planned  4 weeks

## 2021-03-19 NOTE — TELEPHONE ENCOUNTER
Patient had slightly elevated BP in office today and 30+ protein. Patient admits to not drinking enough fluids during the day. She denies any vision changes, headache, abdominal pain. Dr. Lou Valladares ordered labs due to risk of preeclampsia.  Patient also had Jose Alberto Shelling

## 2021-03-19 NOTE — TELEPHONE ENCOUNTER
----- Message from Bud Alex sent at 3/19/2021  2:00 PM CDT -----  Regarding: Auth #  Patient will need to come back for a Fetal Echo between 22-24wks (CPT 88436/67555)    Starting at 26wks a growth (CPT 31467)    Thanks

## 2021-03-19 NOTE — TELEPHONE ENCOUNTER
PT has questions regarding her lab results and would like to talk with someone today    PT aware that we close at 3

## 2021-03-19 NOTE — TELEPHONE ENCOUNTER
PA request sent to [facility] via online portal    Provided CPT [id number], [id number], and 84058 (4 units) with Dx E10.43 and E10.65    Case to clinical review. Clinicals attached to case.     Case # [id number]

## 2021-03-23 NOTE — TELEPHONE ENCOUNTER
Approved for fetal echo and 4 units of growth ultrasound    Valid from 3/19-12/14/21   Auth # [id number]    [facility] notified.

## 2021-03-24 ENCOUNTER — TELEMEDICINE (OUTPATIENT)
Dept: ENDOCRINOLOGY CLINIC | Facility: CLINIC | Age: 31
End: 2021-03-24

## 2021-03-24 DIAGNOSIS — E10.65 UNCONTROLLED TYPE 1 DIABETES MELLITUS WITH HYPERGLYCEMIA (HCC): Primary | ICD-10-CM

## 2021-03-24 DIAGNOSIS — O24.012 TYPE 1 DIABETES MELLITUS AFFECTING PREGNANCY IN SECOND TRIMESTER, ANTEPARTUM: ICD-10-CM

## 2021-03-24 PROCEDURE — 99214 OFFICE O/P EST MOD 30 MIN: CPT | Performed by: INTERNAL MEDICINE

## 2021-03-24 NOTE — PROGRESS NOTES
Please note that the following visit was completed using two-way, real-time interactive audio and video communication.   This has been done in good og to provide continuity of care in the best interest of the provider-patient relationship, due to the lashanda Every Day Smoker        Packs/day: 0.50        Types: Cigarettes        Start date: 2011      Smokeless tobacco: Never Used      Tobacco comment: smokes 1-2 day     Vaping Use      Vaping Use: Never used    Alcohol use: Not Currently      Comment: occ    D directed with sensors and transmitter 1 Device 0   • Continuous Blood Gluc Transmit (DEXCOM G6 TRANSMITTER) Does not apply Misc Use as directed with sensors 1 each 3   • Continuous Blood Gluc Sensor (DEXCOM G6 SENSOR) Does not apply Misc Use as directed ev sentences, pulmonary effort is normal  Neurological: Alert and oriented to person, place, and time  Psychiatric: Behavior is normal, normal affect  Skin: No visible lesions  Extremities: no obvious extremity swelling, no lesions      DATA:   HbA1c- 9.5 on hypoglycemia (104):  • Premeal, bedtime, and overnight glucose 60–99 mg/dL   • Peak postprandial glucose 100–129 mg/dL  • A1C <6.0%  Review of blood glucose data shows  1.  Fasting blood glucose that are lower than goal   - Decreased  Basal from 1.7units/hr

## 2021-03-25 ENCOUNTER — TELEPHONE (OUTPATIENT)
Dept: ENDOCRINOLOGY CLINIC | Facility: CLINIC | Age: 31
End: 2021-03-25

## 2021-03-25 NOTE — TELEPHONE ENCOUNTER
Hi!  Patient will come to try and download her Dexcom, because she doesn't have a working computer at home. Please also set up a two-week appointment for her, with myself or Анна Jarvis. Thank you.

## 2021-03-29 DIAGNOSIS — E66.01 CLASS 2 SEVERE OBESITY DUE TO EXCESS CALORIES WITH SERIOUS COMORBIDITY AND BODY MASS INDEX (BMI) OF 37.0 TO 37.9 IN ADULT (HCC): ICD-10-CM

## 2021-03-29 NOTE — TELEPHONE ENCOUNTER
Last refill:  10/1/2020 #180 NR - discontinued on 1/5/21 by [facility] telemedicine 11/24/2020 Dr Dewayne Cheadle RTC 1 week   No FOV scheduled -  4/15/2021 FOV with manuel Loo.

## 2021-04-05 RX ORDER — TOPIRAMATE 50 MG/1
TABLET, FILM COATED ORAL
Qty: 180 TABLET | Refills: 0 | OUTPATIENT
Start: 2021-04-05

## 2021-04-08 ENCOUNTER — TELEPHONE (OUTPATIENT)
Dept: ENDOCRINOLOGY CLINIC | Facility: CLINIC | Age: 31
End: 2021-04-08

## 2021-04-13 ENCOUNTER — TELEPHONE (OUTPATIENT)
Dept: PERINATAL CARE | Facility: HOSPITAL | Age: 31
End: 2021-04-13

## 2021-04-15 ENCOUNTER — ROUTINE PRENATAL (OUTPATIENT)
Dept: OBGYN CLINIC | Facility: CLINIC | Age: 31
End: 2021-04-15
Payer: MEDICAID

## 2021-04-15 VITALS — DIASTOLIC BLOOD PRESSURE: 82 MMHG | BODY MASS INDEX: 43 KG/M2 | WEIGHT: 244 LBS | SYSTOLIC BLOOD PRESSURE: 126 MMHG

## 2021-04-15 DIAGNOSIS — N76.0 VAGINITIS AND VULVOVAGINITIS: ICD-10-CM

## 2021-04-15 DIAGNOSIS — E10.43 UNCONTROLLED TYPE 1 DIABETES MELLITUS WITH DIABETIC AUTONOMIC NEUROPATHY, WITH LONG-TERM CURRENT USE OF INSULIN (HCC): ICD-10-CM

## 2021-04-15 DIAGNOSIS — O26.892 PREGNANCY HEADACHE IN SECOND TRIMESTER: ICD-10-CM

## 2021-04-15 DIAGNOSIS — Z34.82 ENCOUNTER FOR SUPERVISION OF OTHER NORMAL PREGNANCY IN SECOND TRIMESTER: Primary | ICD-10-CM

## 2021-04-15 DIAGNOSIS — E10.65 UNCONTROLLED TYPE 1 DIABETES MELLITUS WITH DIABETIC AUTONOMIC NEUROPATHY, WITH LONG-TERM CURRENT USE OF INSULIN (HCC): ICD-10-CM

## 2021-04-15 DIAGNOSIS — R51.9 PREGNANCY HEADACHE IN SECOND TRIMESTER: ICD-10-CM

## 2021-04-15 PROCEDURE — 0502F SUBSEQUENT PRENATAL CARE: CPT | Performed by: NURSE PRACTITIONER

## 2021-04-15 PROCEDURE — 81002 URINALYSIS NONAUTO W/O SCOPE: CPT | Performed by: NURSE PRACTITIONER

## 2021-04-15 PROCEDURE — 3074F SYST BP LT 130 MM HG: CPT | Performed by: NURSE PRACTITIONER

## 2021-04-15 PROCEDURE — 3079F DIAST BP 80-89 MM HG: CPT | Performed by: NURSE PRACTITIONER

## 2021-04-15 PROCEDURE — 87480 CANDIDA DNA DIR PROBE: CPT | Performed by: NURSE PRACTITIONER

## 2021-04-15 PROCEDURE — 87510 GARDNER VAG DNA DIR PROBE: CPT | Performed by: NURSE PRACTITIONER

## 2021-04-15 PROCEDURE — 82570 ASSAY OF URINE CREATININE: CPT | Performed by: OBSTETRICS & GYNECOLOGY

## 2021-04-15 PROCEDURE — 87660 TRICHOMONAS VAGIN DIR PROBE: CPT | Performed by: NURSE PRACTITIONER

## 2021-04-15 PROCEDURE — 84156 ASSAY OF PROTEIN URINE: CPT | Performed by: OBSTETRICS & GYNECOLOGY

## 2021-04-15 NOTE — PROGRESS NOTES
JENNIFER  Doing well but increased discharge with vaginal itching  Still smoking, having difficulty quitting  FM rare, maybe flutters  RH positive  S/P Anatomy scan with MFM  CBC ordered as well as a Hemoglobin A1C- will do her urine protein and Cr ratio today

## 2021-04-16 ENCOUNTER — TELEPHONE (OUTPATIENT)
Dept: OBGYN CLINIC | Facility: CLINIC | Age: 31
End: 2021-04-16

## 2021-04-19 ENCOUNTER — TELEPHONE (OUTPATIENT)
Dept: OBGYN CLINIC | Facility: CLINIC | Age: 31
End: 2021-04-19

## 2021-04-19 NOTE — TELEPHONE ENCOUNTER
Per Joseph, please follow up with patient to remind her to have her blood work done and schedule an appointment with her endocrinologist.  Sandra Greco like patient has an appointment with her endocrinologist on 04/22/201.  Please encourage her to keep the appointmen

## 2021-04-20 ENCOUNTER — TELEPHONE (OUTPATIENT)
Dept: OBGYN CLINIC | Facility: CLINIC | Age: 31
End: 2021-04-20

## 2021-04-20 NOTE — TELEPHONE ENCOUNTER
Received thru fax in [facility] Pt response form from KAMILLA Noyola. Put in [facility] in [facility] ( she is working here today) for review.

## 2021-04-20 NOTE — TELEPHONE ENCOUNTER
Notes from Virgilio Ryan reviewed, she was given resources from [employer]. Records will be sent to be scanned into EMR.

## 2021-04-22 ENCOUNTER — NURSE ONLY (OUTPATIENT)
Dept: ENDOCRINOLOGY CLINIC | Facility: CLINIC | Age: 31
End: 2021-04-22
Payer: MEDICAID

## 2021-04-22 DIAGNOSIS — O24.012 PREGNANCY COMPLICATED BY PRE-EXISTING TYPE 1 DIABETES IN SECOND TRIMESTER: Primary | ICD-10-CM

## 2021-04-22 PROCEDURE — 36416 COLLJ CAPILLARY BLOOD SPEC: CPT | Performed by: INTERNAL MEDICINE

## 2021-04-22 PROCEDURE — 83036 HEMOGLOBIN GLYCOSYLATED A1C: CPT | Performed by: INTERNAL MEDICINE

## 2021-04-22 NOTE — PROGRESS NOTES
Hyacinth Calvincy  : 1990 was seen for Insulin Pump Follow up: Omnipod    Date: 2021     Start time: 1:45 pm  End time: 2:15 pm    Clarkmichelle Pittman was seen for download and adjustment of insulin pump.  She has type 1 diabetes now complicated by pregnan Patient verbalized understanding and has no further questions at this time.     Dat Darby, RN, CDE

## 2021-04-22 NOTE — PROGRESS NOTES
Gerard Cosby is a 31year old female. HPI:   Patient presents with:  Depression  Pregnancy    Patient presents to discuss depressive symptoms. She has been feeling a lot more depressed over the past month.   No energy, psychomotor retardation, wants TRANSMITTER) Does not apply Misc, 1 each by Does not apply route every 3 (three) months., Disp: 1 each, Rfl: 3  •  Continuous Blood Gluc Sensor (DEXCOM G6 SENSOR) Does not apply Misc, 1 each by Does not apply route Every 10 days. , Disp: 9 each, Rfl: 3  • clubbing, cyanosis or edema. GI: soft non tender nondistended no hepatosplenomegaly, bowel sounds throughout  PSYCH: pleasant, appropriate mood and affect  ASSESSMENT AND PLAN:   1. Major depressive disorder in mother affecting pregnancy  2.  Generalized a

## 2021-04-26 ENCOUNTER — TELEPHONE (OUTPATIENT)
Dept: ENDOCRINOLOGY CLINIC | Facility: CLINIC | Age: 31
End: 2021-04-26

## 2021-04-29 ENCOUNTER — PATIENT MESSAGE (OUTPATIENT)
Dept: ENDOCRINOLOGY CLINIC | Facility: CLINIC | Age: 31
End: 2021-04-29

## 2021-04-29 NOTE — TELEPHONE ENCOUNTER
Received notification from [facility] that fetal echo and growth ultrasound was performed by them at [facility] and that PA was requested for Dr. Alec Junior office. Site needs to be changed. Call to Chrissy at [phone]; spoke with Carmen Vazquez.

## 2021-04-30 NOTE — TELEPHONE ENCOUNTER
[facility] notified of site change.  Patient will either need to have remaining scans completed at [facility], or they will need to notify us that the patient is going to use a different site and another PA request will need to be initiated for the remaining marek

## 2021-04-30 NOTE — TELEPHONE ENCOUNTER
Spoke with patient. She is using Omnipod with Dexcom G6- using the  for Dexcom so unable to get clarity code from her. She woke up with her fasting sugar high today at 280.  She ate an apple and sugar still elevated at 198 almost 2 hours after e

## 2021-04-30 NOTE — TELEPHONE ENCOUNTER
Deloris Almendarez,  Patient asking to speak to you about BG - states her BG reading now is 204 and she only had an apple about 1 hour ago.   Thanks

## 2021-04-30 NOTE — TELEPHONE ENCOUNTER
From: Celina Lal  To: Barbi Amin MD  Sent: 4/29/2021 3:41 PM CDT  Subject: Visit Follow-up Question    Im trying to speak to diabetic educator im thinking i need more insulin it still taking ao long for my bg to go down

## 2021-05-06 ENCOUNTER — NURSE ONLY (OUTPATIENT)
Dept: ENDOCRINOLOGY CLINIC | Facility: CLINIC | Age: 31
End: 2021-05-06
Payer: MEDICAID

## 2021-05-06 ENCOUNTER — TELEPHONE (OUTPATIENT)
Dept: INTERNAL MEDICINE CLINIC | Facility: CLINIC | Age: 31
End: 2021-05-06

## 2021-05-06 DIAGNOSIS — O24.012 PREGNANCY COMPLICATED BY PRE-EXISTING TYPE 1 DIABETES IN SECOND TRIMESTER: Primary | ICD-10-CM

## 2021-05-06 PROCEDURE — 99211 OFF/OP EST MAY X REQ PHY/QHP: CPT | Performed by: INTERNAL MEDICINE

## 2021-05-06 NOTE — PROGRESS NOTES
Deonte Jasso  : 1990 was seen for Insulin Pump Follow up: Omnipod     Date: 2021                              Start time: 10:25 am  End time: 10:50 am     Demi Krause was seen for download and adjustment of insulin pump.  She has type 1 diabete if sugars remain persistently above goal as discussed.      Appt scheduled to return for visit with me in 2 weeks.      Encouraged her to schedule follow up with MD today.     Patient verbalized understanding and has no further questions at this time.     M

## 2021-05-06 NOTE — PROGRESS NOTES
Claudia Sellers is a 31year old female here today for a telemedicine/video visit. Patient is in the state of [place] during this visit. Claudia Sellers verbally consents to a Video visit service on 05/06/21.   Patient understands and accepts financial her to work/function. Work note written for next 1-2 weeks.   I had entered a [facility] referral to see if she could be provided in-network therapists/counselors - referral response pasted below:  [facility]

## 2021-05-07 RX ORDER — BLOOD SUGAR DIAGNOSTIC
300 STRIP MISCELLANEOUS DAILY
Qty: 300 EACH | Refills: 0 | Status: SHIPPED | OUTPATIENT
Start: 2021-05-07 | End: 2021-10-04

## 2021-05-07 NOTE — TELEPHONE ENCOUNTER
No protocol     Last refill:  Glucose Blood (ONETOUCH ULTRA) In Vitro Strip 300 each 0 2020    Si each by Other route daily.  BEFORE MEALS AND BEDTIME       LOV:   21 telemedicne Dr Luiza Garcia   No FOV scheduled

## 2021-05-12 PROBLEM — O24.012 TYPE 1 DIABETES MELLITUS AFFECTING PREGNANCY IN SECOND TRIMESTER, ANTEPARTUM: Status: RESOLVED | Noted: 2021-02-19 | Resolved: 2021-05-12

## 2021-05-12 PROBLEM — O24.012: Status: RESOLVED | Noted: 2021-02-19 | Resolved: 2021-05-12

## 2021-05-13 ENCOUNTER — ROUTINE PRENATAL (OUTPATIENT)
Dept: OBGYN CLINIC | Facility: CLINIC | Age: 31
End: 2021-05-13
Payer: MEDICAID

## 2021-05-13 VITALS — BODY MASS INDEX: 45 KG/M2 | DIASTOLIC BLOOD PRESSURE: 76 MMHG | WEIGHT: 252.81 LBS | SYSTOLIC BLOOD PRESSURE: 118 MMHG

## 2021-05-13 DIAGNOSIS — Z34.80 SUPERVISION OF OTHER NORMAL PREGNANCY: Primary | ICD-10-CM

## 2021-05-13 DIAGNOSIS — Z36.9 ANTENATAL SCREENING ENCOUNTER: ICD-10-CM

## 2021-05-13 PROCEDURE — 3074F SYST BP LT 130 MM HG: CPT | Performed by: OBSTETRICS & GYNECOLOGY

## 2021-05-13 PROCEDURE — 3078F DIAST BP <80 MM HG: CPT | Performed by: OBSTETRICS & GYNECOLOGY

## 2021-05-13 PROCEDURE — 0502F SUBSEQUENT PRENATAL CARE: CPT | Performed by: OBSTETRICS & GYNECOLOGY

## 2021-05-13 PROCEDURE — 81002 URINALYSIS NONAUTO W/O SCOPE: CPT | Performed by: OBSTETRICS & GYNECOLOGY

## 2021-05-13 NOTE — PROGRESS NOTES
Patient presents with:  Prenatal Care    Routine prenatal visit. Patient without complaints. Good fetal movement  Patient denies any bleeding, leaking fluid, cramping, or contractions.     Assessment/Plan:  27w6d doing well  Prev. CS and desires steriliza

## 2021-05-13 NOTE — PATIENT INSTRUCTIONS
LABOR & DELIVERY PRE-REGISTRATION    Thank you for choosing [facility] for you labor and delivery needs. We look forward to caring for you and your family in this exciting time.   In order to better care for all of our patients, [facility] re CHART    Although not all women need to perform daily fetal movement counts, if you notice a decrease in fetal activity, you should contact our office immediately. Begin counting fetal movements at 32 weeks of pregnancy.   You may find that your baby i that all pregnant women receive a Tdap vaccine during pregnancy, regardless of whether you have received one previously.   The vaccine reduces your chances of latasha the illness, and also provides some natural immunity to your baby for possible exposur Covid-19 infection and have a subsequent higher risk for the following:   - severe illness   - adverse pregnancy outcomes including  birth   - hospitalization, ICU admission, need for mechanical ventilation and death  [2] The mRNA vaccines that are

## 2021-05-14 ENCOUNTER — TELEPHONE (OUTPATIENT)
Dept: OBGYN CLINIC | Facility: CLINIC | Age: 31
End: 2021-05-14

## 2021-05-14 NOTE — PROGRESS NOTES
Sonja Shaw is a 31year old female here today for a telemedicine/video visit. Patient is in the [place] of [place] during this visit. Sonja Shaw verbally consents to a Video visit service on 05/14/21.   Patient understands and accepts financial insomnia  Doing a little better, still with significant stress, anxiety, insomnia. Patient hesitant to increase sertraline dose higher due to her current pregnancy. This is understandable. Will continue sertraline at 25 mg every day.   She has finally fo

## 2021-05-18 ENCOUNTER — PATIENT MESSAGE (OUTPATIENT)
Dept: INTERNAL MEDICINE CLINIC | Facility: CLINIC | Age: 31
End: 2021-05-18

## 2021-05-25 ENCOUNTER — PATIENT MESSAGE (OUTPATIENT)
Dept: ENDOCRINOLOGY CLINIC | Facility: CLINIC | Age: 31
End: 2021-05-25

## 2021-05-26 NOTE — TELEPHONE ENCOUNTER
From: Ledy Campos  To: Barbi Garcia MD  Sent: 5/25/2021 12:14 AM CDT  Subject: Visit Follow-up Question    im running a little high through out the day and would like to see if you can change my pump settings over the phone and see if you are

## 2021-05-26 NOTE — TELEPHONE ENCOUNTER
Dr Gari Sacks  Last visit with obdulio 5/6/21 please advise  Cancelled last visit pregnant 30 weeks.   How frequently dose she need to come in?

## 2021-05-27 ENCOUNTER — ROUTINE PRENATAL (OUTPATIENT)
Dept: OBGYN CLINIC | Facility: CLINIC | Age: 31
End: 2021-05-27
Payer: MEDICAID

## 2021-05-27 ENCOUNTER — LAB ENCOUNTER (OUTPATIENT)
Dept: LAB | Age: 31
End: 2021-05-27
Attending: OBSTETRICS & GYNECOLOGY
Payer: MEDICAID

## 2021-05-27 VITALS
DIASTOLIC BLOOD PRESSURE: 74 MMHG | WEIGHT: 257 LBS | HEIGHT: 63 IN | SYSTOLIC BLOOD PRESSURE: 122 MMHG | BODY MASS INDEX: 45.54 KG/M2

## 2021-05-27 DIAGNOSIS — Z34.82 ENCOUNTER FOR SUPERVISION OF OTHER NORMAL PREGNANCY IN SECOND TRIMESTER: ICD-10-CM

## 2021-05-27 DIAGNOSIS — O09.629 SUPERVISION OF HIGH-RISK PREGNANCY OF YOUNG MULTIGRAVIDA: Primary | ICD-10-CM

## 2021-05-27 DIAGNOSIS — Z23 NEED FOR VACCINATION: ICD-10-CM

## 2021-05-27 DIAGNOSIS — Z36.9 ANTENATAL SCREENING ENCOUNTER: ICD-10-CM

## 2021-05-27 DIAGNOSIS — Z98.891 HISTORY OF CESAREAN DELIVERY: ICD-10-CM

## 2021-05-27 DIAGNOSIS — G44.201 ACUTE INTRACTABLE TENSION-TYPE HEADACHE: ICD-10-CM

## 2021-05-27 DIAGNOSIS — E10.65 UNCONTROLLED TYPE 1 DIABETES MELLITUS WITH HYPERGLYCEMIA (HCC): ICD-10-CM

## 2021-05-27 DIAGNOSIS — O99.210 OBESITY AFFECTING PREGNANCY, ANTEPARTUM: ICD-10-CM

## 2021-05-27 PROCEDURE — 3078F DIAST BP <80 MM HG: CPT | Performed by: OBSTETRICS & GYNECOLOGY

## 2021-05-27 PROCEDURE — 36415 COLL VENOUS BLD VENIPUNCTURE: CPT

## 2021-05-27 PROCEDURE — 90471 IMMUNIZATION ADMIN: CPT | Performed by: OBSTETRICS & GYNECOLOGY

## 2021-05-27 PROCEDURE — 0502F SUBSEQUENT PRENATAL CARE: CPT | Performed by: OBSTETRICS & GYNECOLOGY

## 2021-05-27 PROCEDURE — 90715 TDAP VACCINE 7 YRS/> IM: CPT | Performed by: OBSTETRICS & GYNECOLOGY

## 2021-05-27 PROCEDURE — 85025 COMPLETE CBC W/AUTO DIFF WBC: CPT

## 2021-05-27 PROCEDURE — 81002 URINALYSIS NONAUTO W/O SCOPE: CPT | Performed by: OBSTETRICS & GYNECOLOGY

## 2021-05-27 PROCEDURE — 87389 HIV-1 AG W/HIV-1&-2 AB AG IA: CPT

## 2021-05-27 PROCEDURE — 3074F SYST BP LT 130 MM HG: CPT | Performed by: OBSTETRICS & GYNECOLOGY

## 2021-05-27 PROCEDURE — 3008F BODY MASS INDEX DOCD: CPT | Performed by: OBSTETRICS & GYNECOLOGY

## 2021-05-27 NOTE — PATIENT INSTRUCTIONS
Tdap Vaccine: What You Need To Know    1. Why Get Vaccinated:    · Tetanus, diphtheria, and pertussis can be very serious diseases, even for adolescents and adults. Tdap vaccine can protect us from these diseases.     · Tetanus (Lockjaw) causes painful mu tetanus infection. Medications Safe in Pregnancy  The following over-the-counter medications may be taken safely after 12 weeks gestation by any patient who is pregnant. Please follow the instructions on the package for adult dosage.   If you ex

## 2021-05-27 NOTE — PROGRESS NOTES
JENNIFER  [id number]  GA: 29w6d   05/27/21  1427   BP: 122/74   Weight: 257 lb (116.6 kg)   Height: 63\"       Doing well, +FM  Denies LOF/VB/uctx  Rh positive, TDAP today received, EPDS 19  Fetal movement count given  DM Type 1, on insulin pump, poorly controlled

## 2021-06-01 NOTE — TELEPHONE ENCOUNTER
Hi!  Can we see if she can come in to see [facility]? She should be coming in at every two weeks.

## 2021-06-02 ENCOUNTER — PATIENT MESSAGE (OUTPATIENT)
Dept: INTERNAL MEDICINE CLINIC | Facility: CLINIC | Age: 31
End: 2021-06-02

## 2021-06-03 ENCOUNTER — NURSE ONLY (OUTPATIENT)
Dept: ENDOCRINOLOGY CLINIC | Facility: CLINIC | Age: 31
End: 2021-06-03
Payer: MEDICAID

## 2021-06-03 ENCOUNTER — ROUTINE PRENATAL (OUTPATIENT)
Dept: OBGYN CLINIC | Facility: CLINIC | Age: 31
End: 2021-06-03
Payer: MEDICAID

## 2021-06-03 VITALS
BODY MASS INDEX: 46 KG/M2 | WEIGHT: 257.38 LBS | DIASTOLIC BLOOD PRESSURE: 68 MMHG | HEART RATE: 97 BPM | SYSTOLIC BLOOD PRESSURE: 118 MMHG

## 2021-06-03 DIAGNOSIS — Z3A.30 30 WEEKS GESTATION OF PREGNANCY: Primary | ICD-10-CM

## 2021-06-03 DIAGNOSIS — E10.65 UNCONTROLLED TYPE 1 DIABETES MELLITUS WITH HYPERGLYCEMIA (HCC): Primary | ICD-10-CM

## 2021-06-03 PROCEDURE — 81002 URINALYSIS NONAUTO W/O SCOPE: CPT | Performed by: NURSE PRACTITIONER

## 2021-06-03 PROCEDURE — 3052F HG A1C>EQUAL 8.0%<EQUAL 9.0%: CPT | Performed by: INTERNAL MEDICINE

## 2021-06-03 PROCEDURE — 0502F SUBSEQUENT PRENATAL CARE: CPT | Performed by: NURSE PRACTITIONER

## 2021-06-03 PROCEDURE — 3074F SYST BP LT 130 MM HG: CPT | Performed by: NURSE PRACTITIONER

## 2021-06-03 PROCEDURE — 36416 COLLJ CAPILLARY BLOOD SPEC: CPT | Performed by: INTERNAL MEDICINE

## 2021-06-03 PROCEDURE — 83036 HEMOGLOBIN GLYCOSYLATED A1C: CPT | Performed by: INTERNAL MEDICINE

## 2021-06-03 PROCEDURE — 3078F DIAST BP <80 MM HG: CPT | Performed by: NURSE PRACTITIONER

## 2021-06-03 NOTE — PROGRESS NOTES
Ruth Parkerress: 12/21/1990 was seen for Insulin Pump Follow up: Omnipod     Date: 6/3/2021                              Start time: 10:08 am  End time: 10:30am     Vira was seen for download and adjustment of insulin pump.  She has type 1 diabetes schedule follow up with MD today.     Patient verbalized understanding and has no further questions at this time.     Jd Tran RN, CDE

## 2021-06-03 NOTE — TELEPHONE ENCOUNTER
From: Whit Lara  To: Rafat Mccarty MD  Sent: 6/2/2021 5:06 PM CDT  Subject: Visit Follow-up Question    Hi i was wondering if i can get dr massimo extended and to return june 7 if possible my job called me today and told me i was to return tomorrow w

## 2021-06-03 NOTE — TELEPHONE ENCOUNTER
Noted, will address pump settings at visit. She last saw MD in 3/2021 so should have MD follow up as well. I will help her schedule that today.

## 2021-06-03 NOTE — PROGRESS NOTES
JENNIFER  Doing somewhat better today. No further headache and her dizziness is improved. She admits she wasn't drinking much water so she will work on this. She is still fatigued, this has been ongoing for around 1 week.   Advised hydration, call with any persi

## 2021-06-04 ENCOUNTER — TELEPHONE (OUTPATIENT)
Dept: ENDOCRINOLOGY CLINIC | Facility: CLINIC | Age: 31
End: 2021-06-04

## 2021-06-04 ENCOUNTER — PATIENT MESSAGE (OUTPATIENT)
Dept: ENDOCRINOLOGY CLINIC | Facility: CLINIC | Age: 31
End: 2021-06-04

## 2021-06-04 RX ORDER — INSULIN PUMP CONTROLLER
100 EACH MISCELLANEOUS DAILY
Qty: 18 EACH | Refills: 0 | Status: SHIPPED | OUTPATIENT
Start: 2021-06-04 | End: 2021-11-29

## 2021-06-04 NOTE — TELEPHONE ENCOUNTER
Pt needs omnipod refill today - she is out and uses one daily - states pharm has sent over request -  asking to call Simeon in [facility] at [phone]

## 2021-06-04 NOTE — TELEPHONE ENCOUNTER
Future Appointments   Date Time Provider Bonifacio Simran   6/7/2021 10:15 AM Kim Corral MD [facility] 8 [facility] [place]   6/10/2021 10:30 AM Jd Rankin MD [facility] OB/GYN N [facility] [place]   6/10/2021 11:30 AM [address] ENDO CDE E[facility]O EC CF   6/16/2021  2:00 PM EM

## 2021-06-04 NOTE — TELEPHONE ENCOUNTER
Sent in prescription per protocol to pt pharmacy. Pt changing pod every day. Requested 90 days worth of pods to be filled from the pharmacy. Sent to [facility] closed for weekend. Called pt to notify her.

## 2021-06-06 ENCOUNTER — TELEPHONE (OUTPATIENT)
Dept: ENDOCRINOLOGY CLINIC | Facility: CLINIC | Age: 31
End: 2021-06-06

## 2021-06-06 RX ORDER — INSULIN DETEMIR 100 [IU]/ML
46 INJECTION, SOLUTION SUBCUTANEOUS DAILY
Qty: 6 ML | Refills: 0 | Status: SHIPPED | OUTPATIENT
Start: 2021-06-06

## 2021-06-06 RX ORDER — INSULIN LISPRO 100 [IU]/ML
INJECTION, SOLUTION INTRAVENOUS; SUBCUTANEOUS
Qty: 6 ML | Refills: 0 | Status: SHIPPED | OUTPATIENT
Start: 2021-06-06

## 2021-06-06 NOTE — TELEPHONE ENCOUNTER
Patient called at 1:30 am on 6/6/2021 stating she is out of insulin  States she beltran snot have pump supplies  Levemir 46 daily and humalog based on icr and CF pens sent  Please FU with the patient  Thanks

## 2021-06-07 ENCOUNTER — TELEPHONE (OUTPATIENT)
Dept: INTERNAL MEDICINE CLINIC | Facility: CLINIC | Age: 31
End: 2021-06-07

## 2021-06-07 RX ORDER — INSULIN LISPRO 100 U/ML
INJECTION, SOLUTION SUBCUTANEOUS
Qty: 15 ML | Refills: 0 | OUTPATIENT
Start: 2021-06-07

## 2021-06-07 NOTE — PROGRESS NOTES
Bal Blair is a 31year old female here today for a telemedicine/video visit. Patient is in the state of [place] during this visit. Bal Blair verbally consents to a Video visit service on 05/14/21.   Patient understands and accepts financial Endocrinology for her diabetes as well. Continue sertraline 25 mg every day for now. She does finally have her appointment with Psychiatry this week. I recommended she discuss return to work recommendations with psychiatry at appointment this weekend.

## 2021-06-08 ENCOUNTER — TELEPHONE (OUTPATIENT)
Dept: ENDOCRINOLOGY CLINIC | Facility: CLINIC | Age: 31
End: 2021-06-08

## 2021-06-08 NOTE — TELEPHONE ENCOUNTER
• OMNIPOD DASH PODS FOR THE OMNIPOD SYSTEM) Does not apply Misc, Change pods every day, Disp: 50 each, Rfl: 0    PHARMACY COMMENTS: CAN WE PLEASE GET A RX FOR OMNIPOD DASH PODS WITH THE DIRECTIONS OF CHANGE POD ONCE DAILY?  PATIENT HAS BEEN NEEDING TO USE I

## 2021-06-08 NOTE — TELEPHONE ENCOUNTER
Called patient back today to follow-up on this patient stated that all insulin and pump supplies are coming in the mail. Advised patient to contact our office if she has any additional questions.

## 2021-06-10 ENCOUNTER — ROUTINE PRENATAL (OUTPATIENT)
Dept: OBGYN CLINIC | Facility: CLINIC | Age: 31
End: 2021-06-10
Payer: MEDICAID

## 2021-06-10 ENCOUNTER — TELEPHONE (OUTPATIENT)
Dept: ENDOCRINOLOGY CLINIC | Facility: CLINIC | Age: 31
End: 2021-06-10

## 2021-06-10 VITALS
WEIGHT: 257.63 LBS | HEIGHT: 63 IN | SYSTOLIC BLOOD PRESSURE: 112 MMHG | DIASTOLIC BLOOD PRESSURE: 70 MMHG | BODY MASS INDEX: 45.65 KG/M2

## 2021-06-10 DIAGNOSIS — E10.65 UNCONTROLLED TYPE 1 DIABETES MELLITUS WITH HYPERGLYCEMIA (HCC): ICD-10-CM

## 2021-06-10 DIAGNOSIS — O99.210 OBESITY AFFECTING PREGNANCY, ANTEPARTUM: ICD-10-CM

## 2021-06-10 DIAGNOSIS — Z34.90 ENCOUNTER FOR SUPERVISION OF NORMAL PREGNANCY, ANTEPARTUM, UNSPECIFIED GRAVIDITY: Primary | ICD-10-CM

## 2021-06-10 PROCEDURE — 0502F SUBSEQUENT PRENATAL CARE: CPT | Performed by: OBSTETRICS & GYNECOLOGY

## 2021-06-10 PROCEDURE — 3074F SYST BP LT 130 MM HG: CPT | Performed by: OBSTETRICS & GYNECOLOGY

## 2021-06-10 PROCEDURE — 3008F BODY MASS INDEX DOCD: CPT | Performed by: OBSTETRICS & GYNECOLOGY

## 2021-06-10 PROCEDURE — 81002 URINALYSIS NONAUTO W/O SCOPE: CPT | Performed by: OBSTETRICS & GYNECOLOGY

## 2021-06-10 PROCEDURE — 3078F DIAST BP <80 MM HG: CPT | Performed by: OBSTETRICS & GYNECOLOGY

## 2021-06-10 RX ORDER — INSULIN PUMP CONTROLLER
100 EACH MISCELLANEOUS DAILY
Qty: 18 EACH | Refills: 0 | Status: CANCELLED | OUTPATIENT
Start: 2021-06-10

## 2021-06-10 NOTE — TELEPHONE ENCOUNTER
Called patient back and offered appointments tomorrow, however, she chose to come in Monday. Future Appointments   Date Time Provider Bonifacio Khalil   6/14/2021  9:30 AM [facility] location reviewed with patient.

## 2021-06-10 NOTE — PROGRESS NOTES
JENNIFER - 31w6d  Doing well, +FM  Denies LOF/VB/uctx  Was without a pump for a week, using injectable insulin. Last a1c was 8.3, improved. Following up with Endo weekly and making adjustments.    /70   Ht 63\"   Wt 257 lb 9.6 oz (116.8 kg)   LMP 10/19/202

## 2021-06-10 NOTE — TELEPHONE ENCOUNTER
That is fine. Let's see if she can come in tomorrow for a visit or Monday? I have several openings tomorrow it looks like.

## 2021-06-10 NOTE — TELEPHONE ENCOUNTER
Pt called in stating she is not able to make the nurse only appt today and would like to reschedule.  Please follow up

## 2021-06-14 ENCOUNTER — NURSE ONLY (OUTPATIENT)
Dept: ENDOCRINOLOGY CLINIC | Facility: CLINIC | Age: 31
End: 2021-06-14
Payer: MEDICAID

## 2021-06-14 DIAGNOSIS — E11.65 UNCONTROLLED TYPE 2 DIABETES MELLITUS WITH HYPERGLYCEMIA (HCC): Primary | ICD-10-CM

## 2021-06-14 PROCEDURE — 99211 OFF/OP EST MAY X REQ PHY/QHP: CPT | Performed by: INTERNAL MEDICINE

## 2021-06-14 NOTE — PROGRESS NOTES
Xochitl Six: 12/21/1990 was seen for Insulin Pump Follow up: Omnipod and Dexcom G6     Date: 6/14/2021                              Start time: 10:00 am  End time: 10:22am     Vira was seen for download and adjustment of insulin pump.  She has understanding and has no further questions at this time.     Yvette Conner RN, CDE

## 2021-06-15 ENCOUNTER — TELEPHONE (OUTPATIENT)
Dept: OBGYN CLINIC | Facility: CLINIC | Age: 31
End: 2021-06-15

## 2021-06-15 NOTE — TELEPHONE ENCOUNTER
Patient has a slight cough that started 2 days. She states that it might be allergies.  Please call to advise

## 2021-06-15 NOTE — TELEPHONE ENCOUNTER
32W4D called c/o scratchy cough. She has history of allergies. She denies any exposure to COVID. She denies any recent testing. Denies any fever, loss of taste or smell, body aches, chills.    Last OV:    Pregnancy Complications: NA  Abdomina

## 2021-06-16 ENCOUNTER — ROUTINE PRENATAL (OUTPATIENT)
Dept: OBGYN CLINIC | Facility: CLINIC | Age: 31
End: 2021-06-16
Payer: MEDICAID

## 2021-06-16 VITALS — WEIGHT: 259.38 LBS | DIASTOLIC BLOOD PRESSURE: 64 MMHG | BODY MASS INDEX: 46 KG/M2 | SYSTOLIC BLOOD PRESSURE: 118 MMHG

## 2021-06-16 DIAGNOSIS — O99.210 OBESITY AFFECTING PREGNANCY, ANTEPARTUM: ICD-10-CM

## 2021-06-16 DIAGNOSIS — Z3A.32 32 WEEKS GESTATION OF PREGNANCY: Primary | ICD-10-CM

## 2021-06-16 DIAGNOSIS — E10.65 UNCONTROLLED TYPE 1 DIABETES MELLITUS WITH HYPERGLYCEMIA (HCC): ICD-10-CM

## 2021-06-16 DIAGNOSIS — Z72.0 CURRENT TOBACCO USE: ICD-10-CM

## 2021-06-16 PROCEDURE — 3074F SYST BP LT 130 MM HG: CPT | Performed by: OBSTETRICS & GYNECOLOGY

## 2021-06-16 PROCEDURE — 81002 URINALYSIS NONAUTO W/O SCOPE: CPT | Performed by: OBSTETRICS & GYNECOLOGY

## 2021-06-16 PROCEDURE — 3078F DIAST BP <80 MM HG: CPT | Performed by: OBSTETRICS & GYNECOLOGY

## 2021-06-16 PROCEDURE — 0502F SUBSEQUENT PRENATAL CARE: CPT | Performed by: OBSTETRICS & GYNECOLOGY

## 2021-06-16 PROCEDURE — 59025 FETAL NON-STRESS TEST: CPT | Performed by: OBSTETRICS & GYNECOLOGY

## 2021-06-16 NOTE — PROGRESS NOTES
Patient presents with:  Prenatal Care  Non-stress Test    Routine prenatal visit without complaints. Patient denies any bleeding, leaking fluid, cramping, or regular uterine contractions. Good fetal movement. NST: see report.   Reactive  Assessment/Plan:

## 2021-06-17 DIAGNOSIS — E10.65 UNCONTROLLED TYPE 1 DIABETES MELLITUS WITH HYPERGLYCEMIA (HCC): ICD-10-CM

## 2021-06-17 DIAGNOSIS — O24.012 TYPE 1 DIABETES MELLITUS AFFECTING PREGNANCY IN SECOND TRIMESTER, ANTEPARTUM: ICD-10-CM

## 2021-06-17 RX ORDER — INSULIN LISPRO 100 [IU]/ML
INJECTION, SOLUTION INTRAVENOUS; SUBCUTANEOUS
Qty: 250 ML | Refills: 0 | Status: ON HOLD | OUTPATIENT
Start: 2021-06-17 | End: 2021-07-02

## 2021-06-17 NOTE — TELEPHONE ENCOUNTER
LOV 6/14/21    RTC in 1 week    LR 6/6/21    Spoke to [facility] about refill request. [facility] stated that patient takes closer to 275 units every other day. Pended script for 275 units of Humalog insulin for insulin pump daily so patient will have enough.  90 d

## 2021-06-17 NOTE — TELEPHONE ENCOUNTER
Phoned pt w/ neg urine culture results. Pt is feeling well w/o c/o today/ pt verbalized an understanding. Pt requesting a refill for insulin for Omnipod. Pt requesting an updated script sent stating pt is taking 200 units every other day. Pt states she is out of insulin.

## 2021-06-18 ENCOUNTER — TELEPHONE (OUTPATIENT)
Dept: ENDOCRINOLOGY CLINIC | Facility: CLINIC | Age: 31
End: 2021-06-18

## 2021-06-18 NOTE — TELEPHONE ENCOUNTER
MARYCRUZ Del Rosario Curls patient back to discuss. She had 4 low sugars overnight and this morning woke up with sugar of 69. This is the first night she has had low sugars since we last changed the settings earlier this week on Monday.     Denies eating

## 2021-06-18 NOTE — TELEPHONE ENCOUNTER
Kimberly Mesa to patient - states she has had 4 episode of low sugars in overnight (last 12 hours - not last 4 nights)    Patient states BG reading at 9:10am today was 69    Please advise -thanks

## 2021-06-18 NOTE — TELEPHONE ENCOUNTER
Hi!  The changes look good. Just please confirm that she is having a good carbohydrate snack before going to bed every night. Thank you!

## 2021-06-18 NOTE — TELEPHONE ENCOUNTER
Sent call to Rn - Patient states she is experiencing Plumeting Overnight Blood Sugars the past 4 nights. Please call. Thank you.

## 2021-06-18 NOTE — TELEPHONE ENCOUNTER
Spoke to patient to advise that Dr. Cristina Ramos is in accordance with changes made by Cruz Hu earlier today. Advised patient to have a good carb snack before bed every night - patient stated understanding and denied further questions.

## 2021-06-20 ENCOUNTER — HOSPITAL ENCOUNTER (OUTPATIENT)
Age: 31
Discharge: HOME | End: 2021-06-20
Payer: MEDICAID

## 2021-06-20 VITALS
SYSTOLIC BLOOD PRESSURE: 136 MMHG | WEIGHT: 260 LBS | OXYGEN SATURATION: 97 % | RESPIRATION RATE: 16 BRPM | HEIGHT: 63 IN | HEART RATE: 102 BPM | BODY MASS INDEX: 46.07 KG/M2 | DIASTOLIC BLOOD PRESSURE: 75 MMHG | TEMPERATURE: 98 F

## 2021-06-20 DIAGNOSIS — J01.00 ACUTE NON-RECURRENT MAXILLARY SINUSITIS: Primary | ICD-10-CM

## 2021-06-20 PROCEDURE — 99213 OFFICE O/P EST LOW 20 MIN: CPT

## 2021-06-20 PROCEDURE — 99214 OFFICE O/P EST MOD 30 MIN: CPT

## 2021-06-20 RX ORDER — AMOXICILLIN 875 MG/1
875 TABLET, COATED ORAL 2 TIMES DAILY
Qty: 20 TABLET | Refills: 0 | Status: SHIPPED | OUTPATIENT
Start: 2021-06-20 | End: 2021-06-29 | Stop reason: ALTCHOICE

## 2021-06-20 NOTE — ED PROVIDER NOTES
Patient Seen in: [facility]      History   Patient presents with:  Cough/URI    Stated Complaint: sinus pressure x 4 days    HPI/Subjective:   HPI  30 yo female who is 33 weeks pregnant with history of dm and hld presents to immediate car General: She is not in acute distress. Appearance: Normal appearance. She is well-developed. She is not ill-appearing or toxic-appearing. HENT:      Right Ear: Ear canal and external ear normal. A middle ear effusion is present.       Left Ear:

## 2021-06-20 NOTE — ED INITIAL ASSESSMENT (HPI)
Patient reports a stuffy nose and congested cough, and left ear pain. Patient reports symptoms for 4 or 5 days. Patient reports she seemed worse last night.

## 2021-06-22 ENCOUNTER — ROUTINE PRENATAL (OUTPATIENT)
Dept: OBGYN CLINIC | Facility: CLINIC | Age: 31
End: 2021-06-22
Payer: MEDICAID

## 2021-06-22 VITALS — WEIGHT: 260.81 LBS | DIASTOLIC BLOOD PRESSURE: 80 MMHG | SYSTOLIC BLOOD PRESSURE: 122 MMHG | BODY MASS INDEX: 46 KG/M2

## 2021-06-22 DIAGNOSIS — O99.210 OBESITY AFFECTING PREGNANCY, ANTEPARTUM: ICD-10-CM

## 2021-06-22 DIAGNOSIS — O09.629 SUPERVISION OF HIGH-RISK PREGNANCY OF YOUNG MULTIGRAVIDA: Primary | ICD-10-CM

## 2021-06-22 DIAGNOSIS — R05.8 PRODUCTIVE COUGH: ICD-10-CM

## 2021-06-22 DIAGNOSIS — E10.65 UNCONTROLLED TYPE 1 DIABETES MELLITUS WITH HYPERGLYCEMIA (HCC): ICD-10-CM

## 2021-06-22 DIAGNOSIS — Z3A.33 33 WEEKS GESTATION OF PREGNANCY: ICD-10-CM

## 2021-06-22 PROCEDURE — 3079F DIAST BP 80-89 MM HG: CPT | Performed by: OBSTETRICS & GYNECOLOGY

## 2021-06-22 PROCEDURE — 0502F SUBSEQUENT PRENATAL CARE: CPT | Performed by: OBSTETRICS & GYNECOLOGY

## 2021-06-22 PROCEDURE — 59025 FETAL NON-STRESS TEST: CPT | Performed by: OBSTETRICS & GYNECOLOGY

## 2021-06-22 PROCEDURE — 3074F SYST BP LT 130 MM HG: CPT | Performed by: OBSTETRICS & GYNECOLOGY

## 2021-06-22 PROCEDURE — 81002 URINALYSIS NONAUTO W/O SCOPE: CPT | Performed by: OBSTETRICS & GYNECOLOGY

## 2021-06-22 RX ORDER — BENZONATATE 100 MG/1
100 CAPSULE ORAL 3 TIMES DAILY PRN
Qty: 12 CAPSULE | Refills: 0 | Status: ON HOLD | OUTPATIENT
Start: 2021-06-22 | End: 2021-06-30

## 2021-06-22 RX ORDER — GUAIFENESIN 600 MG
1200 TABLET, EXTENDED RELEASE 12 HR ORAL 2 TIMES DAILY
Qty: 8 TABLET | Refills: 0 | Status: SHIPPED | OUTPATIENT
Start: 2021-06-22 | End: 2021-06-23

## 2021-06-22 NOTE — PATIENT INSTRUCTIONS
What Is Acute Bronchitis? Acute bronchitis is when the airways in your lungs (bronchial tubes) become red and swollen (inflamed). It's usually caused by a viral infection. But it can also occur because of a bacteria or allergen.  Symptoms include a cough antibiotics. During the illness, it's important to get plenty of sleep. To ease symptoms:  · Don’t smoke. Also avoid secondhand smoke. · Use a humidifier. Or try breathing in steam from a hot shower. This may help loosen mucus.   · Drink a lot of water and for allergies. · Ask your provider about getting a yearly flu shot. Also ask about pneumococcal or pneumonia shots. · Wash your hands often. This helps reduce the chance of picking up viruses that cause colds and flu.     When to call your healthcare prov

## 2021-06-22 NOTE — PROGRESS NOTES
33w4d seen for routine OB visit. Denies ctx, lof, vb. Reports good FM. C/o cough, productive of sputum, but feels more congestion in her chest. Seen at urgent care and started Amoxicillin on Sunday.  Reports blood sugars have been ok, but she had some low

## 2021-06-23 ENCOUNTER — OFFICE VISIT (OUTPATIENT)
Dept: INTERNAL MEDICINE CLINIC | Facility: CLINIC | Age: 31
End: 2021-06-23
Payer: MEDICAID

## 2021-06-23 VITALS
BODY MASS INDEX: 45.71 KG/M2 | HEART RATE: 108 BPM | DIASTOLIC BLOOD PRESSURE: 84 MMHG | SYSTOLIC BLOOD PRESSURE: 122 MMHG | HEIGHT: 63 IN | TEMPERATURE: 98 F | WEIGHT: 258 LBS | RESPIRATION RATE: 24 BRPM

## 2021-06-23 DIAGNOSIS — Z72.0 CURRENT TOBACCO USE: ICD-10-CM

## 2021-06-23 DIAGNOSIS — F33.1 MDD (MAJOR DEPRESSIVE DISORDER), RECURRENT EPISODE, MODERATE (HCC): ICD-10-CM

## 2021-06-23 DIAGNOSIS — J22 ACUTE LOWER RESPIRATORY INFECTION: Primary | ICD-10-CM

## 2021-06-23 DIAGNOSIS — F41.1 GENERALIZED ANXIETY DISORDER: ICD-10-CM

## 2021-06-23 DIAGNOSIS — E10.65 UNCONTROLLED TYPE 1 DIABETES MELLITUS WITH HYPERGLYCEMIA (HCC): ICD-10-CM

## 2021-06-23 PROCEDURE — 3074F SYST BP LT 130 MM HG: CPT | Performed by: INTERNAL MEDICINE

## 2021-06-23 PROCEDURE — 3008F BODY MASS INDEX DOCD: CPT | Performed by: INTERNAL MEDICINE

## 2021-06-23 PROCEDURE — 3079F DIAST BP 80-89 MM HG: CPT | Performed by: INTERNAL MEDICINE

## 2021-06-23 PROCEDURE — 99214 OFFICE O/P EST MOD 30 MIN: CPT | Performed by: INTERNAL MEDICINE

## 2021-06-23 RX ORDER — CEPHALEXIN 500 MG/1
500 CAPSULE ORAL 2 TIMES DAILY
Qty: 14 CAPSULE | Refills: 0 | Status: ON HOLD | OUTPATIENT
Start: 2021-06-23 | End: 2021-06-30

## 2021-06-23 NOTE — PATIENT INSTRUCTIONS
- Start new antibiotic, cephalexin. Take 1 capsule twice daily with food  - Avoid smoking.   Not only will it affect your pregnancy, but it can also make bronchitis worse  - If you are not better by tomorrow, go to the emergency department for further eval

## 2021-06-23 NOTE — PROGRESS NOTES
Raquel Rock is a 31year old female. HPI:   Patient presents with:  Urgent Care F/u: Pt c/o cough, chest congestion, feels tired, unable to sleep, tooth ache. Not feeling better even with taking Amoxicillin and Benzonatate.    Patient presents wi tablet, Rfl: 0  •  Insulin Lispro (HUMALOG) 100 UNIT/ML Subcutaneous Solution, Max daily dose 275 units per day for use in insulin pump, Disp: 250 mL, Rfl: 0  •  Insulin Pen Needle 32G X 4 MM Does not apply Misc, Inject insulin 4 times a day, Disp: 50 each Other in her father. Social:  reports that she has been smoking cigarettes. She started smoking about 10 years ago. She has been smoking about 0.50 packs per day. She has never used smokeless tobacco. She reports previous alcohol use.  She reports that she cephALEXin 500 MG Oral Cap; Take 1 capsule (500 mg total) by mouth 2 (two) times daily for 7 days. Dispense: 14 capsule; Refill: 0    3. Uncontrolled type 1 diabetes mellitus with hyperglycemia (HCC)  A1c of 8.6 this month. Following with Endocrinology.

## 2021-06-24 ENCOUNTER — TELEPHONE (OUTPATIENT)
Dept: OBGYN CLINIC | Facility: CLINIC | Age: 31
End: 2021-06-24

## 2021-06-24 NOTE — TELEPHONE ENCOUNTER
Pt is to have emergency dental work and needs ok from OB to have procedure and ok to dispense Tylenol 3 so she can get through the night. Pt states she needs sent to them asap as she is scheduled at 8 am tomorrow 6/25/21.     Dr Marco Stringer (sp) in [place]

## 2021-06-24 NOTE — TELEPHONE ENCOUNTER
Author: Caleb Fabian MD Service: Jamie Hobbs Author Type: Physician   Filed: 6/24/2021  3:33 PM Encounter Date: 6/23/2021 Status: Signed   : Caleb Fabian MD (Physician)        Show:Clear all  [x]Manual[]Template[x]Copied    Added by:  Geoffrey Gan

## 2021-06-25 ENCOUNTER — ROUTINE PRENATAL (OUTPATIENT)
Dept: OBGYN CLINIC | Facility: CLINIC | Age: 31
End: 2021-06-25
Payer: MEDICAID

## 2021-06-25 ENCOUNTER — APPOINTMENT (OUTPATIENT)
Dept: OBGYN CLINIC | Facility: CLINIC | Age: 31
End: 2021-06-25
Payer: MEDICAID

## 2021-06-25 VITALS — SYSTOLIC BLOOD PRESSURE: 124 MMHG | BODY MASS INDEX: 46 KG/M2 | WEIGHT: 259.63 LBS | DIASTOLIC BLOOD PRESSURE: 82 MMHG

## 2021-06-25 DIAGNOSIS — E66.01 CLASS 2 SEVERE OBESITY DUE TO EXCESS CALORIES WITH SERIOUS COMORBIDITY AND BODY MASS INDEX (BMI) OF 37.0 TO 37.9 IN ADULT (HCC): ICD-10-CM

## 2021-06-25 DIAGNOSIS — E10.43 UNCONTROLLED TYPE 1 DIABETES MELLITUS WITH DIABETIC AUTONOMIC NEUROPATHY, WITH LONG-TERM CURRENT USE OF INSULIN (HCC): ICD-10-CM

## 2021-06-25 DIAGNOSIS — E10.65 UNCONTROLLED TYPE 1 DIABETES MELLITUS WITH DIABETIC AUTONOMIC NEUROPATHY, WITH LONG-TERM CURRENT USE OF INSULIN (HCC): ICD-10-CM

## 2021-06-25 DIAGNOSIS — Z3A.34 34 WEEKS GESTATION OF PREGNANCY: Primary | ICD-10-CM

## 2021-06-25 PROCEDURE — 0502F SUBSEQUENT PRENATAL CARE: CPT | Performed by: OBSTETRICS & GYNECOLOGY

## 2021-06-25 PROCEDURE — 3079F DIAST BP 80-89 MM HG: CPT | Performed by: OBSTETRICS & GYNECOLOGY

## 2021-06-25 PROCEDURE — 3074F SYST BP LT 130 MM HG: CPT | Performed by: OBSTETRICS & GYNECOLOGY

## 2021-06-25 PROCEDURE — 81002 URINALYSIS NONAUTO W/O SCOPE: CPT | Performed by: OBSTETRICS & GYNECOLOGY

## 2021-06-25 PROCEDURE — 59025 FETAL NON-STRESS TEST: CPT | Performed by: OBSTETRICS & GYNECOLOGY

## 2021-06-25 NOTE — PROGRESS NOTES
Patient presents with:  Prenatal Care: JENNIFER + NST     Routine prenatal visit without complaints. Patient denies any bleeding, leaking fluid, cramping, or regular uterine contractions. Good fetal movement. NST: see report.   Reactive  Assessment/Plan:  34w

## 2021-06-26 ENCOUNTER — TELEPHONE (OUTPATIENT)
Dept: OBGYN CLINIC | Facility: CLINIC | Age: 31
End: 2021-06-26

## 2021-06-26 NOTE — TELEPHONE ENCOUNTER
Per discussion with Dr. Flavia Cifuentes, patient will need to be inpatient for administration of betamethasone due to poorly controlled diabetes. Will admit Wednesday 10 am.  Patient notified.

## 2021-06-28 DIAGNOSIS — Z34.90 PREGNANCY: Primary | ICD-10-CM

## 2021-06-29 ENCOUNTER — ROUTINE PRENATAL (OUTPATIENT)
Dept: OBGYN CLINIC | Facility: CLINIC | Age: 31
End: 2021-06-29
Payer: MEDICAID

## 2021-06-29 VITALS
DIASTOLIC BLOOD PRESSURE: 74 MMHG | BODY MASS INDEX: 46.71 KG/M2 | SYSTOLIC BLOOD PRESSURE: 118 MMHG | HEIGHT: 63 IN | WEIGHT: 263.63 LBS

## 2021-06-29 DIAGNOSIS — E10.65 UNCONTROLLED TYPE 1 DIABETES MELLITUS WITH DIABETIC AUTONOMIC NEUROPATHY, WITH LONG-TERM CURRENT USE OF INSULIN (HCC): ICD-10-CM

## 2021-06-29 DIAGNOSIS — O24.012 TYPE 1 DIABETES MELLITUS AFFECTING PREGNANCY IN SECOND TRIMESTER, ANTEPARTUM: ICD-10-CM

## 2021-06-29 DIAGNOSIS — Z3A.34 34 WEEKS GESTATION OF PREGNANCY: Primary | ICD-10-CM

## 2021-06-29 DIAGNOSIS — E10.43 UNCONTROLLED TYPE 1 DIABETES MELLITUS WITH DIABETIC AUTONOMIC NEUROPATHY, WITH LONG-TERM CURRENT USE OF INSULIN (HCC): ICD-10-CM

## 2021-06-29 DIAGNOSIS — E10.65 UNCONTROLLED TYPE 1 DIABETES MELLITUS WITH HYPERGLYCEMIA (HCC): ICD-10-CM

## 2021-06-29 DIAGNOSIS — O09.629 SUPERVISION OF HIGH-RISK PREGNANCY OF YOUNG MULTIGRAVIDA: ICD-10-CM

## 2021-06-29 PROCEDURE — 3074F SYST BP LT 130 MM HG: CPT | Performed by: OBSTETRICS & GYNECOLOGY

## 2021-06-29 PROCEDURE — 3078F DIAST BP <80 MM HG: CPT | Performed by: OBSTETRICS & GYNECOLOGY

## 2021-06-29 PROCEDURE — 81002 URINALYSIS NONAUTO W/O SCOPE: CPT | Performed by: OBSTETRICS & GYNECOLOGY

## 2021-06-29 PROCEDURE — 3008F BODY MASS INDEX DOCD: CPT | Performed by: OBSTETRICS & GYNECOLOGY

## 2021-06-29 PROCEDURE — 0502F SUBSEQUENT PRENATAL CARE: CPT | Performed by: OBSTETRICS & GYNECOLOGY

## 2021-06-29 PROCEDURE — 59025 FETAL NON-STRESS TEST: CPT | Performed by: OBSTETRICS & GYNECOLOGY

## 2021-06-29 RX ORDER — INSULIN LISPRO 100 [IU]/ML
INJECTION, SOLUTION INTRAVENOUS; SUBCUTANEOUS
Qty: 6 ML | Refills: 0 | Status: CANCELLED | OUTPATIENT
Start: 2021-06-29

## 2021-06-29 NOTE — PROGRESS NOTES
34w4d seen for routine OB visit. Denies ctx, lof, vb. Reports good FM. Cough much improved, overall feels well.     Patient Active Problem List:     Uncontrolled type 1 diabetes mellitus with diabetic autonomic neuropathy, with long-term current use of in

## 2021-06-29 NOTE — TELEPHONE ENCOUNTER
• Insulin Lispro, 1 Unit Dial, (HUMALOG KWIKPEN) 100 UNIT/ML Subcutaneous Solution Pen-injector Ijnject insulin based on carb ratio and correction factor, maximum daily dose is 50 units 6 mL 0

## 2021-06-30 ENCOUNTER — HOSPITAL ENCOUNTER (OUTPATIENT)
Facility: HOSPITAL | Age: 31
Setting detail: OBSERVATION
LOS: 1 days | Discharge: HOME | End: 2021-07-02
Attending: OBSTETRICS & GYNECOLOGY | Admitting: OBSTETRICS & GYNECOLOGY
Payer: MEDICAID

## 2021-06-30 ENCOUNTER — TELEPHONE (OUTPATIENT)
Dept: ENDOCRINOLOGY CLINIC | Facility: CLINIC | Age: 31
End: 2021-06-30

## 2021-06-30 ENCOUNTER — APPOINTMENT (OUTPATIENT)
Dept: OBGYN CLINIC | Facility: HOSPITAL | Age: 31
End: 2021-06-30
Payer: MEDICAID

## 2021-06-30 PROBLEM — E10.8 TYPE 1 DIABETES MELLITUS WITH COMPLICATION, WITH LONG TERM CURRENT USE OF INSULIN PUMP: Chronic | Status: ACTIVE | Noted: 2021-06-30

## 2021-06-30 PROBLEM — Z96.41 TYPE 1 DIABETES MELLITUS WITH COMPLICATION, WITH LONG TERM CURRENT USE OF INSULIN PUMP: Chronic | Status: ACTIVE | Noted: 2021-06-30

## 2021-06-30 PROBLEM — Z34.90 PREGNANCY: Status: ACTIVE | Noted: 2021-06-30

## 2021-06-30 PROBLEM — E10.8 TYPE 1 DIABETES MELLITUS WITH COMPLICATION, WITH LONG TERM CURRENT USE OF INSULIN PUMP (HCC): Chronic | Status: ACTIVE | Noted: 2021-06-30

## 2021-06-30 PROBLEM — Z96.41 TYPE 1 DIABETES MELLITUS WITH COMPLICATION, WITH LONG TERM CURRENT USE OF INSULIN PUMP (HCC): Chronic | Status: ACTIVE | Noted: 2021-06-30

## 2021-06-30 PROBLEM — E10.8 TYPE 1 DIABETES MELLITUS WITH COMPLICATION, WITH LONG TERM CURRENT USE OF INSULIN PUMP  (HCC): Chronic | Status: ACTIVE | Noted: 2021-06-30

## 2021-06-30 PROBLEM — Z96.41 TYPE 1 DIABETES MELLITUS WITH COMPLICATION, WITH LONG TERM CURRENT USE OF INSULIN PUMP  (HCC): Chronic | Status: ACTIVE | Noted: 2021-06-30

## 2021-06-30 PROBLEM — Z34.90 PREGNANCY (HCC): Status: ACTIVE | Noted: 2021-06-30

## 2021-06-30 LAB
GLUCOSE BLD-MCNC: 156 MG/DL (ref 70–99)
GLUCOSE BLD-MCNC: 166 MG/DL (ref 70–99)
SARS-COV-2 RNA RESP QL NAA+PROBE: NOT DETECTED

## 2021-06-30 PROCEDURE — 99221 1ST HOSP IP/OBS SF/LOW 40: CPT | Performed by: OBSTETRICS & GYNECOLOGY

## 2021-06-30 PROCEDURE — 59025 FETAL NON-STRESS TEST: CPT | Performed by: OBSTETRICS & GYNECOLOGY

## 2021-06-30 PROCEDURE — 99232 SBSQ HOSP IP/OBS MODERATE 35: CPT | Performed by: CLINICAL NURSE SPECIALIST

## 2021-06-30 RX ORDER — ACETAMINOPHEN 500 MG
500 TABLET ORAL EVERY 6 HOURS PRN
Status: DISCONTINUED | OUTPATIENT
Start: 2021-06-30 | End: 2021-07-02

## 2021-06-30 RX ORDER — CALCIUM CARBONATE 200(500)MG
1000 TABLET,CHEWABLE ORAL
Status: DISCONTINUED | OUTPATIENT
Start: 2021-06-30 | End: 2021-07-02

## 2021-06-30 RX ORDER — SERTRALINE HYDROCHLORIDE 25 MG/1
25 TABLET, FILM COATED ORAL DAILY
Status: DISCONTINUED | OUTPATIENT
Start: 2021-07-01 | End: 2021-07-02

## 2021-06-30 RX ORDER — ACETAMINOPHEN 500 MG
1000 TABLET ORAL EVERY 6 HOURS PRN
Status: DISCONTINUED | OUTPATIENT
Start: 2021-06-30 | End: 2021-07-02

## 2021-06-30 RX ORDER — DOCUSATE SODIUM 100 MG/1
100 CAPSULE, LIQUID FILLED ORAL 2 TIMES DAILY
Status: DISCONTINUED | OUTPATIENT
Start: 2021-06-30 | End: 2021-07-02

## 2021-06-30 RX ORDER — DEXTROSE MONOHYDRATE 25 G/50ML
50 INJECTION, SOLUTION INTRAVENOUS
Status: DISCONTINUED | OUTPATIENT
Start: 2021-06-30 | End: 2021-07-02

## 2021-06-30 RX ORDER — ZOLPIDEM TARTRATE 5 MG/1
5 TABLET ORAL NIGHTLY PRN
Status: DISCONTINUED | OUTPATIENT
Start: 2021-06-30 | End: 2021-07-02

## 2021-06-30 RX ORDER — ASPIRIN 81 MG/1
81 TABLET, CHEWABLE ORAL DAILY
Status: DISCONTINUED | OUTPATIENT
Start: 2021-07-01 | End: 2021-07-02

## 2021-06-30 RX ORDER — BETAMETHASONE SODIUM PHOSPHATE AND BETAMETHASONE ACETATE 3; 3 MG/ML; MG/ML
12 INJECTION, SUSPENSION INTRA-ARTICULAR; INTRALESIONAL; INTRAMUSCULAR; SOFT TISSUE EVERY 24 HOURS
Status: COMPLETED | OUTPATIENT
Start: 2021-06-30 | End: 2021-07-01

## 2021-06-30 NOTE — CONSULTS
[facility]  CNS Consult Note    Era Loop Patient Status:  Inpatient    1990 MRN [id number]   Location [address] [facility] Attending Alyce Benedict MD   Hosp Day # 0 PCP Zoey Neves MD     Reason for Consult and her last visit was 6/18. Accu-Chek on admission was 156 mg/dl.  The patient received her first dose of betamethasone at 11:56 am, and at the time of meeting with the patient, CGM reading was 120 mg/dl which was approximately 1:45pm.      The patient wa uncontrolled type 1 diabetes in pregnancy insulin pump management, blood glucose monitoring, insulin administration, medications, treatment options, follow-up coordination and resources.     Ashlee Palomino  6/30/2021  2:15 PM

## 2021-06-30 NOTE — CM/SW NOTE
SW order placed due to OUD screening. Chart reviewed and noted that MD approved Tylenol 3 on 6/24/21 due to oral surgery. No social work needs identified at this time. Social work to remain available for support or any discharge planning needs.     Brand

## 2021-06-30 NOTE — PROGRESS NOTES
Pt is a 31year old female admitted to 120/120-A. Patient presents with:  Non-stress Test  Betamethasone Injection   Complication: Type I diabetic     Pt is  34w5d intra-uterine pregnancy. History obtained, consents signed.  Oriented to r

## 2021-06-30 NOTE — PLAN OF CARE
Problem: RESPIRATORY - ADULT  Goal: Achieves optimal ventilation and oxygenation  Description: INTERVENTIONS:  - Assess for changes in respiratory status  - Assess for changes in mentation and behavior  - Position to facilitate oxygenation and minimize r distraction, guided imagery, massage, music therapy, therapeutic touch)  - Manage patient's environment (Avoid overstimulation of patient)  Outcome: Progressing  Goal: Demonstrates ability to cope with hospitalization/illness  Description: INTERVENTIONS:

## 2021-06-30 NOTE — CONSULTS
[facility]  Maternal-Fetal Medicine Inpatient Consultation    Date of Admission:  6/30/2021  Date of Consult:  6/30/2021    Reason for Consult:   A maternal-fetal medicine consultation was requested secondary to Diabetes, pre-gestational. diabetes mellitus ([facility] [address]       Past Surgical History:   Procedure Laterality Date   •       x3   • OTHER SURGICAL HISTORY Right 2011    R arm surgery after trauma      Family History   Problem Relation Age of Onset   • Cancer Mother         uterine administration. She understands that we will increase her insulin to minimize risk of diabetic ketoacidosis that can occur from increasing blood sugars. She says she is scheduled  for repeat .     IMPRESSION:   · IUP at 34w5d   · Type 1 di

## 2021-06-30 NOTE — H&P
[facility]  Obstetrics and Gynecology  History & Physical    Efren White Patient Status:  Inpatient    1990 MRN [id number]   Location [address] [facility] Attending Radha Jasmine MD   Hospital Day 0 DEENA Kam 2155 g (4 lb   12 oz) 42%. ARGENIS is 13.2 cm. MVP is 5.6 cm.   BPP is 8/8         Obstetric History:   OB History    Para Term  AB Living   4 3 3 0 0 3   SAB TAB Ectopic Multiple Live Births   0 0 0 0 3      # Outcome Date GA Lbr Benjamin/2nd Weight 100 UNIT/ML Subcutaneous Solution Pen-injector, Ijnject insulin based on carb ratio and correction factor, maximum daily dose is 50 units, Disp: 6 mL, Rfl: 0  Insulin Disposable Pump (OMNIPOD DASH 5 PACK PODS) Does not apply Misc, Inject 100 Units into the 34w5d admitted for glycemic control through BMZ administration due to poorly controlled type 1 DM.     1. Type 1 DM  - appreciate MFM input and recommendations, insulin pump settings adjusted through the night and will reassess tomorrow  - diabetic diet  -

## 2021-07-01 ENCOUNTER — TELEPHONE (OUTPATIENT)
Dept: ENDOCRINOLOGY CLINIC | Facility: CLINIC | Age: 31
End: 2021-07-01

## 2021-07-01 LAB
GLUCOSE BLD-MCNC: 119 MG/DL (ref 70–99)
GLUCOSE BLD-MCNC: 144 MG/DL (ref 70–99)
GLUCOSE BLD-MCNC: 151 MG/DL (ref 70–99)
GLUCOSE BLD-MCNC: 181 MG/DL (ref 70–99)
GLUCOSE BLD-MCNC: 194 MG/DL (ref 70–99)

## 2021-07-01 PROCEDURE — 99231 SBSQ HOSP IP/OBS SF/LOW 25: CPT | Performed by: OBSTETRICS & GYNECOLOGY

## 2021-07-01 RX ORDER — CHOLECALCIFEROL (VITAMIN D3) 25 MCG
1 TABLET,CHEWABLE ORAL DAILY
Status: DISCONTINUED | OUTPATIENT
Start: 2021-07-01 | End: 2021-07-02

## 2021-07-01 NOTE — PROGRESS NOTES
Blood glucose 194 and Dr Teddy Almonte notified with orders received. Pt increased insulin pump basal rate for 3p-midnite from 3.0 to 3.1 per Dr Adeline Aschoff orders.

## 2021-07-01 NOTE — PROGRESS NOTES
[facility]: ANTEPARTUM PROGRESS NOTE     Subjective:   Patient without complaints.   Good fetal movement, no bleeding or leaking fluid, no contractions      Objective:      07/01/21  0815   BP: 130/69   Pulse: 81   Resp: 16   Temp: 98.4 °F (36.9 °C) irreversible     4.  Delivery plan: repeat C/S and BS at 36wks, scheduled for 7/13/21     Active Problems:  Patient Active Problem List:     Uncontrolled type 1 diabetes mellitus with diabetic autonomic neuropathy, with long-term current use of insulin ([facility] [address]

## 2021-07-01 NOTE — PROGRESS NOTES
2 hour postprandial blood glucose from [employer] . Dr Penny Servin notified and orders received.

## 2021-07-01 NOTE — PAYOR COMM NOTE
--------------  ADMISSION REVIEW     Payor: Mason Moore #:  [id number]  Authorization Number: [id number]    Admit date: 6/30/21  Admit time: 10:07 AM     History & Physical    CC: uncontrolled type 1 diabetes adelina Pen-injector, Ijnject insulin based on carb ratio and correction factor, maximum daily dose is 50 units, Disp: 6 mL, Rfl: 0  Insulin Disposable Pump (OMNIPOD DASH 5 PACK PODS) Does not apply Misc, Inject 100 Units into the skin daily.  Change pod every day, undecided on permanent sterilization    4.  Delivery plan: repeat C/S at 36wks, scheduled for 7/13/21        Maternal-Fetal Medicine Inpatient Consultation     Reason for Consult:   A maternal-fetal medicine consultation was requested secondary to Diabetes, DM.     1. Type 1 DM  - MFM following  - diabetic diet  - treat hypoglycemia and hyperglycemia as indicated     2. Fetal monitoring: NST BID  - BTM for fetal lung maturity - first dose given yesterday at 1200  - appropriate growth on recent US     3.  [address] [facility]

## 2021-07-02 ENCOUNTER — TELEPHONE (OUTPATIENT)
Dept: ENDOCRINOLOGY CLINIC | Facility: CLINIC | Age: 31
End: 2021-07-02

## 2021-07-02 ENCOUNTER — TELEPHONE (OUTPATIENT)
Dept: PERINATAL CARE | Facility: HOSPITAL | Age: 31
End: 2021-07-02

## 2021-07-02 VITALS
SYSTOLIC BLOOD PRESSURE: 116 MMHG | WEIGHT: 263 LBS | TEMPERATURE: 99 F | HEIGHT: 63 IN | RESPIRATION RATE: 20 BRPM | BODY MASS INDEX: 46.6 KG/M2 | DIASTOLIC BLOOD PRESSURE: 76 MMHG | HEART RATE: 72 BPM

## 2021-07-02 LAB
EST. AVERAGE GLUCOSE BLD GHB EST-MCNC: 217 MG/DL (ref 68–126)
GLUCOSE BLD-MCNC: 148 MG/DL (ref 70–99)
HBA1C MFR BLD HPLC: 9.2 % (ref ?–5.7)

## 2021-07-02 PROCEDURE — 99231 SBSQ HOSP IP/OBS SF/LOW 25: CPT | Performed by: OBSTETRICS & GYNECOLOGY

## 2021-07-02 PROCEDURE — 59025 FETAL NON-STRESS TEST: CPT | Performed by: OBSTETRICS & GYNECOLOGY

## 2021-07-02 PROCEDURE — 3046F HEMOGLOBIN A1C LEVEL >9.0%: CPT | Performed by: INTERNAL MEDICINE

## 2021-07-02 RX ORDER — INSULIN LISPRO 100 [IU]/ML
INJECTION, SOLUTION INTRAVENOUS; SUBCUTANEOUS
Qty: 300 ML | Refills: 1 | Status: ON HOLD | OUTPATIENT
Start: 2021-07-02 | End: 2021-07-15

## 2021-07-02 NOTE — TELEPHONE ENCOUNTER
I will defer to Dr. Cardenas. I am happy to see patient if she would like but I know it has been a few months since she saw MD so she may prefer to see her.

## 2021-07-02 NOTE — TELEPHONE ENCOUNTER
Refill received for Humalog kwikpens but patient using Omnipod pump. She has been having frequent adjustments in insulin dose due to pregnancy requiring much larger dose of insulin.  Prescription updated to reflect new maximum daily dose and sent to [facility] [facility]

## 2021-07-02 NOTE — DISCHARGE SUMMARY
Tayler Seay Patient Status:  inpatient    1990 MRN [id number]   Location 120/120-A Attending [facility] [address] 2 PCP Keagan Maradiaga MD     ANTEPARTUM DISCHARGE SUMMARY     Admit date: 2021    Discharge date: 2021     Attendi mouth daily. Continuous Blood Gluc Transmit (DEXCOM G6 TRANSMITTER) Does not apply Misc  1 each by Does not apply route every 3 (three) months.   Qty: 1 each Refills: 3    Continuous Blood Gluc Sensor (DEXCOM G6 SENSOR) Does not apply Misc  1 each by Orellana

## 2021-07-02 NOTE — TELEPHONE ENCOUNTER
Dr. Ca Patient    I booked patient first available, in between consults on 7/6 at 9:15 am. Please advise if this is ok. Thanks!

## 2021-07-02 NOTE — TELEPHONE ENCOUNTER
Pt called regarding insulin pump settings. Pt had a low blood sugar today after discharge and was unsure how she would adjust her pump down by 20% as recommended at discharge. Reviewed with Dr. Amirah Chow.   Pt call returned, reviewed current settings and sett

## 2021-07-02 NOTE — TELEPHONE ENCOUNTER
Esvin states patient is currently in patient and has Elevated Blood Sugars - pt scheduled for CSection 7/13/2021 and need hospital follow up prior to. Please call patient at [phone]. For additional questions please call inpt . Thank you.

## 2021-07-02 NOTE — PROGRESS NOTES
2 hour postprandial blood glucose 185 @ 2030 per Dexcom CGM. Pts insulin pump changed due to low insulin and new pump applied. Pump administered correction dose as ordered.

## 2021-07-03 ENCOUNTER — TELEPHONE (OUTPATIENT)
Dept: ENDOCRINOLOGY CLINIC | Facility: CLINIC | Age: 31
End: 2021-07-03

## 2021-07-03 DIAGNOSIS — E10.65 UNCONTROLLED TYPE 1 DIABETES MELLITUS WITH HYPERGLYCEMIA (HCC): ICD-10-CM

## 2021-07-03 DIAGNOSIS — O24.012 TYPE 1 DIABETES MELLITUS AFFECTING PREGNANCY IN SECOND TRIMESTER, ANTEPARTUM: ICD-10-CM

## 2021-07-03 NOTE — TELEPHONE ENCOUNTER
•  Insulin Lispro (HUMALOG) 100 UNIT/ML Subcutaneous Solution, Max daily dose 300 units per day for use in insulin pump, Disp: 300 mL, Rfl: 1    Pharmacy comments: Plan does not cover this medication.  Please call plan at [phone] to initiate prior

## 2021-07-06 ENCOUNTER — APPOINTMENT (OUTPATIENT)
Dept: OBGYN CLINIC | Facility: CLINIC | Age: 31
End: 2021-07-06
Payer: MEDICAID

## 2021-07-06 ENCOUNTER — ROUTINE PRENATAL (OUTPATIENT)
Dept: OBGYN CLINIC | Facility: CLINIC | Age: 31
End: 2021-07-06
Payer: MEDICAID

## 2021-07-06 ENCOUNTER — OFFICE VISIT (OUTPATIENT)
Dept: ENDOCRINOLOGY CLINIC | Facility: CLINIC | Age: 31
End: 2021-07-06
Payer: MEDICAID

## 2021-07-06 VITALS — WEIGHT: 266 LBS | BODY MASS INDEX: 47 KG/M2 | SYSTOLIC BLOOD PRESSURE: 120 MMHG | DIASTOLIC BLOOD PRESSURE: 78 MMHG

## 2021-07-06 VITALS
BODY MASS INDEX: 46.6 KG/M2 | HEART RATE: 94 BPM | HEIGHT: 63 IN | WEIGHT: 263 LBS | RESPIRATION RATE: 18 BRPM | SYSTOLIC BLOOD PRESSURE: 133 MMHG | DIASTOLIC BLOOD PRESSURE: 96 MMHG

## 2021-07-06 DIAGNOSIS — Z3A.35 35 WEEKS GESTATION OF PREGNANCY: Primary | ICD-10-CM

## 2021-07-06 DIAGNOSIS — O99.210 OBESITY AFFECTING PREGNANCY, ANTEPARTUM: ICD-10-CM

## 2021-07-06 DIAGNOSIS — Z36.9 ANTENATAL SCREENING ENCOUNTER: ICD-10-CM

## 2021-07-06 DIAGNOSIS — E10.65 UNCONTROLLED TYPE 1 DIABETES MELLITUS WITH HYPERGLYCEMIA (HCC): Primary | ICD-10-CM

## 2021-07-06 DIAGNOSIS — Z72.0 CURRENT TOBACCO USE: ICD-10-CM

## 2021-07-06 DIAGNOSIS — E10.65 UNCONTROLLED TYPE 1 DIABETES MELLITUS WITH HYPERGLYCEMIA (HCC): ICD-10-CM

## 2021-07-06 DIAGNOSIS — Z3A.36 36 WEEKS GESTATION OF PREGNANCY: ICD-10-CM

## 2021-07-06 LAB
GLUCOSE (URINE DIPSTICK): NEGATIVE MG/DL
GLUCOSE BLOOD: 113
MULTISTIX LOT#: NORMAL NUMERIC
TEST STRIP LOT #: NORMAL NUMERIC

## 2021-07-06 PROCEDURE — 99214 OFFICE O/P EST MOD 30 MIN: CPT | Performed by: INTERNAL MEDICINE

## 2021-07-06 PROCEDURE — 87184 SC STD DISK METHOD PER PLATE: CPT | Performed by: OBSTETRICS & GYNECOLOGY

## 2021-07-06 PROCEDURE — 3074F SYST BP LT 130 MM HG: CPT | Performed by: OBSTETRICS & GYNECOLOGY

## 2021-07-06 PROCEDURE — 87653 STREP B DNA AMP PROBE: CPT | Performed by: OBSTETRICS & GYNECOLOGY

## 2021-07-06 PROCEDURE — 0502F SUBSEQUENT PRENATAL CARE: CPT | Performed by: OBSTETRICS & GYNECOLOGY

## 2021-07-06 PROCEDURE — 3075F SYST BP GE 130 - 139MM HG: CPT | Performed by: INTERNAL MEDICINE

## 2021-07-06 PROCEDURE — 3078F DIAST BP <80 MM HG: CPT | Performed by: OBSTETRICS & GYNECOLOGY

## 2021-07-06 PROCEDURE — 81002 URINALYSIS NONAUTO W/O SCOPE: CPT | Performed by: OBSTETRICS & GYNECOLOGY

## 2021-07-06 PROCEDURE — 82947 ASSAY GLUCOSE BLOOD QUANT: CPT | Performed by: INTERNAL MEDICINE

## 2021-07-06 PROCEDURE — 59025 FETAL NON-STRESS TEST: CPT | Performed by: OBSTETRICS & GYNECOLOGY

## 2021-07-06 PROCEDURE — 36416 COLLJ CAPILLARY BLOOD SPEC: CPT | Performed by: INTERNAL MEDICINE

## 2021-07-06 PROCEDURE — 87081 CULTURE SCREEN ONLY: CPT | Performed by: OBSTETRICS & GYNECOLOGY

## 2021-07-06 PROCEDURE — 3008F BODY MASS INDEX DOCD: CPT | Performed by: INTERNAL MEDICINE

## 2021-07-06 NOTE — PROGRESS NOTES
Patient presents with:  Pregnancy: JENNIFER after NST , back pain  Non-stress Test    Routine prenatal visit without complaints. Patient denies any bleeding, leaking fluid, cramping, or regular uterine contractions. Good fetal movement. NST: see report.   Jose Armando Dolan

## 2021-07-06 NOTE — TELEPHONE ENCOUNTER
Medication PA Requested:  •  Insulin Lispro (HUMALOG) 100 UNIT/ML Subcutaneous Solution,  Disp: 300 mL, Rfl: 1                                                           CoverMyMeds Used:   Key:  Sig: Max daily dose 300 units per day for use in insulin pump

## 2021-07-06 NOTE — TELEPHONE ENCOUNTER
Medication PA Requested:  •  Insulin Lispro (HUMALOG) 100 UNIT/ML Subcutaneous Solution,  Disp: 300 mL, Rfl: 1                                                           CoverMyMeds Used:   Key:  [id number]  Sig: Max daily dose 300 units per day for use in in

## 2021-07-06 NOTE — PROGRESS NOTES
Follow-up - Reason for Visit:  Type 1 Diabetes Complicating Pregnancy. Requesting Physician: None. CHIEF COMPLAINT:  No chief complaint on file.     Follow-up:   Amando Odonnell is a 31year old female who presents with Type 1 DM complicating pregnancy, UNIT/ML Subcutaneous Solution Max daily dose 300 units per day for use in insulin pump 300 mL 1   • insulin detemir (LEVEMIR FLEXTOUCH) 100 UNIT/ML Subcutaneous Solution Pen-injector Inject 46 Units into the skin daily.  6 mL 0   • Insulin Pen Needle 32G X weakness  Genito-Urinary: Negative for: dysuria, frequency  Psychiatric: Negative for:  depression, anxiety  Hematology/Lymphatics: Negative for: bruising, lower extremity edema  Endocrine: Negative for: polyuria, polydypsia  Skin: Negative for: rash, blis hypoglycemia and hyperbilirubinemia after birth. Maternal complications include increased risk of pre eclampsia, post partum hemorrhage, weight gain.   Pre conception counselling:  ? Will refer for education regarding self-administration of insulin and regu 7/06/21  Barbi Mendoza MD

## 2021-07-08 ENCOUNTER — TELEPHONE (OUTPATIENT)
Dept: OBGYN UNIT | Facility: HOSPITAL | Age: 31
End: 2021-07-08

## 2021-07-08 DIAGNOSIS — Z34.90 PREGNANCY: Primary | ICD-10-CM

## 2021-07-08 PROBLEM — B95.1 GROUP B STREPTOCOCCAL INFECTION DURING PREGNANCY (HCC): Status: ACTIVE | Noted: 2021-07-08

## 2021-07-08 PROBLEM — O98.819 GROUP B STREPTOCOCCAL INFECTION DURING PREGNANCY: Status: ACTIVE | Noted: 2021-07-08

## 2021-07-08 PROBLEM — O98.819 GROUP B STREPTOCOCCAL INFECTION DURING PREGNANCY (HCC): Status: ACTIVE | Noted: 2021-07-08

## 2021-07-08 PROBLEM — B95.1 GROUP B STREPTOCOCCAL INFECTION DURING PREGNANCY: Status: ACTIVE | Noted: 2021-07-08

## 2021-07-08 NOTE — PROGRESS NOTES
Pt given arrival instructions. RN reviewed  procedures, general plan of care, and  pain medication. Questions answered. Pt verbalizes understanding. Statement Selected

## 2021-07-09 ENCOUNTER — APPOINTMENT (OUTPATIENT)
Dept: OBGYN CLINIC | Facility: CLINIC | Age: 31
End: 2021-07-09
Payer: MEDICAID

## 2021-07-09 ENCOUNTER — ROUTINE PRENATAL (OUTPATIENT)
Dept: OBGYN CLINIC | Facility: CLINIC | Age: 31
End: 2021-07-09
Payer: MEDICAID

## 2021-07-09 ENCOUNTER — TELEPHONE (OUTPATIENT)
Dept: OBGYN CLINIC | Facility: CLINIC | Age: 31
End: 2021-07-09

## 2021-07-09 ENCOUNTER — HOSPITAL ENCOUNTER (OUTPATIENT)
Facility: HOSPITAL | Age: 31
Setting detail: OBSERVATION
Discharge: HOME | End: 2021-07-09
Attending: OBSTETRICS & GYNECOLOGY | Admitting: OBSTETRICS & GYNECOLOGY
Payer: MEDICAID

## 2021-07-09 ENCOUNTER — ULTRASOUND ENCOUNTER (OUTPATIENT)
Dept: PERINATAL CARE | Facility: HOSPITAL | Age: 31
End: 2021-07-09
Attending: OBSTETRICS & GYNECOLOGY
Payer: MEDICAID

## 2021-07-09 VITALS — BODY MASS INDEX: 47 KG/M2 | WEIGHT: 265.19 LBS

## 2021-07-09 DIAGNOSIS — O09.629 SUPERVISION OF HIGH-RISK PREGNANCY OF YOUNG MULTIGRAVIDA: ICD-10-CM

## 2021-07-09 DIAGNOSIS — E10.65 UNCONTROLLED TYPE 1 DIABETES MELLITUS WITH HYPERGLYCEMIA (HCC): ICD-10-CM

## 2021-07-09 DIAGNOSIS — Z3A.36 36 WEEKS GESTATION OF PREGNANCY: Primary | ICD-10-CM

## 2021-07-09 DIAGNOSIS — E10.8 TYPE 1 DIABETES MELLITUS WITH COMPLICATION, WITH LONG TERM CURRENT USE OF INSULIN PUMP (HCC): ICD-10-CM

## 2021-07-09 DIAGNOSIS — Z96.41 TYPE 1 DIABETES MELLITUS WITH COMPLICATION, WITH LONG TERM CURRENT USE OF INSULIN PUMP (HCC): ICD-10-CM

## 2021-07-09 DIAGNOSIS — O99.210 OBESITY AFFECTING PREGNANCY, ANTEPARTUM: ICD-10-CM

## 2021-07-09 DIAGNOSIS — Z98.891 HISTORY OF CESAREAN DELIVERY: ICD-10-CM

## 2021-07-09 DIAGNOSIS — B95.1 GROUP B STREPTOCOCCAL INFECTION DURING PREGNANCY: ICD-10-CM

## 2021-07-09 DIAGNOSIS — O98.819 GROUP B STREPTOCOCCAL INFECTION DURING PREGNANCY: ICD-10-CM

## 2021-07-09 LAB — MULTISTIX LOT#: ABNORMAL NUMERIC

## 2021-07-09 PROCEDURE — 59025 FETAL NON-STRESS TEST: CPT | Performed by: OBSTETRICS & GYNECOLOGY

## 2021-07-09 PROCEDURE — 59025 FETAL NON-STRESS TEST: CPT

## 2021-07-09 PROCEDURE — 81002 URINALYSIS NONAUTO W/O SCOPE: CPT | Performed by: OBSTETRICS & GYNECOLOGY

## 2021-07-09 PROCEDURE — 99214 OFFICE O/P EST MOD 30 MIN: CPT

## 2021-07-09 PROCEDURE — 76819 FETAL BIOPHYS PROFIL W/O NST: CPT | Performed by: OBSTETRICS & GYNECOLOGY

## 2021-07-09 PROCEDURE — 0502F SUBSEQUENT PRENATAL CARE: CPT | Performed by: OBSTETRICS & GYNECOLOGY

## 2021-07-09 NOTE — PROGRESS NOTES
Pt. Discharged home in stable condition not in active labor. US WNL. Discharge instructions reviewed with pt. Patient V/U written instructions givens. And discharged home.

## 2021-07-09 NOTE — PROGRESS NOTES
Dr Yanet Mays called RN to inform she spoke with Dr Lopez Doing concerning [address]. US WNL, instructions  given for insulin pump on Tuesday for Csection.

## 2021-07-09 NOTE — PROGRESS NOTES
Patient to Triage from office with orders for BPP with MFM per DR Malick Terrazas. Pt. Is uncontrolled Type I diabetic on insulin pump. Pt. Is G 4P3 scheduled section on 07/12.   EFM tested and applied

## 2021-07-09 NOTE — PROGRESS NOTES
JENNIFER  [id number]  GA: 36w0d   07/09/21  1429   Weight: 265 lb 3.2 oz (120.3 kg)       Doing well, +FM  Denies LOF/VB/uctx  Mode of delivery: RCS anticipated  SVE deferred    GBS positive  Fetal movement count given  Labor precautions provided   DM Type 1, on i

## 2021-07-09 NOTE — IMAGING NOTE
Encounter for a BPP due to difficulty to keep the fetal heart rate on the NST. The complete ultrasound report is under the imaging tab.     L&D tracing was reactive:  Baseline 125 bpm, moderate variability, accelerations noted up to 150-160 bpm.  Reactive;

## 2021-07-09 NOTE — NST
Nonstress Test   Patient: Calvin Martinez    Gestation: 36w0d    NST:       Variability: Moderate           Accelerations: Yes           Decelerations: None            Baseline: 130 BPM           Uterine Irritability: No           Contractions: Not pr

## 2021-07-10 ENCOUNTER — LAB ENCOUNTER (OUTPATIENT)
Dept: LAB | Age: 31
End: 2021-07-10
Attending: OBSTETRICS & GYNECOLOGY
Payer: MEDICAID

## 2021-07-10 DIAGNOSIS — Z34.90 PREGNANCY: ICD-10-CM

## 2021-07-11 LAB — SARS-COV-2 RNA RESP QL NAA+PROBE: NOT DETECTED

## 2021-07-12 ENCOUNTER — PATIENT MESSAGE (OUTPATIENT)
Dept: ENDOCRINOLOGY CLINIC | Facility: CLINIC | Age: 31
End: 2021-07-12

## 2021-07-12 ENCOUNTER — TELEPHONE (OUTPATIENT)
Dept: OBGYN CLINIC | Facility: CLINIC | Age: 31
End: 2021-07-12

## 2021-07-12 ENCOUNTER — MOBILE ENCOUNTER (OUTPATIENT)
Dept: OBGYN CLINIC | Facility: CLINIC | Age: 31
End: 2021-07-12

## 2021-07-12 ENCOUNTER — ANESTHESIA EVENT (OUTPATIENT)
Dept: OBGYN UNIT | Facility: HOSPITAL | Age: 31
End: 2021-07-12
Payer: MEDICAID

## 2021-07-12 NOTE — TELEPHONE ENCOUNTER
Per patient's preference, RN responded to her with pump settings after delivery via mychart. Was also advised to contact the office if she has further questions or concerns.

## 2021-07-12 NOTE — TELEPHONE ENCOUNTER
36W3D called stating she was in contact with someone who was + for COVID on 07/10/2021. She was tested for COVID and the exposure was following that. She denies any symptoms.    Last OV:    Pregnancy Complications: Type 1 Diabetes (poorly con

## 2021-07-12 NOTE — TELEPHONE ENCOUNTER
Patient has covid test on sat and it came back negative. She was with someone after that the test positive for covid and is wanting to know what she should do since she is high risk and going in for rcs tomorrow.  Please advise

## 2021-07-12 NOTE — TELEPHONE ENCOUNTER
From: Raquel Rock  To: Barbi Drake MD  Sent: 7/12/2021 3:46 AM CDT  Subject: Visit Follow-up Question    So i wanted to reach out to see if we can add lower setting im not sure when but between 3am and 6am im dropping 50-70s every day i c

## 2021-07-12 NOTE — TELEPHONE ENCOUNTER
Hi! Yes. These are fine. Please let her know that I am on call and that she should call us in one day instead of two days. Thank you!

## 2021-07-12 NOTE — TELEPHONE ENCOUNTER
rn called patient states blood sugars have been dropping at all times . Last night she was 58 ate 1 banana and frosted flakes  And now just ate a double burger from [facility] and bs 71    PT SAID SHE IS HAVING C SECTION TOMORROW AT 6AM.    Settings:   Ba

## 2021-07-12 NOTE — TELEPHONE ENCOUNTER
Did not approve quantity limit override, only allow up to 270ML/78 days or 90ML per 30 days. Cannot fill more than 30 days at a time. Letter faxed to office 7/7/21 per [facility]. She will refax letter to [phone]. No information in cover my meds.    C

## 2021-07-12 NOTE — TELEPHONE ENCOUNTER
Hi Can we please change patient's setting to the following:    Settings:  Basal Rate:  12AM (7 hours)- 1.7------->1. 5         7AM (6 hours)- 2.5  1PM (2 hours)-3.2  3PM (9 hours)-3.1     ICR:  1:1     CF:  8     BG Target:  100    Thank you!

## 2021-07-12 NOTE — TELEPHONE ENCOUNTER
RN spoke with patient and advised updated settings. RN also sent updated settings to patient on her MyChart which she states she rec'd and are better for her in this format. Pt verbalized understanding and states she will change.     Dr Danyel Mcgowan - patient

## 2021-07-13 ENCOUNTER — TELEPHONE (OUTPATIENT)
Dept: ENDOCRINOLOGY CLINIC | Facility: CLINIC | Age: 31
End: 2021-07-13

## 2021-07-13 ENCOUNTER — HOSPITAL ENCOUNTER (INPATIENT)
Facility: HOSPITAL | Age: 31
LOS: 2 days | Discharge: HOME | End: 2021-07-15
Attending: OBSTETRICS & GYNECOLOGY | Admitting: OBSTETRICS & GYNECOLOGY
Payer: MEDICAID

## 2021-07-13 ENCOUNTER — PATIENT MESSAGE (OUTPATIENT)
Dept: INTERNAL MEDICINE CLINIC | Facility: CLINIC | Age: 31
End: 2021-07-13

## 2021-07-13 ENCOUNTER — ANESTHESIA (OUTPATIENT)
Dept: OBGYN UNIT | Facility: HOSPITAL | Age: 31
End: 2021-07-13
Payer: MEDICAID

## 2021-07-13 ENCOUNTER — TELEPHONE (OUTPATIENT)
Dept: INTERNAL MEDICINE CLINIC | Facility: CLINIC | Age: 31
End: 2021-07-13

## 2021-07-13 DIAGNOSIS — E10.65 UNCONTROLLED TYPE 1 DIABETES MELLITUS WITH HYPERGLYCEMIA (HCC): Primary | ICD-10-CM

## 2021-07-13 PROBLEM — Z96.41 TYPE 1 DIABETES MELLITUS WITHOUT COMPLICATION, WITH LONG TERM CURRENT USE OF INSULIN PUMP: Chronic | Status: ACTIVE | Noted: 2021-07-13

## 2021-07-13 PROBLEM — E10.9 TYPE 1 DIABETES MELLITUS WITHOUT COMPLICATION, WITH LONG TERM CURRENT USE OF INSULIN PUMP (HCC): Chronic | Status: ACTIVE | Noted: 2021-07-13

## 2021-07-13 PROBLEM — Z96.41 TYPE 1 DIABETES MELLITUS WITHOUT COMPLICATION, WITH LONG TERM CURRENT USE OF INSULIN PUMP (HCC): Chronic | Status: ACTIVE | Noted: 2021-07-13

## 2021-07-13 PROBLEM — E10.9 TYPE 1 DIABETES MELLITUS WITHOUT COMPLICATION, WITH LONG TERM CURRENT USE OF INSULIN PUMP: Chronic | Status: ACTIVE | Noted: 2021-07-13

## 2021-07-13 PROBLEM — E10.9: Chronic | Status: ACTIVE | Noted: 2021-07-13

## 2021-07-13 PROBLEM — Z96.41: Chronic | Status: ACTIVE | Noted: 2021-07-13

## 2021-07-13 LAB
ANTIBODY SCREEN: NEGATIVE
BASOPHILS # BLD AUTO: 0.04 X10(3) UL (ref 0–0.2)
BASOPHILS NFR BLD AUTO: 0.3 %
DEPRECATED RDW RBC AUTO: 44.4 FL (ref 35.1–46.3)
EOSINOPHIL # BLD AUTO: 0.31 X10(3) UL (ref 0–0.7)
EOSINOPHIL NFR BLD AUTO: 2.5 %
ERYTHROCYTE [DISTWIDTH] IN BLOOD BY AUTOMATED COUNT: 14.2 % (ref 11–15)
GLUCOSE BLD-MCNC: 105 MG/DL (ref 70–99)
GLUCOSE BLD-MCNC: 120 MG/DL (ref 70–99)
GLUCOSE BLD-MCNC: 126 MG/DL (ref 70–99)
GLUCOSE BLD-MCNC: 139 MG/DL (ref 70–99)
GLUCOSE BLD-MCNC: 155 MG/DL (ref 70–99)
HCT VFR BLD AUTO: 37.2 %
HGB BLD-MCNC: 12 G/DL
IMM GRANULOCYTES # BLD AUTO: 0.08 X10(3) UL (ref 0–1)
IMM GRANULOCYTES NFR BLD: 0.6 %
LYMPHOCYTES # BLD AUTO: 2.72 X10(3) UL (ref 1–4)
LYMPHOCYTES NFR BLD AUTO: 21.6 %
MCH RBC QN AUTO: 28.6 PG (ref 26–34)
MCHC RBC AUTO-ENTMCNC: 32.3 G/DL (ref 31–37)
MCV RBC AUTO: 88.8 FL
MONOCYTES # BLD AUTO: 1 X10(3) UL (ref 0.1–1)
MONOCYTES NFR BLD AUTO: 8 %
NEUTROPHILS # BLD AUTO: 8.42 X10 (3) UL (ref 1.5–7.7)
NEUTROPHILS # BLD AUTO: 8.42 X10(3) UL (ref 1.5–7.7)
NEUTROPHILS NFR BLD AUTO: 67 %
PLATELET # BLD AUTO: 234 10(3)UL (ref 150–450)
RBC # BLD AUTO: 4.19 X10(6)UL
RH BLOOD TYPE: POSITIVE
T PALLIDUM AB SER QL IA: NONREACTIVE
WBC # BLD AUTO: 12.6 X10(3) UL (ref 4–11)

## 2021-07-13 PROCEDURE — 58611 LIGATE OVIDUCT(S) ADD-ON: CPT | Performed by: OBSTETRICS & GYNECOLOGY

## 2021-07-13 PROCEDURE — 0UT70ZZ RESECTION OF BILATERAL FALLOPIAN TUBES, OPEN APPROACH: ICD-10-PCS | Performed by: OBSTETRICS & GYNECOLOGY

## 2021-07-13 PROCEDURE — 59514 CESAREAN DELIVERY ONLY: CPT | Performed by: OBSTETRICS & GYNECOLOGY

## 2021-07-13 PROCEDURE — 99231 SBSQ HOSP IP/OBS SF/LOW 25: CPT | Performed by: CLINICAL NURSE SPECIALIST

## 2021-07-13 RX ORDER — IBUPROFEN 600 MG/1
600 TABLET ORAL EVERY 6 HOURS
Status: DISCONTINUED | OUTPATIENT
Start: 2021-07-14 | End: 2021-07-15

## 2021-07-13 RX ORDER — ONDANSETRON 2 MG/ML
4 INJECTION INTRAMUSCULAR; INTRAVENOUS ONCE AS NEEDED
Status: DISCONTINUED | OUTPATIENT
Start: 2021-07-13 | End: 2021-07-13

## 2021-07-13 RX ORDER — NALBUPHINE HCL 10 MG/ML
2.5 AMPUL (ML) INJECTION EVERY 4 HOURS PRN
Status: DISCONTINUED | OUTPATIENT
Start: 2021-07-13 | End: 2021-07-15

## 2021-07-13 RX ORDER — BLOOD-GLUCOSE SENSOR
1 EACH MISCELLANEOUS
Qty: 9 EACH | Refills: 0 | Status: SHIPPED | OUTPATIENT
Start: 2021-07-13 | End: 2022-02-07

## 2021-07-13 RX ORDER — DEXTROSE MONOHYDRATE 25 G/50ML
50 INJECTION, SOLUTION INTRAVENOUS
Status: DISCONTINUED | OUTPATIENT
Start: 2021-07-13 | End: 2021-07-13

## 2021-07-13 RX ORDER — NALOXONE HYDROCHLORIDE 0.4 MG/ML
0.08 INJECTION, SOLUTION INTRAMUSCULAR; INTRAVENOUS; SUBCUTANEOUS
Status: ACTIVE | OUTPATIENT
Start: 2021-07-13 | End: 2021-07-14

## 2021-07-13 RX ORDER — DIPHENHYDRAMINE HCL 25 MG
25 CAPSULE ORAL EVERY 4 HOURS PRN
Status: DISCONTINUED | OUTPATIENT
Start: 2021-07-13 | End: 2021-07-15

## 2021-07-13 RX ORDER — ACETAMINOPHEN 500 MG
1000 TABLET ORAL EVERY 6 HOURS
Status: DISCONTINUED | OUTPATIENT
Start: 2021-07-13 | End: 2021-07-15

## 2021-07-13 RX ORDER — ENOXAPARIN SODIUM 100 MG/ML
40 INJECTION SUBCUTANEOUS NIGHTLY
Status: DISCONTINUED | OUTPATIENT
Start: 2021-07-13 | End: 2021-07-15

## 2021-07-13 RX ORDER — MORPHINE SULFATE 0.5 MG/ML
0.15 INJECTION, SOLUTION EPIDURAL; INTRATHECAL; INTRAVENOUS ONCE
Status: DISCONTINUED | OUTPATIENT
Start: 2021-07-13 | End: 2021-07-13

## 2021-07-13 RX ORDER — GABAPENTIN 300 MG/1
300 CAPSULE ORAL EVERY 8 HOURS PRN
Status: DISCONTINUED | OUTPATIENT
Start: 2021-07-13 | End: 2021-07-15

## 2021-07-13 RX ORDER — SODIUM CHLORIDE, SODIUM LACTATE, POTASSIUM CHLORIDE, CALCIUM CHLORIDE 600; 310; 30; 20 MG/100ML; MG/100ML; MG/100ML; MG/100ML
INJECTION, SOLUTION INTRAVENOUS CONTINUOUS
Status: DISCONTINUED | OUTPATIENT
Start: 2021-07-13 | End: 2021-07-13

## 2021-07-13 RX ORDER — NALBUPHINE HCL 10 MG/ML
2.5 AMPUL (ML) INJECTION
Status: DISCONTINUED | OUTPATIENT
Start: 2021-07-13 | End: 2021-07-13

## 2021-07-13 RX ORDER — BISACODYL 10 MG
10 SUPPOSITORY, RECTAL RECTAL
Status: DISCONTINUED | OUTPATIENT
Start: 2021-07-13 | End: 2021-07-15

## 2021-07-13 RX ORDER — KETOROLAC TROMETHAMINE 30 MG/ML
30 INJECTION, SOLUTION INTRAMUSCULAR; INTRAVENOUS EVERY 6 HOURS
Status: COMPLETED | OUTPATIENT
Start: 2021-07-13 | End: 2021-07-14

## 2021-07-13 RX ORDER — KETOROLAC TROMETHAMINE 30 MG/ML
30 INJECTION, SOLUTION INTRAMUSCULAR; INTRAVENOUS ONCE AS NEEDED
Status: COMPLETED | OUTPATIENT
Start: 2021-07-13 | End: 2021-07-13

## 2021-07-13 RX ORDER — HYDROMORPHONE HYDROCHLORIDE 1 MG/ML
0.5 INJECTION, SOLUTION INTRAMUSCULAR; INTRAVENOUS; SUBCUTANEOUS EVERY 2 HOUR PRN
Status: ACTIVE | OUTPATIENT
Start: 2021-07-13 | End: 2021-07-14

## 2021-07-13 RX ORDER — MORPHINE SULFATE 2 MG/ML
INJECTION, SOLUTION INTRAMUSCULAR; INTRAVENOUS AS NEEDED
Status: DISCONTINUED | OUTPATIENT
Start: 2021-07-13 | End: 2021-07-13 | Stop reason: SURG

## 2021-07-13 RX ORDER — ACETAMINOPHEN 500 MG
1000 TABLET ORAL ONCE
Status: COMPLETED | OUTPATIENT
Start: 2021-07-13 | End: 2021-07-13

## 2021-07-13 RX ORDER — SIMETHICONE 80 MG
80 TABLET,CHEWABLE ORAL 3 TIMES DAILY PRN
Status: DISCONTINUED | OUTPATIENT
Start: 2021-07-13 | End: 2021-07-15

## 2021-07-13 RX ORDER — BUPIVACAINE HYDROCHLORIDE 7.5 MG/ML
INJECTION, SOLUTION INTRASPINAL AS NEEDED
Status: DISCONTINUED | OUTPATIENT
Start: 2021-07-13 | End: 2021-07-13 | Stop reason: SURG

## 2021-07-13 RX ORDER — CEFAZOLIN SODIUM/WATER 2 G/20 ML
SYRINGE (ML) INTRAVENOUS
Status: DISPENSED
Start: 2021-07-13 | End: 2021-07-13

## 2021-07-13 RX ORDER — ONDANSETRON 2 MG/ML
4 INJECTION INTRAMUSCULAR; INTRAVENOUS EVERY 6 HOURS PRN
Status: DISCONTINUED | OUTPATIENT
Start: 2021-07-13 | End: 2021-07-13

## 2021-07-13 RX ORDER — OXYCODONE HYDROCHLORIDE 5 MG/1
5 TABLET ORAL EVERY 6 HOURS PRN
Status: DISCONTINUED | OUTPATIENT
Start: 2021-07-13 | End: 2021-07-15

## 2021-07-13 RX ORDER — HYDROMORPHONE HYDROCHLORIDE 1 MG/ML
0.3 INJECTION, SOLUTION INTRAMUSCULAR; INTRAVENOUS; SUBCUTANEOUS EVERY 2 HOUR PRN
Status: DISCONTINUED | OUTPATIENT
Start: 2021-07-13 | End: 2021-07-15

## 2021-07-13 RX ORDER — DOCUSATE SODIUM 100 MG/1
100 CAPSULE, LIQUID FILLED ORAL
Status: DISCONTINUED | OUTPATIENT
Start: 2021-07-14 | End: 2021-07-15

## 2021-07-13 RX ORDER — HYDROMORPHONE HYDROCHLORIDE 1 MG/ML
0.5 INJECTION, SOLUTION INTRAMUSCULAR; INTRAVENOUS; SUBCUTANEOUS EVERY 5 MIN PRN
Status: DISCONTINUED | OUTPATIENT
Start: 2021-07-13 | End: 2021-07-13

## 2021-07-13 RX ORDER — DIPHENHYDRAMINE HYDROCHLORIDE 50 MG/ML
25 INJECTION INTRAMUSCULAR; INTRAVENOUS ONCE AS NEEDED
Status: DISCONTINUED | OUTPATIENT
Start: 2021-07-13 | End: 2021-07-13

## 2021-07-13 RX ORDER — SODIUM CHLORIDE, SODIUM LACTATE, POTASSIUM CHLORIDE, CALCIUM CHLORIDE 600; 310; 30; 20 MG/100ML; MG/100ML; MG/100ML; MG/100ML
125 INJECTION, SOLUTION INTRAVENOUS CONTINUOUS
Status: DISCONTINUED | OUTPATIENT
Start: 2021-07-13 | End: 2021-07-13

## 2021-07-13 RX ORDER — PHENYLEPHRINE HCL 10 MG/ML
VIAL (ML) INJECTION AS NEEDED
Status: DISCONTINUED | OUTPATIENT
Start: 2021-07-13 | End: 2021-07-13 | Stop reason: SURG

## 2021-07-13 RX ORDER — DIPHENHYDRAMINE HYDROCHLORIDE 50 MG/ML
12.5 INJECTION INTRAMUSCULAR; INTRAVENOUS EVERY 4 HOURS PRN
Status: DISCONTINUED | OUTPATIENT
Start: 2021-07-13 | End: 2021-07-15

## 2021-07-13 RX ORDER — ONDANSETRON 2 MG/ML
4 INJECTION INTRAMUSCULAR; INTRAVENOUS EVERY 6 HOURS PRN
Status: DISCONTINUED | OUTPATIENT
Start: 2021-07-13 | End: 2021-07-15

## 2021-07-13 RX ORDER — TRISODIUM CITRATE DIHYDRATE AND CITRIC ACID MONOHYDRATE 500; 334 MG/5ML; MG/5ML
30 SOLUTION ORAL ONCE
Status: COMPLETED | OUTPATIENT
Start: 2021-07-13 | End: 2021-07-13

## 2021-07-13 RX ORDER — ACETAMINOPHEN 500 MG
TABLET ORAL
Status: DISPENSED
Start: 2021-07-13 | End: 2021-07-13

## 2021-07-13 RX ORDER — DEXTROSE, SODIUM CHLORIDE, SODIUM LACTATE, POTASSIUM CHLORIDE, AND CALCIUM CHLORIDE 5; .6; .31; .03; .02 G/100ML; G/100ML; G/100ML; G/100ML; G/100ML
INJECTION, SOLUTION INTRAVENOUS CONTINUOUS PRN
Status: DISCONTINUED | OUTPATIENT
Start: 2021-07-13 | End: 2021-07-15

## 2021-07-13 RX ORDER — DEXTROSE MONOHYDRATE 25 G/50ML
50 INJECTION, SOLUTION INTRAVENOUS
Status: DISCONTINUED | OUTPATIENT
Start: 2021-07-13 | End: 2021-07-15

## 2021-07-13 RX ORDER — SERTRALINE HYDROCHLORIDE 25 MG/1
25 TABLET, FILM COATED ORAL DAILY
Status: DISCONTINUED | OUTPATIENT
Start: 2021-07-14 | End: 2021-07-15

## 2021-07-13 RX ORDER — DEXTROSE MONOHYDRATE 25 G/50ML
50 INJECTION, SOLUTION INTRAVENOUS
Status: DISCONTINUED | OUTPATIENT
Start: 2021-07-13 | End: 2021-07-13 | Stop reason: SDUPTHER

## 2021-07-13 RX ADMIN — SODIUM CHLORIDE, SODIUM LACTATE, POTASSIUM CHLORIDE, CALCIUM CHLORIDE: 600; 310; 30; 20 INJECTION, SOLUTION INTRAVENOUS at 10:42:00

## 2021-07-13 RX ADMIN — MORPHINE SULFATE 0.15 MG: 2 INJECTION, SOLUTION INTRAMUSCULAR; INTRAVENOUS at 09:07:00

## 2021-07-13 RX ADMIN — ONDANSETRON 4 MG: 2 INJECTION INTRAMUSCULAR; INTRAVENOUS at 09:46:00

## 2021-07-13 RX ADMIN — BUPIVACAINE HYDROCHLORIDE 1.5 ML: 7.5 INJECTION, SOLUTION INTRASPINAL at 09:07:00

## 2021-07-13 RX ADMIN — PHENYLEPHRINE HCL 100 MCG: 10 MG/ML VIAL (ML) INJECTION at 09:13:00

## 2021-07-13 NOTE — H&P
[address] [facility]  Obstetrics and Gynecology  History & Physical    [facility] Patient Status:  Inpatient    1990 MRN [id number]   Location [address] [facility] Attending Kenyon Mike 15 Day 0 PCP Patti Gooden Family History   Problem Relation Age of Onset   • Cancer Mother         uterine   • Other (Multiple sclerosis) Mother    • Other (Other) Father         overdose     Social History: Social History    Tobacco Use      Smoking status: Current Every Day Smo Device, Use as directed with sensors and transmitter, Disp: 1 Device, Rfl: 0  ONETOUCH DELICA PLUS [id number] Does not apply Misc, USE AS DIRECTED, Disp: 400 each, Rfl: 2      Allergies: No Known Allergies    OBJECTIVE:    Temp:  [98.3 °F (36.8 °C)] 98.3 °F Type 1 diabetes mellitus with complication, with long term current use of insulin pump (HCC)     Group B streptococcal infection during pregnancy      1.  Preoperative:   - Preoperative antibiotics: 2g Cefoxitin   - LABS: CBC, RPR, Type and Screen  - IVF: 1

## 2021-07-13 NOTE — CONSULTS
[facility]  Maternal-Fetal Medicine Inpatient Consultation    Date of Admission:  7/13/2021  Date of Consult:  7/13/2021    Reason for Consult:   Type I DM postpartum insulin management    History of Present Illness:  [place] is Problem Relation Age of Onset   • Cancer Mother         uterine   • Other (Multiple sclerosis) Mother    • Other (Other) Father         overdose      reports that she has been smoking cigarettes. She started smoking about 10 years ago.  She has been [email or web address]  •  bisacodyl (DULCOLAX) rectal suppository 10 mg, 10 mg, Rectal, Daily PRN  •  oxyTOCIN (PITOCIN) 30 units/ 500 ml 0.9% NS premix infusion, 62.5 mL/hr, Intravenous, Continuous  •  HYDROmorphone HCl (DILAUDID) 1 MG/ML injection 0.3 mg, 0.3 mg summarized above. The approximate physician face-to-face time was 40 minutes.       Florinda Mcfadden MD  7/13/2021  6:08 PM

## 2021-07-13 NOTE — PROGRESS NOTES
Patient is a  @ 36.4 weeks gestation. She is scheduled for a repeat c/s today. Patient admitted to room 122. Consents, IV, admission completed. POC discussed. All patient questions answered.

## 2021-07-13 NOTE — PROGRESS NOTES
Report given to Jose Daniel Santiago on MB. Patient moved in stable condition to MB room 1114. ID bands matched at bedside.

## 2021-07-13 NOTE — TELEPHONE ENCOUNTER
Patient's mother texted Dr. Michael Ha to advise baby and patient/mother doing well    Patient's mom states patient needs dexcom sensors    Sensors sent to [facility] and PA started

## 2021-07-13 NOTE — ANESTHESIA PROCEDURE NOTES
Spinal Block  Performed by: Harjinder Saul MD  Authorized by: Harjinder Saul MD       General Information and Staff    Start Time:  7/13/2021 9:05 AM  End Time:  7/13/2021 9:15 AM  Anesthesiologist:  Harjinder Saul MD  Performed by:   Anesthesiologist  Site

## 2021-07-13 NOTE — OPERATIVE REPORT
[facility]  Obstetrics and Gynecology   Section - Operative Note    Ryan Arizmendi Patient Status:  Inpatient    1990 MRN [id number]   Location [address] [facility] Attending Donato Alcantar MD   Hosp Day # 0 [facility] Rid entered using Dafne clamp. The incision was extended using blunt finger dissection and bandage scissors. The amniotic sac was noted with clear fluid after rupture with Katy clamp. The fetal head as noted to be cephalic.  The fetal head was brought towards t with Insorb staples. Difficult angle to re-approximate skin edges due to prior surgical history. The sponge and instrument counts were reported to me as being correct. The patient tolerated the procedure and was stable to the recovery room.   The infant w

## 2021-07-13 NOTE — CONSULTS
[facility]  CNS Consult Note    Kerry Meneses Patient Status:  Inpatient    1990 MRN [id number]   [facility] 1SW-J Attending Jose Teague MD   Hosp Day # 0 PCP Severa Boop, MD     Reason for Consult:     Insulin Pump patient stated she programmed her insulin pump for postpartum as ordered by her endocrinologist.  Noted on admission home medications Levemir, 46 units daily.   Clarified with the patient if she was taking the Levemir, 46 units on top of basal insulin deliv

## 2021-07-13 NOTE — PROGRESS NOTES
Patient called with low sugars into the 50s. Scheduled for c section in AM. Wonders if pump should be adjusted. I agree it should but that we as general OB/GYN do not adjust pump settings. Advised to call endocrinology.  If cannot get ahold of them and cont

## 2021-07-13 NOTE — PAYOR COMM NOTE
--------------  ADMISSION REVIEW     Payor: Mason Moore #:  [id number]  Authorization Number: [id number]    Admit date: 7/13/21  Admit time:  6:35 AM       Obstetrics and Gynecology  History & Physical  CC: Patient Disp: 30 tablet, Rfl: 1, 7/12/2021 at Unknown time  Prenatal 27-0.8 MG Oral Tab, Take 1 tablet by mouth daily. , Disp: , Rfl: , 7/13/2021 at Unknown time  Insulin Lispro (HUMALOG) 100 UNIT/ML Subcutaneous Solution, Max daily dose 300 units per day for use i calf tenderness bilaterally, Isela's sign negative bilaterally   FHT: moderate variability/140 BPM / Positive accelerations/Negative decelerations   TOCO: irregular     Lab Results   Component Value Date    WBC 12.6 07/13/2021    HGB 12.0 07/13/2021    HCT

## 2021-07-13 NOTE — PROGRESS NOTES
NURSING ADMISSION NOTE    Patient admitted via cart. Droplet and contact isolation for person under investigation for had contact with a Covid positive person. Oriented to room. Safety precautions initiated. Bed in low position.   Call light in reac

## 2021-07-13 NOTE — ANESTHESIA POSTPROCEDURE EVALUATION
[facility] Patient Status:  Inpatient   Age/Gender 31year old female MRN [id number]   Location [address] [facility] Attending Saloni Santoyo MD   Hosp Day # 0 PCP Saima Meehan MD       Anesthesia Post-op Note

## 2021-07-13 NOTE — TELEPHONE ENCOUNTER
Medication PA Requested: Continuous Blood Gluc Sensor (DEXCOM G6 SENSOR)                                                          CoverMyMeds Used:  Key:  [facility]  Si each by Does not apply route Every 10 days.     DX Code: E10.65       Submitted with c

## 2021-07-13 NOTE — ANESTHESIA PREPROCEDURE EVALUATION
PRE-OP EVALUATION    Patient Name: Tayler Lieu    Admit Diagnosis: preg state  Pregnancy    Pre-op Diagnosis: * No pre-op diagnosis entered *     SECTION & Salpingectomy    Anesthesia Procedure:  SECTION & Salpingectomy (N/A )  PPT route daily. BEFORE MEALS AND BEDTIME, Disp: 300 each, Rfl: 0  Sertraline HCl 25 MG Oral Tab, Take 1 tablet (25 mg total) by mouth daily. , Disp: 30 tablet, Rfl: 1  Prenatal 27-0.8 MG Oral Tab, Take 1 tablet by mouth daily. , Disp: , Rfl:   Continuous Blood 28.6 07/13/2021    MCHC 32.3 07/13/2021    RDW 14.2 07/13/2021    .0 07/13/2021               Airway      Mallampati: II  Mouth opening: 3 FB  TM distance: 4 - 6 cm  Neck ROM: full Cardiovascular    Cardiovascular exam normal.         Dental    No n

## 2021-07-13 NOTE — TELEPHONE ENCOUNTER
Medication PA Requested: Continuous Blood Gluc Sensor (DEXCOM G6 SENSOR)                                                          CoverMyMeds Used:  Key:  Si each by Does not apply route Every 10 days.     DX Code: E10.65

## 2021-07-13 NOTE — TELEPHONE ENCOUNTER
Pts mother called stating the pt has just given birth and she went to the pharmacy to  Dexacom G6 sensor, but the pharm said they need a prior authorization. pts mother stated it is an emergency and needs the prior auth asap.     Pharmacy: [address] [place] [address], [place], [place]   [phone]

## 2021-07-13 NOTE — CM/SW NOTE
SW order placed due to OUD screening. Chart reviewed and noted that MD approved Tylenol 3 on 6/24/21 due to oral surgery.     No social work needs identified at this time. Social work to remain available for support or any discharge planning needs.  Pt to lorna

## 2021-07-13 NOTE — ANESTHESIA PROCEDURE NOTES
Peripheral IV  Date/Time: 7/13/2021 9:20 AM  Inserted by: Eugenio Velasquez MD    Placement  Needle size: 18 G  Laterality: right  Location: wrist  Local anesthetic: injectable  Site prep: alcohol  Technique: anatomical landmarks  Attempts: 1

## 2021-07-14 LAB
BASOPHILS # BLD AUTO: 0.04 X10(3) UL (ref 0–0.2)
BASOPHILS NFR BLD AUTO: 0.4 %
DEPRECATED RDW RBC AUTO: 47.2 FL (ref 35.1–46.3)
EOSINOPHIL # BLD AUTO: 0.36 X10(3) UL (ref 0–0.7)
EOSINOPHIL NFR BLD AUTO: 3.4 %
ERYTHROCYTE [DISTWIDTH] IN BLOOD BY AUTOMATED COUNT: 14.4 % (ref 11–15)
GLUCOSE BLD-MCNC: 134 MG/DL (ref 70–99)
GLUCOSE BLD-MCNC: 167 MG/DL (ref 70–99)
GLUCOSE BLD-MCNC: 227 MG/DL (ref 70–99)
GLUCOSE BLD-MCNC: 227 MG/DL (ref 70–99)
GLUCOSE BLD-MCNC: 261 MG/DL (ref 70–99)
GLUCOSE BLD-MCNC: 290 MG/DL (ref 70–99)
GLUCOSE BLD-MCNC: 316 MG/DL (ref 70–99)
HCT VFR BLD AUTO: 32.3 %
HGB BLD-MCNC: 10.5 G/DL
IMM GRANULOCYTES # BLD AUTO: 0.07 X10(3) UL (ref 0–1)
IMM GRANULOCYTES NFR BLD: 0.7 %
LYMPHOCYTES # BLD AUTO: 2.48 X10(3) UL (ref 1–4)
LYMPHOCYTES NFR BLD AUTO: 23.5 %
MCH RBC QN AUTO: 29.4 PG (ref 26–34)
MCHC RBC AUTO-ENTMCNC: 32.5 G/DL (ref 31–37)
MCV RBC AUTO: 90.5 FL
MONOCYTES # BLD AUTO: 0.81 X10(3) UL (ref 0.1–1)
MONOCYTES NFR BLD AUTO: 7.7 %
NEUTROPHILS # BLD AUTO: 6.79 X10 (3) UL (ref 1.5–7.7)
NEUTROPHILS # BLD AUTO: 6.79 X10(3) UL (ref 1.5–7.7)
NEUTROPHILS NFR BLD AUTO: 64.3 %
PLATELET # BLD AUTO: 189 10(3)UL (ref 150–450)
RBC # BLD AUTO: 3.57 X10(6)UL
WBC # BLD AUTO: 10.6 X10(3) UL (ref 4–11)

## 2021-07-14 NOTE — TELEPHONE ENCOUNTER
From: Ghanshyam Kate  To: Sigrid Diego DO  Sent: 7/13/2021 4:15 PM CDT  Subject: Prescription Question    I need refill well for pre authorization sent to [facility] for my dexcom supplies they need pre auth to get it filled.  If [facility] in n

## 2021-07-14 NOTE — PROGRESS NOTES
2 hour after lunch, patient's accucheck was 261, and she gave correcting dose of 9.0. Dr. Bhanu Wei phoned for the result, no change of the insuline pump setting, but call KIMANI LOVETT if accucheck over 250 mg/dl.

## 2021-07-14 NOTE — CM/SW NOTE
spoke to Ms Caitlyn Torres. Ms Caitlyn Torres already spoke to [address] [place] [address] about adding infant on to [facility]. Ms Caitlyn Torres is aware that until she selects a name for the infant, [facility] can not complete medicaid add on.  PCP for infant will be Dr Jazlyn Chang

## 2021-07-14 NOTE — PROGRESS NOTES
Accucheck was 316 at 1426 before lunch, Dr. Catherine Quesada phoned, insulin pump basal rate changed and the insulin:carb changed from 1:5 to 1:2. Patient understood and pump setting had changed. Repeat accucheck 2 hours after her lunch is ordered.

## 2021-07-14 NOTE — PROGRESS NOTES
[facility] 1SW-J niesha De La Fuente Patient Status:  Inpatient   Age/Gender 31year old female MRN [id number]   [facility] 1SW-J Attending Saloni Santoyo MD   Hosp Day # 1 PCP Saima Meehan MD      Anesthesia Pain Progre

## 2021-07-14 NOTE — TELEPHONE ENCOUNTER
Should we proceed with a PA     Patient see's Dr. Jay Nunez for DM management Anesthesia Type: 1% lidocaine with epinephrine and a 1:10 solution of 8.4% sodium bicarbonate

## 2021-07-14 NOTE — PROGRESS NOTES
patient fall asleep while holding baby, bay's father snoring in visitor-bed, help to place baby in the bassinet. However, patient woke up and insist to hold baby in her arm.  Fall prevention instruction given, both bed rail up, call light next to pillow, in

## 2021-07-15 ENCOUNTER — TELEPHONE (OUTPATIENT)
Dept: OBGYN CLINIC | Facility: CLINIC | Age: 31
End: 2021-07-15

## 2021-07-15 VITALS
WEIGHT: 265 LBS | BODY MASS INDEX: 46.95 KG/M2 | RESPIRATION RATE: 18 BRPM | DIASTOLIC BLOOD PRESSURE: 62 MMHG | HEIGHT: 62.99 IN | HEART RATE: 79 BPM | TEMPERATURE: 98 F | OXYGEN SATURATION: 98 % | SYSTOLIC BLOOD PRESSURE: 108 MMHG

## 2021-07-15 PROBLEM — O98.819 GROUP B STREPTOCOCCAL INFECTION DURING PREGNANCY: Status: RESOLVED | Noted: 2021-07-08 | Resolved: 2021-07-15

## 2021-07-15 PROBLEM — O99.210 OBESITY AFFECTING PREGNANCY, ANTEPARTUM: Status: RESOLVED | Noted: 2021-01-21 | Resolved: 2021-07-15

## 2021-07-15 PROBLEM — O09.629 SUPERVISION OF HIGH-RISK PREGNANCY OF YOUNG MULTIGRAVIDA: Status: RESOLVED | Noted: 2021-05-27 | Resolved: 2021-07-15

## 2021-07-15 PROBLEM — O98.819 GROUP B STREPTOCOCCAL INFECTION DURING PREGNANCY (HCC): Status: RESOLVED | Noted: 2021-07-08 | Resolved: 2021-07-15

## 2021-07-15 PROBLEM — Z34.90 PREGNANCY: Status: RESOLVED | Noted: 2021-06-30 | Resolved: 2021-07-15

## 2021-07-15 PROBLEM — O99.210 OBESITY AFFECTING PREGNANCY, ANTEPARTUM (HCC): Status: RESOLVED | Noted: 2021-01-21 | Resolved: 2021-07-15

## 2021-07-15 PROBLEM — O09.629 SUPERVISION OF HIGH-RISK PREGNANCY OF YOUNG MULTIGRAVIDA (HCC): Status: RESOLVED | Noted: 2021-05-27 | Resolved: 2021-07-15

## 2021-07-15 PROBLEM — B95.1 GROUP B STREPTOCOCCAL INFECTION DURING PREGNANCY (HCC): Status: RESOLVED | Noted: 2021-07-08 | Resolved: 2021-07-15

## 2021-07-15 PROBLEM — B95.1 GROUP B STREPTOCOCCAL INFECTION DURING PREGNANCY: Status: RESOLVED | Noted: 2021-07-08 | Resolved: 2021-07-15

## 2021-07-15 PROBLEM — Z34.90 PREGNANCY (HCC): Status: RESOLVED | Noted: 2021-06-30 | Resolved: 2021-07-15

## 2021-07-15 LAB
GLUCOSE BLD-MCNC: 118 MG/DL (ref 70–99)
GLUCOSE BLD-MCNC: 85 MG/DL (ref 70–99)
GLUCOSE BLD-MCNC: 96 MG/DL (ref 70–99)

## 2021-07-15 RX ORDER — IBUPROFEN 600 MG/1
600 TABLET ORAL EVERY 6 HOURS
Qty: 60 TABLET | Refills: 0 | Status: SHIPPED | OUTPATIENT
Start: 2021-07-15

## 2021-07-15 RX ORDER — OXYCODONE HYDROCHLORIDE 5 MG/1
5 TABLET ORAL EVERY 6 HOURS PRN
Qty: 15 TABLET | Refills: 0 | Status: SHIPPED | OUTPATIENT
Start: 2021-07-15 | End: 2022-02-02

## 2021-07-15 RX ORDER — NALOXONE HYDROCHLORIDE 4 MG/.1ML
4 SPRAY, METERED NASAL AS NEEDED
Qty: 1 KIT | Refills: 0 | Status: SHIPPED | OUTPATIENT
Start: 2021-07-15

## 2021-07-15 RX ORDER — GABAPENTIN 300 MG/1
300 CAPSULE ORAL EVERY 8 HOURS PRN
Qty: 20 CAPSULE | Refills: 0 | Status: SHIPPED | OUTPATIENT
Start: 2021-07-15

## 2021-07-15 RX ORDER — INSULIN LISPRO 100 [IU]/ML
INJECTION, SOLUTION INTRAVENOUS; SUBCUTANEOUS
Qty: 60 ML | Refills: 0 | Status: SHIPPED | OUTPATIENT
Start: 2021-07-15 | End: 2021-07-15

## 2021-07-15 RX ORDER — PSEUDOEPHEDRINE HCL 30 MG
100 TABLET ORAL 2 TIMES DAILY
Qty: 60 CAPSULE | Refills: 0 | Status: SHIPPED | OUTPATIENT
Start: 2021-07-15

## 2021-07-15 NOTE — TELEPHONE ENCOUNTER
Can we find out if this is still applicable? She may not need this amount anymore, since she has already delivered. Thank you!

## 2021-07-15 NOTE — DISCHARGE SUMMARY
[facility]  Discharge Summary    Leno Arzate Patient Status:  inpatient    1990 MRN [id number]   Location [facility]- Attending [facility]ira [address] Day # 2 PCP Jade Morales MD     Date of Admission: 2021    Date of Discharge:  transmitter             Continuous Blood Gluc Sensor (DEXCOM G6 SENSOR) Does not apply Misc  1 each by Does not apply route Every 10 days.              Continuous Blood Gluc Transmit (DEXCOM G6 TRANSMITTER) Does not apply Misc  1 each by Does not apply rout Follow up Visits:  Follow-up with [facility] in 2 weeks      Lisa Garcia MD  [facility]

## 2021-07-15 NOTE — PROGRESS NOTES
Patient called c/o being \"sweaty\" and shaky. Patient believed her blood glucose to be low. Reported a reading of 50 on her own glucometer. This was unwitnessed by staff. Patient requested juice and snack.  RN confirmed blood glucose 118 with hospital Accu

## 2021-07-15 NOTE — PROGRESS NOTES
[address] [facility]  Obstetrics and Gynecology    OB/GYN: Postpartum Progress Note     SUBJECTIVE:  Patient is a 31year old  female who is s/p CS. She is POD# 1. Doing well. Denies fever, chills, N, V, chest pain and SOB.  Bleeding has been stab

## 2021-07-15 NOTE — TELEPHONE ENCOUNTER
Dr. Suzi Perez    The patient will require less insulin since giving birth. We are working on the prescription. Thanks!

## 2021-07-15 NOTE — PAYOR COMM NOTE
--------------  ADMISSION REVIEW     Payor: Mason Moore #:  [id number]  Authorization Number: [id number]    Admit date: 21  Admit time:  6:35 AM         History & Physical    CC: Patient is here for  s Extremities: negative edema bilaterally, negative calf tenderness bilaterally, Isela's sign negative bilaterally   FHT: moderate variability/140 BPM / Positive accelerations/Negative decelerations   TOCO: irregular     GBS:  Positive    Lab Results   Com

## 2021-07-15 NOTE — PROGRESS NOTES
" Patient ID: Judy Peterson is a 35 y.o. female.  /68  Pulse 77  Ht 5' 4\" (1.626 m)  Wt 178 lb (80.7 kg)  SpO2 99%  BMI 30.55 kg/m2    Assessment/Plan:                   Diagnoses and all orders for this visit:    Conjunctivitis    Other orders  -     Cancel: Td, Preservative Free (green label)  -     tobramycin-dexamethasone (TOBRADEX) ophthalmic solution; Administer 1 drop to both eyes every 4 (four) hours while awake.  Dispense: 10 mL; Refill: 0        DISCUSSION  The clinical history and exam findings are consistent with conjunctivitis.  She has normal visual acuity as discussed below.  She does not have symptoms that suggest acute angle closure glaucoma or other more significant concerns involving the eye.  Whether this is an infectious etiology or an allergic etiology is somewhat unclear.  Given her failure to improve with Patanol we will try TobraDex 2 drops every 4 hours while awake in both eyes.  Discussed avoiding using any eye makeup.  Discussed close monitoring if symptoms worsen requires reevaluation in a more immediate basis.  If not improving in the next 3-4 days will need to consider evaluation by an eye doctor to undergo a more thorough evaluation.  Recommend not using eyedrops me on 7 days.  Subjective:     HPI    Judy Peterson is a 35 y.o. female is here today to reevaluate eye irritation.  Patient was seen 3 weeks ago.  She was prescribed Patanol drops over concern of allergic conjunctivitis.  She states the eyedrops unfortunately did not help.  Patient reports that she is uncertain as to what is irritating her eyes.  She does notice if she uses any makeup on her face especially near her eyes she gets extreme worsening of the irritation.  She denies headache pain or any significant visual disturbance.  On occasion does notice some slight blurring of her vision.  Symptoms are in both eyes.  She has no history of any significant previous eye problems.  She does not wear corrective " NURSING DISCHARGE NOTE    Discharged Home via Wheelchair. Accompanied by Support staff  Belongings Taken by patient/family. KISS discharged from system and removed. ID bands checked, match.  Discharge instructions explained/ given to patient; "lenses including contacts.  She otherwise feels well but is very bothered by the symptoms.  She has not tried any over-the-counter medications to help.    Review of Systems  Complete review of systems is obtained.  Other than the specific considerations noted above complete review of systems is negative.          Objective:   Medications:  Current Outpatient Prescriptions   Medication Sig     budesonide-formoterol (SYMBICORT) 160-4.5 mcg/actuation inhaler Inhale 2 puffs 2 (two) times a day.     inhalational spacing device (AEROCHAMBER MV) Spcr To use with Symbicort     norelgestromin-ethinyl estradiol (XULANE) 150-35 mcg/24 hr Place 1 patch on the skin once a week.     olopatadine (PATANOL) 0.1 % ophthalmic solution Administer 1 drop to both eyes 2 (two) times a day.     valACYclovir (VALTREX) 1000 MG tablet Take 2 tablets (2,000 mg total) by mouth 2 (two) times a day.     tobramycin-dexamethasone (TOBRADEX) ophthalmic solution Administer 1 drop to both eyes every 4 (four) hours while awake.       Allergies:  Allergies   Allergen Reactions     Cefprozil Hives       Tobacco:   reports that she has been smoking.  She has been smoking about 1.00 pack per day. She has never used smokeless tobacco.     Physical Exam          /68  Pulse 77  Ht 5' 4\" (1.626 m)  Wt 178 lb (80.7 kg)  SpO2 99%  BMI 30.55 kg/m2      General: Patient alert no signs of distress    Visual acuity: Patient is able to read the equivalent of book type print at an arms length distance with both eyes together and each eye individually.  She reports no subjective difficulty in reading.    Eyes: There is obvious conjunctival irritation both eyes with the left slightly greater than the right.  Pupils are round reactive extra commands are intact.  No signs of iritis, no photophobia.  No evidence of any skin irritation around the eyes.  No evidence of foreign body in either eye on gross inspection.  No cervical lymphadenopathy.  No lesions in the " mouth or pharynx.

## 2021-07-15 NOTE — TELEPHONE ENCOUNTER
Patients mother called questioning some of the medication prescribed for post partum.  Please advise

## 2021-07-15 NOTE — PROGRESS NOTES
[facility]  Post-Partum Caesarean Section Progress Note    Elena Watkins Patient Status:  Inpatient    1990 MRN [id number]   [facility] 1SW-J Attending Jessi Rodarte MD   Hosp Day # 2 PCP Viry Daly MD     SUBJECTIVE Continue current care. Diabetes on insulin pump: MFM has been consulted for management, however, they requested q 1 hour accuchecks yesterday and MBU unit could not accommodate.  She has been in touch with her endocrinologist and pump settings adjusted,

## 2021-07-16 NOTE — TELEPHONE ENCOUNTER
Received approval notice for insulin lispro 100 unit/ ML solution effective 4/7/21 to 7/7/22      Approval remains in effect as long as all the following conditions apply: patient remains enrolled in the plan she is currently on and drug coverage does not

## 2021-07-17 ENCOUNTER — TELEMEDICINE (OUTPATIENT)
Dept: TELEHEALTH | Age: 31
End: 2021-07-17

## 2021-07-17 DIAGNOSIS — B00.1 HERPES LABIALIS: Primary | ICD-10-CM

## 2021-07-17 PROCEDURE — 99213 OFFICE O/P EST LOW 20 MIN: CPT | Performed by: NURSE PRACTITIONER

## 2021-07-17 RX ORDER — VALACYCLOVIR HYDROCHLORIDE 1 G/1
TABLET, FILM COATED ORAL
Qty: 4 TABLET | Refills: 1 | Status: SHIPPED | OUTPATIENT
Start: 2021-07-17

## 2021-07-17 NOTE — PROGRESS NOTES
Ghanshyam Kate is a 31year old femalewho presents with Patient presents with: Other: Blisters on lips    HPI:     Encounter was conducted by video. Patient reports blisters on her lips that started less than 2 days ago.  The blisters are painful an Prescriptions Disp Refills   • valACYclovir HCl 1 G Oral Tab 4 tablet 1     Sig: Take 2 tablets every 12 hours for one day       Patient Instructions     Understanding Cold Sores  Cold sores (fever blisters) are small sores or blisters on the lip.  Sometime redness around the sores. · Pain or itching in the area of the outbreak. Often the pain or itching starts 12 to 24 hours before the sore is visible. · Flu-like symptoms, including swollen glands, headache, body ache, or fever.  These typically occur only after touching the area of a cold sore. The herpes virus can be carried from your face to your hands when you touch the area of a cold sore. When this happens, wash your hands thoroughly, for at least 20 seconds.  When you can’t wash with soap and water, us and agrees to the plan.

## 2021-07-17 NOTE — PATIENT INSTRUCTIONS
Understanding Cold Sores  Cold sores (fever blisters) are small sores or blisters on the lip. Sometimes they are inside the mouth. Many people get them from time to time. Cold sores often are not serious. They often heal in a few days, sometimes longer. Flu-like symptoms, including swollen glands, headache, body ache, or fever. These typically occur only at the time of the first infection. Cold sores may also occur on fingers. They may rarely infect the eyes, a serious possible complication.    Some randolph happens, wash your hands thoroughly, for at least 20 seconds. When you can’t wash with soap and water, use an alcohol-based hand .   · Disinfect things you touch often, such as phones and keyboards.    · If you feel a cold sore coming on, do the sa

## 2021-07-19 ENCOUNTER — TELEPHONE (OUTPATIENT)
Dept: OBGYN UNIT | Facility: HOSPITAL | Age: 31
End: 2021-07-19

## 2021-07-19 NOTE — TELEPHONE ENCOUNTER
Called [employer] [phone], spoke with Elis. She states Dexcom sensors, approved 6/25/2021-1/14/2022. Auth # [id number]     1 Jayme Arshad, [phone], spoke with Lizandro Juárez, states picked up 7/14 at different [facility].  Per Jena Branham like

## 2021-07-22 NOTE — TELEPHONE ENCOUNTER
Spoke with patient. She denies needing any medication and is unsure what the message from her mother was regarding. She did state that her incision was slightly oozing and she noticed an odor.    Patient states she is larger and is unsure if odor is comi

## 2021-08-03 ENCOUNTER — POSTPARTUM (OUTPATIENT)
Dept: OBGYN CLINIC | Facility: CLINIC | Age: 31
End: 2021-08-03
Payer: MEDICAID

## 2021-08-03 VITALS
HEIGHT: 63 IN | WEIGHT: 254.81 LBS | SYSTOLIC BLOOD PRESSURE: 124 MMHG | BODY MASS INDEX: 45.15 KG/M2 | DIASTOLIC BLOOD PRESSURE: 80 MMHG | HEART RATE: 83 BPM

## 2021-08-03 DIAGNOSIS — Z98.891 S/P REPEAT LOW TRANSVERSE C-SECTION: Primary | ICD-10-CM

## 2021-08-03 DIAGNOSIS — L03.311 CELLULITIS OF ABDOMINAL WALL: ICD-10-CM

## 2021-08-03 PROCEDURE — 3074F SYST BP LT 130 MM HG: CPT | Performed by: OBSTETRICS & GYNECOLOGY

## 2021-08-03 PROCEDURE — 3008F BODY MASS INDEX DOCD: CPT | Performed by: OBSTETRICS & GYNECOLOGY

## 2021-08-03 PROCEDURE — 3079F DIAST BP 80-89 MM HG: CPT | Performed by: OBSTETRICS & GYNECOLOGY

## 2021-08-03 RX ORDER — CEPHALEXIN 500 MG/1
500 CAPSULE ORAL 2 TIMES DAILY
Qty: 14 CAPSULE | Refills: 0 | Status: SHIPPED | OUTPATIENT
Start: 2021-08-03 | End: 2021-08-13 | Stop reason: ALTCHOICE

## 2021-08-13 ENCOUNTER — POSTPARTUM (OUTPATIENT)
Dept: OBGYN CLINIC | Facility: CLINIC | Age: 31
End: 2021-08-13
Payer: MEDICAID

## 2021-08-13 VITALS
HEART RATE: 89 BPM | WEIGHT: 253.63 LBS | SYSTOLIC BLOOD PRESSURE: 122 MMHG | DIASTOLIC BLOOD PRESSURE: 76 MMHG | HEIGHT: 63 IN | BODY MASS INDEX: 44.94 KG/M2

## 2021-08-13 DIAGNOSIS — Z98.891 S/P REPEAT LOW TRANSVERSE C-SECTION: Primary | ICD-10-CM

## 2021-08-13 PROCEDURE — 3078F DIAST BP <80 MM HG: CPT | Performed by: OBSTETRICS & GYNECOLOGY

## 2021-08-13 PROCEDURE — 3074F SYST BP LT 130 MM HG: CPT | Performed by: OBSTETRICS & GYNECOLOGY

## 2021-08-13 PROCEDURE — 3008F BODY MASS INDEX DOCD: CPT | Performed by: OBSTETRICS & GYNECOLOGY

## 2021-08-13 RX ORDER — FLUCONAZOLE 150 MG/1
TABLET ORAL
Qty: 2 TABLET | Refills: 0 | Status: SHIPPED | OUTPATIENT
Start: 2021-08-13

## 2021-08-13 NOTE — PROGRESS NOTES
POSTPARTUM VISIT    S: patient is a 31year old [id number] who presents today for wound check. She underwent RCS with BS on 7/13/2021 for uncontrolled type 1 DM with Dr. Beatrice Villasenor.    She is doing well  Saw Dr. Beatrice Villasenor and superficial separation of wound was noted diflucan PO x 2 for intertrigo (hold on topical for now due to superficial opening, but if still present next visit could Rx nystatin powder).  Advised to keep incision open to air overnight.  -Encouraged continued good control of sugars with insulin pump

## 2021-08-19 ENCOUNTER — PATIENT MESSAGE (OUTPATIENT)
Dept: ENDOCRINOLOGY CLINIC | Facility: CLINIC | Age: 31
End: 2021-08-19

## 2021-08-20 NOTE — TELEPHONE ENCOUNTER
Dr. Prince Murray, please advise on Dr. Ana Denise patient. She reports BG under 70 three times daily, no symptoms. Says she gets low readings with no pattern. This morning she went low after she went on a walk. Highest BG is 300 after she eats but she corrects. Patient reports the following insulin pump settings:  Basal rate:   12am 7 am: 1.1  7am-12a: 1  CF: 10    Patient using dexcom as well as checking with glucometer. She uses dexcom  so unable to view download. She will upload dexcom to her computer at home now so we can view readings. Dexcom is not able to be uploaded to her computer.

## 2021-08-20 NOTE — TELEPHONE ENCOUNTER
LMTCB    Since no answer also sent mychart with following recommendations:    Basal rates:  12a- 1.1-->0.90  7am- 1.0-->0.80    Asked that she return call or confirm that mychart was received and she was able to change settings.

## 2021-08-20 NOTE — TELEPHONE ENCOUNTER
From: Deo Mac  To: Barbi Moss MD  Sent: 2021 5:47 PM CDT  Subject: Other    Can you have diabetic educator call me or can we set up video visit it very hard with  to get to appt right now and im having quiet a bit of lows   thank you

## 2021-08-23 ENCOUNTER — MED REC SCAN ONLY (OUTPATIENT)
Dept: OBGYN CLINIC | Facility: CLINIC | Age: 31
End: 2021-08-23

## 2021-08-26 ENCOUNTER — PATIENT MESSAGE (OUTPATIENT)
Dept: INTERNAL MEDICINE CLINIC | Facility: CLINIC | Age: 31
End: 2021-08-26

## 2021-08-26 NOTE — TELEPHONE ENCOUNTER
From: Kristen Cohen  To: Jeromy Freitas MD  Sent: 8/26/2021 1:39 PM CDT  Subject: Prescription Question    Hi i was wondering if we can do video visit to get my pre pregnancy medications going again, Victoza, Phentermine and Aprazalom.

## 2021-08-30 ENCOUNTER — TELEMEDICINE (OUTPATIENT)
Dept: INTERNAL MEDICINE CLINIC | Facility: CLINIC | Age: 31
End: 2021-08-30

## 2021-08-30 DIAGNOSIS — E10.65 UNCONTROLLED TYPE 1 DIABETES MELLITUS WITH DIABETIC AUTONOMIC NEUROPATHY, WITH LONG-TERM CURRENT USE OF INSULIN (HCC): ICD-10-CM

## 2021-08-30 DIAGNOSIS — E66.01 MORBID OBESITY (HCC): Primary | ICD-10-CM

## 2021-08-30 DIAGNOSIS — F33.1 MDD (MAJOR DEPRESSIVE DISORDER), RECURRENT EPISODE, MODERATE (HCC): ICD-10-CM

## 2021-08-30 DIAGNOSIS — F51.01 PRIMARY INSOMNIA: ICD-10-CM

## 2021-08-30 DIAGNOSIS — E10.43 UNCONTROLLED TYPE 1 DIABETES MELLITUS WITH DIABETIC AUTONOMIC NEUROPATHY, WITH LONG-TERM CURRENT USE OF INSULIN (HCC): ICD-10-CM

## 2021-08-30 DIAGNOSIS — F41.1 GENERALIZED ANXIETY DISORDER: ICD-10-CM

## 2021-08-30 PROCEDURE — 99214 OFFICE O/P EST MOD 30 MIN: CPT | Performed by: INTERNAL MEDICINE

## 2021-08-30 RX ORDER — ALPRAZOLAM 1 MG/1
1 TABLET ORAL 2 TIMES DAILY PRN
Qty: 60 TABLET | Refills: 2 | Status: SHIPPED | OUTPATIENT
Start: 2021-08-30 | End: 2022-01-18

## 2021-08-30 RX ORDER — PHENTERMINE HYDROCHLORIDE 37.5 MG/1
37.5 TABLET ORAL
Qty: 30 TABLET | Refills: 2 | Status: SHIPPED | OUTPATIENT
Start: 2021-08-30 | End: 2022-01-18

## 2021-08-30 NOTE — PROGRESS NOTES
José Miguel Dewey is a 31year old female here today for a telemedicine/video visit. Patient is in the state of [place] during this visit. José Miguel Dewey verbally consents to a Video visit service on 08/30/21.   Patient understands and accepts fi resume phentermine. Has helped with her weight in the past. She has tolerated it without any major issues. She is no longer breast feeding. Will resume phentermine as below. - Phentermine HCl 37.5 MG Oral Tab;  Take 1 tablet (37.5 mg total) by mouth mila visit as a complete head to toe physical exam could not be performed. Every conscious effort was taken to allow for sufficient and adequate time. This billing was spent on reviewing labs, medications, radiology tests and decision making.   Appropriate med

## 2021-08-30 NOTE — TELEPHONE ENCOUNTER
Future Appointments   Date Time Provider Bonifacio Khalil   8/30/2021  5:30 PM Irene Trevino MD [facility] [place]   9/8/2021 10:45 AM Jaya Gross MD [facility] [place]     Pt made an appt via [employer]

## 2021-09-08 ENCOUNTER — POSTPARTUM (OUTPATIENT)
Dept: OBGYN CLINIC | Facility: CLINIC | Age: 31
End: 2021-09-08
Payer: MEDICAID

## 2021-09-08 VITALS
BODY MASS INDEX: 44.79 KG/M2 | WEIGHT: 252.81 LBS | DIASTOLIC BLOOD PRESSURE: 80 MMHG | SYSTOLIC BLOOD PRESSURE: 122 MMHG | HEIGHT: 63 IN

## 2021-09-08 DIAGNOSIS — Z98.891 S/P REPEAT LOW TRANSVERSE C-SECTION: Primary | ICD-10-CM

## 2021-09-08 PROBLEM — L03.311 CELLULITIS OF ABDOMINAL WALL: Status: RESOLVED | Noted: 2021-08-03 | Resolved: 2021-09-08

## 2021-09-08 PROCEDURE — 3008F BODY MASS INDEX DOCD: CPT | Performed by: OBSTETRICS & GYNECOLOGY

## 2021-09-08 PROCEDURE — 3079F DIAST BP 80-89 MM HG: CPT | Performed by: OBSTETRICS & GYNECOLOGY

## 2021-09-08 PROCEDURE — 3074F SYST BP LT 130 MM HG: CPT | Performed by: OBSTETRICS & GYNECOLOGY

## 2021-09-08 NOTE — PROGRESS NOTES
[facility]  Obstetrics and Gynecology   Postpartum Progress Note    Subjective: Ghanshyam Kate is a 31year old  female who is s/p RCS w/ BS on 21.  Her pregnancy was complicated by BMI 45, Anxiety, Tobacco use, DM I uncontr diabetes mellitus with diabetic autonomic neuropathy, with long-term current use of insulin (HCC)     Hypercholesterolemia     Family history of cervical cancer     Chronic allergic rhinitis     Generalized anxiety disorder     MDD (major depressive disord

## 2021-09-21 ENCOUNTER — PATIENT MESSAGE (OUTPATIENT)
Dept: INTERNAL MEDICINE CLINIC | Facility: CLINIC | Age: 31
End: 2021-09-21

## 2021-09-21 DIAGNOSIS — E66.01 SEVERE OBESITY (BMI 35.0-39.9) WITH COMORBIDITY (HCC): ICD-10-CM

## 2021-09-21 DIAGNOSIS — Z51.81 THERAPEUTIC DRUG MONITORING: ICD-10-CM

## 2021-09-21 RX ORDER — TOPIRAMATE 50 MG/1
50 TABLET, FILM COATED ORAL 2 TIMES DAILY
Qty: 180 TABLET | Refills: 0 | Status: SHIPPED | OUTPATIENT
Start: 2021-09-21 | End: 2021-12-27

## 2021-09-21 NOTE — TELEPHONE ENCOUNTER
From: Wayne Sellers  To: Lucina Rodriguez MD  Sent: 9/21/2021 1:48 AM CDT  Subject: Prescription     Hi my last video visit you got me back on my pre pregnancy medication i wanted to see if i could get the toprimate medicine filled as well

## 2021-09-21 NOTE — TELEPHONE ENCOUNTER
Protocol  None    Requesting:   topiramate 50 MG Oral Tab          The original prescription was discontinued on 12/3/2018 by Ryan Fonseca MD. Renewing this prescription may not be appropriate.     Sig: Take 1 tablet (50 mg total) by mouth 2 (two) times anny

## 2021-09-29 DIAGNOSIS — O99.340: ICD-10-CM

## 2021-09-29 DIAGNOSIS — F32.9: ICD-10-CM

## 2021-09-30 NOTE — TELEPHONE ENCOUNTER
No protocol - sertraline   Passed protocol - one touch    Last refill:  Glucose Blood (ONETOUCH ULTRA) In Vitro Strip 300 each 0 2021    Si each by Other route daily.  BEFORE MEALS AND BEDTIME       Sertraline HCl 25 MG Oral Tab 30 tablet 1

## 2021-10-02 ENCOUNTER — PATIENT MESSAGE (OUTPATIENT)
Dept: INTERNAL MEDICINE CLINIC | Facility: CLINIC | Age: 31
End: 2021-10-02

## 2021-10-02 DIAGNOSIS — F32.9: ICD-10-CM

## 2021-10-02 DIAGNOSIS — O99.340: ICD-10-CM

## 2021-10-02 RX ORDER — SERTRALINE HYDROCHLORIDE 25 MG/1
TABLET, FILM COATED ORAL
Qty: 90 TABLET | Refills: 1 | OUTPATIENT
Start: 2021-10-02

## 2021-10-02 RX ORDER — BLOOD SUGAR DIAGNOSTIC
STRIP MISCELLANEOUS
Qty: 300 STRIP | Refills: 0 | OUTPATIENT
Start: 2021-10-02

## 2021-10-04 RX ORDER — BLOOD SUGAR DIAGNOSTIC
300 STRIP MISCELLANEOUS DAILY
Qty: 300 EACH | Refills: 0 | Status: CANCELLED | OUTPATIENT
Start: 2021-10-04

## 2021-10-04 RX ORDER — BLOOD SUGAR DIAGNOSTIC
300 STRIP MISCELLANEOUS DAILY
Qty: 300 EACH | Refills: 0 | Status: SHIPPED | OUTPATIENT
Start: 2021-10-04 | End: 2021-12-14

## 2021-10-04 RX ORDER — SERTRALINE HYDROCHLORIDE 25 MG/1
25 TABLET, FILM COATED ORAL DAILY
Qty: 90 TABLET | Refills: 1 | Status: SHIPPED | OUTPATIENT
Start: 2021-10-04 | End: 2022-02-02

## 2021-10-04 RX ORDER — BLOOD SUGAR DIAGNOSTIC
1 STRIP MISCELLANEOUS 3 TIMES DAILY
Qty: 300 EACH | Refills: 0 | Status: CANCELLED | OUTPATIENT
Start: 2021-10-04

## 2021-10-04 NOTE — TELEPHONE ENCOUNTER
loV 76/21  Patient calling asking for One touch ultra strips  Dr Yanna Torres pt is post partum   Sent mychart to book fu nelly  Refill pended for approval

## 2021-10-04 NOTE — TELEPHONE ENCOUNTER
From: Deanna Amezcua  To: Wayne Sellers  Sent: 9/30/2021 9:06 AM CDT  Subject: Medication refill    Girma Eason,   Our office received a refill request from your pharmacy for Sertraline and Test Strips.  I wanted to clarify if you truly do need these refil

## 2021-10-04 NOTE — TELEPHONE ENCOUNTER
Replied to patient's [employer] message. Prescription for test strips sent. Waiting for patient response.

## 2021-10-18 ENCOUNTER — TELEMEDICINE (OUTPATIENT)
Dept: INTERNAL MEDICINE CLINIC | Facility: CLINIC | Age: 31
End: 2021-10-18

## 2021-10-18 DIAGNOSIS — E10.43 UNCONTROLLED TYPE 1 DIABETES MELLITUS WITH DIABETIC AUTONOMIC NEUROPATHY, WITH LONG-TERM CURRENT USE OF INSULIN (HCC): ICD-10-CM

## 2021-10-18 DIAGNOSIS — Z72.0 CURRENT TOBACCO USE: ICD-10-CM

## 2021-10-18 DIAGNOSIS — E10.65 UNCONTROLLED TYPE 1 DIABETES MELLITUS WITH DIABETIC AUTONOMIC NEUROPATHY, WITH LONG-TERM CURRENT USE OF INSULIN (HCC): ICD-10-CM

## 2021-10-18 DIAGNOSIS — J40 BRONCHITIS: Primary | ICD-10-CM

## 2021-10-18 PROCEDURE — 99214 OFFICE O/P EST MOD 30 MIN: CPT | Performed by: INTERNAL MEDICINE

## 2021-10-18 RX ORDER — AZITHROMYCIN 250 MG/1
TABLET, FILM COATED ORAL
Qty: 6 TABLET | Refills: 0 | Status: SHIPPED | OUTPATIENT
Start: 2021-10-18 | End: 2021-10-23

## 2021-10-18 NOTE — PROGRESS NOTES
Virtual Telephone Check-In    This visit is conducted using Telemedicine with live, interactive video and audio.  Patient resides in [place]   Patient understands and accepts financial responsibility for any deductible, co-insurance and/or co-pays associat is a 31year old female with cough/cold for the past 1 week. Her daughter as sick, but she tested negative for COVID. Daughter feeling better however she is not feeling well. Has a strong cough that is productive. No fevers or chills. No chest pain.  She ha

## 2021-10-20 ENCOUNTER — TELEPHONE (OUTPATIENT)
Dept: ENDOCRINOLOGY CLINIC | Facility: CLINIC | Age: 31
End: 2021-10-20

## 2021-10-20 NOTE — TELEPHONE ENCOUNTER
Patient stated she has been having a lot of low blood sugars, however would not give me any recent readings. LOV 7/6/21, no follow up scheduled.     Medications Insulin Lispro 300 units in pump    Levemir: 22 units    Settings:  Basal Rate:  12AM (7 hours)-

## 2021-10-23 ENCOUNTER — PATIENT MESSAGE (OUTPATIENT)
Dept: ENDOCRINOLOGY CLINIC | Facility: CLINIC | Age: 31
End: 2021-10-23

## 2021-10-26 NOTE — TELEPHONE ENCOUNTER
Dr. Fransisco Horn -- please see below message. RN printed Dexcom report and placed on your desk for review. Thank you.

## 2021-10-26 NOTE — TELEPHONE ENCOUNTER
From: Ryan Arizmendi  To: Barbi Ha MD  Sent: 10/23/2021 3:41 PM CDT  Subject: Dexcom    If you can just tell me now i figured out how to use nelly i should be able to share with you now please please please i need your help

## 2021-10-26 NOTE — TELEPHONE ENCOUNTER
MyCMc4t message sent. Subjective pt in good spirits, ready to go to [facility]     Objective     I/O's    Intake/Output Summary (Last 24 hours) at 10/26/2021 1602  Last data filed at 10/26/2021 1400  Gross per 24 hour   Intake --   Output 1885 ml   Net -1885 ml       Last Recorded Vitals  Blood pressure 112/75, pulse 82, temperature 97.9 °F (36.6 °C), temperature source Oral, resp. rate 16, height 5' 5\" (1.651 m), weight (!) 154 kg (339 lb 8.1 oz), SpO2 98 %.  Body mass index is 56.5 kg/m².    Physical Exam  Cardiovascular:      Rate and Rhythm: Normal rate.      Pulses: Normal pulses.   Pulmonary:      Effort: Pulmonary effort is normal.   Abdominal:      General: Abdomen is flat.      Tenderness: There is abdominal tenderness.   Musculoskeletal:      Right lower leg: No edema.      Left lower leg: Edema present.   Neurological:      Mental Status: She is alert.          Labs   Recent Results (from the past 48 hour(s))   GLUCOSE, BEDSIDE - POINT OF CARE    Collection Time: 10/24/21  5:59 PM   Result Value Ref Range    GLUCOSE, BEDSIDE - POINT OF CARE 111 (H) 70 - 99 mg/dL   GLUCOSE, BEDSIDE - POINT OF CARE    Collection Time: 10/24/21 11:21 PM   Result Value Ref Range    GLUCOSE, BEDSIDE - POINT OF CARE 101 (H) 70 - 99 mg/dL   Magnesium    Collection Time: 10/25/21  5:29 AM   Result Value Ref Range    Magnesium 2.2 1.7 - 2.4 mg/dL   Phosphorus    Collection Time: 10/25/21  5:29 AM   Result Value Ref Range    Phosphorus 4.1 2.4 - 4.7 mg/dL   Comprehensive Metabolic Panel    Collection Time: 10/25/21  5:29 AM   Result Value Ref Range    Fasting Status      Sodium 135 135 - 145 mmol/L    Potassium 4.5 3.4 - 5.1 mmol/L    Chloride 104 98 - 107 mmol/L    Carbon Dioxide 28 21 - 32 mmol/L    Anion Gap 8 (L) 10 - 20 mmol/L    Glucose 91 70 - 99 mg/dL    BUN 63 (H) 6 - 20 mg/dL    Creatinine 1.45 (H) 0.51 - 0.95 mg/dL    Glomerular Filtration Rate 38 (L) >=60    BUN/ Creatinine Ratio 43 (H) 7 - 25    Calcium 7.9 (L) 8.4 - 10.2 mg/dL    Bilirubin, Total  0.4 0.2 - 1.0 mg/dL    GOT/AST 14 <=37 Units/L    GPT/ALT 11 <64 Units/L    Alkaline Phosphatase 69 45 - 117 Units/L    Albumin 1.9 (L) 3.6 - 5.1 g/dL    Protein, Total 6.3 (L) 6.4 - 8.2 g/dL    Globulin 4.4 (H) 2.0 - 4.0 g/dL    A/G Ratio 0.4 (L) 1.0 - 2.4   CBC with Automated Differential (performable only)    Collection Time: 10/25/21  5:29 AM   Result Value Ref Range    WBC 3.8 (L) 4.2 - 11.0 K/mcL    RBC 3.08 (L) 4.00 - 5.20 mil/mcL    HGB 8.5 (L) 12.0 - 15.5 g/dL    HCT 27.7 (L) 36.0 - 46.5 %    MCV 89.9 78.0 - 100.0 fl    MCH 27.6 26.0 - 34.0 pg    MCHC 30.7 (L) 32.0 - 36.5 g/dL    RDW-CV 17.9 (H) 11.0 - 15.0 %    RDW-SD 58.8 (H) 39.0 - 50.0 fL     140 - 450 K/mcL    NRBC 0 <=0 /100 WBC    Neutrophil, Percent 65 %    Lymphocytes, Percent 17 %    Mono, Percent 13 %    Eosinophils, Percent 3 %    Basophils, Percent 1 %    Immature Granulocytes 1 %    Absolute Neutrophils 2.5 1.8 - 7.7 K/mcL    Absolute Lymphocytes 0.6 (L) 1.0 - 4.0 K/mcL    Absolute Monocytes 0.5 0.3 - 0.9 K/mcL    Absolute Eosinophils  0.1 0.0 - 0.5 K/mcL    Absolute Basophils 0.0 0.0 - 0.3 K/mcL    Absolute Immmature Granulocytes 0.0 0.0 - 0.2 K/mcL   GLUCOSE, BEDSIDE - POINT OF CARE    Collection Time: 10/25/21  5:30 AM   Result Value Ref Range    GLUCOSE, BEDSIDE - POINT OF CARE 90 70 - 99 mg/dL   GLUCOSE, BEDSIDE - POINT OF CARE    Collection Time: 10/25/21  7:15 AM   Result Value Ref Range    GLUCOSE, BEDSIDE - POINT OF CARE 91 70 - 99 mg/dL   GLUCOSE, BEDSIDE - POINT OF CARE    Collection Time: 10/25/21 11:54 AM   Result Value Ref Range    GLUCOSE, BEDSIDE - POINT OF CARE 124 (H) 70 - 99 mg/dL   GLUCOSE, BEDSIDE - POINT OF CARE    Collection Time: 10/25/21  4:43 PM   Result Value Ref Range    GLUCOSE, BEDSIDE - POINT OF CARE 102 (H) 70 - 99 mg/dL   GLUCOSE, BEDSIDE - POINT OF CARE    Collection Time: 10/25/21  8:28 PM   Result Value Ref Range    GLUCOSE, BEDSIDE - POINT OF CARE 101 (H) 70 - 99 mg/dL   GLUCOSE, BEDSIDE - POINT OF  CARE    Collection Time: 10/26/21 12:59 AM   Result Value Ref Range    GLUCOSE, BEDSIDE - POINT OF CARE 93 70 - 99 mg/dL   Magnesium    Collection Time: 10/26/21  5:14 AM   Result Value Ref Range    Magnesium 2.2 1.7 - 2.4 mg/dL   Phosphorus    Collection Time: 10/26/21  5:14 AM   Result Value Ref Range    Phosphorus 4.1 2.4 - 4.7 mg/dL   Comprehensive Metabolic Panel    Collection Time: 10/26/21  5:14 AM   Result Value Ref Range    Fasting Status      Sodium 136 135 - 145 mmol/L    Potassium 4.7 3.4 - 5.1 mmol/L    Chloride 105 98 - 107 mmol/L    Carbon Dioxide 27 21 - 32 mmol/L    Anion Gap 9 (L) 10 - 20 mmol/L    Glucose 82 70 - 99 mg/dL    BUN 57 (H) 6 - 20 mg/dL    Creatinine 1.54 (H) 0.51 - 0.95 mg/dL    Glomerular Filtration Rate 35 (L) >=60    BUN/ Creatinine Ratio 37 (H) 7 - 25    Calcium 8.7 8.4 - 10.2 mg/dL    Bilirubin, Total 0.5 0.2 - 1.0 mg/dL    GOT/AST 23 <=37 Units/L    GPT/ALT 16 <64 Units/L    Alkaline Phosphatase 84 45 - 117 Units/L    Albumin 2.0 (L) 3.6 - 5.1 g/dL    Protein, Total 6.4 6.4 - 8.2 g/dL    Globulin 4.4 (H) 2.0 - 4.0 g/dL    A/G Ratio 0.5 (L) 1.0 - 2.4   GLUCOSE, BEDSIDE - POINT OF CARE    Collection Time: 10/26/21  6:12 AM   Result Value Ref Range    GLUCOSE, BEDSIDE - POINT OF CARE 80 70 - 99 mg/dL   GLUCOSE, BEDSIDE - POINT OF CARE    Collection Time: 10/26/21 11:48 AM   Result Value Ref Range    GLUCOSE, BEDSIDE - POINT OF CARE 96 70 - 99 mg/dL        Imaging  No results found.     Assessment & Plan     No problem-specific Assessment & Plan notes found for this encounter.          1) necrotizing fasciitis    2)  Anemia   3)  Morbid obesity    4)   Stage 3 ckd with sarita   Back at baseline    5)  Deconditioning

## 2021-11-04 NOTE — TELEPHONE ENCOUNTER
Hi!  I see that she is having a lot of lows, can you ask her to send you the pump settings? Thank you!

## 2021-11-24 ENCOUNTER — PATIENT MESSAGE (OUTPATIENT)
Dept: ENDOCRINOLOGY CLINIC | Facility: CLINIC | Age: 31
End: 2021-11-24

## 2021-11-24 NOTE — TELEPHONE ENCOUNTER
Hi!  Please ask her to decrease basal rate from 7AM-9PM to 0.9 and upload the Dexcom in 5 days. Thank you.

## 2021-11-26 NOTE — TELEPHONE ENCOUNTER
Sent MC advising patient of change: decrease 7am - 9am from 1 to 0.9  Advised patient to contact us with questions

## 2021-11-26 NOTE — TELEPHONE ENCOUNTER
From: Raquel Rcok  Sent: 11/24/2021 11:44 AM CST  To: Avelina Trujillo Clinical Staff  Subject: Dexcom    So were changing me to 1 basal rate and making no other changes correct

## 2021-11-29 RX ORDER — INSULIN PUMP CONTROLLER
EACH MISCELLANEOUS
Qty: 30 EACH | Refills: 0 | Status: SHIPPED | OUTPATIENT
Start: 2021-11-29

## 2021-11-29 NOTE — TELEPHONE ENCOUNTER
LOV 07/06/21. Called for first available, no answer, lmtcb.  Sent [employer] message, pended 30 day supply

## 2021-12-14 RX ORDER — BLOOD SUGAR DIAGNOSTIC
STRIP MISCELLANEOUS
Qty: 300 STRIP | Refills: 0 | Status: SHIPPED | OUTPATIENT
Start: 2021-12-14

## 2021-12-15 ENCOUNTER — PATIENT MESSAGE (OUTPATIENT)
Dept: ENDOCRINOLOGY CLINIC | Facility: CLINIC | Age: 31
End: 2021-12-15

## 2021-12-16 NOTE — TELEPHONE ENCOUNTER
From: Subha Monsivais  Sent: 12/15/2021 7:06 PM CST  To: Avelina Trujillo Clinical Staff  Subject: Appointment    Unfortunately me and my whole family have been put on a 14 day quarantine as of yesterday my oldest daughter tested positive for covid

## 2021-12-24 DIAGNOSIS — Z51.81 THERAPEUTIC DRUG MONITORING: ICD-10-CM

## 2021-12-24 DIAGNOSIS — E66.01 SEVERE OBESITY (BMI 35.0-39.9) WITH COMORBIDITY (HCC): ICD-10-CM

## 2021-12-27 RX ORDER — TOPIRAMATE 50 MG/1
TABLET, FILM COATED ORAL
Qty: 180 TABLET | Refills: 0 | Status: SHIPPED | OUTPATIENT
Start: 2021-12-27

## 2021-12-27 NOTE — TELEPHONE ENCOUNTER
LOV: 10/18/2021 with Dr. Jd Tavares  RTC: \"Follow up in 7 to 10 days or sooner if symptoms do not improve or worsen\"  Last Relevant Labs: July 2021  Filled: 9/21/2021   #180 with 0 refills    No future appointments.

## 2021-12-30 ENCOUNTER — TELEMEDICINE (OUTPATIENT)
Dept: INTERNAL MEDICINE CLINIC | Facility: CLINIC | Age: 31
End: 2021-12-30

## 2021-12-30 DIAGNOSIS — J06.9 ACUTE UPPER RESPIRATORY INFECTION: Primary | ICD-10-CM

## 2021-12-30 PROCEDURE — 99213 OFFICE O/P EST LOW 20 MIN: CPT | Performed by: INTERNAL MEDICINE

## 2021-12-30 RX ORDER — AMOXICILLIN AND CLAVULANATE POTASSIUM 875; 125 MG/1; MG/1
1 TABLET, FILM COATED ORAL 2 TIMES DAILY
Qty: 14 TABLET | Refills: 0 | Status: SHIPPED | OUTPATIENT
Start: 2021-12-30 | End: 2022-01-06

## 2021-12-30 NOTE — PROGRESS NOTES
Anitra Omer is a 36year old female here today for a telemedicine/video visit. Patient is in the state of [place] during this visit. Anitra Omer verbally consents to a Video visit service on 12/30/21.   Patient understands and accepts fi Some sinus pressure as well. She has COVID testing scheduled for this afternoon. If COVID test is negative will have her start Augmentin to cover sinusitis. Otherwise supportive therapy, isolation if positive for COVID-19.      Other orders  - amoxicill

## 2022-01-17 DIAGNOSIS — F51.01 PRIMARY INSOMNIA: ICD-10-CM

## 2022-01-17 DIAGNOSIS — F41.1 GENERALIZED ANXIETY DISORDER: ICD-10-CM

## 2022-01-17 DIAGNOSIS — E66.01 MORBID OBESITY (HCC): ICD-10-CM

## 2022-01-18 RX ORDER — PHENTERMINE HYDROCHLORIDE 37.5 MG/1
TABLET ORAL
Qty: 30 TABLET | Refills: 0 | Status: SHIPPED | OUTPATIENT
Start: 2022-01-18

## 2022-01-18 RX ORDER — ALPRAZOLAM 1 MG/1
TABLET ORAL
Qty: 60 TABLET | Refills: 0 | Status: SHIPPED | OUTPATIENT
Start: 2022-01-18

## 2022-01-18 NOTE — TELEPHONE ENCOUNTER
Phentermine HCl 37.5 mg  Filled 8-30-21  Qty 30  2 refills  Upcoming appt. 2-2-22  Telemed. 8-30-21    Alprazolam 1 mg  Filled 8-30-21  Qty 60  2 refills  Upcoming appt. 2-2-22  Telemed.  8-30-21

## 2022-02-02 ENCOUNTER — TELEMEDICINE (OUTPATIENT)
Dept: INTERNAL MEDICINE CLINIC | Facility: CLINIC | Age: 32
End: 2022-02-02
Payer: MEDICAID

## 2022-02-02 DIAGNOSIS — F41.1 GENERALIZED ANXIETY DISORDER: ICD-10-CM

## 2022-02-02 DIAGNOSIS — E78.00 HYPERCHOLESTEROLEMIA: ICD-10-CM

## 2022-02-02 DIAGNOSIS — IMO0002 UNCONTROLLED TYPE 1 DIABETES MELLITUS WITH DIABETIC AUTONOMIC NEUROPATHY, WITH LONG-TERM CURRENT USE OF INSULIN: ICD-10-CM

## 2022-02-02 DIAGNOSIS — Z96.41 TYPE 1 DIABETES MELLITUS WITH COMPLICATION, WITH LONG TERM CURRENT USE OF INSULIN PUMP (HCC): Primary | ICD-10-CM

## 2022-02-02 DIAGNOSIS — E66.01 CLASS 2 SEVERE OBESITY DUE TO EXCESS CALORIES WITH SERIOUS COMORBIDITY AND BODY MASS INDEX (BMI) OF 37.0 TO 37.9 IN ADULT (HCC): ICD-10-CM

## 2022-02-02 DIAGNOSIS — F33.1 MDD (MAJOR DEPRESSIVE DISORDER), RECURRENT EPISODE, MODERATE (HCC): ICD-10-CM

## 2022-02-02 DIAGNOSIS — E10.8 TYPE 1 DIABETES MELLITUS WITH COMPLICATION, WITH LONG TERM CURRENT USE OF INSULIN PUMP (HCC): Primary | ICD-10-CM

## 2022-02-02 DIAGNOSIS — M79.671 PAIN OF RIGHT HEEL: ICD-10-CM

## 2022-02-02 PROCEDURE — 99214 OFFICE O/P EST MOD 30 MIN: CPT | Performed by: INTERNAL MEDICINE

## 2022-02-02 RX ORDER — INSULIN LISPRO 100 [IU]/ML
INJECTION, SOLUTION INTRAVENOUS; SUBCUTANEOUS
Qty: 60 ML | Refills: 0 | Status: SHIPPED | OUTPATIENT
Start: 2022-02-02

## 2022-02-02 RX ORDER — BUPROPION HYDROCHLORIDE 150 MG/1
150 TABLET ORAL DAILY
Qty: 90 TABLET | Refills: 0 | Status: SHIPPED | OUTPATIENT
Start: 2022-02-02 | End: 2022-03-21

## 2022-02-02 RX ORDER — MELOXICAM 7.5 MG/1
7.5 TABLET ORAL DAILY PRN
Qty: 30 TABLET | Refills: 0 | Status: SHIPPED | OUTPATIENT
Start: 2022-02-02 | End: 2022-03-21

## 2022-02-02 NOTE — PROGRESS NOTES
Arnaldo Helms is a 36year old female here today for a telemedicine/video visit. Patient is in the state of [place] during this visit. Arnaldo Helms verbally consents to a Video visit service on 02/02/22. Patient understands and accepts financial responsibility for any deductible, co-insurance and/or co-pays associated with this service. Duration of the service: 9 minutes  Start time:  11:01 AM  End time: 11:10 AM    HPI:  Patient presents for follow up on several issues. Obesity - Down 15 lbs from post-pregnancy weight. Still does have some craving/appetite issues. Anxiety, insomnia - was on sertraline during pregnancy; did not help her symptoms a lot. Alprazolam resumed after last video visit. DM I - she is currently on an insulin pump, following with Endocrinology. Needs refill on insulin for pump. Complicated by hyperglycemia, neuropathy. Hypercholesterolemia - lifestyle controlled. Acute issues:  She has been dealing with pain in her heel. Right heel. Going on for two weeks. No specific injury to that area. She feels it more when walking, some pain at bed time as well. No OTC therapies so far. Past medical, surgical, family, and social histories were reviewed and are unchanged from previous visit.     ROS:  GENERAL: feels well otherwise  SKIN: denies any unusual skin lesions  EYES: denies blurred vision or double vision  HEENT: denies nasal congestion, sinus pain or sinus tenderness  LUNGS: denies shortness of breath with exertion  CARDIOVASCULAR: denies chest pain on exertion  GI: denies abdominal pain, denies heartburn, denies diarrhea  MUSCULOSKELETAL: right heel pain  NEURO: denies headaches    EXAM:  GENERAL: Alert and oriented, well developed, well nourished,in no apparent distress  SKIN: no rashes,no suspicious lesions of face  HEENT: atraumatic, EOMI, normal lid and conjunctiva  NECK: no grossly visible jvd or thyromegaly  LUNGS: speaking in full sentences, no increased work of breathing, no audible wheezing  NEURO: alert and oriented x 3  PSYCH: pleasant, appropriate mood and affect    ASSESSMENT/PLAN:  1. Type 1 diabetes mellitus with complication, with long term current use of insulin pump ([facility])  2. Uncontrolled type 1 diabetes mellitus with diabetic autonomic neuropathy, with long-term current use of [facility] ([facility]  Patient on insulin pump. Running low on insulin - prescription sent in, but advised her to follow up with her endocrinologist Dr. Jory jacob. Due for foot exam, eye exam.  Lipid panel ordered. - Insulin Lispro (HUMALOG) 100 UNIT/ML Subcutaneous Solution; Max daily dose of 200 units per day for use in insulin pump  Dispense: 60 mL; Refill: 0  - HEMOGLOBIN A1C; Future  - MICROALB/CREAT RATIO, RANDOM URINE; Future  - LIPID PANEL; Future  - COMP METABOLIC PANEL (14); Future    3. MDD (major depressive disorder), recurrent episode, moderate ([facility]  4. Generalized anxiety disorder  On alprazolam.  Sertraline did not help. She was on bupropion before - would like to resume. Prescription sent in.  - buPROPion 150 MG Oral Tablet 24 Hr; Take 1 tablet (150 mg total) by mouth daily. Dispense: 90 tablet; Refill: 0    5. Hypercholesterolemia  Will check updated lipid panel. Lifestyle controlled. - LIPID PANEL; Future    6. Class 2 severe obesity due to excess calories with serious comorbidity and body mass index (BMI) of 37.0 to 37.9 in [facility])  She reports she is down 15 lbs from post-pregnancy weight. Will continue topiramate, phentermine for now. Will check weight at next visit in person. 7. Pain of right heel  For past two weeks. No injury to the area. No cellulitic changes on video. Could be plantar fasciitis. She does have history of diabetic neuropathy but location of pain/symptoms don't really fit with that. Will start on course of meloxicam.  May consider referral to Podiatry if symptoms persist.  - Meloxicam 7.5 MG Oral Tab;  Take 1 tablet (7.5 mg total) by mouth daily as needed for Pain (heel pain). Dispense: 30 tablet; Refill: 0    Patient lives in [facility] now - however she is in the area at least once a week. Return to clinic in 1-2 months in person for follow up on chronic issues, or earlier as needed for acute issues. Please note that the following visit was completed using two-way, real-time interactive audio and video communication. This has been done in good og to provide continuity of care in the best interest of the provider-patient relationship, due to the ongoing public health crisis/national emergency and because of restrictions of visitation. There are limitations of this visit as a complete head to toe physical exam could not be performed. Every conscious effort was taken to allow for sufficient and adequate time. This billing was spent on reviewing labs, medications, radiology tests and decision making. Appropriate medical decision-making and tests are ordered as detailed in the plan of care above.

## 2022-02-02 NOTE — PATIENT INSTRUCTIONS
- Insulin refilled  - Schedule follow up appointment with your endocrinologist, Dr. Mary Skinner  - Start prescription anti-inflammatory, meloxicam, for heel pain. Take 1 tablet daily as needed  - We will re-start your bupropion for anxiety/depression. This also helps with smoking as well. Take 1 tablet daily.  - Get fasting blood work done at [address] up with me in the office in 1-2 months. It was a pleasure seeing you in the clinic today. Thank you for choosing the [facility] office for your healthcare needs. Please call at [phone] with any questions or concerns.     Lamar Browne MD

## 2022-02-07 ENCOUNTER — TELEPHONE (OUTPATIENT)
Dept: INTERNAL MEDICINE CLINIC | Facility: CLINIC | Age: 32
End: 2022-02-07

## 2022-02-07 RX ORDER — BLOOD-GLUCOSE SENSOR
1 EACH MISCELLANEOUS
Qty: 9 EACH | Refills: 0 | Status: SHIPPED | OUTPATIENT
Start: 2022-02-07

## 2022-02-18 ENCOUNTER — PATIENT MESSAGE (OUTPATIENT)
Dept: INTERNAL MEDICINE CLINIC | Facility: CLINIC | Age: 32
End: 2022-02-18

## 2022-02-18 ENCOUNTER — TELEPHONE (OUTPATIENT)
Dept: INTERNAL MEDICINE CLINIC | Facility: CLINIC | Age: 32
End: 2022-02-18

## 2022-02-18 RX ORDER — TOPIRAMATE 50 MG/1
50 TABLET, FILM COATED ORAL 2 TIMES DAILY
Qty: 180 TABLET | Refills: 1 | Status: SHIPPED | OUTPATIENT
Start: 2022-02-18

## 2022-02-18 RX ORDER — PHENTERMINE HYDROCHLORIDE 37.5 MG/1
37.5 TABLET ORAL
Qty: 30 TABLET | Refills: 2 | Status: SHIPPED | OUTPATIENT
Start: 2022-02-18

## 2022-02-18 RX ORDER — ALPRAZOLAM 1 MG/1
1 TABLET ORAL 2 TIMES DAILY PRN
Qty: 60 TABLET | Refills: 2 | Status: SHIPPED | OUTPATIENT
Start: 2022-02-18

## 2022-02-18 NOTE — TELEPHONE ENCOUNTER
Can Hidden from [facility] called stating prescription needs a PA, she states they sent messages to [employer] insurance but have not received anything. Please advise.

## 2022-02-18 NOTE — TELEPHONE ENCOUNTER
Eric Azar 44 minutes ago (10:20 AM)     SR Laya French from [facility] called stating prescription needs a PA, she states they sent messages to [employer] insurance but have not received anything. Please advise.

## 2022-02-19 NOTE — TELEPHONE ENCOUNTER
From: Cuca Up  To: Nichole Pradhan MD  Sent: 2/18/2022 4:00 PM CST  Subject: Prescription authorization    I need prescription authorization sent to [facility] and my insurance company for my dexcom i called [facility] and they told me they spoke to your office and sent it there

## 2022-02-21 NOTE — TELEPHONE ENCOUNTER
Form filled out, please remind patient to scheduled diabetes follow up with Endocrinology, I do not see any future appointments she has scheduled with them yet.

## 2022-02-22 RX ORDER — BLOOD-GLUCOSE TRANSMITTER
EACH MISCELLANEOUS
Qty: 1 EACH | Refills: 3 | Status: SHIPPED | OUTPATIENT
Start: 2022-02-22

## 2022-02-22 NOTE — TELEPHONE ENCOUNTER
LOV: 2/2/2022 with Dr. Chowdhury  RTC: 1-2 months  Last Relevant Labs: 7/14/2021 (CBC)  Filled: 12/11/2020     #1 each with 3 refills    No future appointments.

## 2022-03-21 ENCOUNTER — PATIENT MESSAGE (OUTPATIENT)
Dept: ENDOCRINOLOGY CLINIC | Facility: CLINIC | Age: 32
End: 2022-03-21

## 2022-03-21 ENCOUNTER — TELEPHONE (OUTPATIENT)
Dept: ENDOCRINOLOGY CLINIC | Facility: CLINIC | Age: 32
End: 2022-03-21

## 2022-03-21 ENCOUNTER — OFFICE VISIT (OUTPATIENT)
Dept: INTERNAL MEDICINE CLINIC | Facility: CLINIC | Age: 32
End: 2022-03-21
Payer: MEDICAID

## 2022-03-21 VITALS
HEIGHT: 62.75 IN | HEART RATE: 88 BPM | DIASTOLIC BLOOD PRESSURE: 78 MMHG | SYSTOLIC BLOOD PRESSURE: 106 MMHG | RESPIRATION RATE: 16 BRPM | WEIGHT: 228 LBS | BODY MASS INDEX: 40.91 KG/M2 | OXYGEN SATURATION: 98 % | TEMPERATURE: 98 F

## 2022-03-21 DIAGNOSIS — Z96.41 TYPE 1 DIABETES MELLITUS WITH COMPLICATION, WITH LONG TERM CURRENT USE OF INSULIN PUMP (HCC): Primary | Chronic | ICD-10-CM

## 2022-03-21 DIAGNOSIS — F41.1 GENERALIZED ANXIETY DISORDER: ICD-10-CM

## 2022-03-21 DIAGNOSIS — M79.671 PAIN OF RIGHT HEEL: ICD-10-CM

## 2022-03-21 DIAGNOSIS — IMO0002 UNCONTROLLED TYPE 1 DIABETES MELLITUS WITH DIABETIC AUTONOMIC NEUROPATHY, WITH LONG-TERM CURRENT USE OF INSULIN: Chronic | ICD-10-CM

## 2022-03-21 DIAGNOSIS — F33.1 MDD (MAJOR DEPRESSIVE DISORDER), RECURRENT EPISODE, MODERATE (HCC): ICD-10-CM

## 2022-03-21 DIAGNOSIS — E78.00 HYPERCHOLESTEROLEMIA: Chronic | ICD-10-CM

## 2022-03-21 DIAGNOSIS — E10.8 TYPE 1 DIABETES MELLITUS WITH COMPLICATION, WITH LONG TERM CURRENT USE OF INSULIN PUMP (HCC): Primary | Chronic | ICD-10-CM

## 2022-03-21 DIAGNOSIS — E66.01 MORBID OBESITY (HCC): ICD-10-CM

## 2022-03-21 PROCEDURE — 3008F BODY MASS INDEX DOCD: CPT | Performed by: INTERNAL MEDICINE

## 2022-03-21 PROCEDURE — 3074F SYST BP LT 130 MM HG: CPT | Performed by: INTERNAL MEDICINE

## 2022-03-21 PROCEDURE — 99214 OFFICE O/P EST MOD 30 MIN: CPT | Performed by: INTERNAL MEDICINE

## 2022-03-21 PROCEDURE — 3078F DIAST BP <80 MM HG: CPT | Performed by: INTERNAL MEDICINE

## 2022-03-21 RX ORDER — BUPROPION HYDROCHLORIDE 150 MG/1
150 TABLET ORAL DAILY
Qty: 90 TABLET | Refills: 0 | Status: SHIPPED | OUTPATIENT
Start: 2022-03-21

## 2022-03-21 RX ORDER — MELOXICAM 7.5 MG/1
7.5 TABLET ORAL DAILY PRN
Qty: 30 TABLET | Refills: 0 | Status: SHIPPED | OUTPATIENT
Start: 2022-03-21

## 2022-03-21 NOTE — TELEPHONE ENCOUNTER
Dr. Alcides Tong patient to try to get sharing code to download dexcom  Patient phone  within first minute on phone - she stated \"oh no my phone's gonna die. Katelyn Bone \"  Please advise if patient should be scheduled with IVETTE tomorrow or GERALD Gaspar on  this week  Thanks

## 2022-03-21 NOTE — TELEPHONE ENCOUNTER
Hi!  Let us try and schedule her with GERALD Woodruff soonest possible. If its not Wednesday, then with IVETTE. Thank you!

## 2022-03-21 NOTE — PATIENT INSTRUCTIONS
- Bupropion (Wellbutrin) and meloxicam (anti-inflammatory for foot/heel pain) re-sent to [facility] on [address]  - Follow up with Psychiatrist (see printout)  - Follow up with Endocrinology clinic as scheduled  - If the psychiatrists cannot see you soon, follow up with me in 1 month (in office or video visit). It was a pleasure seeing you in the clinic today. Thank you for choosing the [facility] office for your healthcare needs. Please call at [phone] with any questions or concerns.     Ruth Ramires MD

## 2022-03-21 NOTE — TELEPHONE ENCOUNTER
Replied to patient's [employer] message inquiring if patient can come in for an office visit. Awaiting response.

## 2022-03-21 NOTE — TELEPHONE ENCOUNTER
rn called patient na left message to call us and sent my chart message   Sent dexcom invite to share data

## 2022-03-22 ENCOUNTER — NURSE ONLY (OUTPATIENT)
Dept: ENDOCRINOLOGY CLINIC | Facility: CLINIC | Age: 32
End: 2022-03-22
Payer: MEDICAID

## 2022-03-22 DIAGNOSIS — E10.65 UNCONTROLLED TYPE 1 DIABETES MELLITUS WITH HYPERGLYCEMIA (HCC): Primary | ICD-10-CM

## 2022-03-22 PROBLEM — Z98.891 HISTORY OF CESAREAN DELIVERY: Status: RESOLVED | Noted: 2021-01-21 | Resolved: 2022-03-22

## 2022-03-22 PROBLEM — F33.1 MDD (MAJOR DEPRESSIVE DISORDER), RECURRENT EPISODE, MODERATE (HCC): Chronic | Status: ACTIVE | Noted: 2017-01-13

## 2022-03-22 PROBLEM — E66.01 MORBID OBESITY (HCC): Status: ACTIVE | Noted: 2018-10-08

## 2022-03-22 LAB
CARTRIDGE LOT#: ABNORMAL NUMERIC
HEMOGLOBIN A1C: 11.6 % (ref 4.3–5.6)

## 2022-03-22 PROCEDURE — 36416 COLLJ CAPILLARY BLOOD SPEC: CPT | Performed by: INTERNAL MEDICINE

## 2022-03-22 PROCEDURE — 83036 HEMOGLOBIN GLYCOSYLATED A1C: CPT | Performed by: INTERNAL MEDICINE

## 2022-03-22 NOTE — PROGRESS NOTES
Marilyn Gayle  : 1990 was seen for Insulin Pump Follow up:    Date: 3/22/22 Start time: 12:00pm   End time: 12:55pm    Lamonte unable to download dash. Tried on multiple computers and browsers. Indication: Pt is a T1D who reports having high blood sugars in the 400's mg/dl. Background: Pt was previously listening to her pump which resulted in frequent lows. She had been making changes on her own since delivering her last baby. Recently, she has not been listening to the pump and entering in fixed boluses. For example, this morning she decided to listen to the pump for a correction dose which resulted in a blood sugar of 53 mg/dl. She is extremely tired of having high blood sugars (and low blood sugars if she listens to her pump). She has been using her dexcom  (sometimes in conjunction with her phone if she sets up simultaneously). A1c:11.6 (3/22/2022)  Previous a1c: 9.2% (2022)     Type of pump: Omnipod Dash    Type of insulin: Humalog    TDD: 57.2 u/day    Basal: 25.4 45%    Bolus: 31.7 55%    Reviewed pump settings:  Basal  12A: 1.3  7A: 1.7  3P: 1.8  8P: 1.4    ICR  12A: 1:7    ISF  12A: 1:20    Dexcom Download  Very High: 52%  High: 22%  In Range: 25%  Low: <1%  Very Low: <1%  Avg Glucose: 254 mg/dl    Interpretation of Pump/CGM: Hyperglycemia noted all throughout the day. Pump download reports an average of 10 g of carbs per day. Charleen Campbell reports that she is entering in fixed doses verses her carb intake due to hypoglycemia when following her pump recommendations. Her over night is relatively stable and appears when she is most in range.       RECOMMENDATIONS/PLAN:  - Based off of TDD, loosen ICR to 1:9 throughout the day  - Based off of TDD, loosen ISF to 1:30 throughout the day  - Increase basal throughout the day  - Discussed trusting the pump and will now enter in BG and carb intake so we can make adjustments  - Pre-bolusing 10-20 minutes beforehand  - Reviewed extended bolusing  - Healthy food choices: foods high in fiber and protein  - Exercise (decrease basal by 50-75% an hour before based on intensity)  - Increase Dexcom high alert 300 mg/dl (from 170 mg/dl)  - Increase Dexcom low alert to 85 mg/dl due to fear of lows (from 75 mg/dl)  - Will plan to switch to phone for Dexcom with next sensor change (3/30/22) and will provide Dexcom Clarity code  - A1c increase and health impact  - CDCES to call on Thursday to check in      Pump setting changes:  Basal  12A: 1.3  7A: 1.8  3P: 1.9  8P: 1.5    ICR  12A: 1:9    ISF  12A: 1:30    Patient verbalized understanding and has no further questions at this time.  Jan Sahu will plan to call on Thursday to check in    [address] [facility]

## 2022-03-24 ENCOUNTER — PATIENT MESSAGE (OUTPATIENT)
Dept: INTERNAL MEDICINE CLINIC | Facility: CLINIC | Age: 32
End: 2022-03-24

## 2022-03-24 ENCOUNTER — TELEPHONE (OUTPATIENT)
Dept: ENDOCRINOLOGY CLINIC | Facility: CLINIC | Age: 32
End: 2022-03-24

## 2022-03-24 ENCOUNTER — PATIENT MESSAGE (OUTPATIENT)
Dept: ENDOCRINOLOGY CLINIC | Facility: CLINIC | Age: 32
End: 2022-03-24

## 2022-03-24 NOTE — TELEPHONE ENCOUNTER
From: Emma Kinsey  To: Aleyda Stevenson MD  Sent: 3/24/2022 12:19 PM CDT  Subject: Follow up fmla    Was paperwork received for fmla?  I put request in until 05/21 back dated since 03/21

## 2022-03-28 ENCOUNTER — PATIENT MESSAGE (OUTPATIENT)
Dept: ENDOCRINOLOGY CLINIC | Facility: CLINIC | Age: 32
End: 2022-03-28

## 2022-03-28 NOTE — TELEPHONE ENCOUNTER
From: Tima Brown  To: Landen Read  Sent: 3/24/2022 10:16 AM CDT  Subject: Check In    Hi Vira,    Hope you are doing well!  I tried giving you a call but just wanted to check in and see you were doing since we made some changes      Thanks,  Alec Jasmine

## 2022-03-28 NOTE — TELEPHONE ENCOUNTER
Faxed FMLA forms to fax # requested below by patient (Fax #[phone]) AND to [facility] (IXT #[phone]).   Also, faxed updated letter to patient's employer ([employer] #[phone]) per patient's request.

## 2022-03-28 NOTE — TELEPHONE ENCOUNTER
From: Scott Pereira  Sent: 3/27/2022 11:32 PM CDT  To: Avelina Trujillo Clinical Staff  Subject: Check In    Hi i had to change my sensor i pulled my leggings down and my sensor came off so i hooked my sensor up to my phone and code to share with you is [id number]

## 2022-03-28 NOTE — TELEPHONE ENCOUNTER
From: Rosario Mancini  Sent: 3/28/2022 8:50 AM CDT  To: Em Endo Clinical Staff  Subject: Check In    It much better but not exactly how it need to be it need work i

## 2022-04-01 ENCOUNTER — PATIENT MESSAGE (OUTPATIENT)
Dept: INTERNAL MEDICINE CLINIC | Facility: CLINIC | Age: 32
End: 2022-04-01

## 2022-04-04 NOTE — TELEPHONE ENCOUNTER
Can we look into this, I entered the note in patient's chart on 3/28/22, was it faxed over to her employer?

## 2022-04-04 NOTE — TELEPHONE ENCOUNTER
From: Rosario Mancini  To: Deena Ellison MD  Sent: 3/24/2022 12:19 PM CDT  Subject: Follow up fmla    Was paperwork received for fmla?  I put request in until 05/21 back dated since 03/21

## 2022-04-08 ENCOUNTER — LAB ENCOUNTER (OUTPATIENT)
Dept: LAB | Age: 32
End: 2022-04-08
Attending: INTERNAL MEDICINE
Payer: MEDICAID

## 2022-04-08 DIAGNOSIS — E78.00 HYPERCHOLESTEREMIA: ICD-10-CM

## 2022-04-08 DIAGNOSIS — E11.9 DIABETES (HCC): Primary | ICD-10-CM

## 2022-04-08 LAB
ALBUMIN SERPL-MCNC: 3.7 G/DL (ref 3.4–5)
ALBUMIN/GLOB SERPL: 1.2 {RATIO} (ref 1–2)
ALP LIVER SERPL-CCNC: 96 U/L
ALT SERPL-CCNC: 18 U/L
ANION GAP SERPL CALC-SCNC: 4 MMOL/L (ref 0–18)
AST SERPL-CCNC: 11 U/L (ref 15–37)
BILIRUB SERPL-MCNC: 0.4 MG/DL (ref 0.1–2)
BUN BLD-MCNC: 14 MG/DL (ref 7–18)
CALCIUM BLD-MCNC: 9.4 MG/DL (ref 8.5–10.1)
CHLORIDE SERPL-SCNC: 108 MMOL/L (ref 98–112)
CHOLEST SERPL-MCNC: 185 MG/DL (ref ?–200)
CO2 SERPL-SCNC: 27 MMOL/L (ref 21–32)
CREAT BLD-MCNC: 0.67 MG/DL
CREAT UR-SCNC: 217 MG/DL
EST. AVERAGE GLUCOSE BLD GHB EST-MCNC: 275 MG/DL (ref 68–126)
FASTING PATIENT LIPID ANSWER: YES
FASTING STATUS PATIENT QL REPORTED: YES
GLOBULIN PLAS-MCNC: 3 G/DL (ref 2.8–4.4)
GLUCOSE BLD-MCNC: 126 MG/DL (ref 70–99)
HBA1C MFR BLD: 11.2 % (ref ?–5.7)
HDLC SERPL-MCNC: 53 MG/DL (ref 40–59)
LDLC SERPL CALC-MCNC: 121 MG/DL (ref ?–100)
MICROALBUMIN UR-MCNC: 1.66 MG/DL
MICROALBUMIN/CREAT 24H UR-RTO: 7.6 UG/MG (ref ?–30)
NONHDLC SERPL-MCNC: 132 MG/DL (ref ?–130)
OSMOLALITY SERPL CALC.SUM OF ELEC: 290 MOSM/KG (ref 275–295)
POTASSIUM SERPL-SCNC: 4.4 MMOL/L (ref 3.5–5.1)
PROT SERPL-MCNC: 6.7 G/DL (ref 6.4–8.2)
SODIUM SERPL-SCNC: 139 MMOL/L (ref 136–145)
TRIGL SERPL-MCNC: 57 MG/DL (ref 30–149)
VLDLC SERPL CALC-MCNC: 10 MG/DL (ref 0–30)

## 2022-04-08 PROCEDURE — 80061 LIPID PANEL: CPT

## 2022-04-08 PROCEDURE — 82043 UR ALBUMIN QUANTITATIVE: CPT | Performed by: INTERNAL MEDICINE

## 2022-04-08 PROCEDURE — 80053 COMPREHEN METABOLIC PANEL: CPT | Performed by: INTERNAL MEDICINE

## 2022-04-08 PROCEDURE — 83036 HEMOGLOBIN GLYCOSYLATED A1C: CPT | Performed by: INTERNAL MEDICINE

## 2022-04-08 PROCEDURE — 36415 COLL VENOUS BLD VENIPUNCTURE: CPT

## 2022-04-08 PROCEDURE — 82570 ASSAY OF URINE CREATININE: CPT | Performed by: INTERNAL MEDICINE

## 2022-04-25 ENCOUNTER — TELEPHONE (OUTPATIENT)
Dept: INTERNAL MEDICINE CLINIC | Facility: CLINIC | Age: 32
End: 2022-04-25

## 2022-04-25 NOTE — TELEPHONE ENCOUNTER
Received on call page from patient on 4/24/22 stating her boyfriend (who she lives with) tested positive for COVID-19. Pt denies any current symptoms. Discussed CDC recs for quarantine and mask wearing. Rec testing today and again in 5 days. Pt to notify office if she develops symptoms.

## 2022-05-13 RX ORDER — BLOOD-GLUCOSE SENSOR
1 EACH MISCELLANEOUS
Qty: 9 EACH | Refills: 0 | Status: SHIPPED | OUTPATIENT
Start: 2022-05-13

## 2022-05-13 RX ORDER — ALPRAZOLAM 1 MG/1
1 TABLET ORAL 2 TIMES DAILY PRN
Qty: 60 TABLET | Refills: 2 | Status: SHIPPED | OUTPATIENT
Start: 2022-05-13

## 2022-05-13 RX ORDER — PHENTERMINE HYDROCHLORIDE 37.5 MG/1
37.5 TABLET ORAL
Qty: 30 TABLET | Refills: 2 | Status: SHIPPED | OUTPATIENT
Start: 2022-05-13

## 2022-05-13 NOTE — TELEPHONE ENCOUNTER
Phentermine HCl 37.5 MG   Filled 2-18-22  Qty 30  2 refills  No upcoming appt   LOV 3-21-22    Alprazolam 1 mg  Filled 2-18-22  Qty 60  2 refills  No upcoming appt   LOV 3-21-22    Dexcom G6   Filled 2-7-22  Qty 9  0 refill  No upcoming appt   LOV 3-21-22

## 2022-06-30 DIAGNOSIS — R05.8 PRODUCTIVE COUGH: ICD-10-CM

## 2022-06-30 RX ORDER — BENZONATATE 100 MG/1
CAPSULE ORAL
Qty: 12 CAPSULE | Refills: 0 | OUTPATIENT
Start: 2022-06-30

## 2022-07-02 DIAGNOSIS — E10.65 UNCONTROLLED TYPE 1 DIABETES MELLITUS WITH HYPERGLYCEMIA (HCC): ICD-10-CM

## 2022-07-05 RX ORDER — BLOOD-GLUCOSE SENSOR
1 EACH MISCELLANEOUS
Qty: 9 EACH | Refills: 0 | Status: SHIPPED | OUTPATIENT
Start: 2022-07-05

## 2022-07-06 ENCOUNTER — TELEMEDICINE (OUTPATIENT)
Dept: INTERNAL MEDICINE CLINIC | Facility: CLINIC | Age: 32
End: 2022-07-06
Payer: MEDICAID

## 2022-07-06 DIAGNOSIS — F41.1 GENERALIZED ANXIETY DISORDER: ICD-10-CM

## 2022-07-06 DIAGNOSIS — R51.9 DAILY HEADACHE: ICD-10-CM

## 2022-07-06 DIAGNOSIS — F51.01 PRIMARY INSOMNIA: ICD-10-CM

## 2022-07-06 DIAGNOSIS — R53.83 FATIGUE, UNSPECIFIED TYPE: Primary | ICD-10-CM

## 2022-07-06 DIAGNOSIS — E10.42 DM TYPE 1 WITH DIABETIC PERIPHERAL NEUROPATHY (HCC): ICD-10-CM

## 2022-07-06 PROCEDURE — 99214 OFFICE O/P EST MOD 30 MIN: CPT | Performed by: INTERNAL MEDICINE

## 2022-07-06 RX ORDER — ALPRAZOLAM 1 MG/1
1 TABLET ORAL 2 TIMES DAILY PRN
Qty: 60 TABLET | Refills: 3 | Status: SHIPPED | OUTPATIENT
Start: 2022-07-06

## 2022-07-06 NOTE — PROGRESS NOTES
Andrew Rosado is a 36year old female here today for a telemedicine/video visit. Patient is in the state of [place] during this visit. Andrew Rosado verbally consents to a Video visit service on 07/06/22. Patient understands and accepts financial responsibility for any deductible, co-insurance and/or co-pays associated with this service. Duration of the service: 9 minutes  Start time: 12:35 PM  End time: 12:44 PM    HPI:  Patient presents to discuss several issues. She has been dealing with persistent fatigue, tiredness. Going on for past few weeks. Some congestion, polyuria. No fevers/chills/sweats. No shortness of breath. No chest pain. Dealing with daily headaches - intermittent. Front of head, back of head. No photophobia, no nausea/emesis. Chronic issues:  DM I - has appointment with Endocrinology next week. Anxiety, insomnia - stable on medication therapy. Past medical, surgical, family, and social histories were reviewed and are unchanged from previous visit. ROS:  GENERAL: fatigue/tiredness  SKIN: denies any unusual skin lesions  EYES: denies blurred vision or double vision  HEENT: denies nasal congestion, sinus pain or sinus tenderness  LUNGS: denies shortness of breath with exertion  CARDIOVASCULAR: denies chest pain on exertion  GI: denies abdominal pain, denies heartburn, denies diarrhea  MUSCULOSKELETAL: denies back pain, denies body aches  NEURO: headaches    EXAM:  GENERAL: Alert and oriented, well developed, well nourished,in no apparent distress  SKIN: no rashes,no suspicious lesions of face  HEENT: atraumatic, EOMI, normal lid and conjunctiva  NECK: no grossly visible jvd or thyromegaly  LUNGS: speaking in full sentences, no increased work of breathing, no audible wheezing  NEURO: alert and oriented x 3  PSYCH: pleasant, appropriate mood and affect    ASSESSMENT/PLAN:  1. Fatigue, unspecified type  2.  Daily headache  Patient with persistent fatigue, daily headaches for 2-3 weeks. Headaches mostly temporal, intermittent. No associated photophobia, nausea, aura, migraine symptoms. Some congestion, urinary symptoms. Will check labs as below. Also has upcoming appointment with Endocrinology next week. Uncontrolled diabetes could be contributory to some of her symptoms. May consider referral to Neurology for further evaluation if labs are unremarkable. - CBC WITH DIFFERENTIAL WITH PLATELET; Future  - COMP METABOLIC PANEL (14); Future  - TSH W REFLEX TO FREE T4; Future  - VITAMIN D, 25-HYDROXY; Future  - VITAMIN B12; Future  - URINALYSIS, ROUTINE; Future    3. DM type 1 with diabetic peripheral neuropathy (HCC)  Uncontrolled. Has appointment with Endocrinology next week. Due for eye exam.  - COMP METABOLIC PANEL (14); Future    4. Generalized anxiety disorder  5. Primary insomnia  Alprazolam refilled. Also on bupropion  mg every day. - ALPRAZolam 1 MG Oral Tab; Take 1 tablet (1 mg total) by mouth 2 (two) times daily as needed for Anxiety or Sleep. Dispense: 60 tablet; Refill: 3    Return to clinic in 3-6 months for follow up on chronic issues, or earlier as needed for acute issues. Please note that the following visit was completed using two-way, real-time interactive audio and video communication. This has been done in good og to provide continuity of care in the best interest of the provider-patient relationship, due to the ongoing public health crisis/national emergency and because of restrictions of visitation. There are limitations of this visit as a complete head to toe physical exam could not be performed. Every conscious effort was taken to allow for sufficient and adequate time. This billing was spent on reviewing labs, medications, radiology tests and decision making. Appropriate medical decision-making and tests are ordered as detailed in the plan of care above.

## 2022-07-07 ENCOUNTER — LAB ENCOUNTER (OUTPATIENT)
Dept: LAB | Age: 32
End: 2022-07-07
Attending: INTERNAL MEDICINE
Payer: MEDICAID

## 2022-07-07 DIAGNOSIS — R51.9 DAILY HEADACHE: ICD-10-CM

## 2022-07-07 DIAGNOSIS — E10.42 DM TYPE 1 WITH DIABETIC PERIPHERAL NEUROPATHY (HCC): ICD-10-CM

## 2022-07-07 DIAGNOSIS — R53.83 FATIGUE, UNSPECIFIED TYPE: ICD-10-CM

## 2022-07-07 LAB
ALBUMIN SERPL-MCNC: 3.3 G/DL (ref 3.4–5)
ALBUMIN/GLOB SERPL: 1 {RATIO} (ref 1–2)
ALP LIVER SERPL-CCNC: 98 U/L
ALT SERPL-CCNC: 13 U/L
ANION GAP SERPL CALC-SCNC: 4 MMOL/L (ref 0–18)
AST SERPL-CCNC: 9 U/L (ref 15–37)
BASOPHILS # BLD AUTO: 0.03 X10(3) UL (ref 0–0.2)
BASOPHILS NFR BLD AUTO: 0.3 %
BILIRUB SERPL-MCNC: 0.5 MG/DL (ref 0.1–2)
BILIRUB UR QL STRIP.AUTO: NEGATIVE
BUN BLD-MCNC: 10 MG/DL (ref 7–18)
CALCIUM BLD-MCNC: 9.1 MG/DL (ref 8.5–10.1)
CHLORIDE SERPL-SCNC: 105 MMOL/L (ref 98–112)
CLARITY UR REFRACT.AUTO: CLEAR
CO2 SERPL-SCNC: 25 MMOL/L (ref 21–32)
COLOR UR AUTO: YELLOW
CREAT BLD-MCNC: 0.66 MG/DL
EOSINOPHIL # BLD AUTO: 0.51 X10(3) UL (ref 0–0.7)
EOSINOPHIL NFR BLD AUTO: 5.9 %
ERYTHROCYTE [DISTWIDTH] IN BLOOD BY AUTOMATED COUNT: 13.8 %
FASTING STATUS PATIENT QL REPORTED: NO
GLOBULIN PLAS-MCNC: 3.2 G/DL (ref 2.8–4.4)
GLUCOSE BLD-MCNC: 223 MG/DL (ref 70–99)
GLUCOSE UR STRIP.AUTO-MCNC: NEGATIVE MG/DL
HCT VFR BLD AUTO: 41.4 %
HGB BLD-MCNC: 13.2 G/DL
IMM GRANULOCYTES # BLD AUTO: 0.03 X10(3) UL (ref 0–1)
IMM GRANULOCYTES NFR BLD: 0.3 %
KETONES UR STRIP.AUTO-MCNC: NEGATIVE MG/DL
LEUKOCYTE ESTERASE UR QL STRIP.AUTO: NEGATIVE
LYMPHOCYTES # BLD AUTO: 2.83 X10(3) UL (ref 1–4)
LYMPHOCYTES NFR BLD AUTO: 32.8 %
MCH RBC QN AUTO: 28.7 PG (ref 26–34)
MCHC RBC AUTO-ENTMCNC: 31.9 G/DL (ref 31–37)
MCV RBC AUTO: 90 FL
MONOCYTES # BLD AUTO: 0.75 X10(3) UL (ref 0.1–1)
MONOCYTES NFR BLD AUTO: 8.7 %
NEUTROPHILS # BLD AUTO: 4.47 X10 (3) UL (ref 1.5–7.7)
NEUTROPHILS # BLD AUTO: 4.47 X10(3) UL (ref 1.5–7.7)
NEUTROPHILS NFR BLD AUTO: 52 %
NITRITE UR QL STRIP.AUTO: NEGATIVE
OSMOLALITY SERPL CALC.SUM OF ELEC: 284 MOSM/KG (ref 275–295)
PH UR STRIP.AUTO: 7.5 [PH] (ref 5–8)
PLATELET # BLD AUTO: 208 10(3)UL (ref 150–450)
POTASSIUM SERPL-SCNC: 3.8 MMOL/L (ref 3.5–5.1)
PROT SERPL-MCNC: 6.5 G/DL (ref 6.4–8.2)
PROT UR STRIP.AUTO-MCNC: NEGATIVE MG/DL
RBC # BLD AUTO: 4.6 X10(6)UL
RBC UR QL AUTO: NEGATIVE
SODIUM SERPL-SCNC: 134 MMOL/L (ref 136–145)
SP GR UR STRIP.AUTO: 1.02 (ref 1–1.03)
TSI SER-ACNC: 0.88 MIU/ML (ref 0.36–3.74)
UROBILINOGEN UR STRIP.AUTO-MCNC: 1 MG/DL
VIT B12 SERPL-MCNC: 431 PG/ML (ref 193–986)
VIT D+METAB SERPL-MCNC: 19.8 NG/ML (ref 30–100)
WBC # BLD AUTO: 8.6 X10(3) UL (ref 4–11)

## 2022-07-07 PROCEDURE — 82607 VITAMIN B-12: CPT

## 2022-07-07 PROCEDURE — 82306 VITAMIN D 25 HYDROXY: CPT

## 2022-07-07 PROCEDURE — 85025 COMPLETE CBC W/AUTO DIFF WBC: CPT

## 2022-07-07 PROCEDURE — 84443 ASSAY THYROID STIM HORMONE: CPT

## 2022-07-07 PROCEDURE — 36415 COLL VENOUS BLD VENIPUNCTURE: CPT

## 2022-07-07 PROCEDURE — 81003 URINALYSIS AUTO W/O SCOPE: CPT

## 2022-07-07 PROCEDURE — 80053 COMPREHEN METABOLIC PANEL: CPT

## 2022-08-04 ENCOUNTER — TELEPHONE (OUTPATIENT)
Dept: INTERNAL MEDICINE CLINIC | Facility: CLINIC | Age: 32
End: 2022-08-04

## 2022-08-04 ENCOUNTER — MOBILE ENCOUNTER (OUTPATIENT)
Dept: INTERNAL MEDICINE CLINIC | Facility: CLINIC | Age: 32
End: 2022-08-04

## 2022-08-04 RX ORDER — INSULIN DETEMIR 100 [IU]/ML
46 INJECTION, SOLUTION SUBCUTANEOUS DAILY
Qty: 6 ML | Refills: 0 | Status: SHIPPED | OUTPATIENT
Start: 2022-08-04

## 2022-08-04 NOTE — TELEPHONE ENCOUNTER
Patient paged requesting urgent refill on levemir. Has an appointment with endocrinology coming up, but glucose is elevated.  Will refill, but needs to follow up with endocrinology  Sigrid Barros DO

## 2022-08-05 RX ORDER — INSULIN DETEMIR 100 [IU]/ML
46 INJECTION, SOLUTION SUBCUTANEOUS DAILY
Qty: 6 ML | Refills: 0 | OUTPATIENT
Start: 2022-08-05

## 2022-08-05 RX ORDER — INSULIN LISPRO 100 [IU]/ML
INJECTION, SOLUTION INTRAVENOUS; SUBCUTANEOUS
Qty: 6 ML | Refills: 0 | OUTPATIENT
Start: 2022-08-05

## 2022-08-14 DIAGNOSIS — E10.8 TYPE 1 DIABETES MELLITUS WITH COMPLICATION, WITH LONG TERM CURRENT USE OF INSULIN PUMP (HCC): ICD-10-CM

## 2022-08-14 DIAGNOSIS — Z96.41 TYPE 1 DIABETES MELLITUS WITH COMPLICATION, WITH LONG TERM CURRENT USE OF INSULIN PUMP (HCC): ICD-10-CM

## 2022-08-14 DIAGNOSIS — E10.42 DM TYPE 1 WITH DIABETIC PERIPHERAL NEUROPATHY (HCC): Primary | ICD-10-CM

## 2022-08-14 RX ORDER — INSULIN DETEMIR 100 [IU]/ML
26 INJECTION, SOLUTION SUBCUTANEOUS EVERY 12 HOURS
Qty: 15 ML | Refills: 2 | Status: SHIPPED | OUTPATIENT
Start: 2022-08-14 | End: 2022-11-12

## 2022-08-14 RX ORDER — PEN NEEDLE, DIABETIC 32GX 5/32"
1 NEEDLE, DISPOSABLE MISCELLANEOUS 2 TIMES DAILY
Qty: 100 EACH | Refills: 2 | Status: SHIPPED | OUTPATIENT
Start: 2022-08-14

## 2022-08-14 NOTE — PROGRESS NOTES
Received call from patient, she is out of her Levemir. Advised her to schedule follow up with Endocrinology asap, looks like she cancelled appointment with Endo last week. Levemir and true plus insulin needle prescriptions sent to pharmacy.

## 2022-08-23 ENCOUNTER — TELEPHONE (OUTPATIENT)
Dept: INTERNAL MEDICINE CLINIC | Facility: CLINIC | Age: 32
End: 2022-08-23

## 2022-08-23 NOTE — TELEPHONE ENCOUNTER
Pt scheduled a VV on MyChart for tomorrow for, \"I am just feeling really under the weather, i have no want to do anything i can hardly focus\"    Ok to keep as VV or switch to in office ?     Future Appointments   Date Time Provider Bonifacio Khalil   8/24/2022 10:00 AM Allan David MD [facility] [place]

## 2022-08-24 ENCOUNTER — TELEPHONE (OUTPATIENT)
Dept: ENDOCRINOLOGY CLINIC | Facility: CLINIC | Age: 32
End: 2022-08-24

## 2022-08-24 ENCOUNTER — TELEMEDICINE (OUTPATIENT)
Dept: INTERNAL MEDICINE CLINIC | Facility: CLINIC | Age: 32
End: 2022-08-24

## 2022-08-24 ENCOUNTER — OFFICE VISIT (OUTPATIENT)
Dept: ENDOCRINOLOGY CLINIC | Facility: CLINIC | Age: 32
End: 2022-08-24
Payer: MEDICAID

## 2022-08-24 ENCOUNTER — LAB ENCOUNTER (OUTPATIENT)
Dept: LAB | Facility: HOSPITAL | Age: 32
End: 2022-08-24
Attending: HOSPITALIST
Payer: MEDICAID

## 2022-08-24 VITALS
DIASTOLIC BLOOD PRESSURE: 86 MMHG | BODY MASS INDEX: 40.37 KG/M2 | HEART RATE: 102 BPM | HEIGHT: 62.75 IN | RESPIRATION RATE: 16 BRPM | WEIGHT: 225 LBS | SYSTOLIC BLOOD PRESSURE: 139 MMHG

## 2022-08-24 DIAGNOSIS — M54.9 UPPER BACK PAIN: ICD-10-CM

## 2022-08-24 DIAGNOSIS — M25.511 ACUTE PAIN OF BOTH SHOULDERS: ICD-10-CM

## 2022-08-24 DIAGNOSIS — F41.1 GENERALIZED ANXIETY DISORDER: ICD-10-CM

## 2022-08-24 DIAGNOSIS — E78.00 HYPERCHOLESTEROLEMIA: ICD-10-CM

## 2022-08-24 DIAGNOSIS — E10.65 UNCONTROLLED TYPE 1 DIABETES MELLITUS WITH HYPERGLYCEMIA (HCC): Primary | ICD-10-CM

## 2022-08-24 DIAGNOSIS — R51.9 BILATERAL HEADACHES: Primary | ICD-10-CM

## 2022-08-24 DIAGNOSIS — R53.83 FATIGUE, UNSPECIFIED TYPE: ICD-10-CM

## 2022-08-24 DIAGNOSIS — M25.512 ACUTE PAIN OF BOTH SHOULDERS: ICD-10-CM

## 2022-08-24 DIAGNOSIS — E66.01 MORBID OBESITY (HCC): ICD-10-CM

## 2022-08-24 DIAGNOSIS — E10.65 UNCONTROLLED TYPE 1 DIABETES MELLITUS WITH HYPERGLYCEMIA (HCC): ICD-10-CM

## 2022-08-24 DIAGNOSIS — R51.9 BILATERAL HEADACHES: ICD-10-CM

## 2022-08-24 DIAGNOSIS — F33.1 MDD (MAJOR DEPRESSIVE DISORDER), RECURRENT EPISODE, MODERATE (HCC): ICD-10-CM

## 2022-08-24 LAB
ALBUMIN SERPL-MCNC: 3.6 G/DL (ref 3.4–5)
ALBUMIN/GLOB SERPL: 1 {RATIO} (ref 1–2)
ALP LIVER SERPL-CCNC: 98 U/L
ALT SERPL-CCNC: 17 U/L
ANION GAP SERPL CALC-SCNC: 6 MMOL/L (ref 0–18)
AST SERPL-CCNC: 10 U/L (ref 15–37)
BASOPHILS # BLD AUTO: 0.04 X10(3) UL (ref 0–0.2)
BASOPHILS NFR BLD AUTO: 0.5 %
BILIRUB SERPL-MCNC: 0.3 MG/DL (ref 0.1–2)
BUN BLD-MCNC: 11 MG/DL (ref 7–18)
BUN/CREAT SERPL: 15.5 (ref 10–20)
CALCIUM BLD-MCNC: 10 MG/DL (ref 8.5–10.1)
CHLORIDE SERPL-SCNC: 103 MMOL/L (ref 98–112)
CHOLEST SERPL-MCNC: 213 MG/DL (ref ?–200)
CK SERPL-CCNC: 161 U/L
CO2 SERPL-SCNC: 28 MMOL/L (ref 21–32)
CREAT BLD-MCNC: 0.71 MG/DL
CREAT UR-SCNC: 71 MG/DL
DEPRECATED RDW RBC AUTO: 42.8 FL (ref 35.1–46.3)
EOSINOPHIL # BLD AUTO: 0.44 X10(3) UL (ref 0–0.7)
EOSINOPHIL NFR BLD AUTO: 5.8 %
ERYTHROCYTE [DISTWIDTH] IN BLOOD BY AUTOMATED COUNT: 13.3 % (ref 11–15)
ERYTHROCYTE [SEDIMENTATION RATE] IN BLOOD: 11 MM/HR
EST. AVERAGE GLUCOSE BLD GHB EST-MCNC: 275 MG/DL (ref 68–126)
FASTING PATIENT LIPID ANSWER: YES
FASTING STATUS PATIENT QL REPORTED: YES
FOLATE SERPL-MCNC: 9.8 NG/ML (ref 8.7–?)
GFR SERPLBLD BASED ON 1.73 SQ M-ARVRAT: 118 ML/MIN/1.73M2 (ref 60–?)
GLOBULIN PLAS-MCNC: 3.7 G/DL (ref 2.8–4.4)
GLUCOSE BLD-MCNC: 339 MG/DL (ref 70–99)
GLUCOSE BLOOD: 408
HBA1C MFR BLD: 11.2 % (ref ?–5.7)
HCT VFR BLD AUTO: 43.8 %
HDLC SERPL-MCNC: 52 MG/DL (ref 40–59)
HGB BLD-MCNC: 14.3 G/DL
IMM GRANULOCYTES # BLD AUTO: 0.01 X10(3) UL (ref 0–1)
IMM GRANULOCYTES NFR BLD: 0.1 %
LDLC SERPL CALC-MCNC: 148 MG/DL (ref ?–100)
LYMPHOCYTES # BLD AUTO: 2.76 X10(3) UL (ref 1–4)
LYMPHOCYTES NFR BLD AUTO: 36.7 %
MCH RBC QN AUTO: 28.7 PG (ref 26–34)
MCHC RBC AUTO-ENTMCNC: 32.6 G/DL (ref 31–37)
MCV RBC AUTO: 87.8 FL
MICROALBUMIN UR-MCNC: <0.5 MG/DL
MONOCYTES # BLD AUTO: 0.57 X10(3) UL (ref 0.1–1)
MONOCYTES NFR BLD AUTO: 7.6 %
NEUTROPHILS # BLD AUTO: 3.71 X10 (3) UL (ref 1.5–7.7)
NEUTROPHILS # BLD AUTO: 3.71 X10(3) UL (ref 1.5–7.7)
NEUTROPHILS NFR BLD AUTO: 49.3 %
NONHDLC SERPL-MCNC: 161 MG/DL (ref ?–130)
OSMOLALITY SERPL CALC.SUM OF ELEC: 297 MOSM/KG (ref 275–295)
PLATELET # BLD AUTO: 206 10(3)UL (ref 150–450)
POTASSIUM SERPL-SCNC: 3.9 MMOL/L (ref 3.5–5.1)
PROT SERPL-MCNC: 7.3 G/DL (ref 6.4–8.2)
RBC # BLD AUTO: 4.99 X10(6)UL
SODIUM SERPL-SCNC: 137 MMOL/L (ref 136–145)
T4 FREE SERPL-MCNC: 0.9 NG/DL (ref 0.8–1.7)
TEST STRIP LOT #: NORMAL NUMERIC
TRIGL SERPL-MCNC: 75 MG/DL (ref 30–149)
TSI SER-ACNC: 0.63 MIU/ML (ref 0.36–3.74)
VLDLC SERPL CALC-MCNC: 14 MG/DL (ref 0–30)
WBC # BLD AUTO: 7.5 X10(3) UL (ref 4–11)

## 2022-08-24 PROCEDURE — 3061F NEG MICROALBUMINURIA REV: CPT | Performed by: INTERNAL MEDICINE

## 2022-08-24 PROCEDURE — 82947 ASSAY GLUCOSE BLOOD QUANT: CPT | Performed by: INTERNAL MEDICINE

## 2022-08-24 PROCEDURE — 99214 OFFICE O/P EST MOD 30 MIN: CPT | Performed by: INTERNAL MEDICINE

## 2022-08-24 PROCEDURE — 3008F BODY MASS INDEX DOCD: CPT | Performed by: INTERNAL MEDICINE

## 2022-08-24 PROCEDURE — 84439 ASSAY OF FREE THYROXINE: CPT

## 2022-08-24 PROCEDURE — 36415 COLL VENOUS BLD VENIPUNCTURE: CPT

## 2022-08-24 PROCEDURE — 83036 HEMOGLOBIN GLYCOSYLATED A1C: CPT

## 2022-08-24 PROCEDURE — 84443 ASSAY THYROID STIM HORMONE: CPT

## 2022-08-24 PROCEDURE — 85652 RBC SED RATE AUTOMATED: CPT

## 2022-08-24 PROCEDURE — 82570 ASSAY OF URINE CREATININE: CPT

## 2022-08-24 PROCEDURE — 3075F SYST BP GE 130 - 139MM HG: CPT | Performed by: INTERNAL MEDICINE

## 2022-08-24 PROCEDURE — 82746 ASSAY OF FOLIC ACID SERUM: CPT

## 2022-08-24 PROCEDURE — 3079F DIAST BP 80-89 MM HG: CPT | Performed by: INTERNAL MEDICINE

## 2022-08-24 PROCEDURE — 82043 UR ALBUMIN QUANTITATIVE: CPT

## 2022-08-24 PROCEDURE — 82550 ASSAY OF CK (CPK): CPT

## 2022-08-24 PROCEDURE — 85025 COMPLETE CBC W/AUTO DIFF WBC: CPT

## 2022-08-24 PROCEDURE — 80061 LIPID PANEL: CPT

## 2022-08-24 PROCEDURE — 80053 COMPREHEN METABOLIC PANEL: CPT

## 2022-08-24 PROCEDURE — 3046F HEMOGLOBIN A1C LEVEL >9.0%: CPT | Performed by: INTERNAL MEDICINE

## 2022-08-24 RX ORDER — INSULIN PMP CART,AUT,G6/7,CNTR
1 EACH SUBCUTANEOUS AS DIRECTED
Qty: 1 KIT | Refills: 0 | Status: SHIPPED | OUTPATIENT
Start: 2022-08-24

## 2022-08-24 RX ORDER — BUPROPION HYDROCHLORIDE 150 MG/1
150 TABLET ORAL DAILY
Qty: 90 TABLET | Refills: 1 | Status: SHIPPED | OUTPATIENT
Start: 2022-08-24

## 2022-08-24 RX ORDER — PHENTERMINE HYDROCHLORIDE 37.5 MG/1
37.5 TABLET ORAL
Qty: 30 TABLET | Refills: 2 | Status: SHIPPED | OUTPATIENT
Start: 2022-08-24

## 2022-08-24 RX ORDER — INSULIN PMP CART,AUT,G6/7,CNTR
1 EACH SUBCUTANEOUS AS DIRECTED
Qty: 30 EACH | Refills: 0 | Status: SHIPPED | OUTPATIENT
Start: 2022-08-24

## 2022-08-24 RX ORDER — TOPIRAMATE 50 MG/1
50 TABLET, FILM COATED ORAL 2 TIMES DAILY
Qty: 180 TABLET | Refills: 1 | Status: SHIPPED | OUTPATIENT
Start: 2022-08-24

## 2022-08-24 NOTE — PROGRESS NOTES
Scott Pereira is a 36year old female here today for a telemedicine/video visit. Patient is in the state of [place] during this visit. Scott Pereira verbally consents to a Video visit service on 08/24/22. Patient understands and accepts financial responsibility for any deductible, co-insurance and/or co-pays associated with this service. Duration of the service: 6 minutes  Start time: 10:15 AM  End time: 10:21 AM    HPI:  Patient dealing with several acute issues. She is dealing with daily/persistent headaches. Pain in the shoulders and neck. Generalized fatigue. Decreased motivation, concentration. No nausea, emesis, diarrhea. No cough, sore throat. No fevers/chills/sweats. Other chronic issues:  Obesity - continues with phentermine, topiramate. DM I - appointment with Endocrinology later today. Anxiety and depression - as above, she has been dealing with decreased motivation/psychomotor slowing, concentration deficits. Past medical, surgical, family, and social histories were reviewed and are unchanged from previous visit. ROS:  GENERAL: fatigue  SKIN: denies any unusual skin lesions  EYES: denies blurred vision or double vision  HEENT: denies nasal congestion, sinus pain or sinus tenderness  LUNGS: denies shortness of breath with exertion  CARDIOVASCULAR: denies chest pain on exertion  GI: denies abdominal pain, denies heartburn, denies diarrhea  MUSCULOSKELETAL: upper back, shoulder pain  NEURO: denies headaches  PSYCH: anxiety and depression    EXAM:  GENERAL: Alert and oriented, well developed, well nourished,in no apparent distress  SKIN: no rashes,no suspicious lesions of face  HEENT: atraumatic, EOMI, normal lid and conjunctiva  NECK: no grossly visible jvd or thyromegaly  LUNGS: speaking in full sentences, no increased work of breathing, no audible wheezing  NEURO: alert and oriented x 3  PSYCH: pleasant, appropriate mood and affect    ASSESSMENT/PLAN:  1. Bilateral headaches  2. Upper back pain  3. Acute pain of both shoulders  4. Fatigue, unspecified type  5. MDD (major depressive disorder), recurrent episode, moderate ([address]  6. Generalized anxiety disorder  Patient with several symptoms over the past few weeks - bilateral headaches (mostly frontal/sinus area), upper back and bilateral shoulder pain, generalized fatigue. Psychomotor slowing, lack of motivation as well. Does have chronic anxiety and depression. This could be contributory to some of these symptoms. No fevers/chills/sweats. No cough/sore throat/shortness of breath. No diarrhea/abdominal pain. Does have uncontrolled diabetes. Seeing Endocrinology later today. Will check labs as below. Will continue bupropion and PRN alprazolam for now. May need medication adjustment and/or referral to Psychiatry. She has been having some increased anxiety of late. Affecting ability to work at times. - CK CREATINE KINASE (NOT CREATININE); Future  - TSH W REFLEX TO FREE T4; Future  - CBC WITH DIFFERENTIAL WITH PLATELET; Future  - COMP METABOLIC PANEL (14); Future  - SED RATE, WESTERGREN (AUTOMATED); Future  - FOLIC ACID SERUM(FOLATE); Future  - buPROPion  MG Oral Tablet 24 Hr; Take 1 tablet (150 mg total) by mouth daily. Dispense: 90 tablet; Refill: 1    7. Morbid obesity ([address]  Continue with phentermine, topiramate for now - refilled as below. - topiramate 50 MG Oral Tab; Take 1 tablet (50 mg total) by mouth 2 (two) times daily. Dispense: 180 tablet; Refill: 1  - Phentermine HCl 37.5 MG Oral Tab; Take 1 tablet (37.5 mg total) by mouth before breakfast.  Dispense: 30 tablet; Refill: 2    Return to clinic in 2-3 months for follow up on chronic issues, or earlier as needed for acute issues. Please note that the following visit was completed using two-way, real-time interactive audio and video communication.   This has been done in good og to provide continuity of care in the best interest of the provider-patient relationship, due to the ongoing public health crisis/national emergency and because of restrictions of visitation. There are limitations of this visit as a complete head to toe physical exam could not be performed. Every conscious effort was taken to allow for sufficient and adequate time. This billing was spent on reviewing labs, medications, radiology tests and decision making. Appropriate medical decision-making and tests are ordered as detailed in the plan of care above.

## 2022-08-24 NOTE — TELEPHONE ENCOUNTER
Dr. Willy Bai,  [facility] for omnipod 5 kit and pods pended for approval to be sent to [facility] (per [facility] patients have success using this pharmacy)   Pharmacy will let us know if PA needed  Thanks

## 2022-08-25 ENCOUNTER — PATIENT MESSAGE (OUTPATIENT)
Dept: INTERNAL MEDICINE CLINIC | Facility: CLINIC | Age: 32
End: 2022-08-25

## 2022-08-25 PROBLEM — E10.65 UNCONTROLLED TYPE 1 DIABETES MELLITUS WITH HYPERGLYCEMIA (HCC): Status: ACTIVE | Noted: 2022-08-25

## 2022-08-25 RX ORDER — MELOXICAM 7.5 MG/1
7.5 TABLET ORAL DAILY
Qty: 30 TABLET | Refills: 0 | Status: SHIPPED | OUTPATIENT
Start: 2022-08-25

## 2022-08-25 RX ORDER — TIZANIDINE 4 MG/1
4 TABLET ORAL NIGHTLY PRN
Qty: 30 TABLET | Refills: 0 | Status: SHIPPED | OUTPATIENT
Start: 2022-08-25

## 2022-08-26 ENCOUNTER — TELEPHONE (OUTPATIENT)
Dept: ENDOCRINOLOGY CLINIC | Facility: CLINIC | Age: 32
End: 2022-08-26

## 2022-08-26 ENCOUNTER — PATIENT MESSAGE (OUTPATIENT)
Dept: ENDOCRINOLOGY CLINIC | Facility: CLINIC | Age: 32
End: 2022-08-26

## 2022-08-26 NOTE — TELEPHONE ENCOUNTER
From: Kimberly Teixeira  To: Barbi Duckworth MD  Sent: 8/26/2022 9:22 AM CDT  Subject: Hi-     I found my old omni pod day i saw you but i have a problem my sugar falling like to 60 between 12am and 6am

## 2022-08-26 NOTE — TELEPHONE ENCOUNTER
Hi!  Please ask her to decrease basal rate from 12AM to 7AM from 1.3 to 1.2, and give us an update tomorrow or on Monday. Thank you!

## 2022-08-26 NOTE — TELEPHONE ENCOUNTER
decrease levemir to 20 BID  Limited data since she was desiree mcclain, but she is having low BG overnight, hence will decrease levemir  Thank

## 2022-08-26 NOTE — TELEPHONE ENCOUNTER
From: Lyssa Hua  To: Andres Livingston MD  Sent: 8/25/2022 12:21 PM CDT  Subject: Test results    I saw some results were very low is this anything I should be concerned about

## 2022-08-26 NOTE — TELEPHONE ENCOUNTER
Dr. Sherrie Smith report placed on your desk for review. Uncontrolled type 1 diabetic. Time in range 24%. Patient last saw Dr. Pasquale Roew on 8/24. She used to be on a pump but now is on MDI. Omnipod 5 pump order placed for patient.     Medications for DM:   22 units bid of levemir; Humalog: ICR: 1:4

## 2022-08-26 NOTE — TELEPHONE ENCOUNTER
MANASA, A [facility]   calling regarding omni 5 6 pod and kit that require both prior authorizations. Please call   [phone], the patients id [id number] to start prior authorization. Also, please contact pharmacy to inform frequency of the pod changes, at [phone],NEXLAURITAEE.

## 2022-08-27 ENCOUNTER — TELEPHONE (OUTPATIENT)
Dept: ENDOCRINOLOGY CLINIC | Facility: CLINIC | Age: 32
End: 2022-08-27

## 2022-08-27 DIAGNOSIS — E10.65 UNCONTROLLED TYPE 1 DIABETES MELLITUS WITH HYPERGLYCEMIA (HCC): Primary | ICD-10-CM

## 2022-08-27 DIAGNOSIS — E78.00 HYPERCHOLESTEROLEMIA: ICD-10-CM

## 2022-08-30 ENCOUNTER — PATIENT MESSAGE (OUTPATIENT)
Dept: INTERNAL MEDICINE CLINIC | Facility: CLINIC | Age: 32
End: 2022-08-30

## 2022-08-30 NOTE — TELEPHONE ENCOUNTER
LVTCB:     Please verify if patient is referring to FMLA ppw that was faxed and completed in May( SE TE FROM 5/17/22) . We have not received no new incoming [place] pp[place].

## 2022-08-31 ENCOUNTER — PATIENT MESSAGE (OUTPATIENT)
Dept: INTERNAL MEDICINE CLINIC | Facility: CLINIC | Age: 32
End: 2022-08-31

## 2022-08-31 NOTE — TELEPHONE ENCOUNTER
RP - pt is requesting new [facility] paperwork. Printed off the forms pt sent through [address] Placed in Dr. Vicky Tipton in basket. Sent [facility] to pt that we will notify her when the paperwork is completed.

## 2022-08-31 NOTE — TELEPHONE ENCOUNTER
From: Alice Guerrero  Sent: 8/31/2022 4:19 PM CDT  To: [facility] 08 Clinical Staff  Subject: Deny Alberto forms     Either way would be great

## 2022-09-01 ENCOUNTER — PATIENT MESSAGE (OUTPATIENT)
Dept: ENDOCRINOLOGY CLINIC | Facility: CLINIC | Age: 32
End: 2022-09-01

## 2022-09-01 RX ORDER — ATORVASTATIN CALCIUM 10 MG/1
10 TABLET, FILM COATED ORAL NIGHTLY
Qty: 90 TABLET | Refills: 0 | Status: SHIPPED | OUTPATIENT
Start: 2022-09-01 | End: 2022-11-30

## 2022-09-01 NOTE — TELEPHONE ENCOUNTER
From: Kimberyl Teixeira  To: Barbi Duckworth MD  Sent: 9/1/2022 9:56 AM CDT  Subject: Omnipod 5    So i called [employer] they said insurance is waiting for prior authorization?  Since 8/26/22

## 2022-09-01 NOTE — TELEPHONE ENCOUNTER
FMLA/ADA paperwork completed - patient should also schedule appointment with psychiatry and/or therapist to help with her current symptoms. In-network options:  Psychiatry/Medication Management Providers:    [facility]  [facility]  [place], [address] [place]  [phone]    Additional location:    [facility]  [address] [place] [address] [address]  [phone]      Travessa [address]  [address], [address]  [address] [facility] [place] , PA-C  [address]  [address] [facility], [address] [facility]  [phone]      [facility], [address] [place] [address] 424 [facility], [address] [place] [address]  [phone]      Lexus Alvarado MD  [address] Extension: [phone]      [facility] for Individual [employer]  [address] [place] [address]. [place] (Psychiatry & Therapy)  Sergio Guerra MD  [facility].  [address]  [facility], [facility]  [phone]      Odell Manzanares [facility]  [phone]    [id number] CRIRIN WTYWBXOS, [place]   [address]   [address], [address]  [facility]  [address]  [place], [address] [place] [address]  [phone]      Individual Therapy:    Travessa [address]  [address], [address]  [phone]    [facility]  [address] [facility]  [place], [address] [place]  [phone]    Additional location:    [facility]  [facility] [address]  [phone]    [facility] for Individual Development  [address] [place] [address].   [place] (Psychiatry & Therapy)  [phone]    HGMNWHWCZRSC SUEVHoracio PETERSEN   [address] [place] [address]   [address] [facility] 95  [place], [address] [place] [address], [address] [place] [address] [address] [facility]   Chelsie Leonard   [phone]

## 2022-09-01 NOTE — TELEPHONE ENCOUNTER
From: Lyssa Hua  Sent: 9/1/2022 10:27 AM CDT  To: Avelina Trujillo Clinical Staff  Subject: Message from Dr. Fernando Winters    I mean high sorry

## 2022-09-01 NOTE — TELEPHONE ENCOUNTER
Hi!  Please let her know that her 'good cholesterol' is at goal, and her circulating fast are at goal, but her 'bad cholesterol' is higher than where I would like it be. We should start the statin at a low dose in order to improve her cardiovascular risk and check Lipid panel again in 3 months. Thank you!

## 2022-09-01 NOTE — TELEPHONE ENCOUNTER
Replied to patient's mychart message with notes and instructions outlined in Dr. Marifer John message below.

## 2022-09-07 ENCOUNTER — TELEPHONE (OUTPATIENT)
Dept: ENDOCRINOLOGY CLINIC | Facility: CLINIC | Age: 32
End: 2022-09-07

## 2022-09-07 NOTE — TELEPHONE ENCOUNTER
Called [employer] [phone], spoke with June Peck. She states PAs for Omnipod 5 G6 INTRO Kit and PODS were approved from 6/2/2022-8/31/2023. Auth # [id number]. Call ref # Inga Kev 9/7/2022    [facility] specialty pharmacy, [phone], spoke with Brandon Kirkpatrick. She states they went through.      My chart message sent to patient of approval.

## 2022-09-08 ENCOUNTER — PATIENT MESSAGE (OUTPATIENT)
Dept: ENDOCRINOLOGY CLINIC | Facility: CLINIC | Age: 32
End: 2022-09-08

## 2022-09-08 NOTE — TELEPHONE ENCOUNTER
From: Rosario Mancini  Sent: 9/8/2022 2:05 PM CDT  To: Avelina Trujillo Clinical Staff  Subject: Omnipod 5    Is there a way to get diabetic educator to call me i received my omnipod 5 and would like to set it up

## 2022-09-13 ENCOUNTER — PATIENT MESSAGE (OUTPATIENT)
Dept: ENDOCRINOLOGY CLINIC | Facility: CLINIC | Age: 32
End: 2022-09-13

## 2022-09-13 NOTE — TELEPHONE ENCOUNTER
From: Galina Mathew  To: Barbi Lane MD  Sent: 9/13/2022 12:19 PM CDT  Subject: Omnipod    Can you look at my Dexcom or clarity so we can adjust my dexcom i believe my sugars are still running high

## 2022-09-13 NOTE — TELEPHONE ENCOUNTER
Per Dr. Cassidy Moss: patient will need to meet with APRN to go over variable sugar levels. Patient notified via [employer] and instructed to schedule an appointment.

## 2022-09-13 NOTE — TELEPHONE ENCOUNTER
Hi!    Is there any way the patient can show us her Omnipod settings? Then I might be able to help her? Thank you!

## 2022-09-15 NOTE — TELEPHONE ENCOUNTER
Gurvinder Durant,    Patient insistent on video visit. She stated she will upload her current omnipod data to [employer]. I told her to call office if she has problems syncing.  Patient booked VV 9/16 at 9 am.

## 2022-09-15 NOTE — TELEPHONE ENCOUNTER
I did respond to patient. Ok to see me for video visit as long as she is connected to [employer] and I can get her pump/dexcom downloads for visit. Otherwise this should really be an in person visit.

## 2022-09-16 ENCOUNTER — TELEMEDICINE (OUTPATIENT)
Dept: ENDOCRINOLOGY CLINIC | Facility: CLINIC | Age: 32
End: 2022-09-16

## 2022-09-16 DIAGNOSIS — E10.65 UNCONTROLLED TYPE 1 DIABETES MELLITUS WITH HYPERGLYCEMIA (HCC): Primary | ICD-10-CM

## 2022-09-16 DIAGNOSIS — E10.65 UNCONTROLLED TYPE 1 DIABETES MELLITUS WITH HYPERGLYCEMIA (HCC): ICD-10-CM

## 2022-09-16 RX ORDER — BLOOD-GLUCOSE SENSOR
1 EACH MISCELLANEOUS
Qty: 9 EACH | Refills: 3 | Status: SHIPPED | OUTPATIENT
Start: 2022-09-16

## 2022-09-16 NOTE — TELEPHONE ENCOUNTER
Medication PA Requested: Omnipod 5 Intro kit                                                          CoverMyMeds Used:  Key:  Quantity: 1 kit  Day Supply:   Sig: Use as directed   DX Code: E10.65                                    CPT code (if applicable):   Case Number/Pending Ref#:    Medication PA Requested: Omnipod 5 pods                                                          CoverMyMeds Used:   Key:  Quantity: 15  Day Supply: 30 days  Sig:  Change pods every 2 days   DX Code:  E10.65                                   CPT code (if applicable):   Case Number/Pending Ref#: Number Of Freeze-Thaw Cycles: 1 freeze-thaw cycle Detail Level: Detailed Render Post-Care Instructions In Note?: no Post-Care Instructions: I reviewed with the patient in detail post-care instructions. Patient is to wear sunprotection, and avoid picking at any of the treated lesions. Pt may apply Vaseline to crusted or scabbing areas.  May apply bandaid if desired. Duration Of Freeze Thaw-Cycle (Seconds): 0 Show Applicator Variable?: Yes Consent: The patient's consent was obtained including but not limited to risks of crusting, scabbing, blistering, scarring, darker or lighter pigmentary change, recurrence, incomplete removal and infection.

## 2022-09-29 ENCOUNTER — PATIENT MESSAGE (OUTPATIENT)
Dept: ENDOCRINOLOGY CLINIC | Facility: CLINIC | Age: 32
End: 2022-09-29

## 2022-09-29 RX ORDER — BLOOD-GLUCOSE METER
1 EACH MISCELLANEOUS AS DIRECTED
Qty: 1 KIT | Refills: 0 | Status: SHIPPED | OUTPATIENT
Start: 2022-09-29

## 2022-09-29 RX ORDER — LANCETS 33 GAUGE
EACH MISCELLANEOUS
Qty: 400 EACH | Refills: 0 | Status: SHIPPED | OUTPATIENT
Start: 2022-09-29

## 2022-09-29 RX ORDER — BLOOD SUGAR DIAGNOSTIC
STRIP MISCELLANEOUS
Qty: 400 STRIP | Refills: 0 | Status: SHIPPED | OUTPATIENT
Start: 2022-09-29

## 2022-09-29 NOTE — TELEPHONE ENCOUNTER
From: Jayme Jauregui  To: Barbi Olvera MD  Sent: 9/29/2022 1:29 PM CDT  Subject: Hi    I am in need of a blood glucose monitor and the things to check my sugar for emergency.

## 2022-10-04 ENCOUNTER — TELEPHONE (OUTPATIENT)
Dept: ENDOCRINOLOGY CLINIC | Facility: CLINIC | Age: 32
End: 2022-10-04

## 2022-10-04 NOTE — TELEPHONE ENCOUNTER
Fax received from [employer]. Completed order form for omnipod 5 G6 pods. Faxed with providers signature.

## 2022-10-05 DIAGNOSIS — O24.012 TYPE 1 DIABETES MELLITUS AFFECTING PREGNANCY IN SECOND TRIMESTER, ANTEPARTUM: ICD-10-CM

## 2022-10-05 DIAGNOSIS — E10.65 UNCONTROLLED TYPE 1 DIABETES MELLITUS WITH HYPERGLYCEMIA (HCC): ICD-10-CM

## 2022-10-06 NOTE — TELEPHONE ENCOUNTER
LOV: 8/24/2022    RTC: 3 months     F/U: not scheduled at this time. Dr Rain Abdi-- orders pending, approve if agreeable.

## 2022-10-07 RX ORDER — INSULIN LISPRO 100 [IU]/ML
INJECTION, SOLUTION INTRAVENOUS; SUBCUTANEOUS
Qty: 300 ML | Refills: 0 | Status: SHIPPED | OUTPATIENT
Start: 2022-10-07

## 2022-10-12 DIAGNOSIS — E10.65 UNCONTROLLED TYPE 1 DIABETES MELLITUS WITH HYPERGLYCEMIA (HCC): ICD-10-CM

## 2022-10-12 RX ORDER — GABAPENTIN 300 MG/1
CAPSULE ORAL
Qty: 20 CAPSULE | Refills: 0 | OUTPATIENT
Start: 2022-10-12

## 2022-10-13 ENCOUNTER — PATIENT MESSAGE (OUTPATIENT)
Dept: ENDOCRINOLOGY CLINIC | Facility: CLINIC | Age: 32
End: 2022-10-13

## 2022-10-13 DIAGNOSIS — E10.65 UNCONTROLLED TYPE 1 DIABETES MELLITUS WITH HYPERGLYCEMIA (HCC): ICD-10-CM

## 2022-10-13 RX ORDER — IBUPROFEN 600 MG/1
TABLET ORAL
Qty: 60 TABLET | Refills: 0 | OUTPATIENT
Start: 2022-10-13

## 2022-10-13 RX ORDER — BLOOD-GLUCOSE SENSOR
1 EACH MISCELLANEOUS
Qty: 9 EACH | Refills: 3 | Status: SHIPPED | OUTPATIENT
Start: 2022-10-13

## 2022-10-17 ENCOUNTER — PATIENT MESSAGE (OUTPATIENT)
Dept: ENDOCRINOLOGY CLINIC | Facility: CLINIC | Age: 32
End: 2022-10-17

## 2022-10-17 RX ORDER — INSULIN LISPRO 100 [IU]/ML
INJECTION, SOLUTION INTRAVENOUS; SUBCUTANEOUS
Qty: 6 ML | Refills: 0 | Status: SHIPPED | OUTPATIENT
Start: 2022-10-17

## 2022-10-17 RX ORDER — BLOOD SUGAR DIAGNOSTIC
STRIP MISCELLANEOUS
Qty: 300 STRIP | Refills: 0 | Status: SHIPPED | OUTPATIENT
Start: 2022-10-17

## 2022-10-17 RX ORDER — BLOOD-GLUCOSE SENSOR
EACH MISCELLANEOUS
Qty: 9 EACH | Refills: 0 | Status: SHIPPED | OUTPATIENT
Start: 2022-10-17

## 2022-10-18 NOTE — TELEPHONE ENCOUNTER
Spoke to patient to advise f/u scheduled 10/26/22 at 11am with [facility] - patient stated understanding and stated she will be at f/u

## 2022-10-18 NOTE — TELEPHONE ENCOUNTER
[facility] sent advising patient that f/u scheduled for 10/26/22 - requested patient confirm apt

## 2022-10-18 NOTE — TELEPHONE ENCOUNTER
From: Ale Kate  Sent: 10/17/2022 7:47 PM CDT  To: Avelina Trujillo Clinical Staff  Subject: RE: Other    Is there anything available on a Wednesday coming up unfortunately that my only day off.

## 2022-11-08 RX ORDER — BLOOD SUGAR DIAGNOSTIC
STRIP MISCELLANEOUS
Qty: 400 STRIP | Refills: 0 | Status: SHIPPED | OUTPATIENT
Start: 2022-11-08

## 2022-11-09 DIAGNOSIS — O24.012 TYPE 1 DIABETES MELLITUS AFFECTING PREGNANCY IN SECOND TRIMESTER, ANTEPARTUM: ICD-10-CM

## 2022-11-09 DIAGNOSIS — E10.65 UNCONTROLLED TYPE 1 DIABETES MELLITUS WITH HYPERGLYCEMIA (HCC): ICD-10-CM

## 2022-11-10 NOTE — TELEPHONE ENCOUNTER
LOV: 9/16/2022    RTC: 1 month     F/U: not scheduled at this time, [employer] message sent. Dr Joanna Miranda-- orders pending, approve if appropriate.

## 2022-11-11 RX ORDER — INSULIN LISPRO 100 [IU]/ML
INJECTION, SOLUTION INTRAVENOUS; SUBCUTANEOUS
Qty: 300 ML | Refills: 0 | Status: SHIPPED | OUTPATIENT
Start: 2022-11-11

## 2022-11-14 DIAGNOSIS — O24.012 TYPE 1 DIABETES MELLITUS AFFECTING PREGNANCY IN SECOND TRIMESTER, ANTEPARTUM: ICD-10-CM

## 2022-11-14 DIAGNOSIS — E10.65 UNCONTROLLED TYPE 1 DIABETES MELLITUS WITH HYPERGLYCEMIA (HCC): ICD-10-CM

## 2022-11-17 ENCOUNTER — OFFICE VISIT (OUTPATIENT)
Dept: INTERNAL MEDICINE CLINIC | Facility: CLINIC | Age: 32
End: 2022-11-17
Payer: MEDICAID

## 2022-11-17 VITALS
DIASTOLIC BLOOD PRESSURE: 84 MMHG | BODY MASS INDEX: 41.04 KG/M2 | WEIGHT: 223 LBS | HEIGHT: 62 IN | RESPIRATION RATE: 16 BRPM | SYSTOLIC BLOOD PRESSURE: 110 MMHG | OXYGEN SATURATION: 100 % | HEART RATE: 86 BPM | TEMPERATURE: 98 F

## 2022-11-17 DIAGNOSIS — G44.52 NEW DAILY PERSISTENT HEADACHE: Primary | ICD-10-CM

## 2022-11-17 DIAGNOSIS — E10.42 DM TYPE 1 WITH DIABETIC PERIPHERAL NEUROPATHY (HCC): ICD-10-CM

## 2022-11-17 DIAGNOSIS — E78.00 HYPERCHOLESTEROLEMIA: ICD-10-CM

## 2022-11-17 DIAGNOSIS — E66.01 MORBID OBESITY (HCC): ICD-10-CM

## 2022-11-17 DIAGNOSIS — F41.1 GENERALIZED ANXIETY DISORDER: ICD-10-CM

## 2022-11-17 DIAGNOSIS — F51.01 PRIMARY INSOMNIA: ICD-10-CM

## 2022-11-17 DIAGNOSIS — M54.50 ACUTE BILATERAL LOW BACK PAIN WITHOUT SCIATICA: ICD-10-CM

## 2022-11-17 PROCEDURE — 3074F SYST BP LT 130 MM HG: CPT | Performed by: INTERNAL MEDICINE

## 2022-11-17 PROCEDURE — 3079F DIAST BP 80-89 MM HG: CPT | Performed by: INTERNAL MEDICINE

## 2022-11-17 PROCEDURE — 99214 OFFICE O/P EST MOD 30 MIN: CPT | Performed by: INTERNAL MEDICINE

## 2022-11-17 PROCEDURE — 3008F BODY MASS INDEX DOCD: CPT | Performed by: INTERNAL MEDICINE

## 2022-11-17 RX ORDER — TIZANIDINE 4 MG/1
4 TABLET ORAL DAILY PRN
Qty: 30 TABLET | Refills: 2 | Status: SHIPPED | OUTPATIENT
Start: 2022-11-17

## 2022-11-17 RX ORDER — PHENTERMINE HYDROCHLORIDE 37.5 MG/1
37.5 TABLET ORAL
Qty: 30 TABLET | Refills: 2 | Status: SHIPPED | OUTPATIENT
Start: 2022-11-17

## 2022-11-17 RX ORDER — ALPRAZOLAM 1 MG/1
1 TABLET ORAL 2 TIMES DAILY PRN
Qty: 60 TABLET | Refills: 3 | Status: SHIPPED | OUTPATIENT
Start: 2022-11-17

## 2022-11-17 NOTE — PATIENT INSTRUCTIONS
- You can get fasting blood tests done next month. Orders are already in the system from Dr. Farheen Perez. Make sure to schedule follow up appointment with her next month as well. - Take tizanidine (muscle relaxant) for back pain. Take daily as needed. Make sure not to take together with alprazolam  - Also follow up with physical therapy - [facility] on next page, you can also call your insurance to see if there are other in-network clinics near you  - Alprazolam, phentermine refilled  - We will check a CT scan for your headaches. Will need insurance approval for the scan first.    It was a pleasure seeing you in the clinic today. Thank you for choosing the [facility] office for your healthcare needs. Please call at [phone] with any questions or concerns.     Grisel Samuel MD

## 2022-11-18 ENCOUNTER — TELEPHONE (OUTPATIENT)
Dept: INTERNAL MEDICINE CLINIC | Facility: CLINIC | Age: 32
End: 2022-11-18

## 2022-11-18 NOTE — TELEPHONE ENCOUNTER
LOV: 9/16/2022    RTC: 1 month     F/U: not scheduled at this time.      Dr Ca Patient-- orders not pending at this time, approve only if appropriate

## 2022-11-21 RX ORDER — BLOOD SUGAR DIAGNOSTIC
STRIP MISCELLANEOUS
Qty: 400 STRIP | Refills: 0 | Status: SHIPPED | OUTPATIENT
Start: 2022-11-21

## 2022-11-21 RX ORDER — INSULIN LISPRO 100 [IU]/ML
300 INJECTION, SOLUTION INTRAVENOUS; SUBCUTANEOUS DAILY
Qty: 300 ML | Refills: 0 | Status: SHIPPED | OUTPATIENT
Start: 2022-11-21

## 2022-12-13 NOTE — TELEPHONE ENCOUNTER
Received faxed confirmation from [facility] [address] [place] [address],[address] stating \"this product (Alprazolam 1 mg) does not require authorization at this time; however, you have attempted to refill the prescription too soon. Please note:  The patient last picked up the product Detail Level: Simple Quality 130: Documentation Of Current Medications In The Medical Record: Current Medications Documented

## 2023-01-10 DIAGNOSIS — E10.65 UNCONTROLLED TYPE 1 DIABETES MELLITUS WITH HYPERGLYCEMIA (HCC): ICD-10-CM

## 2023-01-10 RX ORDER — INSULIN PMP CART,AUT,G6/7,CNTR
EACH SUBCUTANEOUS
Qty: 30 EACH | Refills: 0 | Status: SHIPPED | OUTPATIENT
Start: 2023-01-10

## 2023-01-10 NOTE — TELEPHONE ENCOUNTER
LOV: 09/16/22    RTC:1 month    F/U:02/06/23     Pending Monthly Supply: orders pending, please approve if appropriate

## 2023-01-22 NOTE — TELEPHONE ENCOUNTER
Spoke with Israel Vazquez  Stated that after today's OV felt ill and had emesis.  Her dexcom showed BG was in 300's but she felt nauseated and vomited, she continued to feel bad and went to [facility]. They stated she is in DKA are giving he normal...

## 2023-01-28 NOTE — ED PROVIDER NOTES
Nocturnal CPAP    Patient Seen in: [facility]    History   Patient presents with:  Eval-G (gynecologic)  Dental Problem (dental)  Cough    Stated Complaint: cough, possible uti, tooth pain    HPI    This 79-year-old female who is insulin requirin SUMAN CONTOUR NEXT TEST In Vitro Strip,  USE TO TEST BLOOD SUGAR BEFORE MEALS AND AT BEDTIME   Insulin Lispro (HUMALOG) 100 UNIT/ML Subcutaneous Solution,  Use up to 80 units daily via insulin pump   escitalopram (LEXAPRO) 10 MG Oral Tab,  Take 1 tablet (1 Mother      uterine   • Other[other] [OTHER] Father      overdose         Smoking Status: Current Every Day Smoker        Packs/Day: 1.00  Years: 14        Types: Cigarettes    Smokeless Status: Never Used                        Alcohol Use: Yes Speculum exam shows thick white curd-like discharge high in the vault. Bimanual exam shows uterus to be normal size. No cervical motion tenderness. No adnexal masses or tenderness is noted. EXTREMITIES: No clubbing, cyanosis, edema noted.   SKIN: Warm a symptoms worsen      Medications Prescribed:  Current Discharge Medication List    START taking these medications    Cefuroxime Axetil (CEFTIN) 250 MG Oral Tab  Take 1 tablet (250 mg total) by mouth 2 (two) times daily. TAKE WITH FOOD.   Qty: 14 tablet Refi

## 2023-02-02 ENCOUNTER — OFFICE VISIT (OUTPATIENT)
Dept: ENDOCRINOLOGY CLINIC | Facility: CLINIC | Age: 33
End: 2023-02-02

## 2023-02-02 VITALS
WEIGHT: 220 LBS | HEIGHT: 62 IN | SYSTOLIC BLOOD PRESSURE: 104 MMHG | BODY MASS INDEX: 40.48 KG/M2 | HEART RATE: 95 BPM | DIASTOLIC BLOOD PRESSURE: 53 MMHG

## 2023-02-02 DIAGNOSIS — E10.65 UNCONTROLLED TYPE 1 DIABETES MELLITUS WITH HYPERGLYCEMIA (HCC): Primary | ICD-10-CM

## 2023-02-02 LAB
CARTRIDGE LOT#: ABNORMAL NUMERIC
GLUCOSE BLOOD: 393
HEMOGLOBIN A1C: 11.6 % (ref 4.3–5.6)
TEST STRIP LOT #: NORMAL NUMERIC

## 2023-02-02 PROCEDURE — 3046F HEMOGLOBIN A1C LEVEL >9.0%: CPT | Performed by: INTERNAL MEDICINE

## 2023-02-02 PROCEDURE — 3008F BODY MASS INDEX DOCD: CPT | Performed by: INTERNAL MEDICINE

## 2023-02-02 PROCEDURE — 83036 HEMOGLOBIN GLYCOSYLATED A1C: CPT | Performed by: INTERNAL MEDICINE

## 2023-02-02 PROCEDURE — 3078F DIAST BP <80 MM HG: CPT | Performed by: INTERNAL MEDICINE

## 2023-02-02 PROCEDURE — 3074F SYST BP LT 130 MM HG: CPT | Performed by: INTERNAL MEDICINE

## 2023-02-02 PROCEDURE — 99214 OFFICE O/P EST MOD 30 MIN: CPT | Performed by: INTERNAL MEDICINE

## 2023-02-02 PROCEDURE — 82947 ASSAY GLUCOSE BLOOD QUANT: CPT | Performed by: INTERNAL MEDICINE

## 2023-02-05 ENCOUNTER — PATIENT MESSAGE (OUTPATIENT)
Dept: ENDOCRINOLOGY CLINIC | Facility: CLINIC | Age: 33
End: 2023-02-05

## 2023-02-05 ENCOUNTER — TELEPHONE (OUTPATIENT)
Dept: ENDOCRINOLOGY CLINIC | Facility: CLINIC | Age: 33
End: 2023-02-05

## 2023-02-05 NOTE — TELEPHONE ENCOUNTER
Hi!    Can we please contact this patient in 1 week and obtain Dexcom upload and see how she has been doing on the settings I have changed her to? Thank you!

## 2023-02-06 NOTE — TELEPHONE ENCOUNTER
From: Lyssa Hua  To: Barbi Winters MD  Sent: 2/5/2023 11:27 AM CST  Subject: London George    My setting are to high over night and to much for me im dropping below 60 when i wake up

## 2023-02-14 ENCOUNTER — TELEPHONE (OUTPATIENT)
Dept: INTERNAL MEDICINE CLINIC | Facility: CLINIC | Age: 33
End: 2023-02-14

## 2023-02-14 NOTE — TELEPHONE ENCOUNTER
Pt scheduled upcoming VV via mychart to discuss refills, FMLA, etc. OK to keep VV?     Future Appointments   Date Time Provider Bonifacio Simran   2/17/2023  1:00 PM Yg Arciniega MD [facility] [place]

## 2023-02-15 NOTE — TELEPHONE ENCOUNTER
Dr Elaine Ann Staff-- see pt message. Dexcom is on and connected to our clinic, actively updating. Please advise.

## 2023-02-18 NOTE — PROGRESS NOTES
1:00 PM patient not logged on for video visit  1:05 PM text link sent for video visit  1:15 PM patient not logged on for video visit, text link sent  1:30 PM patient not logged on for video visit, patient no-showed video visit 2/17/23.

## 2023-02-20 NOTE — TELEPHONE ENCOUNTER
Ok to keep as video visit but patient no-showed last video visit so please make sure she is aware to login on time this time

## 2023-02-20 NOTE — TELEPHONE ENCOUNTER
Pt scheduled new apt via mychart for a VV to discuss medications and refills, ok to keep as VV?     Future Appointments   Date Time Provider Bonifacio Simran   2/24/2023  8:30 AM [facility]

## 2023-02-24 ENCOUNTER — TELEMEDICINE (OUTPATIENT)
Dept: INTERNAL MEDICINE CLINIC | Facility: CLINIC | Age: 33
End: 2023-02-24

## 2023-02-24 DIAGNOSIS — Z91.199 NO-SHOW FOR APPOINTMENT: Primary | ICD-10-CM

## 2023-02-24 NOTE — PROGRESS NOTES
Jostin Armenta is a 29year old female here today for a telemedicine/video visit. Patient is in the state of [place] during this visit. 8:30 AM: Patient not logged in for video visit  8:35 AM: Text link and email link sent to patient to start video visit  9:00 AM Patient not logged in for video visit - no show.

## 2023-02-27 ENCOUNTER — TELEMEDICINE (OUTPATIENT)
Dept: INTERNAL MEDICINE CLINIC | Facility: CLINIC | Age: 33
End: 2023-02-27

## 2023-02-27 ENCOUNTER — TELEPHONE (OUTPATIENT)
Dept: INTERNAL MEDICINE CLINIC | Facility: CLINIC | Age: 33
End: 2023-02-27

## 2023-02-27 ENCOUNTER — PATIENT MESSAGE (OUTPATIENT)
Dept: INTERNAL MEDICINE CLINIC | Facility: CLINIC | Age: 33
End: 2023-02-27

## 2023-02-27 DIAGNOSIS — F41.1 GENERALIZED ANXIETY DISORDER: Chronic | ICD-10-CM

## 2023-02-27 DIAGNOSIS — Z96.41 TYPE 1 DIABETES MELLITUS WITH COMPLICATION, WITH LONG TERM CURRENT USE OF INSULIN PUMP (HCC): Primary | Chronic | ICD-10-CM

## 2023-02-27 DIAGNOSIS — E10.8 TYPE 1 DIABETES MELLITUS WITH COMPLICATION, WITH LONG TERM CURRENT USE OF INSULIN PUMP (HCC): Primary | Chronic | ICD-10-CM

## 2023-02-27 PROCEDURE — 99214 OFFICE O/P EST MOD 30 MIN: CPT | Performed by: INTERNAL MEDICINE

## 2023-02-27 NOTE — PROGRESS NOTES
Virtual Telephone Check-In    This visit is conducted using Telemedicine with live, interactive video and audio. Patient resides in [place]   Patient understands and accepts financial responsibility for any deductible, co-insurance and/or co-pays associated with this service. Telehealth outside of [employer] Verbal Consent   I conducted a telehealth visit with ELYSE on 2/27/2023   which was completed using two-way, real-time interactive audio and video  communication. This has been done in good og to provide continuity of care in the best interest of the provider-patient relationship, due to the COVID -19 public health crisis/national emergency where restrictions of face-to-face office visits are ongoing. Every conscious effort was taken to allow for sufficient and adequate time to complete the visit. The patient was made aware of the limitations of the telehealth visit, including treatment limitations as no physical exam could be performed. The patient was advised to call 911 or to go to the ER in case there was an emergency. The patient was also advised of the potential privacy & security concerns related to the telehealth platform. The patient was made aware of where to find [facility] notice of privacy practices, telehealth consent form and other related consent forms and documents. which are located on the [facility] website. The patient verbally agreed to telehealth consent form, related consents and the risks discussed. Lastly, the patient confirmed that they were in [place]. Included in this visit, time may have been spent reviewing labs, medications, radiology tests and decision making. Appropriate medical decision-making and tests are ordered as detailed in the plan of care above. Coding/billing information is submitted for this visit based on complexity of care and/or time spent for the visit.   Time spent: 16 minutes with forms and patient  HPI: Regina Curry is a 29 year old female who is calling about ADA forms to be filled out. She has a lot of anxiety and is interested in seeing a therapist. No thoughts about hurting herself. Also her diabetes has not been well controlled and does not know why her insulin does not get \"fixed. \" does follow up with an endocrinologist   ROS:  General: Feels well overall  Skin: Denies any unusual skin lesions  Eyes: Denies blurred vision or double vision  All systems negative, except for above  Physical:  GENERAL: Alert and oriented, well developed, well nourished,in no apparent distress  SKIN: no rashes,no suspicious lesions on face  LUNGS: no audible wheezing  PSYCH: pleasant, appropriate mood and affect    Assessment and Plan  (E10.8,  Z96.41) Type 1 diabetes mellitus with complication, with long term current use of insulin pump ([address]  (primary encounter diagnosis)  Plan: forms filled. Needs to follow up with her endocrinologist more closely    (F41.1) Generalized anxiety disorder  Plan: [facility]  Note: referral placed. Takes xanax as needed.  On wellbutrin  Continue follow up with primary care physician   [address] [place] [address]

## 2023-02-27 NOTE — TELEPHONE ENCOUNTER
From: Galina Mathew  To: Tremaine Gan MD  Sent: 2/27/2023 12:34 PM CST  Subject: Gustavo Harada extend     Dr Isrrael Bryant I have an appointment today my mental health as well as my diabetes is still not doing good I am requesting to extend my female and need this filled out I thought they had sent this and have not if you can please send back asap thank you to fax number at bottom of form.  Forms are attached

## 2023-02-27 NOTE — TELEPHONE ENCOUNTER
[facility], diabetes, and depression. Pt is unable to schedule vv with Dr. Cordero due to multiple no show's, ok to keep vv?     Future Appointments   Date Time Provider Bonifacio Khalil   2/27/2023  1:30 PM Kelsy Iniguez,  [facility] [place]

## 2023-02-28 NOTE — TELEPHONE ENCOUNTER
Faxed signed FMLA forms back to [employer] with a copy to scan, one in the FMLA folder pod #1 and original to front office for patient to . If front office can please let patient know ready for .

## 2023-03-03 ENCOUNTER — PATIENT MESSAGE (OUTPATIENT)
Dept: INTERNAL MEDICINE CLINIC | Facility: CLINIC | Age: 33
End: 2023-03-03

## 2023-03-06 RX ORDER — BLOOD-GLUCOSE TRANSMITTER
1 EACH MISCELLANEOUS AS DIRECTED
Qty: 1 EACH | Refills: 3 | Status: SHIPPED | OUTPATIENT
Start: 2023-03-06

## 2023-03-09 NOTE — TELEPHONE ENCOUNTER
[employer] requesting additional information in order to proceed with the ADA request.  Jose Eduardo Mayberry in Dr. Bettie Spain in-box.

## 2023-03-15 ENCOUNTER — TELEPHONE (OUTPATIENT)
Dept: ENDOCRINOLOGY CLINIC | Facility: CLINIC | Age: 33
End: 2023-03-15

## 2023-03-15 NOTE — TELEPHONE ENCOUNTER
Pt states she needs PA for Continuous Blood Gluc Transmit (DEXCOM G6 TRANSMITTER) Does not apply Misc please follow up

## 2023-03-16 NOTE — TELEPHONE ENCOUNTER
ALYSSIA WESTBROOK Requested: Dexcom G6 transmitter    CoverMyMeds Used:  Key:  Quantity: 1  Day Supply: 90 days  Sig: Change transmitter every 3 months   DX Code: E10.65                                   Case Number/Pending Ref#:

## 2023-03-17 ENCOUNTER — PATIENT MESSAGE (OUTPATIENT)
Dept: ENDOCRINOLOGY CLINIC | Facility: CLINIC | Age: 33
End: 2023-03-17

## 2023-03-17 DIAGNOSIS — E10.42 DM TYPE 1 WITH DIABETIC PERIPHERAL NEUROPATHY (HCC): ICD-10-CM

## 2023-03-17 DIAGNOSIS — E10.8 TYPE 1 DIABETES MELLITUS WITH COMPLICATION, WITH LONG TERM CURRENT USE OF INSULIN PUMP (HCC): ICD-10-CM

## 2023-03-17 DIAGNOSIS — Z96.41 TYPE 1 DIABETES MELLITUS WITH COMPLICATION, WITH LONG TERM CURRENT USE OF INSULIN PUMP (HCC): ICD-10-CM

## 2023-03-17 RX ORDER — PEN NEEDLE, DIABETIC 32GX 5/32"
1 NEEDLE, DISPOSABLE MISCELLANEOUS 2 TIMES DAILY
Qty: 100 EACH | Refills: 2 | Status: SHIPPED | OUTPATIENT
Start: 2023-03-17

## 2023-03-17 NOTE — TELEPHONE ENCOUNTER
PA form for [facility] transmitter sent to [facility] [phone]. Patient has been updated via [employer].

## 2023-03-17 NOTE — TELEPHONE ENCOUNTER
Patient calling for update on prior authorization for [employer] [facility] Transmitter, please call at [phone],NAYLA.

## 2023-03-20 NOTE — TELEPHONE ENCOUNTER
Spoke to [employer]:    Case number: [id number]    Quantity: 1  per year, 1 transmitter for 90 days, 3 sensors per 30 days.

## 2023-03-22 ENCOUNTER — PATIENT MESSAGE (OUTPATIENT)
Dept: ENDOCRINOLOGY CLINIC | Facility: CLINIC | Age: 33
End: 2023-03-22

## 2023-03-22 NOTE — TELEPHONE ENCOUNTER
From: Louis Strickland  To: Barbi Mendes MD  Sent: 3/22/2023 12:34 PM CDT  Subject: Testing    Is there test I should be getting done?  I know you had me on some medication because something was low and I never did another test to see if It went up but I am very fatigue and low energy

## 2023-03-23 NOTE — TELEPHONE ENCOUNTER
Hi!  Please contact patient and ask her if she is having trouble obtaining her Dexcom? Also, I did nor tell her that any of her labs are low. She may be confusing us with her PCP. Thank you!

## 2023-03-24 ENCOUNTER — PATIENT MESSAGE (OUTPATIENT)
Dept: INTERNAL MEDICINE CLINIC | Facility: CLINIC | Age: 33
End: 2023-03-24

## 2023-03-24 DIAGNOSIS — E55.9 VITAMIN D DEFICIENCY: Primary | ICD-10-CM

## 2023-03-24 NOTE — PATIENT INSTRUCTIONS
We are here to support you with Diabetes but please remember that you still need your primary care doctor for your routine health maintenance. Your current A1C: 10.3  This test provides us with your average blood sugar for the past 3 months.    The main g trends of glucose. Will reevaluate medication at next visit based on CGM interpretation.   Educated on :  Minimum 4 x daily blood sugar testing  Recording daily food/drink intake and activity in log book  Instructed to call us with any questions or concern Rifampin Pregnancy And Lactation Text: This medication is Pregnancy Category C and it isn't know if it is safe during pregnancy. It is also excreted in breast milk and should not be used if you are breast feeding.

## 2023-03-24 NOTE — TELEPHONE ENCOUNTER
Responded to pt with instructions on labs and inquriing about dexcom; [facility] 3/17 shows dexcom transmitters and sensors PA approved fro 1 yr

## 2023-03-28 NOTE — TELEPHONE ENCOUNTER
Vitamin D was low last summer, we can re-check that.   There are also blood tests in the system from her endocrinologist that she should get done

## 2023-04-02 DIAGNOSIS — F51.01 PRIMARY INSOMNIA: ICD-10-CM

## 2023-04-02 DIAGNOSIS — F41.1 GENERALIZED ANXIETY DISORDER: ICD-10-CM

## 2023-04-03 ENCOUNTER — TELEPHONE (OUTPATIENT)
Dept: INTERNAL MEDICINE CLINIC | Facility: CLINIC | Age: 33
End: 2023-04-03

## 2023-04-03 RX ORDER — ALPRAZOLAM 1 MG/1
1 TABLET ORAL 2 TIMES DAILY PRN
Qty: 60 TABLET | Refills: 3 | Status: SHIPPED | OUTPATIENT
Start: 2023-04-03

## 2023-04-03 NOTE — TELEPHONE ENCOUNTER
Incoming fax from [employer] [facility], Placed in Dr León Andre in [place] for him to complete and review.

## 2023-04-03 NOTE — TELEPHONE ENCOUNTER
Outgoing Fax of completed form's to [facility]. Received Confirmation report, made a copy one was sent to [facility] and the other was placed in [facility].

## 2023-04-03 NOTE — TELEPHONE ENCOUNTER
Pt called to check on status of incoming fax from [facility] [employer]. I advised pt that we haven't received any faxes regarding the pt and to have it re-faxed. Pt v/u and confirmed fax number.

## 2023-04-03 NOTE — TELEPHONE ENCOUNTER
Received fax from [employer], they called and stated they need this form completed and faxed back by 3pm today in order to turn her water back on.     Placed in RP faxfolder

## 2023-04-24 ENCOUNTER — MOBILE ENCOUNTER (OUTPATIENT)
Dept: INTERNAL MEDICINE CLINIC | Facility: CLINIC | Age: 33
End: 2023-04-24

## 2023-04-24 RX ORDER — INSULIN DETEMIR 100 [IU]/ML
40 INJECTION, SOLUTION SUBCUTANEOUS 2 TIMES DAILY
Qty: 90 EACH | Refills: 0 | Status: SHIPPED | OUTPATIENT
Start: 2023-04-24

## 2023-04-24 RX ORDER — INSULIN DETEMIR 100 [IU]/ML
40 INJECTION, SOLUTION SUBCUTANEOUS 2 TIMES DAILY
Qty: 90 EACH | Refills: 0 | Status: SHIPPED | OUTPATIENT
Start: 2023-04-24 | End: 2023-04-24

## 2023-04-30 ENCOUNTER — HOSPITAL ENCOUNTER (EMERGENCY)
Facility: HOSPITAL | Age: 33
Discharge: HOME | End: 2023-04-30
Attending: EMERGENCY MEDICINE
Payer: MEDICAID

## 2023-04-30 VITALS
RESPIRATION RATE: 22 BRPM | DIASTOLIC BLOOD PRESSURE: 92 MMHG | SYSTOLIC BLOOD PRESSURE: 137 MMHG | TEMPERATURE: 98 F | WEIGHT: 229 LBS | HEART RATE: 99 BPM | OXYGEN SATURATION: 98 % | HEIGHT: 62 IN | BODY MASS INDEX: 42.14 KG/M2

## 2023-04-30 DIAGNOSIS — G44.229 CHRONIC TENSION-TYPE HEADACHE, NOT INTRACTABLE: ICD-10-CM

## 2023-04-30 DIAGNOSIS — M54.50 ACUTE BILATERAL LOW BACK PAIN WITHOUT SCIATICA: Primary | ICD-10-CM

## 2023-04-30 LAB
ANION GAP SERPL CALC-SCNC: 2 MMOL/L (ref 0–18)
B-HCG UR QL: NEGATIVE
BASOPHILS # BLD AUTO: 0.04 X10(3) UL (ref 0–0.2)
BASOPHILS NFR BLD AUTO: 0.3 %
BILIRUB UR QL STRIP.AUTO: NEGATIVE
BUN BLD-MCNC: 14 MG/DL (ref 7–18)
CALCIUM BLD-MCNC: 9.2 MG/DL (ref 8.5–10.1)
CHLORIDE SERPL-SCNC: 104 MMOL/L (ref 98–112)
CLARITY UR REFRACT.AUTO: CLEAR
CO2 SERPL-SCNC: 27 MMOL/L (ref 21–32)
COLOR UR AUTO: YELLOW
CREAT BLD-MCNC: 0.87 MG/DL
EOSINOPHIL # BLD AUTO: 0.77 X10(3) UL (ref 0–0.7)
EOSINOPHIL NFR BLD AUTO: 5.7 %
ERYTHROCYTE [DISTWIDTH] IN BLOOD BY AUTOMATED COUNT: 14.4 %
GFR SERPLBLD BASED ON 1.73 SQ M-ARVRAT: 92 ML/MIN/1.73M2 (ref 60–?)
GLUCOSE BLD-MCNC: 210 MG/DL (ref 70–99)
GLUCOSE BLD-MCNC: 321 MG/DL (ref 70–99)
GLUCOSE BLD-MCNC: 374 MG/DL (ref 70–99)
GLUCOSE BLD-MCNC: 388 MG/DL (ref 70–99)
GLUCOSE UR STRIP.AUTO-MCNC: >=500 MG/DL
HCT VFR BLD AUTO: 43.2 %
HGB BLD-MCNC: 13.9 G/DL
IMM GRANULOCYTES # BLD AUTO: 0.04 X10(3) UL (ref 0–1)
IMM GRANULOCYTES NFR BLD: 0.3 %
KETONES UR STRIP.AUTO-MCNC: NEGATIVE MG/DL
LEUKOCYTE ESTERASE UR QL STRIP.AUTO: NEGATIVE
LYMPHOCYTES # BLD AUTO: 2.91 X10(3) UL (ref 1–4)
LYMPHOCYTES NFR BLD AUTO: 21.5 %
MCH RBC QN AUTO: 28.1 PG (ref 26–34)
MCHC RBC AUTO-ENTMCNC: 32.2 G/DL (ref 31–37)
MCV RBC AUTO: 87.3 FL
MONOCYTES # BLD AUTO: 0.87 X10(3) UL (ref 0.1–1)
MONOCYTES NFR BLD AUTO: 6.4 %
NEUTROPHILS # BLD AUTO: 8.88 X10 (3) UL (ref 1.5–7.7)
NEUTROPHILS # BLD AUTO: 8.88 X10(3) UL (ref 1.5–7.7)
NEUTROPHILS NFR BLD AUTO: 65.8 %
NITRITE UR QL STRIP.AUTO: NEGATIVE
OSMOLALITY SERPL CALC.SUM OF ELEC: 293 MOSM/KG (ref 275–295)
PH UR STRIP.AUTO: 7 [PH] (ref 5–8)
PLATELET # BLD AUTO: 204 10(3)UL (ref 150–450)
POTASSIUM SERPL-SCNC: 4 MMOL/L (ref 3.5–5.1)
PROT UR STRIP.AUTO-MCNC: NEGATIVE MG/DL
RBC # BLD AUTO: 4.95 X10(6)UL
RBC UR QL AUTO: NEGATIVE
SODIUM SERPL-SCNC: 133 MMOL/L (ref 136–145)
SP GR UR STRIP.AUTO: >1.03 (ref 1–1.03)
UROBILINOGEN UR STRIP.AUTO-MCNC: <2 MG/DL
WBC # BLD AUTO: 13.5 X10(3) UL (ref 4–11)

## 2023-04-30 PROCEDURE — 81025 URINE PREGNANCY TEST: CPT

## 2023-04-30 PROCEDURE — 99284 EMERGENCY DEPT VISIT MOD MDM: CPT

## 2023-04-30 PROCEDURE — 96374 THER/PROPH/DIAG INJ IV PUSH: CPT

## 2023-04-30 PROCEDURE — 82962 GLUCOSE BLOOD TEST: CPT

## 2023-04-30 PROCEDURE — 85025 COMPLETE CBC W/AUTO DIFF WBC: CPT | Performed by: EMERGENCY MEDICINE

## 2023-04-30 PROCEDURE — 80048 BASIC METABOLIC PNL TOTAL CA: CPT | Performed by: EMERGENCY MEDICINE

## 2023-04-30 PROCEDURE — 96361 HYDRATE IV INFUSION ADD-ON: CPT

## 2023-04-30 PROCEDURE — 81003 URINALYSIS AUTO W/O SCOPE: CPT | Performed by: EMERGENCY MEDICINE

## 2023-04-30 RX ORDER — IBUPROFEN 600 MG/1
600 TABLET ORAL EVERY 8 HOURS PRN
Qty: 30 TABLET | Refills: 0 | Status: SHIPPED | OUTPATIENT
Start: 2023-04-30 | End: 2023-05-07

## 2023-04-30 RX ORDER — INSULIN ASPART 100 [IU]/ML
0.1 INJECTION, SOLUTION INTRAVENOUS; SUBCUTANEOUS ONCE
Status: COMPLETED | OUTPATIENT
Start: 2023-04-30 | End: 2023-04-30

## 2023-04-30 RX ORDER — LIDOCAINE 50 MG/G
2 PATCH TOPICAL EVERY 24 HOURS
Qty: 10 PATCH | Refills: 0 | Status: SHIPPED | OUTPATIENT
Start: 2023-04-30

## 2023-04-30 RX ORDER — KETOROLAC TROMETHAMINE 15 MG/ML
15 INJECTION, SOLUTION INTRAMUSCULAR; INTRAVENOUS ONCE
Status: COMPLETED | OUTPATIENT
Start: 2023-04-30 | End: 2023-04-30

## 2023-04-30 NOTE — ED INITIAL ASSESSMENT (HPI)
States she noticed last week that her BP has been elevated. Adjusted insulin pump at home but that stopped working this morning. States she gave herself long acting and short acting earlier today. States she has felt fatigued and dizzy at home.

## 2023-05-01 ENCOUNTER — PATIENT MESSAGE (OUTPATIENT)
Dept: INTERNAL MEDICINE CLINIC | Facility: CLINIC | Age: 33
End: 2023-05-01

## 2023-05-02 NOTE — TELEPHONE ENCOUNTER
Ok to follow up next available appointment. She should call Neurology to set up appointment with them as well.

## 2023-05-08 NOTE — TELEPHONE ENCOUNTER
LOV: 2/2/23    RTC: 1  Months    FU: No FU Appt Scheduled          Called to help schedule a f/u appt, pt is requesting if it would be okay for a VV

## 2023-05-10 ENCOUNTER — LAB ENCOUNTER (OUTPATIENT)
Dept: LAB | Age: 33
End: 2023-05-10
Attending: INTERNAL MEDICINE
Payer: MEDICAID

## 2023-05-10 DIAGNOSIS — E10.65 UNCONTROLLED TYPE 1 DIABETES MELLITUS WITH HYPERGLYCEMIA (HCC): ICD-10-CM

## 2023-05-10 DIAGNOSIS — E55.9 VITAMIN D DEFICIENCY: ICD-10-CM

## 2023-05-10 DIAGNOSIS — E78.00 HYPERCHOLESTEROLEMIA: ICD-10-CM

## 2023-05-10 LAB
ALBUMIN SERPL-MCNC: 3.3 G/DL (ref 3.4–5)
ALBUMIN/GLOB SERPL: 0.9 {RATIO} (ref 1–2)
ALP LIVER SERPL-CCNC: 82 U/L
ALT SERPL-CCNC: 15 U/L
ANION GAP SERPL CALC-SCNC: 1 MMOL/L (ref 0–18)
AST SERPL-CCNC: 12 U/L (ref 15–37)
BILIRUB SERPL-MCNC: 0.4 MG/DL (ref 0.1–2)
BUN BLD-MCNC: 13 MG/DL (ref 7–18)
CALCIUM BLD-MCNC: 8.8 MG/DL (ref 8.5–10.1)
CHLORIDE SERPL-SCNC: 111 MMOL/L (ref 98–112)
CHOLEST SERPL-MCNC: 173 MG/DL (ref ?–200)
CO2 SERPL-SCNC: 25 MMOL/L (ref 21–32)
CREAT BLD-MCNC: 0.58 MG/DL
CREAT UR-SCNC: 131 MG/DL
FASTING PATIENT LIPID ANSWER: YES
FASTING STATUS PATIENT QL REPORTED: YES
GFR SERPLBLD BASED ON 1.73 SQ M-ARVRAT: 125 ML/MIN/1.73M2 (ref 60–?)
GLOBULIN PLAS-MCNC: 3.7 G/DL (ref 2.8–4.4)
GLUCOSE BLD-MCNC: 308 MG/DL (ref 70–99)
HDLC SERPL-MCNC: 52 MG/DL (ref 40–59)
LDLC SERPL CALC-MCNC: 111 MG/DL (ref ?–100)
MICROALBUMIN UR-MCNC: 0.8 MG/DL
MICROALBUMIN/CREAT 24H UR-RTO: 6.1 UG/MG (ref ?–30)
NONHDLC SERPL-MCNC: 121 MG/DL (ref ?–130)
OSMOLALITY SERPL CALC.SUM OF ELEC: 296 MOSM/KG (ref 275–295)
POTASSIUM SERPL-SCNC: 3.9 MMOL/L (ref 3.5–5.1)
PROT SERPL-MCNC: 7 G/DL (ref 6.4–8.2)
SODIUM SERPL-SCNC: 137 MMOL/L (ref 136–145)
TRIGL SERPL-MCNC: 51 MG/DL (ref 30–149)
VIT D+METAB SERPL-MCNC: 28.5 NG/ML (ref 30–100)
VLDLC SERPL CALC-MCNC: 9 MG/DL (ref 0–30)

## 2023-05-10 PROCEDURE — 36415 COLL VENOUS BLD VENIPUNCTURE: CPT

## 2023-05-10 PROCEDURE — 80053 COMPREHEN METABOLIC PANEL: CPT

## 2023-05-10 PROCEDURE — 82306 VITAMIN D 25 HYDROXY: CPT

## 2023-05-10 PROCEDURE — 3061F NEG MICROALBUMINURIA REV: CPT | Performed by: INTERNAL MEDICINE

## 2023-05-10 PROCEDURE — 82043 UR ALBUMIN QUANTITATIVE: CPT

## 2023-05-10 PROCEDURE — 82570 ASSAY OF URINE CREATININE: CPT

## 2023-05-10 PROCEDURE — 80061 LIPID PANEL: CPT

## 2023-05-11 ENCOUNTER — PATIENT MESSAGE (OUTPATIENT)
Dept: INTERNAL MEDICINE CLINIC | Facility: CLINIC | Age: 33
End: 2023-05-11

## 2023-05-11 RX ORDER — BLOOD SUGAR DIAGNOSTIC
STRIP MISCELLANEOUS
Qty: 400 STRIP | Refills: 0 | Status: SHIPPED | OUTPATIENT
Start: 2023-05-11

## 2023-05-11 NOTE — TELEPHONE ENCOUNTER
From: Marilyn Gayle  To: Ruth Ramires MD  Sent: 5/11/2023 4:56 PM CDT  Subject: Question regarding VITAMIN D, 25-HYDROXY    I saw on other test done my vitamin b6 was low

## 2023-05-12 ENCOUNTER — OFFICE VISIT (OUTPATIENT)
Dept: INTERNAL MEDICINE CLINIC | Facility: CLINIC | Age: 33
End: 2023-05-12
Payer: MEDICAID

## 2023-05-12 VITALS
BODY MASS INDEX: 41.52 KG/M2 | SYSTOLIC BLOOD PRESSURE: 132 MMHG | WEIGHT: 225.63 LBS | RESPIRATION RATE: 16 BRPM | OXYGEN SATURATION: 99 % | DIASTOLIC BLOOD PRESSURE: 80 MMHG | HEIGHT: 62 IN | HEART RATE: 85 BPM

## 2023-05-12 DIAGNOSIS — E10.8 TYPE 1 DIABETES MELLITUS WITH COMPLICATION, WITH LONG TERM CURRENT USE OF INSULIN PUMP (HCC): Chronic | ICD-10-CM

## 2023-05-12 DIAGNOSIS — E10.65 UNCONTROLLED TYPE 1 DIABETES MELLITUS WITH HYPERGLYCEMIA (HCC): ICD-10-CM

## 2023-05-12 DIAGNOSIS — R60.0 PEDAL EDEMA: ICD-10-CM

## 2023-05-12 DIAGNOSIS — E10.42 DM TYPE 1 WITH DIABETIC PERIPHERAL NEUROPATHY (HCC): ICD-10-CM

## 2023-05-12 DIAGNOSIS — Z96.41 TYPE 1 DIABETES MELLITUS WITH COMPLICATION, WITH LONG TERM CURRENT USE OF INSULIN PUMP (HCC): Chronic | ICD-10-CM

## 2023-05-12 DIAGNOSIS — F33.1 MDD (MAJOR DEPRESSIVE DISORDER), RECURRENT EPISODE, MODERATE (HCC): Chronic | ICD-10-CM

## 2023-05-12 DIAGNOSIS — E78.00 HYPERCHOLESTEROLEMIA: Chronic | ICD-10-CM

## 2023-05-12 DIAGNOSIS — F41.1 GENERALIZED ANXIETY DISORDER: Chronic | ICD-10-CM

## 2023-05-12 DIAGNOSIS — B37.9 YEAST INFECTION: ICD-10-CM

## 2023-05-12 DIAGNOSIS — M54.50 ACUTE BILATERAL LOW BACK PAIN WITHOUT SCIATICA: Primary | ICD-10-CM

## 2023-05-12 PROCEDURE — 3075F SYST BP GE 130 - 139MM HG: CPT | Performed by: INTERNAL MEDICINE

## 2023-05-12 PROCEDURE — 3079F DIAST BP 80-89 MM HG: CPT | Performed by: INTERNAL MEDICINE

## 2023-05-12 PROCEDURE — 3008F BODY MASS INDEX DOCD: CPT | Performed by: INTERNAL MEDICINE

## 2023-05-12 PROCEDURE — 99214 OFFICE O/P EST MOD 30 MIN: CPT | Performed by: INTERNAL MEDICINE

## 2023-05-12 RX ORDER — FLUCONAZOLE 150 MG/1
150 TABLET ORAL ONCE
Qty: 2 TABLET | Refills: 0 | Status: SHIPPED | OUTPATIENT
Start: 2023-05-12 | End: 2023-05-12

## 2023-05-12 RX ORDER — SEMAGLUTIDE 1.34 MG/ML
0.25 INJECTION, SOLUTION SUBCUTANEOUS WEEKLY
Qty: 1.5 ML | Refills: 0 | Status: SHIPPED | OUTPATIENT
Start: 2023-05-12

## 2023-05-12 NOTE — PATIENT INSTRUCTIONS
- Schedule back x-ray and CT scan of head. Call central scheduling at [phone] to schedule. - Will decide next steps for back pain based on x-ray results  - Fluconazole prescription sent in for yeast infection  - We will try Ozempic or something similar for diabetes and weight.    - Follow up with Dr. Master Rainey or her nurse practitioner for the diabetes  - Follow up with me in 6 months for chronic issues; follow up earlier as needed. It was a pleasure seeing you in the clinic today. Thank you for choosing the [facility] office for your healthcare needs. Please call at [phone] with any questions or concerns.     Xavier Medley MD

## 2023-05-23 ENCOUNTER — PATIENT MESSAGE (OUTPATIENT)
Dept: INTERNAL MEDICINE CLINIC | Facility: CLINIC | Age: 33
End: 2023-05-23

## 2023-05-23 ENCOUNTER — PATIENT MESSAGE (OUTPATIENT)
Dept: ENDOCRINOLOGY CLINIC | Facility: CLINIC | Age: 33
End: 2023-05-23

## 2023-05-23 NOTE — TELEPHONE ENCOUNTER
Hi!    I cannot do a video visit. It is better for her to reschedule. Does she have any specific concerns that we can take care of now? Tahnk you.

## 2023-05-23 NOTE — TELEPHONE ENCOUNTER
From: Redd Barros  To: Tomas Daniel MD  Sent: 5/23/2023 2:46 PM CDT  Subject: Ozempic    Since ozempic was denied I received paperwork in mail last week is victoza available

## 2023-05-25 NOTE — TELEPHONE ENCOUNTER
LOV: 2/2/2023    RTC:  Months    FU: No FU Appt Scheduled    Last Refill: 8/24/2022    3 Month Supply Pending     I called pt to schedule a follow up appointment. Per pt states that she is having car trouble and once her car is fixed she will schedule a follow up appointment via MyChart or by phone.

## 2023-05-26 RX ORDER — INSULIN PUMP CONTROLLER
1 EACH MISCELLANEOUS DAILY
Qty: 30 EACH | Refills: 0 | Status: SHIPPED | OUTPATIENT
Start: 2023-05-26

## 2023-06-21 NOTE — TELEPHONE ENCOUNTER
Hi!  Please let patient know that I have reviewed alll of her test results and they are normal except that her LDL is elevated. She should be started on atorvastatin 10mg if she is not taking this already. Thank you!

## 2023-07-06 DIAGNOSIS — Z96.41 TYPE 1 DIABETES MELLITUS WITH COMPLICATION, WITH LONG TERM CURRENT USE OF INSULIN PUMP: Chronic | ICD-10-CM

## 2023-07-06 DIAGNOSIS — E10.8 TYPE 1 DIABETES MELLITUS WITH COMPLICATION, WITH LONG TERM CURRENT USE OF INSULIN PUMP: Chronic | ICD-10-CM

## 2023-07-06 DIAGNOSIS — E10.42 DM TYPE 1 WITH DIABETIC PERIPHERAL NEUROPATHY (HCC): ICD-10-CM

## 2023-07-06 DIAGNOSIS — E10.65 UNCONTROLLED TYPE 1 DIABETES MELLITUS WITH HYPERGLYCEMIA (HCC): ICD-10-CM

## 2023-07-07 ENCOUNTER — PATIENT MESSAGE (OUTPATIENT)
Dept: INTERNAL MEDICINE CLINIC | Facility: CLINIC | Age: 33
End: 2023-07-07

## 2023-07-07 RX ORDER — FLUCONAZOLE 150 MG/1
150 TABLET ORAL ONCE
Qty: 2 TABLET | Refills: 0 | Status: SHIPPED | OUTPATIENT
Start: 2023-07-07 | End: 2023-07-07

## 2023-07-07 NOTE — TELEPHONE ENCOUNTER
Please advise on patient MCM, pended rx for diflucan. Last given on 05/12/2023. She does have a FOV on 07/17/2023 with Dr Jacklyn Mchugh. LOV:   05/12/2023 Dr Pauline Tucker   2. Yeast infection  Yeast infection symptoms. Fluconazole sent to pharmacy. - fluconazole 150 MG Oral Tab; Take 1 tablet (150 mg total) by mouth once for 1 dose. Take 2nd tablet in 3 days if still with symptoms. Dispense: 2 tablet;  Refill: 0  FOV with Dr Jacklyn Mchugh on 07/17/2023

## 2023-07-10 RX ORDER — FLUCONAZOLE 150 MG/1
150 TABLET ORAL ONCE
Qty: 2 TABLET | Refills: 0
Start: 2023-07-10 | End: 2023-07-10

## 2023-07-10 NOTE — TELEPHONE ENCOUNTER
RP- Please advise; willing to order fluconazole (previously pended), or OV to evaluate further TY!    -Patient sent additional [facility] today confirming Fluconazole worked for similar symptoms she had back in May. Patient requesting refill.

## 2023-07-23 DIAGNOSIS — E10.8 TYPE 1 DIABETES MELLITUS WITH COMPLICATION, WITH LONG TERM CURRENT USE OF INSULIN PUMP: Primary | Chronic | ICD-10-CM

## 2023-07-23 DIAGNOSIS — Z96.41 TYPE 1 DIABETES MELLITUS WITH COMPLICATION, WITH LONG TERM CURRENT USE OF INSULIN PUMP: Primary | Chronic | ICD-10-CM

## 2023-07-24 RX ORDER — INSULIN DETEMIR 100 [IU]/ML
40 INJECTION, SOLUTION SUBCUTANEOUS EVERY 12 HOURS
Qty: 75 ML | Refills: 0 | Status: SHIPPED | OUTPATIENT
Start: 2023-07-24

## 2023-07-24 NOTE — TELEPHONE ENCOUNTER
Requested Prescriptions     Pending Prescriptions Disp Refills    LEVEMIR FLEXPEN 100 UNIT/ML Subcutaneous Solution Pen-injector [Pharmacy Med Name: LEVEMIR FLEXPEN 100 UNIT/ML] 75 mL 0     Sig: USE 40 UNITS UNDER THE SKIN TWO TIMES A DAY     LOV 5/12/23  RTC 3-6 months   Filled 4/24/23 90 each 0 refills  No future appointments.

## 2023-08-20 DIAGNOSIS — E10.8 TYPE 1 DIABETES MELLITUS WITH COMPLICATION, WITH LONG TERM CURRENT USE OF INSULIN PUMP: Primary | Chronic | ICD-10-CM

## 2023-08-20 DIAGNOSIS — Z96.41 TYPE 1 DIABETES MELLITUS WITH COMPLICATION, WITH LONG TERM CURRENT USE OF INSULIN PUMP: Primary | Chronic | ICD-10-CM

## 2023-08-20 DIAGNOSIS — E66.01 MORBID OBESITY (HCC): ICD-10-CM

## 2023-08-20 DIAGNOSIS — E10.65 UNCONTROLLED TYPE 1 DIABETES MELLITUS WITH HYPERGLYCEMIA (HCC): Chronic | ICD-10-CM

## 2023-08-21 RX ORDER — PHENTERMINE HYDROCHLORIDE 37.5 MG/1
37.5 TABLET ORAL
Qty: 30 TABLET | Refills: 2 | Status: SHIPPED | OUTPATIENT
Start: 2023-08-21

## 2023-08-21 RX ORDER — INSULIN LISPRO 100 [IU]/ML
INJECTION, SOLUTION INTRAVENOUS; SUBCUTANEOUS
Qty: 15 ML | Refills: 0 | Status: SHIPPED | OUTPATIENT
Start: 2023-08-21

## 2023-08-21 RX ORDER — ATORVASTATIN CALCIUM 10 MG/1
10 TABLET, FILM COATED ORAL NIGHTLY
Qty: 90 TABLET | Refills: 0 | Status: SHIPPED | OUTPATIENT
Start: 2023-08-21

## 2023-08-21 NOTE — TELEPHONE ENCOUNTER
Requested Prescriptions     Pending Prescriptions Disp Refills    Phentermine HCl 37.5 MG Oral Tab 30 tablet 2     Sig: Take 1 tablet (37.5 mg total) by mouth before breakfast.     No protocol     LOV 5/12/23  RTC 6 months   Filled 11/17/22 30 tabs 2 refills  Future Appointments   Date Time Provider Bonifacio Khalil   9/18/2023  1:15 PM Marialuisa Sosa MD [facility]

## 2023-08-31 DIAGNOSIS — F51.01 PRIMARY INSOMNIA: ICD-10-CM

## 2023-08-31 DIAGNOSIS — F41.1 GENERALIZED ANXIETY DISORDER: ICD-10-CM

## 2023-08-31 RX ORDER — ALPRAZOLAM 1 MG/1
1 TABLET ORAL 2 TIMES DAILY PRN
Qty: 60 TABLET | Refills: 3 | Status: SHIPPED | OUTPATIENT
Start: 2023-08-31

## 2023-09-01 RX ORDER — BLOOD-GLUCOSE METER
1 EACH MISCELLANEOUS AS DIRECTED
Qty: 1 KIT | Refills: 0 | Status: CANCELLED | OUTPATIENT
Start: 2023-09-01

## 2023-09-02 RX ORDER — BLOOD-GLUCOSE METER
1 EACH MISCELLANEOUS AS DIRECTED
Qty: 1 KIT | Refills: 0 | Status: SHIPPED | OUTPATIENT
Start: 2023-09-02

## 2023-09-06 RX ORDER — BLOOD-GLUCOSE METER
1 EACH MISCELLANEOUS AS DIRECTED
Qty: 1 KIT | Refills: 0 | Status: CANCELLED | OUTPATIENT
Start: 2023-09-06

## 2023-09-13 RX ORDER — BLOOD SUGAR DIAGNOSTIC
STRIP MISCELLANEOUS
Qty: 400 STRIP | Refills: 0 | Status: SHIPPED | OUTPATIENT
Start: 2023-09-13

## 2023-09-18 ENCOUNTER — OFFICE VISIT (OUTPATIENT)
Dept: ENDOCRINOLOGY CLINIC | Facility: CLINIC | Age: 33
End: 2023-09-18

## 2023-09-18 ENCOUNTER — TELEPHONE (OUTPATIENT)
Dept: ENDOCRINOLOGY CLINIC | Facility: CLINIC | Age: 33
End: 2023-09-18

## 2023-09-18 VITALS
HEART RATE: 94 BPM | DIASTOLIC BLOOD PRESSURE: 94 MMHG | SYSTOLIC BLOOD PRESSURE: 135 MMHG | WEIGHT: 226.38 LBS | BODY MASS INDEX: 41 KG/M2

## 2023-09-18 DIAGNOSIS — E10.65 UNCONTROLLED TYPE 1 DIABETES MELLITUS WITH HYPERGLYCEMIA (HCC): ICD-10-CM

## 2023-09-18 DIAGNOSIS — Z79.4 TYPE 2 DIABETES MELLITUS WITH HYPERGLYCEMIA, WITH LONG-TERM CURRENT USE OF INSULIN (HCC): ICD-10-CM

## 2023-09-18 DIAGNOSIS — E88.819 INSULIN RESISTANCE: ICD-10-CM

## 2023-09-18 DIAGNOSIS — E11.65 TYPE 2 DIABETES MELLITUS WITH HYPERGLYCEMIA, WITH LONG-TERM CURRENT USE OF INSULIN (HCC): ICD-10-CM

## 2023-09-18 DIAGNOSIS — E78.00 HYPERCHOLESTEROLEMIA: ICD-10-CM

## 2023-09-18 DIAGNOSIS — E10.65 UNCONTROLLED TYPE 1 DIABETES MELLITUS WITH HYPERGLYCEMIA (HCC): Primary | ICD-10-CM

## 2023-09-18 LAB
CARTRIDGE EXPIRATION DATE: ABNORMAL DATE
CARTRIDGE LOT#: ABNORMAL NUMERIC
GLUCOSE BLOOD: 422
HEMOGLOBIN A1C: 10.6 % (ref 4.3–5.6)
TEST STRIP EXPIRATION DATE: NORMAL DATE
TEST STRIP LOT #: NORMAL NUMERIC

## 2023-09-18 PROCEDURE — 3080F DIAST BP >= 90 MM HG: CPT | Performed by: INTERNAL MEDICINE

## 2023-09-18 PROCEDURE — 82947 ASSAY GLUCOSE BLOOD QUANT: CPT | Performed by: INTERNAL MEDICINE

## 2023-09-18 PROCEDURE — 83036 HEMOGLOBIN GLYCOSYLATED A1C: CPT | Performed by: INTERNAL MEDICINE

## 2023-09-18 PROCEDURE — 3046F HEMOGLOBIN A1C LEVEL >9.0%: CPT | Performed by: INTERNAL MEDICINE

## 2023-09-18 PROCEDURE — 3075F SYST BP GE 130 - 139MM HG: CPT | Performed by: INTERNAL MEDICINE

## 2023-09-18 PROCEDURE — 99214 OFFICE O/P EST MOD 30 MIN: CPT | Performed by: INTERNAL MEDICINE

## 2023-09-18 RX ORDER — PROCHLORPERAZINE 25 MG/1
1 SUPPOSITORY RECTAL DAILY
Qty: 1 EACH | Refills: 0 | Status: SHIPPED | OUTPATIENT
Start: 2023-09-18

## 2023-09-18 RX ORDER — PROCHLORPERAZINE 25 MG/1
SUPPOSITORY RECTAL
Qty: 1 EACH | Refills: 3 | Status: SHIPPED | OUTPATIENT
Start: 2023-09-18

## 2023-09-18 RX ORDER — INSULIN PMP CART,AUT,G6/7,CNTR
1 EACH SUBCUTANEOUS DAILY
Qty: 1 KIT | Refills: 0 | Status: SHIPPED | OUTPATIENT
Start: 2023-09-18

## 2023-09-18 RX ORDER — PROCHLORPERAZINE 25 MG/1
1 SUPPOSITORY RECTAL
Qty: 9 EACH | Refills: 3 | Status: SHIPPED | OUTPATIENT
Start: 2023-09-18

## 2023-09-18 NOTE — PROGRESS NOTES
Name: Brody Wong  Date: 9/16/2022    Referring Physician: No ref. provider found     HISTORY OF PRESENT ILLNESS   Vira Rose is a 29year old female who presents for follow up for type 1 diabetes     She was last seen by BALDO Diaz on 9/16/22. At that visit, it was made note of that she had transitioned to Omnipod 5 from [address] [place] [address]. She had not attended formal training for Omnipod 5. She started pump on her own and transferred previous pump settings to new Omnipod 5 pump. Does have pump running in auto mode. At the last visit, the following changes were made:  12A-1.3-->1.4  7A-1.8-->2.0  3p-1.9-->2.0  8P-1.65-->1.8    CF- 50--45  ICR- 9->8    Prior glycohemoglobin were: 11.2% 4/2022; 11.2% 8/2022; 11.6 on 2/02/23    Dietary compliance: Fair- counting carbohydrates. Eating three meals daily     Exercise: No  Polyuria/polydipsia: No  Blurred vision: No    Episodes of hypoglycemia: No  Blood Glucose:  Dexcom G6 - Reviewed reports (1/20/23-2/02/23)    19% glucose readings in target range ()  18% readings above target range (>180)  62% glucose readings very high (>250)  0% glucose readings below target range     Trends: Persistent hyperglycemia throughout the day and overnight.  She does have lower blood sugars closer to goal between 9-12AM. Significantly elevated sugars >300's for a majority of the day    Current DM Regimen:  Omnipod 5- automode- started on her own with previous settings from her DASH pump    12A-1.4-->1.6  7A-2.0-->2.1  3p-2.0-->2.1  8P-1.8-->1.9    CF- 50--45  ICR- 8->6    We kept the same:  Glucose targets and correct above ranges:  12A- glucose target 110 and correct above 140  6A- glucose target 120 and correct above 140  9P-glucose target 110 and correct above 140    Active insulin time: 3 hours       REVIEW OF SYSTEMS  Eyes: Diabetic retinopathy present: No            Most recent visit to eye doctor in last 12 months: No    CV: Cardiovascular disease present: No         Hypertension present: No         Hyperlipidemia present: Yes         Peripheral Vascular Disease present: No    : Nephropathy present: No    Neuro: Neuropathy present: No    Skin: Infection or ulceration: No    Thyroid disease: No      Medications:     Current Outpatient Medications:     Glucose Blood (ONETOUCH VERIO) In Vitro Strip, Use to test blood sugar up to 4 times daily. , Disp: 400 strip, Rfl: 0    Blood Glucose Monitoring Suppl (Rhea Carrel) w/Device Does not apply Kit, 1 each As Directed. Use check blood sugar, Disp: 1 kit, Rfl: 0    ALPRAZolam 1 MG Oral Tab, Take 1 tablet (1 mg total) by mouth 2 (two) times daily as needed for Anxiety or Sleep., Disp: 60 tablet, Rfl: 3    Phentermine HCl 37.5 MG Oral Tab, Take 1 tablet (37.5 mg total) by mouth before breakfast., Disp: 30 tablet, Rfl: 2    Insulin Lispro, 1 Unit Dial, 100 UNIT/ML Subcutaneous Solution Pen-injector, INJECT UNDER THE SKIN BASED ON CARB RATIO AND CORRECTION FACTOR*MAX DAILY DOSE IS 50 UNITS*, Disp: 15 mL, Rfl: 0    atorvastatin 10 MG Oral Tab, Take 1 tablet (10 mg total) by mouth nightly., Disp: 90 tablet, Rfl: 0    Insulin Lispro, 1 Unit Dial, 100 UNIT/ML Subcutaneous Solution Pen-injector, INJECT UNDER THE SKIN BASED ON CARB RATIO AND CORRECTION FACTOR*MAX DAILY DOSE IS 50 UNITS*, Disp: 15 mL, Rfl: 0    insulin detemir (LEVEMIR FLEXPEN) 100 UNIT/ML Subcutaneous Solution Pen-injector, Inject 40 Units into the skin every 12 (twelve) hours. , Disp: 75 mL, Rfl: 0    Insulin Disposable Pump (OMNIPOD DASH PODS, GEN 4,) Does not apply Misc, 1 each daily. , Disp: 30 each, Rfl: 0    semaglutide (OZEMPIC, 0.25 OR 0.5 MG/DOSE,) 2 MG/1.5ML Subcutaneous Solution Pen-injector, Inject 0.25 mg into the skin once a week., Disp: 1.5 mL, Rfl: 0    Glucose Blood (ONETOUCH ULTRA) In Vitro Strip, Check blood sugar up to 4x daily, Disp: 400 strip, Rfl: 0    lidocaine 5 % External Patch, Place 2 patches onto the skin daily. , Disp: 10 patch, Rfl: 0    Insulin Pen Needle (TRUEPLUS PEN NEEDLES) 32G X 4 MM Does not apply Misc, Take 1 each by mouth in the morning and 1 each before bedtime. , Disp: 100 each, Rfl: 2    Continuous Blood Gluc Transmit (DEXCOM G6 TRANSMITTER) Does not apply Misc, Take 1 Bottle by mouth As Directed., Disp: 1 each, Rfl: 3    OMNIPOD 5 G6 POD, GEN 5, Does not apply Misc, CHANGE OMNIPOD AS DIRECTED, Disp: 30 each, Rfl: 0    Glucose Blood (ONETOUCH VERIO) In Vitro Strip, Check blood sugar up to 4x daily, Disp: 400 strip, Rfl: 0    Insulin Lispro 100 UNIT/ML Injection Solution, 300 Units/day by Insulin pump route daily. , Disp: 300 mL, Rfl: 0    tiZANidine 4 MG Oral Tab, Take 1 tablet (4 mg total) by mouth daily as needed. Do not take with alprazolam/Xanax, Disp: 30 tablet, Rfl: 2    DEXCOM G6 SENSOR Does not apply Misc, USE AS DIRECTED EVERY 10 DAYS., Disp: 9 each, Rfl: 0    Continuous Blood Gluc Sensor (DEXCOM G6 SENSOR) Does not apply Misc, 1 each Every 10 days. , Disp: 9 each, Rfl: 3    OneTouch Delica Lancets 31J Does not apply Misc, Check blood sugar up to 4x daily, Disp: 400 each, Rfl: 0    Meloxicam 7.5 MG Oral Tab, Take 1 tablet (7.5 mg total) by mouth daily. , Disp: 30 tablet, Rfl: 0    buPROPion  MG Oral Tablet 24 Hr, Take 1 tablet (150 mg total) by mouth daily. , Disp: 90 tablet, Rfl: 1    topiramate 50 MG Oral Tab, Take 1 tablet (50 mg total) by mouth 2 (two) times daily. , Disp: 180 tablet, Rfl: 1    Insulin Disposable Pump (OMNIPOD 5 G6 INTRO, GEN 5,) Does not apply Kit, 1 each As Directed., Disp: 1 kit, Rfl: 0    ergocalciferol 1.25 MG (97815 UT) Oral Cap, Take 1 capsule (50,000 Units total) by mouth once a week., Disp: 12 capsule, Rfl: 1    Insulin Pen Needle 32G X 4 MM Does not apply Misc, Inject insulin 4 times a day, Disp: 50 each, Rfl: 0    Continuous Blood Gluc  (DEXCOM G6 ) Does not apply Device, Use as directed with sensors and transmitter, Disp: 1 Device, Rfl: 0    [facility] PLUS [id number] Does not apply Misc, USE AS DIRECTED, Disp: 400 each, Rfl: 2     Allergies:   No Known Allergies    Social History:   Social History    Socioeconomic History      Marital status: Single    Tobacco Use      Smoking status: Every Day        Packs/day: .5        Types: Cigarettes        Start date:       Smokeless tobacco: Never    Vaping Use      Vaping Use: Former        Substances: Nicotine        Devices: Pre-filled pod    Substance and Sexual Activity      Alcohol use: Not Currently      Drug use: No      Sexual activity: Yes        Partners: Male    Other Topics      Concerns:        Caffeine Concern: Yes          16 oz of diet coke every other day and 1 medium coffee from [employer] once daily. Exercise: Yes          sometimes        Seat Belt: Yes        Special Diet: No        Stress Concern: Yes        Weight Concern: Yes      Medical History:   Past Medical History:   Diagnosis Date    History of blood transfusion         History of  delivery 1/21/2021    X 3 [x ] Repeat CS at 36 weeks- 21 [ ] bilateral salpingectomy - IPA consent signed 2021     S/P repeat low transverse         Surgical history:   Past Surgical History:   Procedure Laterality Date          x3    OTHER SURGICAL HISTORY Right     R arm surgery after trauma         PHYSICAL EXAM  There were no vitals filed for this visit. PHYSICAL EXAM  General Appearance:  alert, in no acute distress  Throat/Neck: normal sound to voice. Respiratory:  non-labored. no increased work of breathing. Psychiatric:  oriented to time, self, and place    ASSESSMENT/PLAN:    Diabetes mellitus type 1 uncontrolled  -Hga1c- 11.6% 2023   - Reviewed pathogenesis of diabetes.    - Reviewed importance of good glycemic control to prevent microvascular and macrovascular complications including nephropathy, neuropathy, retinopathy, and cardiovascular disease.  - Reviewed importance of SBGM- continue with Dexcom G5  - Reviewed target glucose goals for patient  fasting and <160 post prandially     -Sugars are significantly above goal since transition to Omnipod 5 although patient admits sugars were not well controlled with same settings on DASH pump either    -Pump settings adjusted as noted above. Discussed she needs to contact clinic this afternoon if sugars not improved. Will need to be seen for pump download or switch back to MDI temporarily until pump settings can be better assessed and adjusted. RTC in 1 month or sooner in person for pump download if sugars remain persistently >200 at home. I have asked her to try and have labs done. 2/02/2023  Caren Goncalves MD    Prior to this encounter, I spent over 15 minutes with preparing for the visit, including reviewing documents from other specialties as well as from PCP and going over test results. During the face to face encounter, I spent an additional 15 minutes which were determined for follow-up. Greater than 50% of the time was spent in counseling, anticipatory guidance, and coordination of care. Patient concerns were answered to the best of my knowledge.

## 2023-09-18 NOTE — PROGRESS NOTES
Name: Galina Mathew  Date: 9/18/2023    Referring Physician: No ref. provider found    HISTORY OF PRESENT ILLNESS   Vira Gallagher is a 29year old female who presents for evaluation and management of type 1 diabetes. She was diagnosed with diabetes many years ago. Blood Glucose Today: 422  HbA1C or glycohemoglobin 10.6 today  Type 1 or Type 2?: Type 1 with insulin resistance  Medications for DM: Levemir 32 bid; Humalog 12 units tid with meals  Fasting- 120;   Before meals, 250s   Checking 4-5 times per day  Episodes of hypoglycemia: occasionally in the middle of the night    Dietary compliance: she is trying to eat healthier    Exercise: she is trying to exercise more    Polyuria/polydipsia: none    Blurred vision: none    Flu Vaccine This Season: yes    Covid Vaccine: yes    REVIEW OF SYSTEMS  CV: Cardiovascular disease present: none         Hypertension present: not at goal, not on meds         Hyperlipidemia present: LDL elevated, not on meds (5/10/23)         Peripheral Vascular Disease present: none    : Nephropathy present: MAC: nne (5/10/23) Creatinine:- 0.58     Neuro: Neuropathy present: none    Eyes: Diabetic retinopathy present: unknown            Most recent visit to eye doctor in last 12 months: she has to make an appt    Skin: Infection or ulceration: none    Osteoporosis/ Osteopenia: none Vitamin D: 28.5 (5/10/23)    Thyroid disease: none TSH: unknown    Medications:     Current Outpatient Medications:     Glucose Blood (ONETOUCH VERIO) In Vitro Strip, Use to test blood sugar up to 4 times daily. , Disp: 400 strip, Rfl: 0    Blood Glucose Monitoring Suppl (Marilu Slaughter) w/Device Does not apply Kit, 1 each As Directed.  Use check blood sugar, Disp: 1 kit, Rfl: 0    ALPRAZolam 1 MG Oral Tab, Take 1 tablet (1 mg total) by mouth 2 (two) times daily as needed for Anxiety or Sleep., Disp: 60 tablet, Rfl: 3    Phentermine HCl 37.5 MG Oral Tab, Take 1 tablet (37.5 mg total) by mouth before breakfast., Disp: 30 tablet, Rfl: 2    Insulin Lispro, 1 Unit Dial, 100 UNIT/ML Subcutaneous Solution Pen-injector, INJECT UNDER THE SKIN BASED ON CARB RATIO AND CORRECTION FACTOR*MAX DAILY DOSE IS 50 UNITS*, Disp: 15 mL, Rfl: 0    atorvastatin 10 MG Oral Tab, Take 1 tablet (10 mg total) by mouth nightly., Disp: 90 tablet, Rfl: 0    Insulin Lispro, 1 Unit Dial, 100 UNIT/ML Subcutaneous Solution Pen-injector, INJECT UNDER THE SKIN BASED ON CARB RATIO AND CORRECTION FACTOR*MAX DAILY DOSE IS 50 UNITS*, Disp: 15 mL, Rfl: 0    insulin detemir (LEVEMIR FLEXPEN) 100 UNIT/ML Subcutaneous Solution Pen-injector, Inject 40 Units into the skin every 12 (twelve) hours. , Disp: 75 mL, Rfl: 0    Insulin Disposable Pump (OMNIPOD DASH PODS, GEN 4,) Does not apply Misc, 1 each daily. , Disp: 30 each, Rfl: 0    semaglutide (OZEMPIC, 0.25 OR 0.5 MG/DOSE,) 2 MG/1.5ML Subcutaneous Solution Pen-injector, Inject 0.25 mg into the skin once a week., Disp: 1.5 mL, Rfl: 0    Glucose Blood (ONETOUCH ULTRA) In Vitro Strip, Check blood sugar up to 4x daily, Disp: 400 strip, Rfl: 0    lidocaine 5 % External Patch, Place 2 patches onto the skin daily. , Disp: 10 patch, Rfl: 0    Insulin Pen Needle (TRUEPLUS PEN NEEDLES) 32G X 4 MM Does not apply Misc, Take 1 each by mouth in the morning and 1 each before bedtime. , Disp: 100 each, Rfl: 2    Continuous Blood Gluc Transmit (DEXCOM G6 TRANSMITTER) Does not apply Misc, Take 1 Bottle by mouth As Directed., Disp: 1 each, Rfl: 3    OMNIPOD 5 G6 POD, GEN 5, Does not apply Misc, CHANGE OMNIPOD AS DIRECTED, Disp: 30 each, Rfl: 0    Glucose Blood (ONETOUCH VERIO) In Vitro Strip, Check blood sugar up to 4x daily, Disp: 400 strip, Rfl: 0    Insulin Lispro 100 UNIT/ML Injection Solution, 300 Units/day by Insulin pump route daily. , Disp: 300 mL, Rfl: 0    tiZANidine 4 MG Oral Tab, Take 1 tablet (4 mg total) by mouth daily as needed.  Do not take with alprazolam/Xanax, Disp: 30 tablet, Rfl: 2    DEXCOM G6 SENSOR Does not apply Misc, USE AS DIRECTED EVERY 10 DAYS., Disp: 9 each, Rfl: 0    Continuous Blood Gluc Sensor (DEXCOM G6 SENSOR) Does not apply Misc, 1 each Every 10 days. , Disp: 9 each, Rfl: 3    OneTouch Delica Lancets 68K Does not apply Misc, Check blood sugar up to 4x daily, Disp: 400 each, Rfl: 0    Meloxicam 7.5 MG Oral Tab, Take 1 tablet (7.5 mg total) by mouth daily. , Disp: 30 tablet, Rfl: 0    buPROPion  MG Oral Tablet 24 Hr, Take 1 tablet (150 mg total) by mouth daily. , Disp: 90 tablet, Rfl: 1    topiramate 50 MG Oral Tab, Take 1 tablet (50 mg total) by mouth 2 (two) times daily. , Disp: 180 tablet, Rfl: 1    Insulin Disposable Pump (OMNIPOD 5 G6 INTRO, GEN 5,) Does not apply Kit, 1 each As Directed., Disp: 1 kit, Rfl: 0    ergocalciferol 1.25 MG (31637 UT) Oral Cap, Take 1 capsule (50,000 Units total) by mouth once a week., Disp: 12 capsule, Rfl: 1    Insulin Pen Needle 32G X 4 MM Does not apply Misc, Inject insulin 4 times a day, Disp: 50 each, Rfl: 0    Continuous Blood Gluc  (DEXCOM G6 ) Does not apply Device, Use as directed with sensors and transmitter, Disp: 1 Device, Rfl: 0    ONETOUCH DELICA PLUS [id number] Does not apply Misc, USE AS DIRECTED, Disp: 400 each, Rfl: 2     Allergies:   No Known Allergies    Social History:   Social History     Socioeconomic History    Marital status: Single   Tobacco Use    Smoking status: Every Day     Packs/day: .5     Types: Cigarettes     Start date: 2011    Smokeless tobacco: Never   Vaping Use    Vaping Use: Former    Substances: Nicotine    Devices: Pre-filled pod   Substance and Sexual Activity    Alcohol use: Not Currently    Drug use: No    Sexual activity: Yes     Partners: Male   Other Topics Concern    Caffeine Concern Yes     Comment: 16 oz of diet coke every other day and 1 medium coffee from DD once daily.     Exercise Yes     Comment: sometimes    Seat Belt Yes    Special Diet No    Stress Concern Yes    Weight Concern Yes       Medical History:   Past Medical History:   Diagnosis Date    History of blood transfusion         History of  delivery 1/21/2021    X 3 [x ] Repeat CS at 36 weeks- 21 [ ] bilateral salpingectomy - IPA consent signed 2021     S/P repeat low transverse         Surgical history:   Past Surgical History:   Procedure Laterality Date          x3    OTHER SURGICAL HISTORY Right 2011    R arm surgery after trauma         PHYSICAL EXAM   23  1331   BP: (!) 135/94   Pulse: 94   Weight: 226 lb 6.4 oz (102.7 kg)       General Appearance:  alert, well developed, in no acute distress  Eyes:  normal conjunctivae, sclera. , normal sclera and normal pupils  Ears/Nose/Mouth/Throat/Neck:  no palpable thyroid nodules or cervical lymphadenopathy  Neck: Trachea midline: Normal  Psychiatric:  oriented to time, self, and place  Nutritional:  no abnormal weight gain or loss    Lab Data:   Lab Results   Component Value Date     (H) 2022    A1C 11.6 (A) 2023     Lab Results   Component Value Date     (H) 05/10/2023    BUN 13 05/10/2023    BUNCREA 15.5 2022    CREATSERUM 0.58 05/10/2023    ANIONGAP 1 05/10/2023     2017    GFRNAA 120 2022    GFRAA 138 2022    CA 8.8 05/10/2023    OSMOCALC 296 (H) 05/10/2023    ALKPHO 82 05/10/2023    AST 12 (L) 05/10/2023    ALT 15 05/10/2023    BILT 0.4 05/10/2023    TP 7.0 05/10/2023    ALB 3.3 (L) 05/10/2023    GLOBULIN 3.7 05/10/2023     05/10/2023    K 3.9 05/10/2023     05/10/2023    CO2 25.0 05/10/2023     Lab Results   Component Value Date    CHOLEST 173 05/10/2023    TRIG 51 05/10/2023    HDL 52 05/10/2023     (H) 05/10/2023    VLDL 9 05/10/2023    TCHDLRATIO 3.22 2018    [place] 121 05/10/2023     Lab Results   Component Value Date    MALBP 0.80 05/10/2023    CREUR 131.00 05/10/2023         ASSESSMENT/PLAN:  This is a 29year-old woman here for evaluation and management of uncontrolled type 2 diabetes. We discussed the ABCs of DM.     1.) Hyperglycemia Management- We discussed the importance of glycemic control to prevent complications of diabetes. We discussed the importance of SBGM. I offered and provided patient education materials and offered a blood glucose log book. - Decrease Levemir to 28 units and increase humalog to 14-16 units.   - She will be in touch with Hector Baltazar RN, CDE about obtaining the Omnipod and DExcom    2.) Management of Diabetic Complications- We discussed the complications of diabetes include retinopathy, neuropathy, nephropathy and cardiovascular disease. - Ophtho- she will make appt  - Flu and Covid vaccine- up to date  - BP- not at goal, will monitor  - Lipids- not at goal, check again in 3 months  - MAC- at goal, check again in 3 months  - CMP- check in 3 months  - Neuropathy- none  - CAD- none    3.) Lifestyle Management for Diabetes- We discussed importance of a low CHO diet, and recommend 45gm per meal or 135gm per day. We discussed the importance of trying to follow a Mediterranean diet, with an emphasis on vegetables at every meal, with lots whole grains, and protein from either plant-based sources, or poultry and fish. - Diet- She will try and eat healthy  - Exercise- she will try and exercise    Return to clinic in 3 months    Prior to this encounter, I spent over 15 minutes with preparing for the visit, including reviewing documents from other specialties as well as from PCP and going over test results. During the face to face encounter, I spent an additional 15 minutes which were determined for follow-up. Greater than 50% of the time was spent in counseling, anticipatory guidance, and coordination of care. Patient concerns were answered to the best of my knowledge. 9/18/2023  Barbi Gatica MD

## 2023-09-19 RX ORDER — SEMAGLUTIDE 0.68 MG/ML
INJECTION, SOLUTION SUBCUTANEOUS
Qty: 3 ML | Refills: 0 | Status: SHIPPED | OUTPATIENT
Start: 2023-09-19 | End: 2023-11-03

## 2023-09-21 ENCOUNTER — PATIENT MESSAGE (OUTPATIENT)
Dept: ENDOCRINOLOGY CLINIC | Facility: CLINIC | Age: 33
End: 2023-09-21

## 2023-09-21 NOTE — TELEPHONE ENCOUNTER
PA submitted via [employer]:    Case Id  [id number]  Reference Id  [id number]  Priority  Priority: Expedited  Notes  View

## 2023-09-21 NOTE — TELEPHONE ENCOUNTER
From: Anna Loo  To: [facility]bi [facility]  Sent: 9/21/2023 11:37 AM CDT  Subject: Omnipod Pre Auth    Was pre authorization sent for omnipod? That what pharmacy is waiting for you all to send to insurance.

## 2023-09-25 RX ORDER — INSULIN PMP CART,AUT,G6/7,CNTR
1 EACH SUBCUTANEOUS DAILY
Qty: 30 EACH | Refills: 0 | Status: SHIPPED | OUTPATIENT
Start: 2023-09-25

## 2023-09-25 NOTE — TELEPHONE ENCOUNTER
Per micaela - PA denied for Omnipod 5 PDM as suspected. Advised patient to contact [employer] directly to speak about buying options.

## 2023-09-27 PROBLEM — Z79.4 TYPE 2 DIABETES MELLITUS WITH HYPERGLYCEMIA, WITH LONG-TERM CURRENT USE OF INSULIN (HCC): Status: ACTIVE | Noted: 2023-09-27

## 2023-09-27 PROBLEM — E11.65 TYPE 2 DIABETES MELLITUS WITH HYPERGLYCEMIA, WITH LONG-TERM CURRENT USE OF INSULIN (HCC): Status: ACTIVE | Noted: 2023-09-27

## 2023-09-27 PROBLEM — E88.819 INSULIN RESISTANCE: Status: ACTIVE | Noted: 2023-09-27

## 2023-09-29 DIAGNOSIS — Z96.41 TYPE 1 DIABETES MELLITUS WITH COMPLICATION, WITH LONG TERM CURRENT USE OF INSULIN PUMP: ICD-10-CM

## 2023-09-29 DIAGNOSIS — E10.8 TYPE 1 DIABETES MELLITUS WITH COMPLICATION, WITH LONG TERM CURRENT USE OF INSULIN PUMP: ICD-10-CM

## 2023-09-29 DIAGNOSIS — E10.42 DM TYPE 1 WITH DIABETIC PERIPHERAL NEUROPATHY (HCC): ICD-10-CM

## 2023-09-29 RX ORDER — PEN NEEDLE, DIABETIC 32GX 5/32"
1 NEEDLE, DISPOSABLE MISCELLANEOUS 2 TIMES DAILY
Qty: 100 EACH | Refills: 2 | Status: SHIPPED | OUTPATIENT
Start: 2023-09-29

## 2023-09-29 NOTE — TELEPHONE ENCOUNTER
Insulin Pen Needle (TRUEPLUS PEN NEEDLES) 32G X 4 MM Does not apply Misc         Sig: Take 1 each by mouth in the morning and 1 each before bedtime. Disp: 100 each    Refills: 2    Start: 9/29/2023    Class: Normal    For: DM type 1 with diabetic peripheral neuropathy ([facility] [address] Type 1 diabetes mellitus with complication, with long term current use of insulin pump    To pharmacy: E10.43    Last ordered: 6 months ago (3/17/2023) by Colten Tavera MD    Diabetic Supplies Protocol [id number]/29/2023 01:19 PM    Appointment in the past 12 or next 3 months      LOV 5/12/23  Filled 3/17/23 100 each 2 refills   No future appointments.

## 2023-10-02 DIAGNOSIS — E88.819 INSULIN RESISTANCE: ICD-10-CM

## 2023-10-02 DIAGNOSIS — E10.65 UNCONTROLLED TYPE 1 DIABETES MELLITUS WITH HYPERGLYCEMIA (HCC): ICD-10-CM

## 2023-10-02 DIAGNOSIS — E11.65 TYPE 2 DIABETES MELLITUS WITH HYPERGLYCEMIA, WITH LONG-TERM CURRENT USE OF INSULIN (HCC): ICD-10-CM

## 2023-10-02 DIAGNOSIS — Z79.4 TYPE 2 DIABETES MELLITUS WITH HYPERGLYCEMIA, WITH LONG-TERM CURRENT USE OF INSULIN (HCC): ICD-10-CM

## 2023-10-02 RX ORDER — INSULIN PMP CART,AUT,G6/7,CNTR
1 EACH SUBCUTANEOUS AS DIRECTED
Qty: 30 EACH | Refills: 0 | Status: SHIPPED | OUTPATIENT
Start: 2023-10-02

## 2023-10-02 RX ORDER — SEMAGLUTIDE 0.68 MG/ML
0.5 INJECTION, SOLUTION SUBCUTANEOUS WEEKLY
Qty: 9 ML | Refills: 0 | Status: CANCELLED | OUTPATIENT
Start: 2023-10-02 | End: 2023-12-31

## 2023-10-02 NOTE — TELEPHONE ENCOUNTER
LOV: 9/18/2023    RTC: 3 Months    FU: No FU Appt Scheduled    Last Refill: 9/25/2023    Last Labs: 5/10/2023    Next Labs Due: 12/18/2023    3 Month Supply Pending

## 2023-10-02 NOTE — TELEPHONE ENCOUNTER
LOV: 9/18/2023    RTC: 3 Months    FU: No FU Appt Scheduled    Last Refill: 9/19/2023    Last Labs: 5/10/2023    Next Labs Due: 12/18/2023    3 Month Supply Pending

## 2023-10-06 RX ORDER — DULAGLUTIDE 0.75 MG/.5ML
0.75 INJECTION, SOLUTION SUBCUTANEOUS WEEKLY
Qty: 2 ML | Refills: 0 | Status: SHIPPED | OUTPATIENT
Start: 2023-10-06 | End: 2023-11-05

## 2023-10-06 NOTE — TELEPHONE ENCOUNTER
, per pharmacy ozempic does require Prior auth, would you like to try sending in alternative such as Trulicity ?

## 2023-10-07 NOTE — TELEPHONE ENCOUNTER
Hi!  Please let patient know that I have started her on Trulicity 7.15VS qweekly for 30 days. Please ask her to be in touch with us in 3 weeks and if she does well, I will increase dose to 1.5mcg qweekly. Thank you!

## 2023-10-10 ENCOUNTER — TELEPHONE (OUTPATIENT)
Dept: ENDOCRINOLOGY CLINIC | Facility: CLINIC | Age: 33
End: 2023-10-10

## 2023-10-10 NOTE — TELEPHONE ENCOUNTER
Prior Authorization:  Insulin Disposable Pump (OMNIPOD 5 G6 POD, GEN 5,) Does not apply Misc, 1 Device every other day., Disp: 45 each, Rfl: 0

## 2023-10-11 NOTE — TELEPHONE ENCOUNTER
[employer] calling states needs diagnosis code and how many omni pods per day.      Fax #: [phone]  Case #: [id number]

## 2023-10-16 NOTE — PROGRESS NOTES
[facility]  Obstetrics and Gynecology   Postpartum Progress Note    Subjective: Ryan Arizmendi is a 31year old  female who is s/p RCS w/ BS on 21.  Her pregnancy was complicated by BMI 45, Anxiety, Tobacco use, DM I uncontr Class 2 severe obesity due to excess calories with serious comorbidity and body mass index (BMI) of 37.0 to 37.9 in adult [facility])     Uncontrolled type 1 diabetes mellitus with hyperglycemia ([facility] [address]     History of  delivery     Type 1 diabetes mellitus Left arm;

## 2023-11-01 DIAGNOSIS — Z79.4 TYPE 2 DIABETES MELLITUS WITH HYPERGLYCEMIA, WITH LONG-TERM CURRENT USE OF INSULIN (HCC): ICD-10-CM

## 2023-11-01 DIAGNOSIS — E88.819 INSULIN RESISTANCE: ICD-10-CM

## 2023-11-01 DIAGNOSIS — E11.65 TYPE 2 DIABETES MELLITUS WITH HYPERGLYCEMIA, WITH LONG-TERM CURRENT USE OF INSULIN (HCC): ICD-10-CM

## 2023-11-01 RX ORDER — DULAGLUTIDE 0.75 MG/.5ML
0.75 INJECTION, SOLUTION SUBCUTANEOUS WEEKLY
Qty: 2 ML | Refills: 0 | Status: CANCELLED | OUTPATIENT
Start: 2023-11-01 | End: 2023-12-01

## 2023-11-02 ENCOUNTER — TELEPHONE (OUTPATIENT)
Dept: ENDOCRINOLOGY CLINIC | Facility: CLINIC | Age: 33
End: 2023-11-02

## 2023-11-02 RX ORDER — DULAGLUTIDE 1.5 MG/.5ML
1.5 INJECTION, SOLUTION SUBCUTANEOUS WEEKLY
Qty: 6 ML | Refills: 0 | Status: SHIPPED | OUTPATIENT
Start: 2023-11-02 | End: 2024-01-31

## 2023-11-02 NOTE — TELEPHONE ENCOUNTER
LOV: 9/18/23    RTC:  3 Months    FU: 1/18/24    Last Refill: 10/6/23    1 Month Supply Pending     Called and spoke to patient, appointment booked with MD for 01/2024. While on the phone with patient she had mentioned she was able to receive her pump. Patient had requested for [facility], CDE to give her a call regarding her pump, this MA informed IVETTE Tovar.  Routing Rx to MD.

## 2023-11-02 NOTE — TELEPHONE ENCOUNTER
Refill 11/1/2023 requesting to speak with [facility]    Attempted to call pt at 11:01am at [phone] and unable to leave .  [employer] message sent      Settings from 2/2/2023:  12A-1.4-->1.6  7A-2.0-->2.1  3p-2.0-->2.1  8P-1.8-->1.9     CF- 50--45  ICR- 8->6     We kept the same:  Glucose targets and correct above ranges:  12A- glucose target 110 and correct above 140  6A- glucose target 120 and correct above 140  9P-glucose target 110 and correct above 140     Active insulin time: 3 hours

## 2023-11-03 NOTE — TELEPHONE ENCOUNTER
Hi!  I will increase dose to 1.5mg qweekly. She should speak with IVETTE Sheehan, so that her pump settings can be adjusted. Thank you!

## 2023-11-07 ENCOUNTER — TELEPHONE (OUTPATIENT)
Dept: ENDOCRINOLOGY CLINIC | Facility: CLINIC | Age: 33
End: 2023-11-07

## 2023-11-07 NOTE — TELEPHONE ENCOUNTER
Refill 80/3 state Trulicity was increased to 1.5mg/weekly    Mychart message sent to pt incase pump needs to be adjusted

## 2023-11-09 ENCOUNTER — MED REC SCAN ONLY (OUTPATIENT)
Dept: ENDOCRINOLOGY CLINIC | Facility: CLINIC | Age: 33
End: 2023-11-09

## 2023-11-10 NOTE — TELEPHONE ENCOUNTER
Received email from Galen for 11/9 5:32pm:    [facility],    I just got off the phone with [facility]. She should be all set now.  You should be seeing her data within the next 3 hours at most.     Thanks,  Tere Jimenez"      Reviewed data which is only showing up to 10/31/2023

## 2023-11-10 NOTE — TELEPHONE ENCOUNTER
Received email from Jeniohyan: [facility],    It was great meeting with you all as well! Sorry to hear Vira's data is still not showing up, after speaking with her yesterday I thought this would have been fully resolved. I will need to submit a ticket to our [facility] support team. Is there any chance you can just send me a screenshot of the last sync date or a copy of the report with the data missing? I am hoping we can get some answers before the weekend. I will let you know as soon as I hear back from [facility].      Thanks,  Tesfaye\"      [facility] set to [facility]

## 2023-11-29 RX ORDER — ATORVASTATIN CALCIUM 10 MG/1
10 TABLET, FILM COATED ORAL NIGHTLY
Qty: 90 TABLET | Refills: 3 | Status: SHIPPED | OUTPATIENT
Start: 2023-11-29

## 2023-12-13 ENCOUNTER — PATIENT MESSAGE (OUTPATIENT)
Dept: INTERNAL MEDICINE CLINIC | Facility: CLINIC | Age: 33
End: 2023-12-13

## 2023-12-13 RX ORDER — AZITHROMYCIN 250 MG/1
TABLET, FILM COATED ORAL
Qty: 6 TABLET | Refills: 0 | Status: SHIPPED | OUTPATIENT
Start: 2023-12-13 | End: 2023-12-17

## 2023-12-15 RX ORDER — INSULIN LISPRO 100 [IU]/ML
INJECTION, SOLUTION INTRAVENOUS; SUBCUTANEOUS
Qty: 15 ML | Refills: 0 | Status: SHIPPED | OUTPATIENT
Start: 2023-12-15

## 2023-12-18 DIAGNOSIS — F51.01 PRIMARY INSOMNIA: ICD-10-CM

## 2023-12-18 DIAGNOSIS — F41.1 GENERALIZED ANXIETY DISORDER: ICD-10-CM

## 2023-12-18 DIAGNOSIS — E10.65 UNCONTROLLED TYPE 1 DIABETES MELLITUS WITH HYPERGLYCEMIA (HCC): ICD-10-CM

## 2023-12-18 DIAGNOSIS — O24.012: ICD-10-CM

## 2023-12-18 NOTE — TELEPHONE ENCOUNTER
Requested Prescriptions     Pending Prescriptions Disp Refills    ALPRAZolam 1 MG Oral Tab 60 tablet 3     Sig: Take 1 tablet (1 mg total) by mouth 2 (two) times daily as needed for Anxiety or Sleep.      No protocol    LOV 5/12/23  RTC 6 months   Filled 8/31/23 60 tabs 3 refills   Future Appointments   Date Time Provider Bonifacio Khalil   1/18/2024  2:30 PM Ankush Simmons MD [facility]

## 2023-12-19 RX ORDER — ALPRAZOLAM 1 MG/1
1 TABLET ORAL 2 TIMES DAILY PRN
Qty: 60 TABLET | Refills: 3 | Status: SHIPPED | OUTPATIENT
Start: 2023-12-19

## 2023-12-19 NOTE — TELEPHONE ENCOUNTER
LOV: 09/18/2023     Next office visit: 01/18/2024      Refill request: Insulin pro     Order pended and routed to provider

## 2023-12-21 RX ORDER — INSULIN LISPRO 100 [IU]/ML
300 INJECTION, SOLUTION INTRAVENOUS; SUBCUTANEOUS DAILY
Qty: 300 ML | Refills: 0 | OUTPATIENT
Start: 2023-12-21

## 2024-01-08 DIAGNOSIS — E10.65 UNCONTROLLED TYPE 1 DIABETES MELLITUS WITH HYPERGLYCEMIA (HCC): ICD-10-CM

## 2024-01-09 RX ORDER — INSULIN PMP CART,AUT,G6/7,CNTR
1 EACH SUBCUTANEOUS
Qty: 45 EACH | Refills: 0 | Status: SHIPPED | OUTPATIENT
Start: 2024-01-09

## 2024-01-09 NOTE — TELEPHONE ENCOUNTER
LOV: 9/18/23     Next office visit: 1/13/23     Last filled: 10/5/23  Refill request: OMNIPOD 5 G6 POD     Order pended and routed to Provider

## 2024-01-09 NOTE — LETTER
Date & Time: 3/13/2021, 4:34 PM  Patient: My Davis  Encounter Provider(s):    Nikkie Hernandez MD       To Whom It May Concern: Jose Pereraa was seen and treated in our department on 3/13/2021.  She Please excuse her from work on 3/13, 3/14
No

## 2024-01-16 ENCOUNTER — OFFICE VISIT (OUTPATIENT)
Dept: ENDOCRINOLOGY CLINIC | Facility: CLINIC | Age: 34
End: 2024-01-16

## 2024-01-16 VITALS
HEART RATE: 105 BPM | HEIGHT: 62 IN | WEIGHT: 224 LBS | DIASTOLIC BLOOD PRESSURE: 69 MMHG | SYSTOLIC BLOOD PRESSURE: 120 MMHG | BODY MASS INDEX: 41.22 KG/M2

## 2024-01-16 DIAGNOSIS — Z79.4 TYPE 2 DIABETES MELLITUS WITH HYPERGLYCEMIA, WITH LONG-TERM CURRENT USE OF INSULIN (HCC): ICD-10-CM

## 2024-01-16 DIAGNOSIS — E10.65 UNCONTROLLED TYPE 1 DIABETES MELLITUS WITH HYPERGLYCEMIA (HCC): Primary | ICD-10-CM

## 2024-01-16 DIAGNOSIS — E78.00 HYPERCHOLESTEROLEMIA: ICD-10-CM

## 2024-01-16 DIAGNOSIS — E88.819 INSULIN RESISTANCE: ICD-10-CM

## 2024-01-16 DIAGNOSIS — E11.65 TYPE 2 DIABETES MELLITUS WITH HYPERGLYCEMIA, WITH LONG-TERM CURRENT USE OF INSULIN (HCC): ICD-10-CM

## 2024-01-16 LAB
CARTRIDGE EXPIRATION DATE: ABNORMAL DATE
CARTRIDGE LOT#: ABNORMAL NUMERIC
GLUCOSE BLOOD: 272
HEMOGLOBIN A1C: 11.3 % (ref 4.3–5.6)
TEST STRIP EXPIRATION DATE: NORMAL DATE
TEST STRIP LOT #: NORMAL NUMERIC

## 2024-01-16 PROCEDURE — 99214 OFFICE O/P EST MOD 30 MIN: CPT | Performed by: INTERNAL MEDICINE

## 2024-01-16 PROCEDURE — 82947 ASSAY GLUCOSE BLOOD QUANT: CPT | Performed by: INTERNAL MEDICINE

## 2024-01-16 PROCEDURE — 3008F BODY MASS INDEX DOCD: CPT | Performed by: INTERNAL MEDICINE

## 2024-01-16 PROCEDURE — 3046F HEMOGLOBIN A1C LEVEL >9.0%: CPT | Performed by: INTERNAL MEDICINE

## 2024-01-16 PROCEDURE — 83036 HEMOGLOBIN GLYCOSYLATED A1C: CPT | Performed by: INTERNAL MEDICINE

## 2024-01-16 PROCEDURE — 3074F SYST BP LT 130 MM HG: CPT | Performed by: INTERNAL MEDICINE

## 2024-01-16 PROCEDURE — 3078F DIAST BP <80 MM HG: CPT | Performed by: INTERNAL MEDICINE

## 2024-01-16 RX ORDER — DULAGLUTIDE 0.75 MG/.5ML
0.75 INJECTION, SOLUTION SUBCUTANEOUS WEEKLY
Qty: 6 ML | Refills: 0 | Status: SHIPPED | OUTPATIENT
Start: 2024-01-16 | End: 2024-04-15

## 2024-01-16 NOTE — PROGRESS NOTES
Name: Vira Johnson  Date: 1/16/24    Referring Physician: No ref. provider found    HISTORY OF PRESENT ILLNESS   Vira Johnson is a 33 year old female who presents for evaluation and management of type 1 diabetes. She was diagnosed with diabetes many years ago. She has run out of her pods and she cannot get her Dexcom.     Blood Glucose Today:   HbA1C or glycohemoglobin is 11.3 today (and it was 10.6 on 9/18/23)  Type 1 or Type 2?: Type 1 with insulin resistance  Medications for DM: Levemir 25 bid; Humalog 12 units tid with meals; Trulicity 0.75  Fasting- 150  Before meals, 250s   Checking 4-5 times per day  Episodes of hypoglycemia: occasionally in the middle of the night    Dietary compliance: she is trying to eat healthier    Exercise: she is trying to exercise more    Polyuria/polydipsia: none    Blurred vision: none    Flu Vaccine This Season: yes    Covid Vaccine: yes    REVIEW OF SYSTEMS  CV: Cardiovascular disease present: none         Hypertension present: not at goal, not on meds         Hyperlipidemia present: LDL elevated, not on meds (5/10/23)         Peripheral Vascular Disease present: none    : Nephropathy present: MAC: none (5/10/23) Creatinine:- 0.58     Neuro: Neuropathy present: none    Eyes: Diabetic retinopathy present: unknown            Most recent visit to eye doctor in last 12 months: she has to make an appt    Skin: Infection or ulceration: none    Osteoporosis/ Osteopenia: none Vitamin D: 28.5 (5/10/23)    Thyroid disease: none TSH: unknown    Medications:     Current Outpatient Medications:     Dulaglutide (TRULICITY) 0.75 MG/0.5ML Subcutaneous Solution Pen-injector, Inject 0.75 mg into the skin once a week., Disp: 6 mL, Rfl: 0    insulin detemir (LEVEMIR FLEXPEN) 100 UNIT/ML Subcutaneous Solution Pen-injector, Inject 40 Units into the skin every 12 (twelve) hours., Disp: 75 mL, Rfl: 0    Insulin Lispro 100 UNIT/ML Injection Solution, 300 Units/day by Insulin pump route  daily., Disp: 300 mL, Rfl: 0    ONETOUCH VERIO REFLECT w/Device Does not apply Kit, USE AS DIRECTED, Disp: 1 kit, Rfl: 0    Glucose Blood (ONETOUCH VERIO) In Vitro Strip, TEST FOUR TIMES DAILY, Disp: 400 strip, Rfl: 0    Insulin Disposable Pump (OMNIPOD 5 G6 POD, GEN 5,) Does not apply Misc, 1 Device every other day., Disp: 45 each, Rfl: 0    ALPRAZolam 1 MG Oral Tab, Take 1 tablet (1 mg total) by mouth 2 (two) times daily as needed for Anxiety or Sleep., Disp: 60 tablet, Rfl: 3    Insulin Lispro, 1 Unit Dial, 100 UNIT/ML Subcutaneous Solution Pen-injector, INJECT UNDER THE SKIN BASED ON CARB RATIO AND CORRECTION FACTOR*MAX DAILY DOSE IS 50 UNITS*, Disp: 15 mL, Rfl: 0    atorvastatin 10 MG Oral Tab, Take 1 tablet (10 mg total) by mouth nightly., Disp: 90 tablet, Rfl: 3    Insulin Pen Needle (TRUEPLUS PEN NEEDLES) 32G X 4 MM Does not apply Misc, Take 1 each by mouth in the morning and 1 each before bedtime., Disp: 100 each, Rfl: 2    Insulin Disposable Pump (OMNIPOD 5 G6 POD, GEN 5,) Does not apply Misc, Apply 1 Application topically daily., Disp: 30 each, Rfl: 0    Continuous Blood Gluc Sensor (DEXCOM G6 SENSOR) Does not apply Misc, 1 each Every 10 days., Disp: 9 each, Rfl: 3    Continuous Blood Gluc Transmit (DEXCOM G6 TRANSMITTER) Does not apply Misc, Use 1 transmitter every 90 days, Disp: 1 each, Rfl: 3    Insulin Disposable Pump (OMNIPOD 5 G6 INTRO, GEN 5,) Does not apply Kit, 1 each daily., Disp: 1 kit, Rfl: 0    Continuous Blood Gluc  (DEXCOM G6 ) Does not apply Device, 1 each daily., Disp: 1 each, Rfl: 0    Glucose Blood (ONETOUCH VERIO) In Vitro Strip, Use to test blood sugar up to 4 times daily., Disp: 400 strip, Rfl: 0    Phentermine HCl 37.5 MG Oral Tab, Take 1 tablet (37.5 mg total) by mouth before breakfast., Disp: 30 tablet, Rfl: 2    Insulin Lispro, 1 Unit Dial, 100 UNIT/ML Subcutaneous Solution Pen-injector, INJECT UNDER THE SKIN BASED ON CARB RATIO AND CORRECTION FACTOR*MAX DAILY  DOSE IS 50 UNITS*, Disp: 15 mL, Rfl: 0    Glucose Blood (ONETOUCH ULTRA) In Vitro Strip, Check blood sugar up to 4x daily, Disp: 400 strip, Rfl: 0    lidocaine 5 % External Patch, Place 2 patches onto the skin daily., Disp: 10 patch, Rfl: 0    tiZANidine 4 MG Oral Tab, Take 1 tablet (4 mg total) by mouth daily as needed. Do not take with alprazolam/Xanax, Disp: 30 tablet, Rfl: 2    DEXCOM G6 SENSOR Does not apply Misc, USE AS DIRECTED EVERY 10 DAYS., Disp: 9 each, Rfl: 0    OneTouch Delica Lancets 33G Does not apply Misc, Check blood sugar up to 4x daily, Disp: 400 each, Rfl: 0    Meloxicam 7.5 MG Oral Tab, Take 1 tablet (7.5 mg total) by mouth daily., Disp: 30 tablet, Rfl: 0    buPROPion  MG Oral Tablet 24 Hr, Take 1 tablet (150 mg total) by mouth daily., Disp: 90 tablet, Rfl: 1    topiramate 50 MG Oral Tab, Take 1 tablet (50 mg total) by mouth 2 (two) times daily., Disp: 180 tablet, Rfl: 1    Insulin Disposable Pump (OMNIPOD 5 G6 INTRO, GEN 5,) Does not apply Kit, 1 each As Directed., Disp: 1 kit, Rfl: 0    ergocalciferol 1.25 MG (82530 UT) Oral Cap, Take 1 capsule (50,000 Units total) by mouth once a week., Disp: 12 capsule, Rfl: 1    Insulin Pen Needle 32G X 4 MM Does not apply Misc, Inject insulin 4 times a day, Disp: 50 each, Rfl: 0    Continuous Blood Gluc  (DEXCOM G6 ) Does not apply Device, Use as directed with sensors and transmitter, Disp: 1 Device, Rfl: 0    ONETOUCH DELICA PLUS [id number] Does not apply Misc, USE AS DIRECTED, Disp: 400 each, Rfl: 2     Allergies:   No Known Allergies    Social History:   Social History     Socioeconomic History    Marital status: Single   Tobacco Use    Smoking status: Every Day     Packs/day: .5     Types: Cigarettes     Start date: 2011    Smokeless tobacco: Never   Vaping Use    Vaping Use: Former    Substances: Nicotine    Devices: Pre-filled pod   Substance and Sexual Activity    Alcohol use: Not Currently    Drug use: No    Sexual activity:  Yes     Partners: Male   Other Topics Concern    Caffeine Concern Yes     Comment: 16 oz of diet coke every other day and 1 medium coffee from [employer] once daily.    Exercise Yes     Comment: sometimes    Seat Belt Yes    Special Diet No    Stress Concern Yes    Weight Concern Yes       Medical History:   Past Medical History:   Diagnosis Date    History of blood transfusion         History of  delivery 1/21/2021    X 3 [x ] Repeat CS at 36 weeks- 21 [ ] bilateral salpingectomy - IPA consent signed 2021     S/P repeat low transverse         Surgical history:   Past Surgical History:   Procedure Laterality Date          x3    OTHER SURGICAL HISTORY Right 2011    R arm surgery after trauma         PHYSICAL EXAM  Vitals:    24 1004   BP: 120/69   Pulse: 105   Weight: 224 lb (101.6 kg)   Height: 5' 2\" (1.575 m)         General Appearance:  alert, well developed, in no acute distress  Eyes:  normal conjunctivae, sclera., normal sclera and normal pupils  Ears/Nose/Mouth/Throat/Neck:  no palpable thyroid nodules or cervical lymphadenopathy  Neck: Trachea midline: Normal  Psychiatric:  oriented to time, self, and place  Nutritional:  no abnormal weight gain or loss    Lab Data:   Lab Results   Component Value Date     (H) 2022    A1C 10.6 (A) 2023     Lab Results   Component Value Date     (H) 05/10/2023    BUN 13 05/10/2023    BUNCREA 15.5 2022    CREATSERUM 0.58 05/10/2023    ANIONGAP 1 05/10/2023     2017    GFRNAA 120 2022    GFRAA 138 2022    CA 8.8 05/10/2023    OSMOCALC 296 (H) 05/10/2023    ALKPHO 82 05/10/2023    AST 12 (L) 05/10/2023    ALT 15 05/10/2023    BILT 0.4 05/10/2023    TP 7.0 05/10/2023    ALB 3.3 (L) 05/10/2023    GLOBULIN 3.7 05/10/2023     05/10/2023    K 3.9 05/10/2023     05/10/2023    CO2 25.0 05/10/2023     Lab Results   Component Value Date    CHOLEST 173 05/10/2023    TRIG 51 05/10/2023     HDL 52 05/10/2023     (H) 05/10/2023    VLDL 9 05/10/2023    TCHDLRATIO 3.22 03/16/2018    NONHDLC 121 05/10/2023     Lab Results   Component Value Date    MALBP 0.80 05/10/2023    CREUR 131.00 05/10/2023         ASSESSMENT/PLAN:  This is a 32 year-old woman here for evaluation and management of uncontrolled type 2 diabetes. We discussed the ABCs of DM.     1.) Hyperglycemia Management- We discussed the importance of glycemic control to prevent complications of diabetes. We discussed the importance of SBGM. I offered and provided patient education materials and offered a blood glucose log book.   - Continue Levemir 25 units bid and continue humalog 12 units tid with meals  - Decrease Trulicity to 0.75mg qweekly  - As soon as she has her Omnipod, she will contact us so that we can help her with her settings.     2.) Management of Diabetic Complications- We discussed the complications of diabetes include retinopathy, neuropathy, nephropathy and cardiovascular disease.   - Ophtho- she will make appt  - Flu and Covid vaccine- up to date  - BP- not at goal, will monitor  - Lipids- not at goal, check again now  - MAC- at goal, check again now  - CMP- check again now  - Neuropathy- none  - CAD- none    3.) Lifestyle Management for Diabetes- We discussed importance of a low CHO diet, and recommend 45gm per meal or 135gm per day. We discussed the importance of trying to follow a Mediterranean diet, with an emphasis on vegetables at every meal, with lots whole grains, and protein from either plant-based sources, or poultry and fish.   - Diet- She will try and eat healthy  - Exercise- she will try and exercise    Return to clinic in 3 months    Prior to this encounter, I spent over 15 minutes with preparing for the visit, including reviewing documents from other specialties as well as from PCP and going over test results. During the face to face encounter, I spent an additional 15 minutes which were determined for  follow-up. Greater than 50% of the time was spent in counseling, anticipatory guidance, and coordination of care. Patient concerns were answered to the best of my knowledge.         1/16/24  Barbi Bello MD

## 2024-01-23 DIAGNOSIS — F41.1 GENERALIZED ANXIETY DISORDER: ICD-10-CM

## 2024-01-23 DIAGNOSIS — F51.01 PRIMARY INSOMNIA: ICD-10-CM

## 2024-01-24 RX ORDER — ALPRAZOLAM 1 MG/1
1 TABLET ORAL 2 TIMES DAILY PRN
Qty: 60 TABLET | Refills: 3 | Status: SHIPPED | OUTPATIENT
Start: 2024-01-24

## 2024-01-24 NOTE — TELEPHONE ENCOUNTER
MCM sent to pt to schedule 6 curtis f/u appt.     Alprazolam 1 mg  Filled 12-19-23  Qty 60  3 refills  No future appointments.  LOV 5-12-23 RP  RTC 3-6 mo.

## 2024-01-25 DIAGNOSIS — E10.65 UNCONTROLLED TYPE 1 DIABETES MELLITUS WITH HYPERGLYCEMIA (HCC): ICD-10-CM

## 2024-01-25 RX ORDER — PROCHLORPERAZINE 25 MG/1
1 SUPPOSITORY RECTAL
Qty: 9 EACH | Refills: 3 | Status: CANCELLED | OUTPATIENT
Start: 2024-01-25

## 2024-01-26 DIAGNOSIS — E10.65 UNCONTROLLED TYPE 1 DIABETES MELLITUS WITH HYPERGLYCEMIA (HCC): ICD-10-CM

## 2024-01-26 RX ORDER — PROCHLORPERAZINE 25 MG/1
1 SUPPOSITORY RECTAL
Qty: 9 EACH | Refills: 3 | Status: SHIPPED | OUTPATIENT
Start: 2024-01-26

## 2024-01-26 NOTE — TELEPHONE ENCOUNTER
LOV;01/16/24    RTC;3months    FU;No F/U    Pending Monthly Supply;order pending, approve if appropriate.

## 2024-01-26 NOTE — TELEPHONE ENCOUNTER
Pt states that she is out of sensors and the G6 sensors will required a PA.  Rx will need to go to [facility].  Pt is requesting a call back when approved.  Please call        Continuous Blood Gluc Sensor (DEXCOM G6 SENSOR) Does not apply Misc, 1 each Every 10 days., Disp: 9 each, Rfl: 3

## 2024-01-27 RX ORDER — PROCHLORPERAZINE 25 MG/1
SUPPOSITORY RECTAL
Qty: 9 EACH | Refills: 0 | Status: SHIPPED | OUTPATIENT
Start: 2024-01-27

## 2024-01-27 NOTE — TELEPHONE ENCOUNTER
LOV: 01/16/2024     Next office visit: No Apt     Last filled: 09/18/2023     Order pended and routed to provider

## 2024-02-07 ENCOUNTER — TELEMEDICINE (OUTPATIENT)
Dept: TELEHEALTH | Age: 34
End: 2024-02-07
Payer: MEDICAID

## 2024-02-07 DIAGNOSIS — J40 BRONCHITIS: Primary | ICD-10-CM

## 2024-02-07 PROCEDURE — 99213 OFFICE O/P EST LOW 20 MIN: CPT | Performed by: NURSE PRACTITIONER

## 2024-02-07 RX ORDER — ALBUTEROL SULFATE 90 UG/1
2 AEROSOL, METERED RESPIRATORY (INHALATION) EVERY 4 HOURS PRN
Qty: 1 EACH | Refills: 0 | Status: SHIPPED | OUTPATIENT
Start: 2024-02-07

## 2024-02-07 RX ORDER — BENZONATATE 100 MG/1
100 CAPSULE ORAL 3 TIMES DAILY PRN
Qty: 15 CAPSULE | Refills: 0 | Status: SHIPPED | OUTPATIENT
Start: 2024-02-07 | End: 2024-02-12

## 2024-02-07 NOTE — PROGRESS NOTES
Virtual/Telephone Check-In    Vira Johnson verbally consents to a Virtual/Telephone Check-In service on 02/07/24.  Patient has been referred to the [facility] website at [email or web address] to review the yearly Consent to Treat document.  Patient understands and accepts financial responsibility for any deductible, co-insurance and/or co-pays associated with this service.      CHIEF COMPLAINT:     Chief Complaint   Patient presents with    Cough       HPI:   Vira Johnson is a 33 year old female who presents  with complaints of cough  for 7 days. Pt reports deep cough. Pt reports productive cough with thick green mucous. Mostly in AM with productive cough. Pt denies fevers. Pt denies SOB or wheezing. Pt does report chest congestion. Pt denies sore throat, ear pain, or sinus pain.     Pt reports covid hx approx 3 weeks ago.      Blood glucose at 197, typical numbers for her.     Current Outpatient Medications   Medication Sig Dispense Refill    albuterol 108 (90 Base) MCG/ACT Inhalation Aero Soln Inhale 2 puffs into the lungs every 4 (four) hours as needed for Wheezing or Shortness of Breath. 1 each 0    benzonatate 100 MG Oral Cap Take 1 capsule (100 mg total) by mouth 3 (three) times daily as needed for cough. 15 capsule 0    Continuous Blood Gluc Sensor (DEXCOM G6 SENSOR) Does not apply Misc USE AS DIRECTED EVERY 10 DAYS. 9 each 0    Continuous Blood Gluc Sensor (DEXCOM G6 SENSOR) Does not apply Misc 1 each Every 10 days. 9 each 3    ALPRAZolam 1 MG Oral Tab Take 1 tablet (1 mg total) by mouth 2 (two) times daily as needed for Anxiety or Sleep. 60 tablet 3    Dulaglutide (TRULICITY) 0.75 MG/0.5ML Subcutaneous Solution Pen-injector Inject 0.75 mg into the skin once a week. 6 mL 0    Glucose Blood (ONETOUCH VERIO) In Vitro Strip TEST FOUR TIMES DAILY 400 strip 0    Insulin Disposable Pump (OMNIPOD 5 G6 POD, GEN 5,) Does not apply Misc 1 Device every other day. 45 each 0    Insulin Lispro, 1 Unit Dial,  100 UNIT/ML Subcutaneous Solution Pen-injector INJECT UNDER THE SKIN BASED ON CARB RATIO AND CORRECTION FACTOR*MAX DAILY DOSE IS 50 UNITS* 15 mL 0    atorvastatin 10 MG Oral Tab Take 1 tablet (10 mg total) by mouth nightly. 90 tablet 3    Insulin Pen Needle (TRUEPLUS PEN NEEDLES) 32G X 4 MM Does not apply Misc Take 1 each by mouth in the morning and 1 each before bedtime. 100 each 2    Insulin Disposable Pump (OMNIPOD 5 G6 POD, GEN 5,) Does not apply Misc Apply 1 Application topically daily. 30 each 0    Continuous Blood Gluc Transmit (DEXCOM G6 TRANSMITTER) Does not apply Misc Use 1 transmitter every 90 days 1 each 3    Insulin Disposable Pump (OMNIPOD 5 G6 INTRO, GEN 5,) Does not apply Kit 1 each daily. 1 kit 0    Continuous Blood Gluc  (DEXCOM G6 ) Does not apply Device 1 each daily. 1 each 0    Glucose Blood (ONETOUCH VERIO) In Vitro Strip Use to test blood sugar up to 4 times daily. 400 strip 0    Phentermine HCl 37.5 MG Oral Tab Take 1 tablet (37.5 mg total) by mouth before breakfast. 30 tablet 2    Insulin Lispro, 1 Unit Dial, 100 UNIT/ML Subcutaneous Solution Pen-injector INJECT UNDER THE SKIN BASED ON CARB RATIO AND CORRECTION FACTOR*MAX DAILY DOSE IS 50 UNITS* 15 mL 0    insulin detemir (LEVEMIR FLEXPEN) 100 UNIT/ML Subcutaneous Solution Pen-injector Inject 40 Units into the skin every 12 (twelve) hours. 75 mL 0    Glucose Blood (ONETOUCH ULTRA) In Vitro Strip Check blood sugar up to 4x daily 400 strip 0    lidocaine 5 % External Patch Place 2 patches onto the skin daily. 10 patch 0    Insulin Lispro 100 UNIT/ML Injection Solution 300 Units/day by Insulin pump route daily. 300 mL 0    buPROPion  MG Oral Tablet 24 Hr Take 1 tablet (150 mg total) by mouth daily. 90 tablet 1    topiramate 50 MG Oral Tab Take 1 tablet (50 mg total) by mouth 2 (two) times daily. 180 tablet 1    Insulin Disposable Pump (OMNIPOD 5 G6 INTRO, GEN 5,) Does not apply Kit 1 each As Directed. 1 kit 0    Insulin  Pen Needle 32G X 4 MM Does not apply Misc Inject insulin 4 times a day 50 each 0    Continuous Blood Gluc  (DEXCOM G6 ) Does not apply Device Use as directed with sensors and transmitter 1 Device 0    ONETOUCH VERIO REFLECT w/Device Does not apply Kit USE AS DIRECTED 1 kit 0    tiZANidine 4 MG Oral Tab Take 1 tablet (4 mg total) by mouth daily as needed. Do not take with alprazolam/Xanax (Patient not taking: Reported on 2024) 30 tablet 2    OneTouch Delica Lancets 33G Does not apply Misc Check blood sugar up to 4x daily (Patient not taking: Reported on 2024) 400 each 0    Meloxicam 7.5 MG Oral Tab Take 1 tablet (7.5 mg total) by mouth daily. (Patient not taking: Reported on 2024) 30 tablet 0    ergocalciferol 1.25 MG (37694 UT) Oral Cap Take 1 capsule (50,000 Units total) by mouth once a week. (Patient not taking: Reported on 2024) 12 capsule 1    ONETOUCH DELICA PLUS [id number] Does not apply Misc USE AS DIRECTED 400 each 2      Past Medical History:   Diagnosis Date    History of blood transfusion         History of  delivery 01/21/2021    X 3 [x ] Repeat CS at 36 weeks- 21 [ ] bilateral salpingectomy - IPA consent signed 2021     S/P repeat low transverse      Type 1 diabetes mellitus (HCC)       Past Surgical History:   Procedure Laterality Date          x3    OTHER SURGICAL HISTORY Right 2011    R arm surgery after trauma         Social History     Socioeconomic History    Marital status: Single   Tobacco Use    Smoking status: Every Day     Packs/day: .5     Types: Cigarettes     Start date:     Smokeless tobacco: Never   Vaping Use    Vaping Use: Former    Substances: Nicotine    Devices: Pre-filled pod   Substance and Sexual Activity    Alcohol use: Not Currently    Drug use: No    Sexual activity: Yes     Partners: Male   Other Topics Concern    Caffeine Concern Yes     Comment: 16 oz of diet coke every other day and 1 medium coffee  from DD once daily.    Exercise Yes     Comment: sometimes    Seat Belt Yes    Special Diet No    Stress Concern Yes    Weight Concern Yes           REVIEW OF SYSTEMS:   GENERAL: Denies fever, chills,weight change, decreased appetite  SKIN: Denies rashes, skin wounds or ulcers.  EYES: Denies blurred vision or double vision  HENT: Denies  rhinorrhea, sore throat or ear pain; Congestion +  CHEST: Denies chest pain, or palpitations  LUNGS: Denies shortness of breath,  or wheezing  GI: Denies abdominal pain, N/V/C/D.   MUSCULOSKELETAL: no arthralgia or swollen joints  LYMPH:  Denies lymphadenopathy  NEURO: Denies  lightheadedness    EXAM:   LMP 04/17/2023 (Approximate)   GENERAL: well developed, well nourished,in no apparent distress. Sitting comfortably.   SKIN: no rashes,no suspicious lesions on visible areas on video, no central cyanosis or diaphoresis noted.   HEAD: normocephalic, Pt denies any pain with palpitation of sinuses.   EYES: conjunctiva clear  NOSE: No Discharge Noted  LUNGS: No audible wheezing. Breathing is non labored. Pt is speaking in full sentences without hesitation  Neuro: A&O X 4, speech is clear and coherent.     ASSESSMENT AND PLAN:     ASSESSMENT:  Encounter Diagnosis   Name Primary?    Bronchitis Yes       Meds & Refills for this Visit:  Requested Prescriptions     Signed Prescriptions Disp Refills    albuterol 108 (90 Base) MCG/ACT Inhalation Aero Soln 1 each 0     Sig: Inhale 2 puffs into the lungs every 4 (four) hours as needed for Wheezing or Shortness of Breath.    benzonatate 100 MG Oral Cap 15 capsule 0     Sig: Take 1 capsule (100 mg total) by mouth 3 (three) times daily as needed for cough.       PLAN:      Warm steamy showers. Warm compress to sinuses multiple times per day    Discussed s/s of worsening infection/condition with Patient and importance of prompt medical re-evaluation including when to seek emergency care. Patient voiced understanding    Comfort care as described in  Patient Instructions. Increase fluids and rest.     May consider OTC tylenol or ibuprofen as needed and directed on packaging     May consider OTC guaifenesin as needed and directed on packaging to thin mucus secretions.    May consider OTC dextromethorphan as needed and directed on packaging as a cough suppressant if not using benzonatate. Discussed with patient that benzonatate can make her feel dizzy or drowsy.     May consider OTC phenylephrine or pseudoephedrine as needed and directed on packaging as a nasal decongestant if no contraindications.     May consider OTC saline nasal spray per box instructions    May consider OTC Cepacol lozenges as needed and directed on packing for sore throat.     All questions were addressed and answered. Encouraged patient to call clinic answering service at [phone] or create another video visit if any questions or concerns that may arise.    Risks, benefits, and side effects of medication discussed. Patient voiced understanding.         Patient Instructions   See attached patient care instructions.      Telehealth Verbal Consent   I conducted a telehealth visit with Vira Johnson today, 02/07/24, which was completed using two-way, real-time interactive audio and video communication. This has been done in good og to provide continuity of care in the best interest of the provider-patient relationship, due to the COVID -19 public health crisis/national emergency where restrictions of face-to-face office visits are ongoing. Every conscious effort was taken to allow for sufficient and adequate time to complete the visit.  The patient was made aware of the limitations of the telehealth visit, including treatment limitations as no physical exam could be performed.  The patient was advised to call 911 or to go to the ER in case there was an emergency.  The patient was also advised of the potential privacy & security concerns related to the telehealth platform.   The  patient was made aware of where to find [facility]'s notice of privacy practices, telehealth consent form and other related consent forms and documents.  which are located on the [facility] website. The patient verbally agreed to telehealth consent form, related consents and the risks discussed.    Lastly, the patient confirmed that they were in [place].   Included in this visit, time may have been spent reviewing labs, medications, radiology tests and decision making. Appropriate medical decision-making and tests are ordered as detailed in the plan of care above.  Coding/billing information is submitted for this visit based on complexity of care and/or time spent for the visit.  15 mins      The patient indicates understanding of these issues and agrees to the plan.

## 2024-02-12 ENCOUNTER — LAB ENCOUNTER (OUTPATIENT)
Dept: LAB | Age: 34
End: 2024-02-12
Attending: INTERNAL MEDICINE
Payer: MEDICAID

## 2024-02-12 DIAGNOSIS — E10.65 UNCONTROLLED TYPE 1 DIABETES MELLITUS WITH HYPERGLYCEMIA (HCC): ICD-10-CM

## 2024-02-12 DIAGNOSIS — E78.00 HYPERCHOLESTEROLEMIA: ICD-10-CM

## 2024-02-12 LAB
ALBUMIN SERPL-MCNC: 3.6 G/DL (ref 3.4–5)
ALBUMIN/GLOB SERPL: 1 {RATIO} (ref 1–2)
ALP LIVER SERPL-CCNC: 112 U/L
ALT SERPL-CCNC: 16 U/L
ANION GAP SERPL CALC-SCNC: 2 MMOL/L (ref 0–18)
AST SERPL-CCNC: 6 U/L (ref 15–37)
BILIRUB SERPL-MCNC: 0.4 MG/DL (ref 0.1–2)
BUN BLD-MCNC: 16 MG/DL (ref 9–23)
CALCIUM BLD-MCNC: 8.9 MG/DL (ref 8.5–10.1)
CHLORIDE SERPL-SCNC: 106 MMOL/L (ref 98–112)
CHOLEST SERPL-MCNC: 160 MG/DL (ref ?–200)
CO2 SERPL-SCNC: 28 MMOL/L (ref 21–32)
CREAT BLD-MCNC: 0.77 MG/DL
CREAT UR-SCNC: 116 MG/DL
EGFRCR SERPLBLD CKD-EPI 2021: 106 ML/MIN/1.73M2 (ref 60–?)
FASTING PATIENT LIPID ANSWER: NO
FASTING STATUS PATIENT QL REPORTED: NO
GLOBULIN PLAS-MCNC: 3.5 G/DL (ref 2.8–4.4)
GLUCOSE BLD-MCNC: 375 MG/DL (ref 70–99)
HDLC SERPL-MCNC: 56 MG/DL (ref 40–59)
LDLC SERPL CALC-MCNC: 86 MG/DL (ref ?–100)
MICROALBUMIN UR-MCNC: 0.64 MG/DL
MICROALBUMIN/CREAT 24H UR-RTO: 5.5 UG/MG (ref ?–30)
NONHDLC SERPL-MCNC: 104 MG/DL (ref ?–130)
OSMOLALITY SERPL CALC.SUM OF ELEC: 299 MOSM/KG (ref 275–295)
POTASSIUM SERPL-SCNC: 4.2 MMOL/L (ref 3.5–5.1)
PROT SERPL-MCNC: 7.1 G/DL (ref 6.4–8.2)
SODIUM SERPL-SCNC: 136 MMOL/L (ref 136–145)
TRIGL SERPL-MCNC: 99 MG/DL (ref 30–149)
VLDLC SERPL CALC-MCNC: 16 MG/DL (ref 0–30)

## 2024-02-12 PROCEDURE — 80053 COMPREHEN METABOLIC PANEL: CPT

## 2024-02-12 PROCEDURE — 82043 UR ALBUMIN QUANTITATIVE: CPT

## 2024-02-12 PROCEDURE — 82570 ASSAY OF URINE CREATININE: CPT

## 2024-02-12 PROCEDURE — 36415 COLL VENOUS BLD VENIPUNCTURE: CPT

## 2024-02-12 PROCEDURE — 80061 LIPID PANEL: CPT

## 2024-02-16 DIAGNOSIS — E88.819 INSULIN RESISTANCE: ICD-10-CM

## 2024-02-16 DIAGNOSIS — Z79.4 TYPE 2 DIABETES MELLITUS WITH HYPERGLYCEMIA, WITH LONG-TERM CURRENT USE OF INSULIN (HCC): ICD-10-CM

## 2024-02-16 DIAGNOSIS — E11.65 TYPE 2 DIABETES MELLITUS WITH HYPERGLYCEMIA, WITH LONG-TERM CURRENT USE OF INSULIN (HCC): ICD-10-CM

## 2024-02-19 RX ORDER — DULAGLUTIDE 1.5 MG/.5ML
INJECTION, SOLUTION SUBCUTANEOUS
Qty: 2 ML | Refills: 0 | OUTPATIENT
Start: 2024-02-19

## 2024-02-19 NOTE — TELEPHONE ENCOUNTER
LOV: 1/16/24    RTC: 3 Months    FU: No FU Appt Scheduled    MCM sent to schedule appointment with MD in April 2024.    Per LOV Note, \"- Decrease Trulicity to 0.75mg qweekly \"    Per below Rx Trulicity 1.5 MG pending below, please refuse Rx if appropriate.

## 2024-02-21 DIAGNOSIS — E66.01 MORBID OBESITY (HCC): ICD-10-CM

## 2024-02-21 NOTE — TELEPHONE ENCOUNTER
LOV: 5/12/2023 with Dr. Barger  RTC: 3-6 months  Last Relevant Labs: 2/12/2024  Filled: 8/21/2023    #30 with 2 refills    No future appointments.

## 2024-02-22 RX ORDER — PHENTERMINE HYDROCHLORIDE 37.5 MG/1
37.5 TABLET ORAL
Qty: 30 TABLET | Refills: 0 | Status: SHIPPED | OUTPATIENT
Start: 2024-02-22

## 2024-02-27 DIAGNOSIS — E10.65 UNCONTROLLED TYPE 1 DIABETES MELLITUS WITH HYPERGLYCEMIA (HCC): ICD-10-CM

## 2024-02-27 DIAGNOSIS — O24.012: ICD-10-CM

## 2024-02-27 RX ORDER — INSULIN LISPRO 100 [IU]/ML
INJECTION, SOLUTION INTRAVENOUS; SUBCUTANEOUS
Qty: 40 ML | Refills: 2 | Status: SHIPPED | OUTPATIENT
Start: 2024-02-27

## 2024-02-27 RX ORDER — INSULIN PMP CART,AUT,G6/7,CNTR
1 EACH SUBCUTANEOUS DAILY
Qty: 30 EACH | Refills: 2 | Status: SHIPPED | OUTPATIENT
Start: 2024-02-27

## 2024-02-27 NOTE — TELEPHONE ENCOUNTER
Spoke with pt. She was off of Omnipod but would like to return on it so request Lispro vials and Pods to [facility]. Discussed dexcom refills should be at pharmacy. CDCES to review download in 1 week and send MideoMehart message     LOV Levemir 25 units BID with Humalog 12 units TID for TDD of 120 units daily as pt in the past had changed pods. Pods sent in for daily change    Mychart message also sent for reminder to schedule appointment

## 2024-03-06 ENCOUNTER — TELEPHONE (OUTPATIENT)
Dept: ENDOCRINOLOGY CLINIC | Facility: CLINIC | Age: 34
End: 2024-03-06

## 2024-03-06 RX ORDER — BLOOD SUGAR DIAGNOSTIC
STRIP MISCELLANEOUS
Qty: 400 STRIP | Refills: 0 | Status: SHIPPED | OUTPATIENT
Start: 2024-03-06

## 2024-03-06 NOTE — TELEPHONE ENCOUNTER
Reviewed Omnipod5 which pt started wearing on 3/4. Noted higher blood sugars due to minimal bolusing. When pt does bolus d/t higher blood sugar, takes anywhere from 4-6 hours to have blood sugars return to target range. Recommended the following:    - Bolusing 15-20 minutes before eating  - Pump adjustments   Basal    12A 1.4    7A 2.1    3P 2.1    8P 1.9   ICR    12A 5   ISF    12A 50 --> 40   Target    12A 150 --> 130 mg/dl   Threshold    12A 180 --> 150 mg/dl   AIT: 4 hrs

## 2024-03-06 NOTE — TELEPHONE ENCOUNTER
Prescription refill request  LOV: 1/16/24  Next scheduled Follow up:  none scheduled    My chart message sent to schedule appointment.

## 2024-03-16 ENCOUNTER — MOBILE ENCOUNTER (OUTPATIENT)
Dept: ENDOCRINOLOGY CLINIC | Facility: CLINIC | Age: 34
End: 2024-03-16

## 2024-03-16 DIAGNOSIS — E10.65 UNCONTROLLED TYPE 1 DIABETES MELLITUS WITH HYPERGLYCEMIA (HCC): Primary | ICD-10-CM

## 2024-03-16 DIAGNOSIS — O24.012: ICD-10-CM

## 2024-03-16 RX ORDER — INSULIN LISPRO 100 [IU]/ML
INJECTION, SOLUTION INTRAVENOUS; SUBCUTANEOUS
Qty: 40 ML | Refills: 2 | Status: SHIPPED | OUTPATIENT
Start: 2024-03-16

## 2024-03-16 RX ORDER — INSULIN LISPRO 100 [IU]/ML
INJECTION, SOLUTION INTRAVENOUS; SUBCUTANEOUS
Qty: 15 ML | Refills: 0 | Status: SHIPPED | OUTPATIENT
Start: 2024-03-16

## 2024-03-17 ENCOUNTER — TELEPHONE (OUTPATIENT)
Dept: ENDOCRINOLOGY CLINIC | Facility: CLINIC | Age: 34
End: 2024-03-17

## 2024-03-17 NOTE — TELEPHONE ENCOUNTER
Patient and mom called for refill on humalog vial for pump and humalog pen for back up  Sent   Thanks

## 2024-04-04 ENCOUNTER — PATIENT MESSAGE (OUTPATIENT)
Dept: ENDOCRINOLOGY CLINIC | Facility: CLINIC | Age: 34
End: 2024-04-04

## 2024-04-04 ENCOUNTER — TELEPHONE (OUTPATIENT)
Dept: INTERNAL MEDICINE CLINIC | Facility: CLINIC | Age: 34
End: 2024-04-04

## 2024-04-04 RX ORDER — PROCHLORPERAZINE 25 MG/1
SUPPOSITORY RECTAL
Qty: 1 EACH | Refills: 3 | Status: CANCELLED | OUTPATIENT
Start: 2024-04-04

## 2024-04-04 NOTE — TELEPHONE ENCOUNTER
Patient called the office stating that she was informed by her pharmacy that a PA is required for her to have a dexcom transmitter. Patient states that the pharmacy has faxed something over regarding this twice within the last 24 hours, please advise.

## 2024-04-05 ENCOUNTER — TELEPHONE (OUTPATIENT)
Dept: ENDOCRINOLOGY CLINIC | Facility: CLINIC | Age: 34
End: 2024-04-05

## 2024-04-05 NOTE — TELEPHONE ENCOUNTER
Patient says she has been without her Dexcom G6 for 3 days and waiting for update on prior authorization. I will try nurse line, otherwise please contact patient as soon as possible at [phone],thanks.

## 2024-04-05 NOTE — TELEPHONE ENCOUNTER
Dr. Barbi Bello prescribed Dexcom for patient.  Faxed message to [facility] to send PA request to Dr. Bello.  LMTCB office on patient's VM.  Please inform patient.

## 2024-04-25 NOTE — PROGRESS NOTES
[facility]: ANTEPARTUM PROGRESS NOTE     Subjective:   Patient without complaints.   Good fetal movement, no bleeding or leaking fluid, no contractions      Objective:     Vital signs in last 24 hours:  Temp:  [98.4 °F (36.9 °C)-99 °F (37.2 °C)] 99 °F Uncontrolled type 1 diabetes mellitus with diabetic autonomic neuropathy, with long-term current use of insulin (HCC)     Hypercholesterolemia     Family history of cervical cancer     Chronic allergic rhinitis     Generalized anxiety disorder     MDD (alvin No Burow's Advancement Flap Text: The defect edges were debeveled with a #15 scalpel blade.  Given the location of the defect and the proximity to free margins a Burow's advancement flap was deemed most appropriate.  Using a sterile surgical marker, the appropriate advancement flap was drawn incorporating the defect and placing the expected incisions within the relaxed skin tension lines where possible.    The area thus outlined was incised deep to adipose tissue with a #15 scalpel blade.  The skin margins were undermined to an appropriate distance in all directions utilizing iris scissors.

## 2024-05-08 DIAGNOSIS — E66.01 MORBID OBESITY (HCC): ICD-10-CM

## 2024-05-08 NOTE — TELEPHONE ENCOUNTER
Endocrine Refill protocol for Glucose testing supplies       Protocol Criteria:  Appointment with Endocrinology completed in the last 12 months or scheduled in the next 6 months     Verify appointment has been completed or scheduled in the appropriate timeline. If so can send a 90 day supply with 1 refill.        Last completed office visit:1/16/24  Next scheduled Follow up: no f/u

## 2024-05-09 NOTE — TELEPHONE ENCOUNTER
Phentermine HCl 37.5 MG Oral Tab         Sig: Take 1 tablet (37.5 mg total) by mouth before breakfast.    Disp: 30 tablet    Refills: 0    Start: 5/8/2024    Class: Normal    Non-formulary For: Morbid obesity (HCC)    Last ordered: 2 months ago (2/22/2024) by Patti Barger MD    Controlled Substance Medication [id number]/08/2024 12:46 PM    This medication is a controlled substance - forward to provider to refill      LOV 5/12/23  RTC 6 months   Last labs 2/12/24  No future appointments.

## 2024-05-10 RX ORDER — PHENTERMINE HYDROCHLORIDE 37.5 MG/1
37.5 TABLET ORAL
Qty: 30 TABLET | Refills: 0 | Status: SHIPPED | OUTPATIENT
Start: 2024-05-10

## 2024-05-10 RX ORDER — BLOOD SUGAR DIAGNOSTIC
STRIP MISCELLANEOUS
Qty: 400 STRIP | Refills: 0 | Status: SHIPPED | OUTPATIENT
Start: 2024-05-10

## 2024-05-14 ENCOUNTER — TELEMEDICINE (OUTPATIENT)
Dept: TELEHEALTH | Age: 34
End: 2024-05-14

## 2024-05-14 DIAGNOSIS — B37.31 CANDIDAL VAGINITIS: Primary | ICD-10-CM

## 2024-05-14 PROCEDURE — 99213 OFFICE O/P EST LOW 20 MIN: CPT | Performed by: NURSE PRACTITIONER

## 2024-05-14 RX ORDER — FLUCONAZOLE 150 MG/1
TABLET ORAL
Qty: 1 TABLET | Refills: 0 | Status: SHIPPED | OUTPATIENT
Start: 2024-05-14

## 2024-05-14 NOTE — PATIENT INSTRUCTIONS
Take fluconazole as prescribed  Follow up with your PCP or gynecologist if symptoms persist  See attached instructiosn

## 2024-05-14 NOTE — PROGRESS NOTES
Virtual/Telephone Check-In    Vira Johnson verbally consents to a Virtual/Telephone Check-In service on 05/14/24.  Patient has been referred to the [facility] website at [email or web address] to review the yearly Consent to Treat document.  Patient understands and accepts financial responsibility for any deductible, co-insurance and/or co-pays associated with this service.       Telehealth Verbal Consent   I conducted a telehealth visit with Vira Johnson today, 05/14/24, which was completed using two-way, real-time interactive audio and video communication. This has been done in good og to provide continuity of care in the best interest of the provider-patient relationship, due to the COVID -19 public health crisis/national emergency where restrictions of face-to-face office visits are ongoing. Every conscious effort was taken to allow for sufficient and adequate time to complete the visit.  The patient was made aware of the limitations of the telehealth visit, including treatment limitations as no physical exam could be performed.  The patient was advised to call 911 or to go to the ER in case there was an emergency.  The patient was also advised of the potential privacy & security concerns related to the telehealth platform.   The patient was made aware of where to find [facility]'s notice of privacy practices, telehealth consent form and other related consent forms and documents.  which are located on the [facility] website. The patient verbally agreed to telehealth consent form, related consents and the risks discussed.    Lastly, the patient confirmed that they were in [place].   Included in this visit, time may have been spent reviewing labs, medications, radiology tests and decision making. Appropriate medical decision-making and tests are ordered as detailed in the plan of care above.  Coding/billing information is submitted for this visit based on complexity of care and/or time spent for the  visit.    CHIEF COMPLAINT:     Chief Complaint   Patient presents with    Vaginal Problem       HPI:   Vira Johnson is a 33 year old female who presents for a video visit.  Patient reports vaginal itching that is similar to previous yeast infections.  Denies unusual vaginal discharge or odor. 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   Denies urinary symptoms, fever, abd pain, STI risk.    Requesting fluconazole since it has helped in the past.     Current Outpatient Medications   Medication Sig Dispense Refill    Phentermine HCl 37.5 MG Oral Tab Take 1 tablet (37.5 mg total) by mouth before breakfast. 30 tablet 0    Glucose Blood (ONETOUCH ULTRA) In Vitro Strip Check blood sugar up to 4x daily 400 strip 0    insulin lispro 100 UNIT/ML Injection Solution Use up to 120 units daily via insulin pump 40 mL 2    Insulin Lispro, 1 Unit Dial, 100 UNIT/ML Subcutaneous Solution Pen-injector INJECT UNDER THE SKIN BASED ON CARB RATIO AND CORRECTION FACTOR*MAX DAILY DOSE IS 50 UNITS* to use in case of pump failure 15 mL 0    Insulin Pen Needle 32G X 4 MM Does not apply Misc Inject insulin 5 times a day 500 each 0    Insulin Disposable Pump (OMNIPOD 5 G6 PODS, GEN 5,) Does not apply Misc Apply 1 Application topically daily. 30 each 2    albuterol 108 (90 Base) MCG/ACT Inhalation Aero Soln Inhale 2 puffs into the lungs every 4 (four) hours as needed for Wheezing or Shortness of Breath. 1 each 0    Continuous Blood Gluc Sensor (DEXCOM G6 SENSOR) Does not apply Misc USE AS DIRECTED EVERY 10 DAYS. 9 each 0    Continuous Blood Gluc Sensor (DEXCOM G6 SENSOR) Does not apply Misc 1 each Every 10 days. 9 each 3    ALPRAZolam 1 MG Oral Tab Take 1 tablet (1 mg total) by mouth 2 (two) times daily as needed for Anxiety or Sleep. 60 tablet 3    ONETOUCH VERIO REFLECT w/Device Does not apply Kit USE AS DIRECTED 1 kit 0    Glucose Blood (ONETOUCH VERIO)  In Vitro Strip TEST FOUR TIMES DAILY 400 strip 0    Insulin Disposable Pump (OMNIPOD 5 G6 POD, GEN 5,) Does not apply Misc 1 Device every other day. 45 each 0    atorvastatin 10 MG Oral Tab Take 1 tablet (10 mg total) by mouth nightly. 90 tablet 3    Insulin Pen Needle (TRUEPLUS PEN NEEDLES) 32G X 4 MM Does not apply Misc Take 1 each by mouth in the morning and 1 each before bedtime. 100 each 2    Continuous Blood Gluc Transmit (DEXCOM G6 TRANSMITTER) Does not apply Misc Use 1 transmitter every 90 days 1 each 3    Insulin Disposable Pump (OMNIPOD 5 G6 INTRO, GEN 5,) Does not apply Kit 1 each daily. 1 kit 0    Continuous Blood Gluc  (DEXCOM G6 ) Does not apply Device 1 each daily. 1 each 0    Glucose Blood (ONETOUCH VERIO) In Vitro Strip Use to test blood sugar up to 4 times daily. 400 strip 0    Insulin Lispro, 1 Unit Dial, 100 UNIT/ML Subcutaneous Solution Pen-injector INJECT UNDER THE SKIN BASED ON CARB RATIO AND CORRECTION FACTOR*MAX DAILY DOSE IS 50 UNITS* 15 mL 0    insulin detemir (LEVEMIR FLEXPEN) 100 UNIT/ML Subcutaneous Solution Pen-injector Inject 40 Units into the skin every 12 (twelve) hours. 75 mL 0    lidocaine 5 % External Patch Place 2 patches onto the skin daily. 10 patch 0    tiZANidine 4 MG Oral Tab Take 1 tablet (4 mg total) by mouth daily as needed. Do not take with alprazolam/Xanax (Patient not taking: Reported on 2/7/2024) 30 tablet 2    OneTouch Delica Lancets 33G Does not apply Misc Check blood sugar up to 4x daily (Patient not taking: Reported on 2/7/2024) 400 each 0    Meloxicam 7.5 MG Oral Tab Take 1 tablet (7.5 mg total) by mouth daily. (Patient not taking: Reported on 2/7/2024) 30 tablet 0    buPROPion  MG Oral Tablet 24 Hr Take 1 tablet (150 mg total) by mouth daily. 90 tablet 1    topiramate 50 MG Oral Tab Take 1 tablet (50 mg total) by mouth 2 (two) times daily. 180 tablet 1    Insulin Disposable Pump (OMNIPOD 5 G6 INTRO, GEN 5,) Does not apply Kit 1 each As  Directed. 1 kit 0    ergocalciferol 1.25 MG (24425 UT) Oral Cap Take 1 capsule (50,000 Units total) by mouth once a week. (Patient not taking: Reported on 2024) 12 capsule 1    Continuous Blood Gluc  (DEXCOM G6 ) Does not apply Device Use as directed with sensors and transmitter 1 Device 0    ONETOUCH DELICA PLUS [id number] Does not apply Misc USE AS DIRECTED 400 each 2      Past Medical History:    History of blood transfusion        History of  delivery    X 3 [x ] Repeat CS at 36 weeks- 21 [ ] bilateral salpingectomy - IPA consent signed 2021     S/P repeat low transverse     Type 1 diabetes mellitus (HCC)      Past Surgical History:   Procedure Laterality Date          x3    Other surgical history Right 2011    R arm surgery after trauma         Social History     Socioeconomic History    Marital status: Single   Tobacco Use    Smoking status: Every Day     Current packs/day: 0.50     Average packs/day: 0.5 packs/day for 13.4 years (6.7 ttl pk-yrs)     Types: Cigarettes     Start date:     Smokeless tobacco: Never   Vaping Use    Vaping status: Former    Substances: Nicotine    Devices: Pre-filled pod   Substance and Sexual Activity    Alcohol use: Not Currently    Drug use: No    Sexual activity: Yes     Partners: Male   Other Topics Concern    Caffeine Concern Yes     Comment: 16 oz of diet coke every other day and 1 medium coffee from [employer] once daily.    Exercise Yes     Comment: sometimes    Seat Belt Yes    Special Diet No    Stress Concern Yes    Weight Concern Yes         REVIEW OF SYSTEMS:   GENERAL: normal appetite  LUNGS: no shortness of breath or wheezing, See HPI  CARDIOVASCULAR: no chest pain or palpitations   GIGU: see HPI; no N/V/C or abdominal pain  NEURO: Denies headaches    EXAM:   General: Alert, Well-appearing, and In no acute distress  Respiratory:   Speaking in full sentences comfortably  Normal work of breathing  No cough during  visit  Mood: Affect appropriate      ASSESSMENT AND PLAN:   Vira Johnson is a 33 year old female who presents with symptoms that are consistent with    ASSESSMENT/PLAN:     Diagnoses and all orders for this visit:    Candidal vaginitis  -     fluconazole 150 MG Oral Tab; Take 1 tablet by mouth once      Will treat with medication as listed  Discussed use, dose, and possible side effects  Pt states she is not taking meloxicam. Has tolerated fluconazole while taking alprazolam in the past; usually takes alprazolam once/day. Taking low dose atorvastatin; discussed s/s that require PCP f/u  To f/u with PCP if symptoms persist or if new/worsening symptoms develop  See pt instructions    Patient Instructions   Take fluconazole as prescribed  Follow up with your PCP or gynecologist if symptoms persist  See attached instructiosn    The patient indicates understanding of these issues and agrees to the plan.    Face to face time spent on Video Visit: 12:52 min  Total Time spent on visit including reviewing history, ordering labs/medication, patient examination and education: 16 min

## 2024-05-31 NOTE — TELEPHONE ENCOUNTER
Endocrine Refill protocol for CGM supplies       Protocol Criteria:  Appointment with Endocrinology completed in the last 12 months or scheduled in the next 6 months     Verify appointment has been completed or scheduled in the appropriate timeline. If so can send a 90 day supply with 1 refill.        Last completed office visit:01/16/24  Next scheduled Follow up: No future appointments.

## 2024-06-01 RX ORDER — INSULIN PMP CART,AUT,G6/7,CNTR
EACH SUBCUTANEOUS
Qty: 30 EACH | Refills: 2 | Status: SHIPPED | OUTPATIENT
Start: 2024-06-01

## 2024-06-07 ENCOUNTER — TELEPHONE (OUTPATIENT)
Dept: ENDOCRINOLOGY CLINIC | Facility: CLINIC | Age: 34
End: 2024-06-07

## 2024-06-07 DIAGNOSIS — E88.819 INSULIN RESISTANCE: ICD-10-CM

## 2024-06-07 DIAGNOSIS — E11.65 TYPE 2 DIABETES MELLITUS WITH HYPERGLYCEMIA, WITH LONG-TERM CURRENT USE OF INSULIN (HCC): Primary | ICD-10-CM

## 2024-06-07 DIAGNOSIS — Z79.4 TYPE 2 DIABETES MELLITUS WITH HYPERGLYCEMIA, WITH LONG-TERM CURRENT USE OF INSULIN (HCC): Primary | ICD-10-CM

## 2024-06-07 NOTE — TELEPHONE ENCOUNTER
Patient states that she was prescribed Ozempic back in September.  She states that insurance will now cover the prescription with the diagnosis of diabetes but would required a Prior Authorization.  Patient is requesting a Prior Authorization for Ozempic be generated and new Rx sent to [facility] pharmacy.  Please call

## 2024-06-11 ENCOUNTER — PATIENT MESSAGE (OUTPATIENT)
Dept: ENDOCRINOLOGY CLINIC | Facility: CLINIC | Age: 34
End: 2024-06-11

## 2024-06-11 NOTE — TELEPHONE ENCOUNTER
From: Vira Johnson  To: Barbi Bello  Sent: 6/11/2024 12:07 PM CDT  Subject: Ozempic    I been trying to get prescription and I spoke to a nurse at your office last week about sending a pre authorization I haven’t heard back from anyone?

## 2024-06-14 RX ORDER — SEMAGLUTIDE 0.68 MG/ML
INJECTION, SOLUTION SUBCUTANEOUS
Qty: 3 ML | Refills: 0 | Status: SHIPPED | OUTPATIENT
Start: 2024-06-14 | End: 2024-07-30

## 2024-06-17 ENCOUNTER — TELEPHONE (OUTPATIENT)
Dept: ENDOCRINOLOGY CLINIC | Facility: CLINIC | Age: 34
End: 2024-06-17

## 2024-06-17 NOTE — TELEPHONE ENCOUNTER
Current Outpatient Medications:     semaglutide (OZEMPIC, 0.25 OR 0.5 MG/DOSE,) 2 MG/3ML Subcutaneous Solution Pen-injector, Inject 0.25 mg into the skin once a week for 30 days, THEN 0.5 mg once a week for 15 days., Disp: 3 mL

## 2024-06-18 ENCOUNTER — TELEPHONE (OUTPATIENT)
Dept: ENDOCRINOLOGY CLINIC | Facility: CLINIC | Age: 34
End: 2024-06-18

## 2024-06-18 DIAGNOSIS — E10.65 UNCONTROLLED TYPE 1 DIABETES MELLITUS WITH HYPERGLYCEMIA (HCC): ICD-10-CM

## 2024-06-18 DIAGNOSIS — Z79.4 TYPE 2 DIABETES MELLITUS WITH HYPERGLYCEMIA, WITH LONG-TERM CURRENT USE OF INSULIN (HCC): Primary | ICD-10-CM

## 2024-06-18 DIAGNOSIS — E88.819 INSULIN RESISTANCE: ICD-10-CM

## 2024-06-18 DIAGNOSIS — E10.65 UNCONTROLLED TYPE 1 DIABETES MELLITUS WITH HYPERGLYCEMIA (HCC): Chronic | ICD-10-CM

## 2024-06-18 DIAGNOSIS — E11.65 TYPE 2 DIABETES MELLITUS WITH HYPERGLYCEMIA, WITH LONG-TERM CURRENT USE OF INSULIN (HCC): Primary | ICD-10-CM

## 2024-06-18 RX ORDER — INSULIN LISPRO 200 [IU]/ML
20 INJECTION, SOLUTION SUBCUTANEOUS 3 TIMES DAILY
Qty: 100 ML | Refills: 1 | Status: SHIPPED | OUTPATIENT
Start: 2024-06-18 | End: 2024-09-16

## 2024-06-18 RX ORDER — INSULIN LISPRO 200 [IU]/ML
20 INJECTION, SOLUTION SUBCUTANEOUS 3 TIMES DAILY
Qty: 100 ML | Refills: 1 | Status: SHIPPED | OUTPATIENT
Start: 2024-06-18 | End: 2024-06-18

## 2024-06-18 RX ORDER — INSULIN LISPRO 100 [IU]/ML
INJECTION, SOLUTION INTRAVENOUS; SUBCUTANEOUS
Qty: 15 ML | Refills: 0 | Status: CANCELLED | OUTPATIENT
Start: 2024-06-18

## 2024-06-18 NOTE — TELEPHONE ENCOUNTER
Medication PA Requested:  Ozempic 2mg/3mL                                                        CoverMyMeds Used:  Key:  Quantity: 3mL  Day Supply: 30  Sig:  Inject 0.25 mg into the skin once a week for 30 days, THEN 0.5 mg once a week   DX Code: E11.65

## 2024-06-18 NOTE — TELEPHONE ENCOUNTER
Medication PA Requested:  Ozempic 2mg/3mL                                                        CoverMyMeds Used: No  Key:  Quantity: 3mL  Day Supply: 30  Sig:  Inject 0.25 mg into the skin once a week for 30 days, THEN 0.5 mg once a week   DX Code: E11.65                 Electronic prior authorization submitted, Last office visit 1/16 and A1C 1/16  Awaiting determination

## 2024-06-19 NOTE — TELEPHONE ENCOUNTER
Requesting    Insulin Lispro, 1 Unit Dial, 100 UNIT/ML Subcutaneous Solution Pen-injector          Possible duplicate: Hover to review recent actions on this medication    Sig: INJECT UNDER THE SKIN BASED ON CARB RATIO AND CORRECTION FACTOR*MAX DAILY DOSE IS 50 UNITS*    Disp: 15 mL    Refills: 0    Start: 6/18/2024    Class: Normal    For: Uncontrolled type 1 diabetes mellitus with hyperglycemia (HCC)    Last ordered: 10 months ago (8/21/2023) by Patti Barger MD    Diabetes Medication Protocol [id number]/18/2024 12:43 PM   Protocol Details Last A1C < 7.5 and within past 6 months    In person appointment or virtual visit in the past 6 mos or appointment in next 3 mos    Microalbumin procedure in past 12 months or taking ACE/ARB    EGFRCR or GFRNAA > 50    GFR in the past 12 months      To be filled at: [id number] [address] & [place], [phone], [phone]        LOV: 05/12/23 w/ BENITA  RTC: 11/12/23  Last Relevant Labs: No recent labs    No future appointments.

## 2024-06-21 NOTE — TELEPHONE ENCOUNTER
Dr. Bello -- ozempic was denied. Did you want to prescribe something else?     Left message notifying pt Ozempic was denied.

## 2024-06-26 DIAGNOSIS — F51.01 PRIMARY INSOMNIA: ICD-10-CM

## 2024-06-26 DIAGNOSIS — F41.1 GENERALIZED ANXIETY DISORDER: ICD-10-CM

## 2024-06-26 RX ORDER — BLOOD SUGAR DIAGNOSTIC
STRIP MISCELLANEOUS
Qty: 400 STRIP | Refills: 0 | Status: SHIPPED | OUTPATIENT
Start: 2024-06-26

## 2024-06-26 NOTE — TELEPHONE ENCOUNTER
Endocrine Refill protocol for Glucose testing supplies       Protocol Criteria:pass  Appointment with Endocrinology completed in the last 12 months or scheduled in the next 6 months     Verify appointment has been completed or scheduled in the appropriate timeline. If so can send a 90 day supply with 1 refill.        Last completed office visit: 1/16/2024 Barbi Bello MD   Next scheduled Follow up: No future appointments. Mcm sent

## 2024-06-28 DIAGNOSIS — F51.01 PRIMARY INSOMNIA: ICD-10-CM

## 2024-06-28 DIAGNOSIS — E66.01 MORBID OBESITY (HCC): ICD-10-CM

## 2024-06-28 DIAGNOSIS — F41.1 GENERALIZED ANXIETY DISORDER: ICD-10-CM

## 2024-06-28 RX ORDER — ALPRAZOLAM 1 MG/1
1 TABLET ORAL 2 TIMES DAILY PRN
Qty: 60 TABLET | Refills: 3 | Status: CANCELLED | OUTPATIENT
Start: 2024-06-28

## 2024-06-28 NOTE — TELEPHONE ENCOUNTER
LOV: 5/12/2023 with Dr. Barger  RTC: 3-6 months  Last Relevant Labs: 4/21/2024  Filled: 1/24/2024    #60 with 3 refills    Future Appointments   Date Time Provider Department Center   8/5/2024 11:30 AM Barbi Bello MD [facility] [place]

## 2024-06-29 RX ORDER — ALPRAZOLAM 1 MG/1
1 TABLET ORAL 2 TIMES DAILY PRN
Qty: 60 TABLET | Refills: 0 | Status: SHIPPED | OUTPATIENT
Start: 2024-06-29

## 2024-07-02 RX ORDER — PHENTERMINE HYDROCHLORIDE 37.5 MG/1
37.5 TABLET ORAL
Qty: 30 TABLET | Refills: 0 | Status: SHIPPED | OUTPATIENT
Start: 2024-07-02

## 2024-07-02 NOTE — TELEPHONE ENCOUNTER
Requesting    Phentermine HCl 37.5 MG Oral Tab         Sig: Take 1 tablet (37.5 mg total) by mouth before breakfast.    Disp: 30 tablet    Refills: 0    Start: 6/28/2024    Class: Normal    Non-formulary For: Morbid obesity (HCC)    Last ordered: 1 month ago (5/10/2024) by Patti Barger MD    Controlled Substance Medication [id number]/28/2024 02:11 PM    This medication is a controlled substance - forward to provider to refill      To be filled at: [facility] [id number] - [place], [place] - [address], [phone], [phone]        LOV: 05/12/23 w/ BENITA  RTC: 11/12/23  Last Relevant Labs: 08/24/22   Future Appointments   Date Time Provider Department Center   8/5/2024 11:30 AM Barbi Bello MD [facility] [place]

## 2024-07-29 DIAGNOSIS — F41.1 GENERALIZED ANXIETY DISORDER: ICD-10-CM

## 2024-07-29 DIAGNOSIS — F51.01 PRIMARY INSOMNIA: ICD-10-CM

## 2024-07-29 RX ORDER — ALPRAZOLAM 1 MG/1
1 TABLET ORAL 2 TIMES DAILY PRN
Qty: 60 TABLET | Refills: 0 | Status: CANCELLED | OUTPATIENT
Start: 2024-07-29

## 2024-07-30 ENCOUNTER — PATIENT MESSAGE (OUTPATIENT)
Dept: INTERNAL MEDICINE CLINIC | Facility: CLINIC | Age: 34
End: 2024-07-30

## 2024-07-30 ENCOUNTER — OFFICE VISIT (OUTPATIENT)
Dept: INTERNAL MEDICINE CLINIC | Facility: CLINIC | Age: 34
End: 2024-07-30
Payer: MEDICAID

## 2024-07-30 VITALS
HEART RATE: 84 BPM | OXYGEN SATURATION: 99 % | BODY MASS INDEX: 41 KG/M2 | DIASTOLIC BLOOD PRESSURE: 74 MMHG | TEMPERATURE: 98 F | SYSTOLIC BLOOD PRESSURE: 121 MMHG | RESPIRATION RATE: 16 BRPM | WEIGHT: 225.81 LBS

## 2024-07-30 DIAGNOSIS — M54.50 ACUTE BILATERAL LOW BACK PAIN WITHOUT SCIATICA: Primary | ICD-10-CM

## 2024-07-30 DIAGNOSIS — F41.1 GENERALIZED ANXIETY DISORDER: ICD-10-CM

## 2024-07-30 DIAGNOSIS — E66.01 MORBID OBESITY (HCC): ICD-10-CM

## 2024-07-30 DIAGNOSIS — M25.512 ACUTE PAIN OF BOTH SHOULDERS: ICD-10-CM

## 2024-07-30 DIAGNOSIS — F51.01 PRIMARY INSOMNIA: ICD-10-CM

## 2024-07-30 DIAGNOSIS — M25.511 ACUTE PAIN OF BOTH SHOULDERS: ICD-10-CM

## 2024-07-30 PROCEDURE — 99214 OFFICE O/P EST MOD 30 MIN: CPT | Performed by: INTERNAL MEDICINE

## 2024-07-30 RX ORDER — PHENTERMINE HYDROCHLORIDE 37.5 MG/1
37.5 TABLET ORAL
Qty: 30 TABLET | Refills: 2 | Status: SHIPPED | OUTPATIENT
Start: 2024-07-30 | End: 2024-08-05

## 2024-07-30 RX ORDER — ALPRAZOLAM 1 MG/1
1 TABLET ORAL 2 TIMES DAILY PRN
Qty: 60 TABLET | Refills: 0 | Status: SHIPPED | OUTPATIENT
Start: 2024-07-30

## 2024-07-30 RX ORDER — PREDNISONE 5 MG/1
TABLET ORAL
Qty: 6 TABLET | Refills: 0 | Status: SHIPPED | OUTPATIENT
Start: 2024-07-30 | End: 2024-08-03

## 2024-07-30 NOTE — PATIENT INSTRUCTIONS
- Start anti-inflammatory, diclofenac.  Take 1 tablet 3 times daily with food.  - Start prednisone (steroid). Take 2 tablets daily for two days, then 1 tablet daily for 2 days.  - Continue cyclobenzaprine (Flexeril) at night.  Do not take Flexeril and Xanax at the same time/space out by at least 4 hours.  - See handout for home physical therapy exercises.  Do 1-2 times daily  - Work note written for return to work 8/12/24  - Will send in refills for Xanax and phentermine    It was a pleasure seeing you in the clinic today.  Thank you for choosing the [facility] [place] office for your healthcare needs. Please call at [phone] with any questions or concerns.    Patti Barger MD

## 2024-07-30 NOTE — PROGRESS NOTES
Vira Johnson is a 33 year old female.   HPI:   Patient presents to discuss acute issue.    She reports that two days ago she was bending down at work, helping a resident up, resident slumped down when lifting was occurring so full body weight went onto patient.  She immediately felt pain in her lower back.  Seen at [facility] emergency department in [place].  Diagnosed with back strain.  Patient has been taking cyclobenzaprine prescribed, but only at night due to drowsiness.  Cannot take during daytime due to this.  No radicular pain in legs.  No saddle anesthesia or incontinence.      She is still having significant pain in lower back.  Also in shoulders.  Hard for her to sit or stand for long periods.  She could not even stand long enough to cook breakfast this morning.      Past medical, family, surgical and social history were reviewed as listed in the chart, and are unchanged from previous visit.  REVIEW OF SYSTEMS:   GENERAL/ const: no fevers/chills, no unintentional weight loss  EYES:no vision problems  HEENT: denies sinus pain or sinus tenderness  LUNGS: denies shortness of breath   CARDIOVASCULAR: denies chest pain  GI: denies nausea/emesis/ abdominal pain diarrhea constipation  MUSCULOSKELETAL: shoulder, back pain  ALLERGY: No Known Allergies  PAST HISTORY:     Current Outpatient Medications:     predniSONE 5 MG Oral Tab, Take 2 tablets (10 mg total) by mouth daily for 2 days, THEN 1 tablet (5 mg total) daily for 2 days., Disp: 6 tablet, Rfl: 0    diclofenac 50 MG Oral Tab EC, Take 1 tablet (50 mg total) by mouth 3 (three) times daily as needed., Disp: 45 tablet, Rfl: 0    Phentermine HCl 37.5 MG Oral Tab, Take 1 tablet (37.5 mg total) by mouth before breakfast., Disp: 30 tablet, Rfl: 0    ALPRAZOLAM 1 MG Oral Tab, TAKE 1 TABLET BY MOUTH TWICE A DAY AS NEEDED FOR ANXIETY OR SLEEP, Disp: 60 tablet, Rfl: 0    Semaglutide (RYBELSUS) 3 MG Oral Tab, Take 3 mg by mouth daily., Disp: 30 tablet, Rfl: 0     ONETOUCH VERIO In Vitro Strip, USE TO TEST BLOOD SUGAR UP TO 4 TIMES DAILY, Disp: 400 strip, Rfl: 0    Insulin Lispro (HUMALOG KWIKPEN) 200 UNIT/ML Subcutaneous Solution Pen-injector, Inject 20 Units into the skin in the morning, at noon, and at bedtime. Take 20 units tid with meals with correction for a total maximum daily dose of 105 units, Disp: 100 mL, Rfl: 1    OMNIPOD 5 G6 PODS, GEN 5, Does not apply Misc, 1 UNDER THE SKIN DAILY, Disp: 30 each, Rfl: 2    Glucose Blood (ONETOUCH ULTRA) In Vitro Strip, Check blood sugar up to 4x daily, Disp: 400 strip, Rfl: 0    insulin lispro 100 UNIT/ML Injection Solution, Use up to 120 units daily via insulin pump, Disp: 40 mL, Rfl: 2    Insulin Lispro, 1 Unit Dial, 100 UNIT/ML Subcutaneous Solution Pen-injector, INJECT UNDER THE SKIN BASED ON CARB RATIO AND CORRECTION FACTOR*MAX DAILY DOSE IS 50 UNITS* to use in case of pump failure, Disp: 15 mL, Rfl: 0    Insulin Pen Needle 32G X 4 MM Does not apply Misc, Inject insulin 5 times a day, Disp: 500 each, Rfl: 0    albuterol 108 (90 Base) MCG/ACT Inhalation Aero Soln, Inhale 2 puffs into the lungs every 4 (four) hours as needed for Wheezing or Shortness of Breath., Disp: 1 each, Rfl: 0    Continuous Blood Gluc Sensor (DEXCOM G6 SENSOR) Does not apply Misc, USE AS DIRECTED EVERY 10 DAYS., Disp: 9 each, Rfl: 0    Continuous Blood Gluc Sensor (DEXCOM G6 SENSOR) Does not apply Misc, 1 each Every 10 days., Disp: 9 each, Rfl: 3    ONETOUCH VERIO REFLECT w/Device Does not apply Kit, USE AS DIRECTED, Disp: 1 kit, Rfl: 0    Glucose Blood (ONETOUCH VERIO) In Vitro Strip, TEST FOUR TIMES DAILY, Disp: 400 strip, Rfl: 0    Insulin Disposable Pump (OMNIPOD 5 G6 POD, GEN 5,) Does not apply Misc, 1 Device every other day., Disp: 45 each, Rfl: 0    atorvastatin 10 MG Oral Tab, Take 1 tablet (10 mg total) by mouth nightly., Disp: 90 tablet, Rfl: 3    Insulin Pen Needle (TRUEPLUS PEN NEEDLES) 32G X 4 MM Does not apply Misc, Take 1 each by mouth in  the morning and 1 each before bedtime., Disp: 100 each, Rfl: 2    Continuous Blood Gluc Transmit (DEXCOM G6 TRANSMITTER) Does not apply Misc, Use 1 transmitter every 90 days, Disp: 1 each, Rfl: 3    Insulin Disposable Pump (OMNIPOD 5 G6 INTRO, GEN 5,) Does not apply Kit, 1 each daily., Disp: 1 kit, Rfl: 0    Continuous Blood Gluc  (DEXCOM G6 ) Does not apply Device, 1 each daily., Disp: 1 each, Rfl: 0    Insulin Lispro, 1 Unit Dial, 100 UNIT/ML Subcutaneous Solution Pen-injector, INJECT UNDER THE SKIN BASED ON CARB RATIO AND CORRECTION FACTOR*MAX DAILY DOSE IS 50 UNITS*, Disp: 15 mL, Rfl: 0    insulin detemir (LEVEMIR FLEXPEN) 100 UNIT/ML Subcutaneous Solution Pen-injector, Inject 40 Units into the skin every 12 (twelve) hours., Disp: 75 mL, Rfl: 0    OneTouch Delica Lancets 33G Does not apply Misc, Check blood sugar up to 4x daily, Disp: 400 each, Rfl: 0    buPROPion  MG Oral Tablet 24 Hr, Take 1 tablet (150 mg total) by mouth daily., Disp: 90 tablet, Rfl: 1    topiramate 50 MG Oral Tab, Take 1 tablet (50 mg total) by mouth 2 (two) times daily., Disp: 180 tablet, Rfl: 1    Insulin Disposable Pump (OMNIPOD 5 G6 INTRO, GEN 5,) Does not apply Kit, 1 each As Directed., Disp: 1 kit, Rfl: 0    Continuous Blood Gluc  (DEXCOM G6 ) Does not apply Device, Use as directed with sensors and transmitter, Disp: 1 Device, Rfl: 0    ONETOUCH DELICA PLUS [id number] Does not apply Misc, USE AS DIRECTED, Disp: 400 each, Rfl: 2  Medical:  has a past medical history of History of blood transfusion, History of  delivery (2021), S/P repeat low transverse , and Type 1 diabetes mellitus (HCC).  Surgical:  has a past surgical history that includes other surgical history (Right, ) and .  Family: family history includes Cancer in her mother; Multiple sclerosis in her mother; Other in her father.  Social:  reports that she has been smoking cigarettes. She started smoking  about 13 years ago. She has a 6.8 pack-year smoking history. She has never used smokeless tobacco. She reports that she does not currently use alcohol. She reports that she does not use drugs.  Wt Readings from Last 6 Encounters:   07/30/24 225 lb 12.8 oz (102.4 kg)   01/16/24 224 lb (101.6 kg)   09/18/23 226 lb 6.4 oz (102.7 kg)   05/12/23 225 lb 9.6 oz (102.3 kg)   04/30/23 229 lb (103.9 kg)   02/02/23 220 lb (99.8 kg)     EXAM:   /74 (BP Location: Right arm, Patient Position: Sitting, Cuff Size: large)   Pulse 84   Temp 97.9 °F (36.6 °C) (Temporal)   Resp 16   Wt 225 lb 12.8 oz (102.4 kg)   LMP 07/03/2024 (Exact Date)   SpO2 99%   Breastfeeding No   BMI 41.30 kg/m²   GENERAL: Alert and oriented, well developed, well nourished,in no apparent distress  HEENT: atraumatic, PERRLA, EOMI, normal lid and conjunctiva  LUNGS: clear to auscultation bilaterally, no wheezing/rubs  CARDIO: RRR without murmurs.  No clubbing, cyanosis or edema.  GI: soft non tender nondistended no hepatosplenomegaly, bowel sounds throughout  NEURO: CN II-XII intact, 5/5 strength all extremities  MS: decreased ROM of back due to pain; significant pain with flexion, rotation of back, to lesser extent with extension of back.  Can flex to 30-40 degrees.  PSYCH: pleasant, appropriate mood and affect  ASSESSMENT AND PLAN:   1. Acute bilateral low back pain without sciatica  2. Acute pain of both shoulders  Patient with acute musculoskeletal symptoms.  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  After that patient felt acute lower back pain.  Went to [place] emergency department.  Prescribed cyclobenzaprine.  Taking this at night due to drowsiness.  Still in significant pain.  Cannot rotate or flex back well due to pain.  Hard to stand for more than 1-2 minutes.  Hard to sit long periods.  No saddle anesthesia.   No incontinence.  5/5/ strength all extremities.  Will start diclofenac, prednisone.  Advised patient to stop prednisone if glucose levels go >200.  Home physical therapy handout provided.  Work form filled out for off work (as she has physical activities at work) from today 7/30/24 - 8/11/24, return to work without restrictions on 8/12/24.  May need to refer to formal physical therapy, return to work on restricted duty if symptoms persist beyond that date.  - predniSONE 5 MG Oral Tab; Take 2 tablets (10 mg total) by mouth daily for 2 days, THEN 1 tablet (5 mg total) daily for 2 days.  Dispense: 6 tablet; Refill: 0  - diclofenac 50 MG Oral Tab EC; Take 1 tablet (50 mg total) by mouth 3 (three) times daily as needed.  Dispense: 45 tablet; Refill: 0    Patient Care Team:  Patti Barger MD as PCP - General (Internal Medicine)  Sis Corcoran MD (Anesthesiology)  Atul Tilley MD as Obstetrician (OBSTETRICS & GYNECOLOGY)  Barbi Bello MD as Consulting Physician (ENDOCRINOLOGY)  The patient indicates understanding of these issues and agrees to the plan.  The patient is asked to return to clinic in 3-6 months for follow up on chronic issues, or earlier if acute issues arise/current issues persist.    Patti Barger MD

## 2024-08-05 ENCOUNTER — OFFICE VISIT (OUTPATIENT)
Dept: ENDOCRINOLOGY CLINIC | Facility: CLINIC | Age: 34
End: 2024-08-05

## 2024-08-05 ENCOUNTER — OFFICE VISIT (OUTPATIENT)
Dept: INTERNAL MEDICINE CLINIC | Facility: CLINIC | Age: 34
End: 2024-08-05
Payer: MEDICAID

## 2024-08-05 ENCOUNTER — LAB ENCOUNTER (OUTPATIENT)
Dept: LAB | Age: 34
End: 2024-08-05
Attending: INTERNAL MEDICINE
Payer: MEDICAID

## 2024-08-05 VITALS
HEART RATE: 78 BPM | WEIGHT: 223 LBS | DIASTOLIC BLOOD PRESSURE: 54 MMHG | SYSTOLIC BLOOD PRESSURE: 117 MMHG | HEIGHT: 62 IN | BODY MASS INDEX: 41.04 KG/M2

## 2024-08-05 VITALS
SYSTOLIC BLOOD PRESSURE: 120 MMHG | DIASTOLIC BLOOD PRESSURE: 81 MMHG | WEIGHT: 223.19 LBS | TEMPERATURE: 97 F | OXYGEN SATURATION: 99 % | BODY MASS INDEX: 41.07 KG/M2 | HEIGHT: 62 IN | HEART RATE: 88 BPM

## 2024-08-05 DIAGNOSIS — F33.1 MDD (MAJOR DEPRESSIVE DISORDER), RECURRENT EPISODE, MODERATE (HCC): ICD-10-CM

## 2024-08-05 DIAGNOSIS — E10.8 TYPE 1 DIABETES MELLITUS WITH COMPLICATION, WITH LONG TERM CURRENT USE OF INSULIN PUMP (HCC): Chronic | ICD-10-CM

## 2024-08-05 DIAGNOSIS — J30.9 CHRONIC ALLERGIC RHINITIS: ICD-10-CM

## 2024-08-05 DIAGNOSIS — E78.00 HYPERCHOLESTEROLEMIA: Chronic | ICD-10-CM

## 2024-08-05 DIAGNOSIS — E11.65 TYPE 2 DIABETES MELLITUS WITH HYPERGLYCEMIA, WITH LONG-TERM CURRENT USE OF INSULIN (HCC): Primary | ICD-10-CM

## 2024-08-05 DIAGNOSIS — F33.1 MDD (MAJOR DEPRESSIVE DISORDER), RECURRENT EPISODE, MODERATE (HCC): Chronic | ICD-10-CM

## 2024-08-05 DIAGNOSIS — Z96.41 TYPE 1 DIABETES MELLITUS WITH COMPLICATION, WITH LONG TERM CURRENT USE OF INSULIN PUMP (HCC): Chronic | ICD-10-CM

## 2024-08-05 DIAGNOSIS — F41.1 GENERALIZED ANXIETY DISORDER: ICD-10-CM

## 2024-08-05 DIAGNOSIS — E66.01 MORBID OBESITY (HCC): ICD-10-CM

## 2024-08-05 DIAGNOSIS — Z79.4 TYPE 2 DIABETES MELLITUS WITH HYPERGLYCEMIA, WITH LONG-TERM CURRENT USE OF INSULIN (HCC): Primary | ICD-10-CM

## 2024-08-05 DIAGNOSIS — E10.8 TYPE 1 DIABETES MELLITUS WITH COMPLICATION, WITH LONG TERM CURRENT USE OF INSULIN PUMP (HCC): Primary | Chronic | ICD-10-CM

## 2024-08-05 DIAGNOSIS — M25.512 ACUTE PAIN OF BOTH SHOULDERS: ICD-10-CM

## 2024-08-05 DIAGNOSIS — F41.1 GENERALIZED ANXIETY DISORDER: Chronic | ICD-10-CM

## 2024-08-05 DIAGNOSIS — Z96.41 TYPE 1 DIABETES MELLITUS WITH COMPLICATION, WITH LONG TERM CURRENT USE OF INSULIN PUMP (HCC): Primary | Chronic | ICD-10-CM

## 2024-08-05 DIAGNOSIS — M25.511 ACUTE PAIN OF BOTH SHOULDERS: ICD-10-CM

## 2024-08-05 DIAGNOSIS — M54.50 ACUTE BILATERAL LOW BACK PAIN WITHOUT SCIATICA: ICD-10-CM

## 2024-08-05 DIAGNOSIS — F51.01 PRIMARY INSOMNIA: ICD-10-CM

## 2024-08-05 LAB
ALBUMIN SERPL-MCNC: 4.1 G/DL (ref 3.2–4.8)
ALBUMIN/GLOB SERPL: 1.5 {RATIO} (ref 1–2)
ALP LIVER SERPL-CCNC: 82 U/L
ALT SERPL-CCNC: 9 U/L
ANION GAP SERPL CALC-SCNC: 3 MMOL/L (ref 0–18)
AST SERPL-CCNC: 14 U/L (ref ?–34)
BASOPHILS # BLD AUTO: 0.03 X10(3) UL (ref 0–0.2)
BASOPHILS NFR BLD AUTO: 0.3 %
BILIRUB SERPL-MCNC: 0.3 MG/DL (ref 0.3–1.2)
BUN BLD-MCNC: 12 MG/DL (ref 9–23)
BUN/CREAT SERPL: 16 (ref 10–20)
CALCIUM BLD-MCNC: 9.5 MG/DL (ref 8.7–10.4)
CHLORIDE SERPL-SCNC: 110 MMOL/L (ref 98–112)
CHOLEST SERPL-MCNC: 170 MG/DL (ref ?–200)
CO2 SERPL-SCNC: 27 MMOL/L (ref 21–32)
CREAT BLD-MCNC: 0.75 MG/DL
DEPRECATED RDW RBC AUTO: 45.8 FL (ref 35.1–46.3)
EGFRCR SERPLBLD CKD-EPI 2021: 109 ML/MIN/1.73M2 (ref 60–?)
EOSINOPHIL # BLD AUTO: 0.67 X10(3) UL (ref 0–0.7)
EOSINOPHIL NFR BLD AUTO: 6.6 %
ERYTHROCYTE [DISTWIDTH] IN BLOOD BY AUTOMATED COUNT: 14.2 % (ref 11–15)
FASTING PATIENT LIPID ANSWER: YES
FASTING STATUS PATIENT QL REPORTED: YES
GLOBULIN PLAS-MCNC: 2.7 G/DL (ref 2–3.5)
GLUCOSE BLD-MCNC: 219 MG/DL (ref 70–99)
GLUCOSE BLOOD: 200
HCT VFR BLD AUTO: 42.2 %
HDLC SERPL-MCNC: 46 MG/DL (ref 40–59)
HEMOGLOBIN A1C: 10.5 % (ref 4.3–5.6)
HGB BLD-MCNC: 14.1 G/DL
IMM GRANULOCYTES # BLD AUTO: 0.03 X10(3) UL (ref 0–1)
IMM GRANULOCYTES NFR BLD: 0.3 %
LDLC SERPL CALC-MCNC: 106 MG/DL (ref ?–100)
LYMPHOCYTES # BLD AUTO: 3.37 X10(3) UL (ref 1–4)
LYMPHOCYTES NFR BLD AUTO: 33.2 %
MCH RBC QN AUTO: 29.5 PG (ref 26–34)
MCHC RBC AUTO-ENTMCNC: 33.4 G/DL (ref 31–37)
MCV RBC AUTO: 88.3 FL
MONOCYTES # BLD AUTO: 0.89 X10(3) UL (ref 0.1–1)
MONOCYTES NFR BLD AUTO: 8.8 %
NEUTROPHILS # BLD AUTO: 5.16 X10 (3) UL (ref 1.5–7.7)
NEUTROPHILS # BLD AUTO: 5.16 X10(3) UL (ref 1.5–7.7)
NEUTROPHILS NFR BLD AUTO: 50.8 %
NONHDLC SERPL-MCNC: 124 MG/DL (ref ?–130)
OSMOLALITY SERPL CALC.SUM OF ELEC: 296 MOSM/KG (ref 275–295)
PLATELET # BLD AUTO: 202 10(3)UL (ref 150–450)
POTASSIUM SERPL-SCNC: 4 MMOL/L (ref 3.5–5.1)
PROT SERPL-MCNC: 6.8 G/DL (ref 5.7–8.2)
RBC # BLD AUTO: 4.78 X10(6)UL
SODIUM SERPL-SCNC: 140 MMOL/L (ref 136–145)
TEST STRIP LOT #: NORMAL NUMERIC
TRIGL SERPL-MCNC: 99 MG/DL (ref 30–149)
TSI SER-ACNC: 1.71 MIU/ML (ref 0.55–4.78)
VLDLC SERPL CALC-MCNC: 17 MG/DL (ref 0–30)
WBC # BLD AUTO: 10.2 X10(3) UL (ref 4–11)

## 2024-08-05 PROCEDURE — 82947 ASSAY GLUCOSE BLOOD QUANT: CPT | Performed by: INTERNAL MEDICINE

## 2024-08-05 PROCEDURE — 84443 ASSAY THYROID STIM HORMONE: CPT

## 2024-08-05 PROCEDURE — 85025 COMPLETE CBC W/AUTO DIFF WBC: CPT

## 2024-08-05 PROCEDURE — 80061 LIPID PANEL: CPT

## 2024-08-05 PROCEDURE — 80053 COMPREHEN METABOLIC PANEL: CPT

## 2024-08-05 PROCEDURE — 36415 COLL VENOUS BLD VENIPUNCTURE: CPT

## 2024-08-05 PROCEDURE — 99214 OFFICE O/P EST MOD 30 MIN: CPT | Performed by: INTERNAL MEDICINE

## 2024-08-05 RX ORDER — ORAL SEMAGLUTIDE 3 MG/1
3 TABLET ORAL DAILY
Qty: 90 TABLET | Refills: 1 | Status: CANCELLED | OUTPATIENT
Start: 2024-08-05 | End: 2024-09-04

## 2024-08-05 RX ORDER — MONTELUKAST SODIUM 10 MG/1
10 TABLET ORAL NIGHTLY
Qty: 90 TABLET | Refills: 1 | Status: SHIPPED | OUTPATIENT
Start: 2024-08-05

## 2024-08-05 RX ORDER — BUPROPION HYDROCHLORIDE 150 MG/1
150 TABLET ORAL DAILY
Qty: 90 TABLET | Refills: 1 | Status: SHIPPED | OUTPATIENT
Start: 2024-08-05

## 2024-08-05 RX ORDER — ORAL SEMAGLUTIDE 7 MG/1
7 TABLET ORAL
Qty: 90 TABLET | Refills: 0 | Status: SHIPPED | OUTPATIENT
Start: 2024-08-05 | End: 2024-11-03

## 2024-08-05 RX ORDER — AZELASTINE HYDROCHLORIDE 0.5 MG/ML
1 SOLUTION/ DROPS OPHTHALMIC 2 TIMES DAILY
Qty: 6 ML | Refills: 1 | Status: SHIPPED | OUTPATIENT
Start: 2024-08-05

## 2024-08-05 NOTE — PROGRESS NOTES
Vira Johnson is a 33 year old female.   HPI:   Patient presents for follow up on several issues.    Chronic issues:  DM I - following with Endocrinology - appointment later today.  Hypercholesterolemia - lifestyle controlled.  Morbid obesity - Body mass index is 40.82 kg/m².  She feels Rybelsus is actually helping more than phentermine for her appetite.  Anxiety, depression, insomnia - taking alprazolam twice daily every day.  Chronic allergic rhinitis - worsening allergic symptoms.  Swollen eyelids, nasal congestion every morning.    Acute issues:  Back pain, shoulder pain - minimal improvement in symptoms.     Past medical, family, surgical and social history were reviewed as listed in the chart, and are unchanged from previous visit on 7/30/2024  REVIEW OF SYSTEMS:   GENERAL/ const: no fevers/chills, no unintentional weight loss  EYES:no vision problems  HEENT: denies sinus pain or sinus tenderness  LUNGS: denies shortness of breath   CARDIOVASCULAR: denies chest pain  GI: denies nausea/emesis/ abdominal pain diarrhea constipation  : denies dysuria   MUSCULOSKELETAL: acute back and shoulder pain  PSYCHIATRIC: anxiety, depression, insomnia  ENDOCRINE: no hot/cold intolerance  ALLERGY: No Known Allergies  PAST HISTORY:     Current Outpatient Medications:     diclofenac 50 MG Oral Tab EC, Take 1 tablet (50 mg total) by mouth 3 (three) times daily as needed., Disp: 45 tablet, Rfl: 0    ALPRAZolam 1 MG Oral Tab, Take 1 tablet (1 mg total) by mouth 2 (two) times daily as needed for Anxiety or Sleep., Disp: 60 tablet, Rfl: 0    Phentermine HCl 37.5 MG Oral Tab, Take 1 tablet (37.5 mg total) by mouth before breakfast., Disp: 30 tablet, Rfl: 2    Semaglutide (RYBELSUS) 3 MG Oral Tab, Take 3 mg by mouth daily., Disp: 30 tablet, Rfl: 0    ONETOUCH VERIO In Vitro Strip, USE TO TEST BLOOD SUGAR UP TO 4 TIMES DAILY, Disp: 400 strip, Rfl: 0    Insulin Lispro (HUMALOG KWIKPEN) 200 UNIT/ML Subcutaneous Solution  Pen-injector, Inject 20 Units into the skin in the morning, at noon, and at bedtime. Take 20 units tid with meals with correction for a total maximum daily dose of 105 units, Disp: 100 mL, Rfl: 1    OMNIPOD 5 G6 PODS, GEN 5, Does not apply Misc, 1 UNDER THE SKIN DAILY, Disp: 30 each, Rfl: 2    Glucose Blood (ONETOUCH ULTRA) In Vitro Strip, Check blood sugar up to 4x daily, Disp: 400 strip, Rfl: 0    insulin lispro 100 UNIT/ML Injection Solution, Use up to 120 units daily via insulin pump, Disp: 40 mL, Rfl: 2    Insulin Lispro, 1 Unit Dial, 100 UNIT/ML Subcutaneous Solution Pen-injector, INJECT UNDER THE SKIN BASED ON CARB RATIO AND CORRECTION FACTOR*MAX DAILY DOSE IS 50 UNITS* to use in case of pump failure, Disp: 15 mL, Rfl: 0    Insulin Pen Needle 32G X 4 MM Does not apply Misc, Inject insulin 5 times a day, Disp: 500 each, Rfl: 0    albuterol 108 (90 Base) MCG/ACT Inhalation Aero Soln, Inhale 2 puffs into the lungs every 4 (four) hours as needed for Wheezing or Shortness of Breath., Disp: 1 each, Rfl: 0    Continuous Blood Gluc Sensor (DEXCOM G6 SENSOR) Does not apply Misc, USE AS DIRECTED EVERY 10 DAYS., Disp: 9 each, Rfl: 0    Continuous Blood Gluc Sensor (DEXCOM G6 SENSOR) Does not apply Misc, 1 each Every 10 days., Disp: 9 each, Rfl: 3    ONETOUCH VERIO REFLECT w/Device Does not apply Kit, USE AS DIRECTED, Disp: 1 kit, Rfl: 0    Glucose Blood (ONETOUCH VERIO) In Vitro Strip, TEST FOUR TIMES DAILY, Disp: 400 strip, Rfl: 0    Insulin Disposable Pump (OMNIPOD 5 G6 POD, GEN 5,) Does not apply Misc, 1 Device every other day., Disp: 45 each, Rfl: 0    atorvastatin 10 MG Oral Tab, Take 1 tablet (10 mg total) by mouth nightly., Disp: 90 tablet, Rfl: 3    Insulin Pen Needle (TRUEPLUS PEN NEEDLES) 32G X 4 MM Does not apply Misc, Take 1 each by mouth in the morning and 1 each before bedtime., Disp: 100 each, Rfl: 2    Continuous Blood Gluc Transmit (DEXCOM G6 TRANSMITTER) Does not apply Misc, Use 1 transmitter every 90  days, Disp: 1 each, Rfl: 3    Insulin Disposable Pump (OMNIPOD 5 G6 INTRO, GEN 5,) Does not apply Kit, 1 each daily., Disp: 1 kit, Rfl: 0    Continuous Blood Gluc  (DEXCOM G6 ) Does not apply Device, 1 each daily., Disp: 1 each, Rfl: 0    Insulin Lispro, 1 Unit Dial, 100 UNIT/ML Subcutaneous Solution Pen-injector, INJECT UNDER THE SKIN BASED ON CARB RATIO AND CORRECTION FACTOR*MAX DAILY DOSE IS 50 UNITS*, Disp: 15 mL, Rfl: 0    insulin detemir (LEVEMIR FLEXPEN) 100 UNIT/ML Subcutaneous Solution Pen-injector, Inject 40 Units into the skin every 12 (twelve) hours., Disp: 75 mL, Rfl: 0    OneTouch Delica Lancets 33G Does not apply Misc, Check blood sugar up to 4x daily, Disp: 400 each, Rfl: 0    buPROPion  MG Oral Tablet 24 Hr, Take 1 tablet (150 mg total) by mouth daily., Disp: 90 tablet, Rfl: 1    topiramate 50 MG Oral Tab, Take 1 tablet (50 mg total) by mouth 2 (two) times daily., Disp: 180 tablet, Rfl: 1    Insulin Disposable Pump (OMNIPOD 5 G6 INTRO, GEN 5,) Does not apply Kit, 1 each As Directed., Disp: 1 kit, Rfl: 0    Continuous Blood Gluc  (DEXCOM G6 ) Does not apply Device, Use as directed with sensors and transmitter, Disp: 1 Device, Rfl: 0    ONETOUCH DELICA PLUS [id number] Does not apply Misc, USE AS DIRECTED, Disp: 400 each, Rfl: 2  Medical:  has a past medical history of History of blood transfusion, History of  delivery (2021), S/P repeat low transverse , and Type 1 diabetes mellitus (HCC).  Surgical:  has a past surgical history that includes other surgical history (Right, ) and .  Family: family history includes Cancer in her mother; Multiple sclerosis in her mother; Other in her father.  Social:  reports that she has been smoking cigarettes. She started smoking about 13 years ago. She has a 6.8 pack-year smoking history. She has never used smokeless tobacco. She reports that she does not currently use alcohol. She reports that  she does not use drugs.  Wt Readings from Last 6 Encounters:   08/05/24 223 lb 3.2 oz (101.2 kg)   07/30/24 225 lb 12.8 oz (102.4 kg)   01/16/24 224 lb (101.6 kg)   09/18/23 226 lb 6.4 oz (102.7 kg)   05/12/23 225 lb 9.6 oz (102.3 kg)   04/30/23 229 lb (103.9 kg)     EXAM:   /81 (BP Location: Left arm, Patient Position: Sitting, Cuff Size: large)   Pulse 88   Temp 97.4 °F (36.3 °C) (Temporal)   Ht 5' 2\" (1.575 m)   Wt 223 lb 3.2 oz (101.2 kg)   LMP 08/01/2024 (Exact Date)   SpO2 99%   Breastfeeding No   BMI 40.82 kg/m²   GENERAL: Alert and oriented, well developed, well nourished,in no apparent distress  HEENT: atraumatic, PERRLA, EOMI, normal lid and conjunctiva  LUNGS: clear to auscultation bilaterally, no wheezing/rubs  CARDIO: RRR without murmurs.  No clubbing, cyanosis or edema.  GI: soft non tender nondistended no hepatosplenomegaly, bowel sounds throughout  NEURO: CN II-XII intact, 5/5 strength all extremities  MS: pain with flexion/extension of back  PSYCH: pleasant, appropriate mood and affect  ASSESSMENT AND PLAN:   1. Type 1 diabetes mellitus with complication, with long term current use of insulin pump (HCC)  A1c done in office -10.5%.  Has upcoming appointment with Endocrinology later today.  Reminded patient to schedule eye exam.   - POC Hgb A1C  - Comp Metabolic Panel (14); Future  - Lipid Panel; Future    2. Hypercholesterolemia  Lifestyle controlled.  Will check lipid panel.  - Lipid Panel; Future    3. Morbid obesity (HCC)  Started on Rybelsus by Endocrinology; she feels this has actually helped more than phentermine for her appetite.  Will have her stop phentermine for now.  - TSH W Reflex To Free T4; Future    4. MDD (major depressive disorder), recurrent episode, moderate (HCC)  5. Generalized anxiety disorder  6. Primary insomnia  Patient taking alprazolam twice daily essentially every day.  For both anxiety and insomnia.  Will resume bupropion to see if that can help some with  anxiety/depression.  She was on escitalopram in the past but this caused weight gain.  - buPROPion  MG Oral Tablet 24 Hr; Take 1 tablet (150 mg total) by mouth daily.  Dispense: 90 tablet; Refill: 1  - Comp Metabolic Panel (14); Future  - CBC With Differential With Platelet; Future    7. Chronic allergic rhinitis  Worsening symptoms.  Will start montelukast, azelastine eye drops.  - montelukast 10 MG Oral Tab; Take 1 tablet (10 mg total) by mouth nightly.  Dispense: 90 tablet; Refill: 1  - Azelastine HCl 0.05 % Ophthalmic Solution; Place 1 drop into both eyes 2 (two) times daily.  Dispense: 6 mL; Refill: 1    8. Acute bilateral low back pain without sciatica  9. Acute pain of both shoulders  No improvement in symptoms.  Will have patient follow up with physical therapy.  - Physical Therapy Referral - [facility]    Patient Care Team:  Patti Barger MD as PCP - General (Internal Medicine)  Sis Corcoran MD (Anesthesiology)  Atul Tilley MD as Obstetrician (OBSTETRICS & GYNECOLOGY)  Barbi Bello MD as Consulting Physician (ENDOCRINOLOGY)  The patient indicates understanding of these issues and agrees to the plan.  The patient is asked to return to clinic in 3-6 months for follow up on chronic issues, or earlier if acute issues arise.    Patti Barger MD

## 2024-08-05 NOTE — PROGRESS NOTES
Name: Vira Johnson  Date: 8/05/24    Referring Physician: No ref. provider found    HISTORY OF PRESENT ILLNESS   Vira Johnson is a 33 year old female who presents for evaluation and management of type 1 diabetes. She was diagnosed with diabetes many years ago. She has run out of her pods and she cannot get her Dexcom.     Blood Glucose Today: 200  HbA1C or glycohemoglobin is 10.5 today (and it was 11.3 on 1/16/24 and it was 10.6 on 9/18/23)  Type 1 or Type 2?: Type 1 with insulin resistance  Medications for DM: Omnipod; Rybelsus 3mg  Fasting- 150  Before meals, 250s   Checking 4-5 times per day  Dexcom Clarity  -----------------------------  Elizabeth LISSETTE Constantino  YOB: 1990  Generated at: Mon, Aug 5, 2024 12:12 PM CDT  Reporting period: Tue Jul 23, 2024 - Mon Aug 5, 2024  -----------------------------  Glucose Details  Average glucose: 220 mg/dL  Standard deviation: 58 mg/dL  GMI: 8.6%  -----------------------------  Time in Range  Very High: 27%  High: 46%  In Range: 27%  Low: 0%  Very Low: 0%     Target Range   mg/dL     -----------------------------  CGM Details  Sensor usage: 86%  Days with CGM data: 12/14     Episodes of hypoglycemia: occasionally in the middle of the night    Dietary compliance: she is trying to eat healthier    Exercise: she is trying to exercise more    Polyuria/polydipsia: none    Blurred vision: none    Flu Vaccine This Season: yes    Covid Vaccine: yes    REVIEW OF SYSTEMS  CV: Cardiovascular disease present: none         Hypertension present: not at goal, not on meds         Hyperlipidemia present: at goal, not on meds (2/12/24)         Peripheral Vascular Disease present: none    : Nephropathy present: MAC: none (2/12/24) Creatinine:- 0.77     Neuro: Neuropathy present: none    Eyes: Diabetic retinopathy present: unknown            Most recent visit to eye doctor in last 12 months: she has to make an appt    Skin: Infection or ulceration:  none    Osteoporosis/ Osteopenia: none Vitamin D: 28.5 (5/10/23)    Thyroid disease: none TSH: unknown    Medications:     Current Outpatient Medications:     Semaglutide (RYBELSUS) 7 MG Oral Tab, Take 7 mg by mouth daily with breakfast., Disp: 90 tablet, Rfl: 0    albuterol 108 (90 Base) MCG/ACT Inhalation Aero Soln, Inhale 2 puffs into the lungs every 4 (four) hours as needed for Wheezing or Shortness of Breath (cough)., Disp: 1 each, Rfl: 0    benzonatate 200 MG Oral Cap, Take 1 capsule (200 mg total) by mouth 3 (three) times daily as needed for cough., Disp: 30 capsule, Rfl: 0    ALPRAZolam 1 MG Oral Tab, Take 1 tablet (1 mg total) by mouth 2 (two) times daily as needed for Anxiety or Sleep., Disp: 60 tablet, Rfl: 1    buPROPion  MG Oral Tablet 24 Hr, Take 1 tablet (150 mg total) by mouth daily., Disp: 90 tablet, Rfl: 1    montelukast 10 MG Oral Tab, Take 1 tablet (10 mg total) by mouth nightly., Disp: 90 tablet, Rfl: 1    Azelastine HCl 0.05 % Ophthalmic Solution, Place 1 drop into both eyes 2 (two) times daily., Disp: 6 mL, Rfl: 1    diclofenac 50 MG Oral Tab EC, Take 1 tablet (50 mg total) by mouth 3 (three) times daily as needed., Disp: 45 tablet, Rfl: 0    ONETOUCH VERIO In Vitro Strip, USE TO TEST BLOOD SUGAR UP TO 4 TIMES DAILY, Disp: 400 strip, Rfl: 0    Insulin Lispro (HUMALOG KWIKPEN) 200 UNIT/ML Subcutaneous Solution Pen-injector, Inject 20 Units into the skin in the morning, at noon, and at bedtime. Take 20 units tid with meals with correction for a total maximum daily dose of 105 units, Disp: 100 mL, Rfl: 1    OMNIPOD 5 G6 PODS, GEN 5, Does not apply Misc, 1 UNDER THE SKIN DAILY, Disp: 30 each, Rfl: 2    Glucose Blood (ONETOUCH ULTRA) In Vitro Strip, Check blood sugar up to 4x daily, Disp: 400 strip, Rfl: 0    insulin lispro 100 UNIT/ML Injection Solution, Use up to 120 units daily via insulin pump, Disp: 40 mL, Rfl: 2    Insulin Lispro, 1 Unit Dial, 100 UNIT/ML Subcutaneous Solution  Pen-injector, INJECT UNDER THE SKIN BASED ON CARB RATIO AND CORRECTION FACTOR*MAX DAILY DOSE IS 50 UNITS* to use in case of pump failure, Disp: 15 mL, Rfl: 0    Insulin Pen Needle 32G X 4 MM Does not apply Misc, Inject insulin 5 times a day, Disp: 500 each, Rfl: 0    albuterol 108 (90 Base) MCG/ACT Inhalation Aero Soln, Inhale 2 puffs into the lungs every 4 (four) hours as needed for Wheezing or Shortness of Breath., Disp: 1 each, Rfl: 0    Continuous Blood Gluc Sensor (DEXCOM G6 SENSOR) Does not apply Misc, USE AS DIRECTED EVERY 10 DAYS., Disp: 9 each, Rfl: 0    Continuous Blood Gluc Sensor (DEXCOM G6 SENSOR) Does not apply Misc, 1 each Every 10 days., Disp: 9 each, Rfl: 3    ONETOUCH VERIO REFLECT w/Device Does not apply Kit, USE AS DIRECTED, Disp: 1 kit, Rfl: 0    Glucose Blood (ONETOUCH VERIO) In Vitro Strip, TEST FOUR TIMES DAILY, Disp: 400 strip, Rfl: 0    Insulin Disposable Pump (OMNIPOD 5 G6 POD, GEN 5,) Does not apply Misc, 1 Device every other day., Disp: 45 each, Rfl: 0    atorvastatin 10 MG Oral Tab, Take 1 tablet (10 mg total) by mouth nightly., Disp: 90 tablet, Rfl: 3    Insulin Pen Needle (TRUEPLUS PEN NEEDLES) 32G X 4 MM Does not apply Misc, Take 1 each by mouth in the morning and 1 each before bedtime., Disp: 100 each, Rfl: 2    Continuous Blood Gluc Transmit (DEXCOM G6 TRANSMITTER) Does not apply Misc, Use 1 transmitter every 90 days, Disp: 1 each, Rfl: 3    Insulin Disposable Pump (OMNIPOD 5 G6 INTRO, GEN 5,) Does not apply Kit, 1 each daily., Disp: 1 kit, Rfl: 0    Continuous Blood Gluc  (DEXCOM G6 ) Does not apply Device, 1 each daily., Disp: 1 each, Rfl: 0    Insulin Lispro, 1 Unit Dial, 100 UNIT/ML Subcutaneous Solution Pen-injector, INJECT UNDER THE SKIN BASED ON CARB RATIO AND CORRECTION FACTOR*MAX DAILY DOSE IS 50 UNITS*, Disp: 15 mL, Rfl: 0    insulin detemir (LEVEMIR FLEXPEN) 100 UNIT/ML Subcutaneous Solution Pen-injector, Inject 40 Units into the skin every 12 (twelve)  hours., Disp: 75 mL, Rfl: 0    OneTouch Delica Lancets 33G Does not apply Misc, Check blood sugar up to 4x daily, Disp: 400 each, Rfl: 0    topiramate 50 MG Oral Tab, Take 1 tablet (50 mg total) by mouth 2 (two) times daily., Disp: 180 tablet, Rfl: 1    Insulin Disposable Pump (OMNIPOD 5 G6 INTRO, GEN 5,) Does not apply Kit, 1 each As Directed., Disp: 1 kit, Rfl: 0    Continuous Blood Gluc  (DEXCOM G6 ) Does not apply Device, Use as directed with sensors and transmitter, Disp: 1 Device, Rfl: 0    ONETOUCH DELICA PLUS [id number] Does not apply Misc, USE AS DIRECTED, Disp: 400 each, Rfl: 2     Allergies:   No Known Allergies    Social History:   Social History     Socioeconomic History    Marital status: Single   Tobacco Use    Smoking status: Every Day     Current packs/day: 0.50     Average packs/day: 0.5 packs/day for 13.7 years (6.8 ttl pk-yrs)     Types: Cigarettes     Start date:     Smokeless tobacco: Never   Vaping Use    Vaping status: Former    Substances: Nicotine    Devices: Pre-filled pod   Substance and Sexual Activity    Alcohol use: Not Currently    Drug use: No    Sexual activity: Yes     Partners: Male   Other Topics Concern    Caffeine Concern Yes     Comment: 16 oz of diet coke every other day and 1 medium coffee from [employer] once daily.    Exercise Yes     Comment: sometimes    Seat Belt Yes    Special Diet No    Stress Concern Yes    Weight Concern Yes       Medical History:   Past Medical History:    History of blood transfusion        History of  delivery    X 3 [x ] Repeat CS at 36 weeks- 21 [ ] bilateral salpingectomy - IPA consent signed 2021     S/P repeat low transverse     Type 1 diabetes mellitus (HCC)       Surgical history:   Past Surgical History:   Procedure Laterality Date          x3    Other surgical history Right 2011    R arm surgery after trauma         PHYSICAL EXAM  Vitals:    24 1159   BP: 117/54   Pulse: 78    Weight: 223 lb (101.2 kg)   Height: 5' 2\" (1.575 m)           General Appearance:  alert, well developed, in no acute distress  Eyes:  normal conjunctivae, sclera., normal sclera and normal pupils  Ears/Nose/Mouth/Throat/Neck:  no palpable thyroid nodules or cervical lymphadenopathy  Neck: Trachea midline: Normal  Psychiatric:  oriented to time, self, and place  Nutritional:  no abnormal weight gain or loss    Lab Data:   Lab Results   Component Value Date     (H) 08/24/2022    A1C 10.5 (A) 08/05/2024     Lab Results   Component Value Date     (H) 08/05/2024    BUN 12 08/05/2024    BUNCREA 16.0 08/05/2024    CREATSERUM 0.75 08/05/2024    ANIONGAP 3 08/05/2024     07/12/2017    GFRNAA 120 07/07/2022    GFRAA 138 07/07/2022    CA 9.5 08/05/2024    OSMOCALC 296 (H) 08/05/2024    ALKPHO 82 08/05/2024    AST 14 08/05/2024    ALT 9 (L) 08/05/2024    BILT 0.3 08/05/2024    TP 6.8 08/05/2024    ALB 4.1 08/05/2024    GLOBULIN 2.7 08/05/2024     08/05/2024    K 4.0 08/05/2024     08/05/2024    CO2 27.0 08/05/2024     Lab Results   Component Value Date    CHOLEST 170 08/05/2024    TRIG 99 08/05/2024    HDL 46 08/05/2024     (H) 08/05/2024    VLDL 17 08/05/2024    TCHDLRATIO 3.22 03/16/2018    NONHDLC 124 08/05/2024     Lab Results   Component Value Date    MALBP 0.64 02/12/2024    CREUR 116.00 02/12/2024         ASSESSMENT/PLAN:  This is a 32 year-old woman here for evaluation and management of uncontrolled type 2 diabetes. We discussed the ABCs of DM.     1.) Hyperglycemia Management- We discussed the importance of glycemic control to prevent complications of diabetes. We discussed the importance of SBGM. I offered and provided patient education materials and offered a blood glucose log book.   - Reorder patient pods  - Increase Rybelsus to 7mg qam  - As soon as she has her Omnipod, she will contact us so that we can help her with her settings.     2.) Management of Diabetic  Complications- We discussed the complications of diabetes include retinopathy, neuropathy, nephropathy and cardiovascular disease.   - Ophtho- she will make appt  - Flu and Covid vaccine- up to date  - BP- not at goal, will monitor  - Lipids- not at goal, check again now  - MAC- at goal, check again now  - CMP- check again now  - Neuropathy- none  - CAD- none    3.) Lifestyle Management for Diabetes- We discussed importance of a low CHO diet, and recommend 45gm per meal or 135gm per day. We discussed the importance of trying to follow a Mediterranean diet, with an emphasis on vegetables at every meal, with lots whole grains, and protein from either plant-based sources, or poultry and fish.   - Diet- She will try and eat healthy  - Exercise- she will try and exercise    Return to clinic in 3 months    Prior to this encounter, I spent over 15 minutes with preparing for the visit, including reviewing documents from other specialties as well as from PCP and going over test results. During the face to face encounter, I spent an additional 15 minutes which were determined for follow-up. Greater than 50% of the time was spent in counseling, anticipatory guidance, and coordination of care. Patient concerns were answered to the best of my knowledge.         1/16/24  Barbi Bello MD

## 2024-08-05 NOTE — PATIENT INSTRUCTIONS
- A1c/diabetes is higher, 10.5%.  Follow up with Dr. Bello as scheduled  - Get rest of blood tests done today  - Follow up with eye doctor for diabetic eye exam  - Will restart wellbutrin (Bupropion) for anxiety and depression. Preferably would want you to take the alprazolam only as needed  - Follow up with Psychiatry (see list of in network providers)  - Will start montelukast tablets nightly and azelastine eye drops for allergy symptoms  - Follow up with physical therapy for back/shoulder pain.    It was a pleasure seeing you in the clinic today.  Thank you for choosing the [facility] [place] office for your healthcare needs. Please call at [phone] with any questions or concerns.    Patti Barger MD

## 2024-08-05 NOTE — PROGRESS NOTES
-----------------------------  Dexcom Clarity  -----------------------------  Elizabeth LISSETTE Constantino  YOB: 1990  Generated at: Mon, Aug 5, 2024 12:12 PM CDT  Reporting period: Tue Jul 23, 2024 - Mon Aug 5, 2024  -----------------------------  Glucose Details  Average glucose: 220 mg/dL  Standard deviation: 58 mg/dL  GMI: 8.6%  -----------------------------  Time in Range  Very High: 27%  High: 46%  In Range: 27%  Low: 0%  Very Low: 0%    Target Range   mg/dL    -----------------------------  CGM Details  Sensor usage: 86%  Days with CGM data: 12/14

## 2024-08-13 ENCOUNTER — PATIENT MESSAGE (OUTPATIENT)
Dept: ENDOCRINOLOGY CLINIC | Facility: CLINIC | Age: 34
End: 2024-08-13

## 2024-08-14 NOTE — TELEPHONE ENCOUNTER
Prescription sent on 8/5 for Rybelsus 7mg. PA required.     PA submitted via [email or web address]:    Case Id  [id number]  Reference Id  [id number]  Priority  Priority: Expedited      Pt notified via [facility]

## 2024-08-14 NOTE — TELEPHONE ENCOUNTER
From: Vira Johnson  To: Barbi Bello  Sent: 8/13/2024 4:57 PM CDT  Subject: Cristy    I am down to my last 2 pills and it shows no refills when I came in you said you would send refill?

## 2024-08-15 ENCOUNTER — MED REC SCAN ONLY (OUTPATIENT)
Dept: ENDOCRINOLOGY CLINIC | Facility: CLINIC | Age: 34
End: 2024-08-15

## 2024-08-15 NOTE — TELEPHONE ENCOUNTER
Received approval for Rybelsus 7mg effective 05/17/2024 through 08/15/2025     sent notifying pt    Approval letter sent to scanning.

## 2024-08-23 DIAGNOSIS — F41.1 GENERALIZED ANXIETY DISORDER: ICD-10-CM

## 2024-08-23 DIAGNOSIS — F51.01 PRIMARY INSOMNIA: ICD-10-CM

## 2024-08-23 RX ORDER — ALPRAZOLAM 1 MG
1 TABLET ORAL 2 TIMES DAILY PRN
Qty: 60 TABLET | Refills: 1 | Status: SHIPPED | OUTPATIENT
Start: 2024-08-23

## 2024-08-27 ENCOUNTER — TELEMEDICINE (OUTPATIENT)
Dept: TELEHEALTH | Age: 34
End: 2024-08-27
Payer: MEDICAID

## 2024-08-27 DIAGNOSIS — J45.901 MILD ASTHMA EXACERBATION (HCC): ICD-10-CM

## 2024-08-27 DIAGNOSIS — J06.9 VIRAL URI WITH COUGH: Primary | ICD-10-CM

## 2024-08-27 PROCEDURE — 99213 OFFICE O/P EST LOW 20 MIN: CPT | Performed by: PHYSICIAN ASSISTANT

## 2024-08-27 RX ORDER — ALBUTEROL SULFATE 90 UG/1
2 AEROSOL, METERED RESPIRATORY (INHALATION) EVERY 4 HOURS PRN
Qty: 1 EACH | Refills: 0 | Status: SHIPPED | OUTPATIENT
Start: 2024-08-27

## 2024-08-27 RX ORDER — BENZONATATE 200 MG/1
200 CAPSULE ORAL 3 TIMES DAILY PRN
Qty: 30 CAPSULE | Refills: 0 | Status: SHIPPED | OUTPATIENT
Start: 2024-08-27

## 2024-08-27 NOTE — PROGRESS NOTES
Virtual/Telephone Check-In    Vira Johnson verbally consents to a Virtual/Telephone Check-In service on 08/27/24.  Patient has been referred to the [facility] website at [email or web address] to review the yearly Consent to Treat document.  Patient understands and accepts financial responsibility for any deductible, co-insurance and/or co-pays associated with this service.       Telehealth Verbal Consent   I conducted a telehealth visit with Vira Johnson today, 08/27/24, which was completed using two-way, real-time interactive audio and video communication. This has been done in good og to provide continuity of care in the best interest of the provider-patient relationship, due to the COVID -19 public health crisis/national emergency where restrictions of face-to-face office visits are ongoing. Every conscious effort was taken to allow for sufficient and adequate time to complete the visit.  The patient was made aware of the limitations of the telehealth visit, including treatment limitations as no physical exam could be performed.  The patient was advised to call 911 or to go to the ER in case there was an emergency.  The patient was also advised of the potential privacy & security concerns related to the telehealth platform.   The patient was made aware of where to find [facility]'s notice of privacy practices, telehealth consent form and other related consent forms and documents.  which are located on the [facility] website. The patient verbally agreed to telehealth consent form, related consents and the risks discussed.    Lastly, the patient confirmed that they were in [place].   Included in this visit, time may have been spent reviewing labs, medications, radiology tests and decision making. Appropriate medical decision-making and tests are ordered as detailed in the plan of care above.  Coding/billing information is submitted for this visit based on complexity of care and/or time spent for the  visit.    CHIEF COMPLAINT:     Chief Complaint   Patient presents with    Cough     Dry cough, hard time catching breath after cough jags       HPI:   Vira Johnson is a 33 year old female who presents for a video visit.  Patient reports mild chest discomfort on right side of chest.  Kids sick 1 week ago with cough/congestion.   Patient with deep, congested, cough for 2 days.  Patient denies fever, sore throat.  Patient has tried nothing for symptoms, yet.  Has DM1 and asthma.  Does not know where albuterol inhaler is currently.  Recently on prednisone at end of July for back issues.      Current Outpatient Medications   Medication Sig Dispense Refill    ALPRAZolam 1 MG Oral Tab Take 1 tablet (1 mg total) by mouth 2 (two) times daily as needed for Anxiety or Sleep. 60 tablet 1    buPROPion  MG Oral Tablet 24 Hr Take 1 tablet (150 mg total) by mouth daily. 90 tablet 1    montelukast 10 MG Oral Tab Take 1 tablet (10 mg total) by mouth nightly. 90 tablet 1    Azelastine HCl 0.05 % Ophthalmic Solution Place 1 drop into both eyes 2 (two) times daily. 6 mL 1    Semaglutide (RYBELSUS) 7 MG Oral Tab Take 7 mg by mouth daily with breakfast. 90 tablet 0    diclofenac 50 MG Oral Tab EC Take 1 tablet (50 mg total) by mouth 3 (three) times daily as needed. 45 tablet 0    ONETOUCH VERIO In Vitro Strip USE TO TEST BLOOD SUGAR UP TO 4 TIMES DAILY 400 strip 0    Insulin Lispro (HUMALOG KWIKPEN) 200 UNIT/ML Subcutaneous Solution Pen-injector Inject 20 Units into the skin in the morning, at noon, and at bedtime. Take 20 units tid with meals with correction for a total maximum daily dose of 105 units 100 mL 1    OMNIPOD 5 G6 PODS, GEN 5, Does not apply Misc 1 UNDER THE SKIN DAILY 30 each 2    Glucose Blood (ONETOUCH ULTRA) In Vitro Strip Check blood sugar up to 4x daily 400 strip 0    insulin lispro 100 UNIT/ML Injection Solution Use up to 120 units daily via insulin pump 40 mL 2    Insulin Lispro, 1 Unit Dial, 100 UNIT/ML  Subcutaneous Solution Pen-injector INJECT UNDER THE SKIN BASED ON CARB RATIO AND CORRECTION FACTOR*MAX DAILY DOSE IS 50 UNITS* to use in case of pump failure 15 mL 0    Insulin Pen Needle 32G X 4 MM Does not apply Misc Inject insulin 5 times a day 500 each 0    albuterol 108 (90 Base) MCG/ACT Inhalation Aero Soln Inhale 2 puffs into the lungs every 4 (four) hours as needed for Wheezing or Shortness of Breath. 1 each 0    Continuous Blood Gluc Sensor (DEXCOM G6 SENSOR) Does not apply Misc USE AS DIRECTED EVERY 10 DAYS. 9 each 0    Continuous Blood Gluc Sensor (DEXCOM G6 SENSOR) Does not apply Misc 1 each Every 10 days. 9 each 3    ONETOUCH VERIO REFLECT w/Device Does not apply Kit USE AS DIRECTED 1 kit 0    Glucose Blood (ONETOUCH VERIO) In Vitro Strip TEST FOUR TIMES DAILY 400 strip 0    Insulin Disposable Pump (OMNIPOD 5 G6 POD, GEN 5,) Does not apply Misc 1 Device every other day. 45 each 0    atorvastatin 10 MG Oral Tab Take 1 tablet (10 mg total) by mouth nightly. 90 tablet 3    Insulin Pen Needle (TRUEPLUS PEN NEEDLES) 32G X 4 MM Does not apply Misc Take 1 each by mouth in the morning and 1 each before bedtime. 100 each 2    Continuous Blood Gluc Transmit (DEXCOM G6 TRANSMITTER) Does not apply Misc Use 1 transmitter every 90 days 1 each 3    Insulin Disposable Pump (OMNIPOD 5 G6 INTRO, GEN 5,) Does not apply Kit 1 each daily. 1 kit 0    Continuous Blood Gluc  (DEXCOM G6 ) Does not apply Device 1 each daily. 1 each 0    Insulin Lispro, 1 Unit Dial, 100 UNIT/ML Subcutaneous Solution Pen-injector INJECT UNDER THE SKIN BASED ON CARB RATIO AND CORRECTION FACTOR*MAX DAILY DOSE IS 50 UNITS* 15 mL 0    insulin detemir (LEVEMIR FLEXPEN) 100 UNIT/ML Subcutaneous Solution Pen-injector Inject 40 Units into the skin every 12 (twelve) hours. 75 mL 0    OneTouch Delica Lancets 33G Does not apply Misc Check blood sugar up to 4x daily 400 each 0    topiramate 50 MG Oral Tab Take 1 tablet (50 mg total) by mouth 2  (two) times daily. 180 tablet 1    Insulin Disposable Pump (OMNIPOD 5 G6 INTRO, GEN 5,) Does not apply Kit 1 each As Directed. 1 kit 0    Continuous Blood Gluc  (DEXCOM G6 ) Does not apply Device Use as directed with sensors and transmitter 1 Device 0    ONETOUCH DELICA PLUS [id number] Does not apply Misc USE AS DIRECTED 400 each 2      Past Medical History:    History of blood transfusion        History of  delivery    X 3 [x ] Repeat CS at 36 weeks- 21 [ ] bilateral salpingectomy - IPA consent signed 2021     S/P repeat low transverse     Type 1 diabetes mellitus (HCC)      Past Surgical History:   Procedure Laterality Date          x3    Other surgical history Right 2011    R arm surgery after trauma         Social History     Socioeconomic History    Marital status: Single   Tobacco Use    Smoking status: Every Day     Current packs/day: 0.50     Average packs/day: 0.5 packs/day for 13.7 years (6.8 ttl pk-yrs)     Types: Cigarettes     Start date:     Smokeless tobacco: Never   Vaping Use    Vaping status: Former    Substances: Nicotine    Devices: Pre-filled pod   Substance and Sexual Activity    Alcohol use: Not Currently    Drug use: No    Sexual activity: Yes     Partners: Male   Other Topics Concern    Caffeine Concern Yes     Comment: 16 oz of diet coke every other day and 1 medium coffee from [employer] once daily.    Exercise Yes     Comment: sometimes    Seat Belt Yes    Special Diet No    Stress Concern Yes    Weight Concern Yes         REVIEW OF SYSTEMS:   GENERAL: no fever/chills  SKIN: no rashes or abnormal skin lesions  HEENT: See HPI  LUNGS: See HPI  CARDIOVASCULAR:see HPI      EXAM:   General: Alert, Well-appearing, and In no acute distress  Respiratory:   Speaking in full sentences comfortably  Normal work of breathing  Coughing during visit / deep, congested, but non productive cough. No audible wheezing.   Head: Normocephalic  Nose: No obvious  nasal discharge.  Skin: No obvious rashes or lesions from what observed.     No results found for this or any previous visit (from the past 24 hour(s)).    ASSESSMENT AND PLAN:   Viar Johnson is a 33 year old female who presents with symptoms that are consistent with    ASSESSMENT:   Encounter Diagnoses   Name Primary?    Viral URI with cough Yes    Mild asthma exacerbation (HCC)        PLAN: Likely viral uri triggering asthma. Benzonatate and albuterol. Meds as below.  Close follow up in person for worsening sx/SOB/CP/fever    Meds & Refills for this Visit:  Requested Prescriptions     Signed Prescriptions Disp Refills    albuterol 108 (90 Base) MCG/ACT Inhalation Aero Soln 1 each 0     Sig: Inhale 2 puffs into the lungs every 4 (four) hours as needed for Wheezing or Shortness of Breath (cough).    benzonatate 200 MG Oral Cap 30 capsule 0     Sig: Take 1 capsule (200 mg total) by mouth 3 (three) times daily as needed for cough.       Risks, benefits, and side effects of medication explained and discussed.    The patient indicates understanding of these issues and agrees to the plan.  The patient is asked to return if sx's persist or worsen.    Face to face time spent on Video Visit: 6:3-  Total Time spent on visit including reviewing history, ordering labs/medication, patient examination and education: 12    Vira Johnson understands video visit evaluation is not a substitute for face-to-face examination or emergency care. Patient advised to go to ER or call 911 for worsening symptoms or acute distress.

## 2024-09-02 ENCOUNTER — TELEMEDICINE (OUTPATIENT)
Dept: TELEHEALTH | Age: 34
End: 2024-09-02
Payer: MEDICAID

## 2024-09-02 DIAGNOSIS — B37.31 VAGINAL CANDIDIASIS: Primary | ICD-10-CM

## 2024-09-02 RX ORDER — FLUCONAZOLE 150 MG/1
TABLET ORAL
Qty: 1 TABLET | Refills: 0 | Status: SHIPPED | OUTPATIENT
Start: 2024-09-02

## 2024-09-02 NOTE — PROGRESS NOTES
Virtual/Telephone Check-In    Vira Johnson verbally consents to a Virtual/Telephone Check-In service on 09/02/24.  Patient has been referred to the [facility] website at [email or web address] to review the yearly Consent to Treat document.  Patient understands and accepts financial responsibility for any deductible, co-insurance and/or co-pays associated with this service.       Telehealth Verbal Consent   I conducted a telehealth visit with Vira Johnson today, 09/02/24, which was completed using two-way, real-time interactive audio and video communication. This has been done in good og to provide continuity of care in the best interest of the provider-patient relationship, due to the COVID -19 public health crisis/national emergency where restrictions of face-to-face office visits are ongoing. Every conscious effort was taken to allow for sufficient and adequate time to complete the visit.  The patient was made aware of the limitations of the telehealth visit, including treatment limitations as no physical exam could be performed.  The patient was advised to call 911 or to go to the ER in case there was an emergency.  The patient was also advised of the potential privacy & security concerns related to the telehealth platform.   The patient was made aware of where to find [facility]'s notice of privacy practices, telehealth consent form and other related consent forms and documents.  which are located on the [facility] website. The patient verbally agreed to telehealth consent form, related consents and the risks discussed.    Lastly, the patient confirmed that they were in [place].   Included in this visit, time may have been spent reviewing labs, medications, radiology tests and decision making. Appropriate medical decision-making and tests are ordered as detailed in the plan of care above.  Coding/billing information is submitted for this visit based on complexity of care and/or time spent for the  visit.    CHIEF COMPLAINT:   No chief complaint on file.      HPI:   Vira Johnson is a 33 year old female who presents for a video visit.  Reports poor blood sugar control leading to a vaginal yeast infection.   Reports vaginal itching x 2 days.  Reports white vaginal discharge.    Denies foul discharge odor, vaginal pain, urinary symptoms, fever, STI risk, abd pain, or vaginal lesions/rash  BG currently 243 on dexcom.  Pt follows with endo.   Last A1C 10.5 on 8/5/24   Last treated for yeast vaginitis with fluconazole x 1 dose in 5/2024; sx resolved with treatment    Current Outpatient Medications   Medication Sig Dispense Refill    albuterol 108 (90 Base) MCG/ACT Inhalation Aero Soln Inhale 2 puffs into the lungs every 4 (four) hours as needed for Wheezing or Shortness of Breath (cough). 1 each 0    benzonatate 200 MG Oral Cap Take 1 capsule (200 mg total) by mouth 3 (three) times daily as needed for cough. 30 capsule 0    ALPRAZolam 1 MG Oral Tab Take 1 tablet (1 mg total) by mouth 2 (two) times daily as needed for Anxiety or Sleep. 60 tablet 1    buPROPion  MG Oral Tablet 24 Hr Take 1 tablet (150 mg total) by mouth daily. 90 tablet 1    montelukast 10 MG Oral Tab Take 1 tablet (10 mg total) by mouth nightly. 90 tablet 1    Azelastine HCl 0.05 % Ophthalmic Solution Place 1 drop into both eyes 2 (two) times daily. 6 mL 1    Semaglutide (RYBELSUS) 7 MG Oral Tab Take 7 mg by mouth daily with breakfast. 90 tablet 0    diclofenac 50 MG Oral Tab EC Take 1 tablet (50 mg total) by mouth 3 (three) times daily as needed. 45 tablet 0    ONETOUCH VERIO In Vitro Strip USE TO TEST BLOOD SUGAR UP TO 4 TIMES DAILY 400 strip 0    Insulin Lispro (HUMALOG KWIKPEN) 200 UNIT/ML Subcutaneous Solution Pen-injector Inject 20 Units into the skin in the morning, at noon, and at bedtime. Take 20 units tid with meals with correction for a total maximum daily dose of 105 units 100 mL 1    OMNIPOD 5 G6 PODS, GEN 5, Does not apply  Misc 1 UNDER THE SKIN DAILY 30 each 2    Glucose Blood (ONETOUCH ULTRA) In Vitro Strip Check blood sugar up to 4x daily 400 strip 0    insulin lispro 100 UNIT/ML Injection Solution Use up to 120 units daily via insulin pump 40 mL 2    Insulin Lispro, 1 Unit Dial, 100 UNIT/ML Subcutaneous Solution Pen-injector INJECT UNDER THE SKIN BASED ON CARB RATIO AND CORRECTION FACTOR*MAX DAILY DOSE IS 50 UNITS* to use in case of pump failure 15 mL 0    Insulin Pen Needle 32G X 4 MM Does not apply Misc Inject insulin 5 times a day 500 each 0    albuterol 108 (90 Base) MCG/ACT Inhalation Aero Soln Inhale 2 puffs into the lungs every 4 (four) hours as needed for Wheezing or Shortness of Breath. 1 each 0    Continuous Blood Gluc Sensor (DEXCOM G6 SENSOR) Does not apply Misc USE AS DIRECTED EVERY 10 DAYS. 9 each 0    Continuous Blood Gluc Sensor (DEXCOM G6 SENSOR) Does not apply Misc 1 each Every 10 days. 9 each 3    ONETOUCH VERIO REFLECT w/Device Does not apply Kit USE AS DIRECTED 1 kit 0    Glucose Blood (ONETOUCH VERIO) In Vitro Strip TEST FOUR TIMES DAILY 400 strip 0    Insulin Disposable Pump (OMNIPOD 5 G6 POD, GEN 5,) Does not apply Misc 1 Device every other day. 45 each 0    atorvastatin 10 MG Oral Tab Take 1 tablet (10 mg total) by mouth nightly. 90 tablet 3    Insulin Pen Needle (TRUEPLUS PEN NEEDLES) 32G X 4 MM Does not apply Misc Take 1 each by mouth in the morning and 1 each before bedtime. 100 each 2    Continuous Blood Gluc Transmit (DEXCOM G6 TRANSMITTER) Does not apply Misc Use 1 transmitter every 90 days 1 each 3    Insulin Disposable Pump (OMNIPOD 5 G6 INTRO, GEN 5,) Does not apply Kit 1 each daily. 1 kit 0    Continuous Blood Gluc  (DEXCOM G6 ) Does not apply Device 1 each daily. 1 each 0    Insulin Lispro, 1 Unit Dial, 100 UNIT/ML Subcutaneous Solution Pen-injector INJECT UNDER THE SKIN BASED ON CARB RATIO AND CORRECTION FACTOR*MAX DAILY DOSE IS 50 UNITS* 15 mL 0    insulin detemir (LEVEMIR  FLEXPEN) 100 UNIT/ML Subcutaneous Solution Pen-injector Inject 40 Units into the skin every 12 (twelve) hours. 75 mL 0    OneTouch Delica Lancets 33G Does not apply Misc Check blood sugar up to 4x daily 400 each 0    topiramate 50 MG Oral Tab Take 1 tablet (50 mg total) by mouth 2 (two) times daily. 180 tablet 1    Insulin Disposable Pump (OMNIPOD 5 G6 INTRO, GEN 5,) Does not apply Kit 1 each As Directed. 1 kit 0    Continuous Blood Gluc  (DEXCOM G6 ) Does not apply Device Use as directed with sensors and transmitter 1 Device 0    ONETOUCH DELICA PLUS [id number] Does not apply Misc USE AS DIRECTED 400 each 2      Past Medical History:    History of blood transfusion    2007    History of  delivery    X 3 [x ] Repeat CS at 36 weeks- 21 [ ] bilateral salpingectomy - IPA consent signed 2021     S/P repeat low transverse     Type 1 diabetes mellitus (HCC)      Past Surgical History:   Procedure Laterality Date          x3    Other surgical history Right 2011    R arm surgery after trauma         Social History     Socioeconomic History    Marital status: Single   Tobacco Use    Smoking status: Every Day     Current packs/day: 0.50     Average packs/day: 0.5 packs/day for 13.7 years (6.8 ttl pk-yrs)     Types: Cigarettes     Start date:     Smokeless tobacco: Never   Vaping Use    Vaping status: Former    Substances: Nicotine    Devices: Pre-filled pod   Substance and Sexual Activity    Alcohol use: Not Currently    Drug use: No    Sexual activity: Yes     Partners: Male   Other Topics Concern    Caffeine Concern Yes     Comment: 16 oz of diet coke every other day and 1 medium coffee from [employer] once daily.    Exercise Yes     Comment: sometimes    Seat Belt Yes    Special Diet No    Stress Concern Yes    Weight Concern Yes         REVIEW OF SYSTEMS:   GENERAL: normal appetite  SKIN: no rashes or abnormal skin lesions  LUNGS: no shortness of breath or wheezing, See  HPI  CARDIOVASCULAR: no chest pain or palpitations   GI/: see HPI    EXAM:   General: Alert, Well-appearing, and In no acute distress  Respiratory:   Speaking in full sentences comfortably  Normal work of breathing  No cough during visit  Head: Normocephalic  GI/: no abd tenderness per pt  Mood: Affect appropriate      ASSESSMENT AND PLAN:   Vira Johnson is a 33 year old female who presents with symptoms that are consistent with    ASSESSMENT/PLAN:     Diagnoses and all orders for this visit:    Vaginal candidiasis  -     fluconazole 150 MG Oral Tab; Take 1 tablet by mouth once      Will treat with medication as listed  Discussed use, dose, and possible side effects  Hasn't restarted statin yet; takes alprazolam occas for sleep; to hold today  To f/u with PCP/gyne if symptoms do not resolve with tx  F/U with endo for BG mgmt  See pt instructions  Addendum:  Pt submitted 2nd video visit to report pharmacy couldn't fill fluconazole; insurance denied d/t provider not being enrolled in [employer].   Spoke with [facility] pharmacist.  OK rec'd from Jodi Andres NP to submit under her name.  Insurance approved.  Pt notified.        Patient Instructions   Take medication as prescribed.   Abstain from sexual intercourse until symptoms are resolved.  Follow recommendations from endocrinology for blood glucose management    Call your healthcare provider right away if any of the following occur:   You have a fever of 100.4ºF (38ºC) or higher  Your symptoms get worse, or they don’t go away within a few days of starting treatment.  You have new pain in the lower belly or pelvic region.  Vaginal pain, vaginal bleeding, pain with urination  You have side effects that bother you or a reaction to the cream or pills you’re prescribed.  You or any partners you have sex with have new symptoms, such as a rash, joint pain, or sores.        The patient indicates understanding of these issues and agrees to the plan.    Face  to face time spent on Video Visit: 8:40 min  Total Time spent on visit including reviewing history, ordering labs/medication, patient examination and education: 11 min

## 2024-09-02 NOTE — PATIENT INSTRUCTIONS
Take medication as prescribed.   Abstain from sexual intercourse until symptoms are resolved.  Follow recommendations from endocrinology for blood glucose management    Call your healthcare provider right away if any of the following occur:   You have a fever of 100.4ºF (38ºC) or higher  Your symptoms get worse, or they don’t go away within a few days of starting treatment.  You have new pain in the lower belly or pelvic region.  Vaginal pain, vaginal bleeding, pain with urination  You have side effects that bother you or a reaction to the cream or pills you’re prescribed.  You or any partners you have sex with have new symptoms, such as a rash, joint pain, or sores.

## 2024-09-03 NOTE — TELEPHONE ENCOUNTER
Endocrine Refill protocol for CGM supplies     Protocol Criteria:  PASSED Reason: N/A  Appointment with Endocrinology completed in the last 12 months or scheduled in the next 6 months     Verify appointment has been completed or scheduled in the appropriate timeline. If so can send a 90 day supply with 1 refill.     Last completed office visit:8/5/2024 Barbi Bello MD   Next scheduled Follow up: No future appointments.

## 2024-09-04 RX ORDER — BLOOD SUGAR DIAGNOSTIC
STRIP MISCELLANEOUS
Qty: 400 STRIP | Refills: 0 | Status: SHIPPED | OUTPATIENT
Start: 2024-09-04

## 2024-09-04 RX ORDER — INSULIN PMP CART,AUT,G6/7,CNTR
1 EACH SUBCUTANEOUS DAILY
Qty: 30 EACH | Refills: 2 | Status: SHIPPED | OUTPATIENT
Start: 2024-09-04

## 2024-09-10 DIAGNOSIS — E10.8 TYPE 1 DIABETES MELLITUS WITH COMPLICATION, WITH LONG TERM CURRENT USE OF INSULIN PUMP (HCC): Chronic | ICD-10-CM

## 2024-09-10 DIAGNOSIS — Z96.41 TYPE 1 DIABETES MELLITUS WITH COMPLICATION, WITH LONG TERM CURRENT USE OF INSULIN PUMP (HCC): Chronic | ICD-10-CM

## 2024-09-10 RX ORDER — INSULIN DETEMIR 100 [IU]/ML
25 INJECTION, SOLUTION SUBCUTANEOUS EVERY 12 HOURS
Qty: 3 ML | Refills: 0 | Status: SHIPPED | OUTPATIENT
Start: 2024-09-10 | End: 2024-10-09

## 2024-09-10 NOTE — TELEPHONE ENCOUNTER
Spoke with pt. Pt requests 1 pen of Levemir be sent to [facility] in [place].     States that she is currently taking 25 units BID.    States she is also taking Humalog 10 units TID with meals.     Pt has not been checking BG, is not wearing dexcom currently.

## 2024-09-10 NOTE — TELEPHONE ENCOUNTER
Patient called to ask Dr. Bello if she can get an emergency insulin pen sent to her. Patient states that she is out of town and did not bring an insulin pen with her. Please advise.

## 2024-09-26 NOTE — TELEPHONE ENCOUNTER
Post op Spine Patient Instructions    Activity Restrictions:  [x]  Return to work will be determined on an individual basis.  [x]  No lifting greater than 5-10 pounds.  [x]  Avoid bending and twisting the area of your surgery more than 45 degrees from neutral position in any direction.  [x]  No driving or operating machinery:  [x]  until cleared by your surgeon.  [x]  while taking narcotic pain medications or muscle relaxants.  [x]  Wear your brace at all times except when lying in bed, showering, eating, using the restroom, or performing hygiene tasks.   [x]  Increase ambulation over the next 2 weeks. Walk on paved surfaces only. It is okay to walk up and down stairs while holding onto a side rail.  [x]  No sexual activity for 2 weeks.    Discharge Medication/Follow-up:  [x]  Please refer to discharge medication reconciliation form.  [x]  Do not take any OTC products containing acetaminophen (tylenol) at the same time as you take your narcotic pain medication. Medications that may contain acetaminophen include but are not limited to: Excedrin and other headache medications, arthritis medications, cold and sinus medications, etc. Please review the list of active ingredients in any OTC medication prior to taking it.  [x]  Take Tylenol (acetaminophen) 650 mg every 6 hours as needed for additional pain control.  [x]  Do not take ANY Aspirin or Aspirin containing products for 2 weeks after surgery (unless otherwise directed in discharge medication list).   [x]  Do not take ANY herbal supplements for 2 weeks after surgery.    [x]  Do not take ANY non-steroidal anti-inflammatory drugs (NSAIDS), including the following: ibuprofen, naprosyn, Aleve, Advil, Indocin, Mobic, or Celebrex for 12 weeks after surgery.   [x]  Prescriptions for appropriate medication will be given upon discharge.  [x]  Take the pain medication as prescribed. We recommend to wean use of your pain medication after 1 week of taking as prescribed (Ex:  Prior authorization form filled out for Ozempic - in my out basket - we can try again - if not can try different medication. After 1 week of taking every 4 hours as needed, wean down to taking your pain medication every 6 hours as needed).  [x]  Take docusate (Colace 100 mg): take one capsule a day as needed for constipation. You can get this over the counter.  [x]  Follow-up appointment:  [x]  10-14 days post-op for wound check by physician assistant/nurse  [x]  4-6 weeks with MD:  [x]  with x-rays  [x]  An appointment will be mailed to you.      Wound Care:  [x]  Remove the small dressing near your incision in 2 days.   [x]  No bandage required. Keep your incision open to the air. You have dermabond/Prineo*** (skin glue) covering your incision. This will begin to flake off over the next 2 weeks. Do not remove this on your own, allow it to peel off. Do not apply ointments or creams to your incision.  [x]  You may shower on the 2nd day after your surgery. Do not allow the force of water to hit the incision. Ok to allow water to rinse over the incision. Pat the incision dry if it becomes wet. Do not rub or scrub the incision.  [x]  You cannot take a bath until 8 weeks after surgery.      Call your doctor or go to the Emergency Room for any signs of infection, including: increased redness, drainage, pain, or fever (temperature >=101). Call your doctor or go to the Emergency Room if there are any localized neurological changes; problems with speech, vision, numbness, tingling, weakness, or severe headache; inability to control urination or bowel movements; inability to urinate; or for other concerns        Special Instructions:  [x]  No use of tobacco products.  [x]  Diet: Please eat a regular diet as tolerated.      Physicians need 3 days' notice for pain medicine to be refilled. Pain medicine will only be refilled between 8 AM and 5 PM, Monday through Friday, due to Food and Drug Administration regulation of documentation.    If you have any questions about this form, please call [phone].    Form No. 21941 (Revised 10/31/2013)

## 2024-10-09 DIAGNOSIS — E10.8 TYPE 1 DIABETES MELLITUS WITH COMPLICATION, WITH LONG TERM CURRENT USE OF INSULIN PUMP (HCC): Chronic | ICD-10-CM

## 2024-10-09 DIAGNOSIS — Z96.41 TYPE 1 DIABETES MELLITUS WITH COMPLICATION, WITH LONG TERM CURRENT USE OF INSULIN PUMP (HCC): Chronic | ICD-10-CM

## 2024-10-09 RX ORDER — INSULIN DETEMIR 100 [IU]/ML
25 INJECTION, SOLUTION SUBCUTANEOUS EVERY 12 HOURS
Qty: 3 ML | Refills: 0 | Status: SHIPPED | OUTPATIENT
Start: 2024-10-09

## 2024-10-09 NOTE — TELEPHONE ENCOUNTER
Endocrine refill protocol for basal insulins     Protocol Criteria: FAILED Reason: Elevated A1C    If all below requirements are met, send a 90-day supply with 1 refill per provider protocol.       Verify Appointment with Endocrinology completed in the last 6 months or scheduled in the next 3 months.  Verify A1C has been completed within the last 6 months and is below 8.5%     Last completed office visit:8/5/2024 Barbi Bello MD   Next scheduled Follow up:   Future Appointments   Date Time Provider Department Center   10/10/2024  2:00 PM Patti Barger MD [facility] [place]      Last A1c result: Last A1c value was 10.5% done 8/5/2024.

## 2024-10-10 ENCOUNTER — OFFICE VISIT (OUTPATIENT)
Dept: INTERNAL MEDICINE CLINIC | Facility: CLINIC | Age: 34
End: 2024-10-10
Payer: MEDICAID

## 2024-10-10 ENCOUNTER — PATIENT MESSAGE (OUTPATIENT)
Dept: INTERNAL MEDICINE CLINIC | Facility: CLINIC | Age: 34
End: 2024-10-10

## 2024-10-10 VITALS
WEIGHT: 220.19 LBS | TEMPERATURE: 98 F | SYSTOLIC BLOOD PRESSURE: 120 MMHG | RESPIRATION RATE: 16 BRPM | DIASTOLIC BLOOD PRESSURE: 86 MMHG | HEART RATE: 87 BPM | HEIGHT: 62 IN | OXYGEN SATURATION: 100 % | BODY MASS INDEX: 40.52 KG/M2

## 2024-10-10 DIAGNOSIS — E66.01 MORBID OBESITY (HCC): ICD-10-CM

## 2024-10-10 DIAGNOSIS — F51.01 PRIMARY INSOMNIA: ICD-10-CM

## 2024-10-10 DIAGNOSIS — K04.7 TOOTH INFECTION: Primary | ICD-10-CM

## 2024-10-10 DIAGNOSIS — F41.1 GENERALIZED ANXIETY DISORDER: ICD-10-CM

## 2024-10-10 PROCEDURE — 99214 OFFICE O/P EST MOD 30 MIN: CPT | Performed by: INTERNAL MEDICINE

## 2024-10-10 RX ORDER — ALPRAZOLAM 1 MG
1 TABLET ORAL 2 TIMES DAILY PRN
Qty: 60 TABLET | Refills: 3 | Status: SHIPPED | OUTPATIENT
Start: 2024-10-20

## 2024-10-10 RX ORDER — PHENTERMINE HYDROCHLORIDE 37.5 MG/1
37.5 TABLET ORAL
Qty: 30 TABLET | Refills: 2 | Status: SHIPPED | OUTPATIENT
Start: 2024-10-10

## 2024-10-10 NOTE — PROGRESS NOTES
Vira Johnson is a 33 year old female.   HPI:     Chief Complaint   Patient presents with    Headache    Fatigue     Difficulty staying awake, exhausted all of the time.      Patient presents to discuss several issues.  She has been having persistent headaches on the left side of her head.  Hypersomnia - sleeps all night, tired all the time.   No fevers/chills/night sweats.   She does have a cracked tooth.  Had some old amoxicillin from earlier in the year - started taking this, feels better.      Other chronic issues:  Anxiety, insomnia - continue with bupropion, PRN alprazolam.  Morbid obesity - Body mass index is 40.28 kg/m².  Initially appetite/weight was better with Rybelsus but this effect has worn off.  She would like to resume phentermine.    Past medical, family, surgical and social history were reviewed as listed in the chart, and are unchanged from previous visit on 8/5/2024  REVIEW OF SYSTEMS:   GENERAL/ const: fatigue; no fevers/chills, no unintentional weight loss  EYES:no vision problems  HEENT: denies sinus pain or sinus tenderness  LUNGS: denies shortness of breath   CARDIOVASCULAR: denies chest pain  : denies dysuria   NEURO:  headaches  PSYCHIATRIC: denies issues  ENDOCRINE: no hot/cold intolerance  ALLERGY: Allergies[1]  PAST HISTORY:     Current Outpatient Medications:     insulin detemir (LEVEMIR FLEXPEN) 100 UNIT/ML Subcutaneous Solution Pen-injector, Inject 25 Units into the skin every 12 (twelve) hours., Disp: 3 mL, Rfl: 0    Insulin Disposable Pump (OMNIPOD 5 G6 PODS, GEN 5,) Does not apply Misc, 1 each daily. 1 UNDER THE SKIN DAILY, Disp: 30 each, Rfl: 2    Glucose Blood (ONETOUCH VERIO) In Vitro Strip, Use to test blood sugar up to 4 times daily., Disp: 400 strip, Rfl: 0    fluconazole 150 MG Oral Tab, Take 1 tablet by mouth once, Disp: 1 tablet, Rfl: 0    albuterol 108 (90 Base) MCG/ACT Inhalation Aero Soln, Inhale 2 puffs into the lungs every 4 (four) hours as needed for  Wheezing or Shortness of Breath (cough)., Disp: 1 each, Rfl: 0    benzonatate 200 MG Oral Cap, Take 1 capsule (200 mg total) by mouth 3 (three) times daily as needed for cough., Disp: 30 capsule, Rfl: 0    ALPRAZolam 1 MG Oral Tab, Take 1 tablet (1 mg total) by mouth 2 (two) times daily as needed for Anxiety or Sleep., Disp: 60 tablet, Rfl: 1    buPROPion  MG Oral Tablet 24 Hr, Take 1 tablet (150 mg total) by mouth daily., Disp: 90 tablet, Rfl: 1    montelukast 10 MG Oral Tab, Take 1 tablet (10 mg total) by mouth nightly., Disp: 90 tablet, Rfl: 1    Azelastine HCl 0.05 % Ophthalmic Solution, Place 1 drop into both eyes 2 (two) times daily., Disp: 6 mL, Rfl: 1    Semaglutide (RYBELSUS) 7 MG Oral Tab, Take 7 mg by mouth daily with breakfast., Disp: 90 tablet, Rfl: 0    diclofenac 50 MG Oral Tab EC, Take 1 tablet (50 mg total) by mouth 3 (three) times daily as needed., Disp: 45 tablet, Rfl: 0    Glucose Blood (ONETOUCH ULTRA) In Vitro Strip, Check blood sugar up to 4x daily, Disp: 400 strip, Rfl: 0    insulin lispro 100 UNIT/ML Injection Solution, Use up to 120 units daily via insulin pump, Disp: 40 mL, Rfl: 2    Insulin Lispro, 1 Unit Dial, 100 UNIT/ML Subcutaneous Solution Pen-injector, INJECT UNDER THE SKIN BASED ON CARB RATIO AND CORRECTION FACTOR*MAX DAILY DOSE IS 50 UNITS* to use in case of pump failure, Disp: 15 mL, Rfl: 0    Insulin Pen Needle 32G X 4 MM Does not apply Misc, Inject insulin 5 times a day, Disp: 500 each, Rfl: 0    albuterol 108 (90 Base) MCG/ACT Inhalation Aero Soln, Inhale 2 puffs into the lungs every 4 (four) hours as needed for Wheezing or Shortness of Breath., Disp: 1 each, Rfl: 0    Continuous Blood Gluc Sensor (DEXCOM G6 SENSOR) Does not apply Misc, USE AS DIRECTED EVERY 10 DAYS., Disp: 9 each, Rfl: 0    Continuous Blood Gluc Sensor (DEXCOM G6 SENSOR) Does not apply Misc, 1 each Every 10 days., Disp: 9 each, Rfl: 3    ONETOUCH VERIO REFLECT w/Device Does not apply Kit, USE AS  DIRECTED, Disp: 1 kit, Rfl: 0    Glucose Blood (ONETOUCH VERIO) In Vitro Strip, TEST FOUR TIMES DAILY, Disp: 400 strip, Rfl: 0    Insulin Disposable Pump (OMNIPOD 5 G6 POD, GEN 5,) Does not apply Misc, 1 Device every other day., Disp: 45 each, Rfl: 0    atorvastatin 10 MG Oral Tab, Take 1 tablet (10 mg total) by mouth nightly., Disp: 90 tablet, Rfl: 3    Insulin Pen Needle (TRUEPLUS PEN NEEDLES) 32G X 4 MM Does not apply Misc, Take 1 each by mouth in the morning and 1 each before bedtime., Disp: 100 each, Rfl: 2    Continuous Blood Gluc Transmit (DEXCOM G6 TRANSMITTER) Does not apply Misc, Use 1 transmitter every 90 days, Disp: 1 each, Rfl: 3    Insulin Disposable Pump (OMNIPOD 5 G6 INTRO, GEN 5,) Does not apply Kit, 1 each daily., Disp: 1 kit, Rfl: 0    Continuous Blood Gluc  (DEXCOM G6 ) Does not apply Device, 1 each daily., Disp: 1 each, Rfl: 0    Insulin Lispro, 1 Unit Dial, 100 UNIT/ML Subcutaneous Solution Pen-injector, INJECT UNDER THE SKIN BASED ON CARB RATIO AND CORRECTION FACTOR*MAX DAILY DOSE IS 50 UNITS*, Disp: 15 mL, Rfl: 0    OneTouch Delica Lancets 33G Does not apply Misc, Check blood sugar up to 4x daily, Disp: 400 each, Rfl: 0    topiramate 50 MG Oral Tab, Take 1 tablet (50 mg total) by mouth 2 (two) times daily., Disp: 180 tablet, Rfl: 1    Insulin Disposable Pump (OMNIPOD 5 G6 INTRO, GEN 5,) Does not apply Kit, 1 each As Directed., Disp: 1 kit, Rfl: 0    Continuous Blood Gluc  (DEXCOM G6 ) Does not apply Device, Use as directed with sensors and transmitter, Disp: 1 Device, Rfl: 0    ONETOUCH DELICA PLUS [id number] Does not apply Misc, USE AS DIRECTED, Disp: 400 each, Rfl: 2  Medical:  has a past medical history of History of blood transfusion, History of  delivery (2021), S/P repeat low transverse , and Type 1 diabetes mellitus (HCC).  Surgical:  has a past surgical history that includes other surgical history (Right, ) and  .  Family: family history includes Cancer in her mother; Multiple sclerosis in her mother; Other in her father.  Social:  reports that she has been smoking cigarettes. She started smoking about 13 years ago. She has a 6.9 pack-year smoking history. She has never used smokeless tobacco. She reports that she does not currently use alcohol. She reports that she does not use drugs.  Wt Readings from Last 6 Encounters:   10/10/24 220 lb 3.2 oz (99.9 kg)   24 223 lb (101.2 kg)   24 223 lb 3.2 oz (101.2 kg)   24 225 lb 12.8 oz (102.4 kg)   24 224 lb (101.6 kg)   23 226 lb 6.4 oz (102.7 kg)     EXAM:    5' 2\" (1.575 m)   Wt 220 lb 3.2 oz (99.9 kg)   LMP 2024 (Exact Date)   BMI 40.28 kg/m²   GENERAL: Alert and oriented, well developed, well nourished,in no apparent distress  HEENT: atraumatic, PERRLA, EOMI, normal lid and conjunctiva; black/infected tooth upper molars  LUNGS: clear to auscultation bilaterally, no wheezing/rubs  CARDIO: RRR without murmurs.  No clubbing, cyanosis or edema.  GI: soft non tender nondistended no hepatosplenomegaly, bowel sounds throughout  NEURO: CN II-XII intact, 5/5 strength all extremities  PSYCH: pleasant, appropriate mood and affect  ASSESSMENT AND PLAN:   1. Tooth infection  Patient with fatigue, headaches/pressure for two weeks.  On examination looks to have infected tooth.  She started some left over amoxicillin yesterday, feels better.  Will send in new prescription for Augmentin.  Advised to follow up with dentist.  Advised to contact us if symptoms persist - may check CT head at that point.  - amoxicillin clavulanate 875-125 MG Oral Tab; Take 1 tablet by mouth 2 (two) times daily for 10 days.  Dispense: 20 tablet; Refill: 0    2. Generalized anxiety disorder  3. Primary insomnia  On bupropion, alprazolam; will continue.  - ALPRAZolam 1 MG Oral Tab; Take 1 tablet (1 mg total) by mouth 2 (two) times daily as needed for Anxiety or Sleep.   Dispense: 60 tablet; Refill: 3    4. Morbid obesity (HCC)  Initially patient lost some weight on Rybelsus, appetite was suppressed, but this effect has gone away.  Will resume phentermine for now.  - Phentermine HCl 37.5 MG Oral Tab; Take 1 tablet (37.5 mg total) by mouth before breakfast.  Dispense: 30 tablet; Refill: 2    Patient Care Team:  Patti Barger MD as PCP - General (Internal Medicine)  Sis Corcoran MD (Anesthesiology)  Atul Tilley MD as Obstetrician (OBSTETRICS & GYNECOLOGY)  Barbi Bello MD as Consulting Physician (ENDOCRINOLOGY)  The patient indicates understanding of these issues and agrees to the plan.  The patient is asked to return to clinic in 6 months for follow up on chronic issues, or earlier if acute issues arise.    Patti Barger MD           [1] No Known Allergies

## 2024-10-10 NOTE — PATIENT INSTRUCTIONS
- Continue amoxicillin that you have, once that is finished start prescription I sent in for Augmentin.  - Will resume phentermine  - Alprazolam refilled, the next refill is due October 21st  - Let us know if headaches come back; we may order a CT scan at that point.  - Otherwise follow up in 6 months for chronic issues; follow up earlier as needed.    It was a pleasure seeing you in the clinic today.  Thank you for choosing the [facility] [place] office for your healthcare needs. Please call at [phone] with any questions or concerns.    Patti Barger MD

## 2024-10-25 NOTE — TELEPHONE ENCOUNTER
Endocrine Refill protocol for CGM supplies     Protocol Criteria:  PASSED Reason: N/A    If below requirement is met, send a 90-day supply with 1 refill per provider protocol.     Verify appointment with Endocrinology completed in the last 12 months or scheduled in the next 6 months     Last completed office visit:8/5/2024 Barbi Bello MD   Next scheduled Follow up: No future appointments.

## 2024-11-03 RX ORDER — INSULIN PMP CART,AUT,G6/7,CNTR
1 EACH SUBCUTANEOUS DAILY
Qty: 30 EACH | Refills: 2 | Status: SHIPPED | OUTPATIENT
Start: 2024-11-03

## 2024-11-04 DIAGNOSIS — E10.65 UNCONTROLLED TYPE 1 DIABETES MELLITUS WITH HYPERGLYCEMIA (HCC): ICD-10-CM

## 2024-11-04 RX ORDER — PROCHLORPERAZINE 25 MG/1
SUPPOSITORY RECTAL
Qty: 9 EACH | Refills: 1 | Status: SHIPPED | OUTPATIENT
Start: 2024-11-04

## 2024-11-04 NOTE — TELEPHONE ENCOUNTER
Endocrine Refill protocol for CGM supplies     Protocol Criteria:  PASSED     If below requirement is met, send a 90-day supply with 1 refill per provider protocol.     Verify appointment with Endocrinology completed in the last 12 months or scheduled in the next 6 months     Last completed office visit:8/5/2024 Barbi Bello MD     Next scheduled Follow up: No future appointments. - Called and spoke to patient, appointment scheduled for first available with MD, 1/6/25

## 2024-11-14 ENCOUNTER — PATIENT MESSAGE (OUTPATIENT)
Dept: INTERNAL MEDICINE CLINIC | Facility: CLINIC | Age: 34
End: 2024-11-14

## 2024-11-16 DIAGNOSIS — E11.65 TYPE 2 DIABETES MELLITUS WITH HYPERGLYCEMIA, WITH LONG-TERM CURRENT USE OF INSULIN (HCC): ICD-10-CM

## 2024-11-16 DIAGNOSIS — Z79.4 TYPE 2 DIABETES MELLITUS WITH HYPERGLYCEMIA, WITH LONG-TERM CURRENT USE OF INSULIN (HCC): ICD-10-CM

## 2024-11-18 NOTE — TELEPHONE ENCOUNTER
Endocrine Refill protocol for oral and injectable diabetic medications    Protocol Criteria:  FAILED  Reason: Elevated A1C    If all below requirements are met, send a 90-day supply with 1 refill per provider protocol.    Verify appointment with Endocrinology completed in the last 6 months or scheduled in the next 3 months.  Verify A1C has been completed within the last 6 months and is below 8.5%     Last completed office visit: 8/5/2024 Barbi Bello MD   Next scheduled Follow up:   Future Appointments   Date Time Provider Department Center   1/6/2025 11:45 AM Barbi Bello MD [facility] [place]      Last A1c result: Last A1c value was 10.5% done 8/5/2024.

## 2024-11-21 LAB — AMB EXT GMI: 8.7 %

## 2024-11-22 DIAGNOSIS — E78.00 HYPERCHOLESTEROLEMIA: Primary | ICD-10-CM

## 2024-11-22 RX ORDER — ATORVASTATIN CALCIUM 10 MG/1
10 TABLET, FILM COATED ORAL NIGHTLY
Qty: 90 TABLET | Refills: 1 | Status: SHIPPED | OUTPATIENT
Start: 2024-11-22

## 2024-11-22 NOTE — TELEPHONE ENCOUNTER
Endocrine refill protocol for lipid lowering medications  Atorvastatin 10 mg, RX sent   Protocol Criteria:  PASSED     If all below requirements are met, send a 90-day supply with 1 refill per provider protocol.    Verify appointment with Endocrinology completed in the last 6 months or scheduled in the next 3 months.  Lipid panel must have been completed in the last 12 months   ALT result below 80  LDL result below 130    Last completed office visit:8/5/2024 Barbi Bello MD   Next scheduled Follow up: 1/6/25  Last Lipid panel date:  Component      Latest Ref Rng 8/5/2024   Cholesterol, Total      <200 mg/dL 170    HDL Cholesterol      40 - 59 mg/dL 46    Triglycerides      30 - 149 mg/dL 99    LDL Cholesterol Calc      <100 mg/dL 106 (H)    VLDL      0 - 30 mg/dL 17    NON-HDL CHOLESTEROL      <130 mg/dL 124    Patient Fasting for Lipid? Yes      Last ALT result: Last ALT was 9 done on 8/5/2024.  Last AST was 14 done on 8/5/2024.

## 2024-11-27 DIAGNOSIS — Z96.41 TYPE 1 DIABETES MELLITUS WITH COMPLICATION, WITH LONG TERM CURRENT USE OF INSULIN PUMP (HCC): Chronic | ICD-10-CM

## 2024-11-27 DIAGNOSIS — E10.8 TYPE 1 DIABETES MELLITUS WITH COMPLICATION, WITH LONG TERM CURRENT USE OF INSULIN PUMP (HCC): Chronic | ICD-10-CM

## 2024-11-27 RX ORDER — INSULIN DETEMIR 100 [IU]/ML
25 INJECTION, SOLUTION SUBCUTANEOUS EVERY 12 HOURS
Qty: 45 ML | Refills: 0 | Status: SHIPPED | OUTPATIENT
Start: 2024-11-27

## 2024-11-27 NOTE — TELEPHONE ENCOUNTER
Endocrine refill protocol for basal insulins     Protocol Criteria: FAILED Reason: Elevated A1C    If all below requirements are met, send a 90-day supply with 1 refill per provider protocol.       Verify Appointment with Endocrinology completed in the last 6 months or scheduled in the next 3 months.  Verify A1C has been completed within the last 6 months and is below 8.5%     Last completed office visit:8/5/2024 Barbi Bello MD   Next scheduled Follow up:   Future Appointments   Date Time Provider Department Center   12/12/2024 12:30 PM Patti Barger MD [facility] [place]   1/6/2025 11:45 AM Barbi Bello MD [facility]      Last A1c result: Last A1c value was 10.5% done 8/5/2024.     Patient comment: I need 30 day supplys to have, my insulin pump is disabled and i need long acting insulin, as soon as possible and more than 1 pen please.        Per Dr. Bello: ok to send

## 2024-12-02 ENCOUNTER — MOBILE ENCOUNTER (OUTPATIENT)
Dept: ENDOCRINOLOGY CLINIC | Facility: CLINIC | Age: 34
End: 2024-12-02

## 2024-12-02 RX ORDER — INSULIN GLARGINE 100 [IU]/ML
25 INJECTION, SOLUTION SUBCUTANEOUS 2 TIMES DAILY
Qty: 50 ML | Refills: 0 | Status: SHIPPED | OUTPATIENT
Start: 2024-12-02

## 2024-12-02 RX ORDER — ORAL SEMAGLUTIDE 7 MG/1
TABLET ORAL
Qty: 90 TABLET | Refills: 1 | Status: SHIPPED | OUTPATIENT
Start: 2024-12-02

## 2024-12-12 ENCOUNTER — OFFICE VISIT (OUTPATIENT)
Dept: INTERNAL MEDICINE CLINIC | Facility: CLINIC | Age: 34
End: 2024-12-12
Payer: MEDICAID

## 2024-12-12 ENCOUNTER — LAB ENCOUNTER (OUTPATIENT)
Dept: LAB | Age: 34
End: 2024-12-12
Attending: INTERNAL MEDICINE
Payer: MEDICAID

## 2024-12-12 VITALS
RESPIRATION RATE: 16 BRPM | HEIGHT: 62 IN | TEMPERATURE: 97 F | DIASTOLIC BLOOD PRESSURE: 76 MMHG | SYSTOLIC BLOOD PRESSURE: 118 MMHG | WEIGHT: 223.63 LBS | OXYGEN SATURATION: 100 % | BODY MASS INDEX: 41.15 KG/M2 | HEART RATE: 83 BPM

## 2024-12-12 DIAGNOSIS — Z11.1 SCREENING FOR TUBERCULOSIS: ICD-10-CM

## 2024-12-12 DIAGNOSIS — R42 LIGHTHEADED: ICD-10-CM

## 2024-12-12 DIAGNOSIS — E10.65 UNCONTROLLED TYPE 1 DIABETES MELLITUS WITH HYPERGLYCEMIA (HCC): Chronic | ICD-10-CM

## 2024-12-12 DIAGNOSIS — E10.42 DM TYPE 1 WITH DIABETIC PERIPHERAL NEUROPATHY (HCC): Chronic | ICD-10-CM

## 2024-12-12 DIAGNOSIS — F41.1 GENERALIZED ANXIETY DISORDER: Chronic | ICD-10-CM

## 2024-12-12 DIAGNOSIS — F33.1 MDD (MAJOR DEPRESSIVE DISORDER), RECURRENT EPISODE, MODERATE (HCC): Chronic | ICD-10-CM

## 2024-12-12 DIAGNOSIS — Z01.84 IMMUNITY STATUS TESTING: ICD-10-CM

## 2024-12-12 DIAGNOSIS — E66.01 MORBID OBESITY (HCC): ICD-10-CM

## 2024-12-12 DIAGNOSIS — J30.9 CHRONIC ALLERGIC RHINITIS: ICD-10-CM

## 2024-12-12 DIAGNOSIS — J01.90 ACUTE RHINOSINUSITIS: Primary | ICD-10-CM

## 2024-12-12 LAB
ALBUMIN SERPL-MCNC: 3.9 G/DL (ref 3.2–4.8)
ALBUMIN/GLOB SERPL: 1.2 {RATIO} (ref 1–2)
ALP LIVER SERPL-CCNC: 120 U/L
ALT SERPL-CCNC: 13 U/L
ANION GAP SERPL CALC-SCNC: 9 MMOL/L (ref 0–18)
AST SERPL-CCNC: 14 U/L (ref ?–34)
BASOPHILS # BLD AUTO: 0.05 X10(3) UL (ref 0–0.2)
BASOPHILS NFR BLD AUTO: 0.4 %
BILIRUB SERPL-MCNC: 0.6 MG/DL (ref 0.3–1.2)
BUN BLD-MCNC: 13 MG/DL (ref 9–23)
CALCIUM BLD-MCNC: 9.2 MG/DL (ref 8.7–10.4)
CHLORIDE SERPL-SCNC: 101 MMOL/L (ref 98–112)
CO2 SERPL-SCNC: 24 MMOL/L (ref 21–32)
CREAT BLD-MCNC: 0.89 MG/DL
EGFRCR SERPLBLD CKD-EPI 2021: 89 ML/MIN/1.73M2 (ref 60–?)
EOSINOPHIL # BLD AUTO: 0.43 X10(3) UL (ref 0–0.7)
EOSINOPHIL NFR BLD AUTO: 3.3 %
ERYTHROCYTE [DISTWIDTH] IN BLOOD BY AUTOMATED COUNT: 13.7 %
EST. AVERAGE GLUCOSE BLD GHB EST-MCNC: 266 MG/DL (ref 68–126)
FASTING STATUS PATIENT QL REPORTED: NO
GLOBULIN PLAS-MCNC: 3.2 G/DL (ref 2–3.5)
GLUCOSE BLD-MCNC: 417 MG/DL (ref 70–99)
HBA1C MFR BLD: 10.9 % (ref ?–5.7)
HBV SURFACE AB SER QL: REACTIVE
HBV SURFACE AB SERPL IA-ACNC: 257.34 MIU/ML
HCT VFR BLD AUTO: 41.7 %
HGB BLD-MCNC: 14 G/DL
IMM GRANULOCYTES # BLD AUTO: 0.05 X10(3) UL (ref 0–1)
IMM GRANULOCYTES NFR BLD: 0.4 %
LYMPHOCYTES # BLD AUTO: 2.66 X10(3) UL (ref 1–4)
LYMPHOCYTES NFR BLD AUTO: 20.4 %
MCH RBC QN AUTO: 30.2 PG (ref 26–34)
MCHC RBC AUTO-ENTMCNC: 33.6 G/DL (ref 31–37)
MCV RBC AUTO: 89.9 FL
MONOCYTES # BLD AUTO: 1.05 X10(3) UL (ref 0.1–1)
MONOCYTES NFR BLD AUTO: 8 %
NEUTROPHILS # BLD AUTO: 8.82 X10 (3) UL (ref 1.5–7.7)
NEUTROPHILS # BLD AUTO: 8.82 X10(3) UL (ref 1.5–7.7)
NEUTROPHILS NFR BLD AUTO: 67.5 %
OSMOLALITY SERPL CALC.SUM OF ELEC: 296 MOSM/KG (ref 275–295)
PLATELET # BLD AUTO: 203 10(3)UL (ref 150–450)
POTASSIUM SERPL-SCNC: 4.3 MMOL/L (ref 3.5–5.1)
PROT SERPL-MCNC: 7.1 G/DL (ref 5.7–8.2)
RBC # BLD AUTO: 4.64 X10(6)UL
RUBV IGG SER QL: POSITIVE
RUBV IGG SER-ACNC: 17 IU/ML (ref 10–?)
SODIUM SERPL-SCNC: 134 MMOL/L (ref 136–145)
WBC # BLD AUTO: 13.1 X10(3) UL (ref 4–11)

## 2024-12-12 PROCEDURE — 36415 COLL VENOUS BLD VENIPUNCTURE: CPT

## 2024-12-12 PROCEDURE — 86765 RUBEOLA ANTIBODY: CPT

## 2024-12-12 PROCEDURE — 80053 COMPREHEN METABOLIC PANEL: CPT

## 2024-12-12 PROCEDURE — 86787 VARICELLA-ZOSTER ANTIBODY: CPT

## 2024-12-12 PROCEDURE — 86735 MUMPS ANTIBODY: CPT

## 2024-12-12 PROCEDURE — 83036 HEMOGLOBIN GLYCOSYLATED A1C: CPT

## 2024-12-12 PROCEDURE — 86480 TB TEST CELL IMMUN MEASURE: CPT

## 2024-12-12 PROCEDURE — 85025 COMPLETE CBC W/AUTO DIFF WBC: CPT

## 2024-12-12 PROCEDURE — 86762 RUBELLA ANTIBODY: CPT

## 2024-12-12 PROCEDURE — 86706 HEP B SURFACE ANTIBODY: CPT

## 2024-12-12 PROCEDURE — 99214 OFFICE O/P EST MOD 30 MIN: CPT | Performed by: INTERNAL MEDICINE

## 2024-12-12 RX ORDER — BUPROPION HYDROCHLORIDE 150 MG/1
150 TABLET ORAL DAILY
Qty: 90 TABLET | Refills: 1 | Status: SHIPPED | OUTPATIENT
Start: 2024-12-12

## 2024-12-12 NOTE — PROGRESS NOTES
Vira Johnson is a 33 year old female.   HPI:   Patient presents to discuss several issues.    Acute issues:  She has been dealing with head pressure/congestion.  Pain along cervical lymph node areas.  Some head congestions.  Had chills 2-3 nights ago - fine now.  No fevers/sweats.  No shortness of breath.  Did feel very dizzy/lightheaded earlier in the week.    Other chronic issues:  DM I - following with Endocrinology.  Most recently on Rybelsus along with her insulin.  Diabetes is complicated by neuropathy.  Anxiety and depression - stopped her Wellbutrin - mother had side effects with this medication so patient was hesitant to resume it.  Continues alprazolam BID.   Allergic rhinitis - on montelukast therapy.  Obesity - continues with phentermine therapy. Body mass index is 40.9 kg/m².    Past medical, family, surgical and social history were reviewed as listed in the chart, and are unchanged from previous visit on 10/10/2024  REVIEW OF SYSTEMS:   GENERAL/ const: chills; no fevers/night sweats, no unintentional weight loss  EYES:no vision problems  HEENT: sinus pressure, nasal congestion, ear pain/pressure  LUNGS: denies shortness of breath   CARDIOVASCULAR: denies chest pain  GI: denies nausea/emesis/ abdominal pain diarrhea constipation  PSYCHIATRIC: chronic anxiety and depression  ENDOCRINE: no hot/cold intolerance  ALLERGY: Allergies[1]  PAST HISTORY:     Current Outpatient Medications:     buPROPion  MG Oral Tablet 24 Hr, Take 1 tablet (150 mg total) by mouth daily., Disp: 90 tablet, Rfl: 1    amoxicillin clavulanate 875-125 MG Oral Tab, Take 1 tablet by mouth 2 (two) times daily for 7 days., Disp: 14 tablet, Rfl: 0    RYBELSUS 7 MG Oral Tab, TAKE ONE TABLET BY MOUTH DAILY ON AN EMPTY STOMACH WITH NO MORE THAN 4 OZ. OF PLAIN WATER. WAIT AT LEAST 30 MIN. BEFORE FOOD, BEVERAGE, OR OTHER MEDICATIONS., Disp: 90 tablet, Rfl: 1    insulin glargine (LANTUS SOLOSTAR) 100 UNIT/ML Subcutaneous Solution  Pen-injector, Inject 25 Units into the skin 2 (two) times daily., Disp: 50 mL, Rfl: 0    Insulin Pen Needle 32G X 4 MM Does not apply Misc, Inject insulin 5 times a day when insulin pump is not working, Disp: 500 each, Rfl: 0    insulin detemir (LEVEMIR FLEXPEN) 100 UNIT/ML Subcutaneous Solution Pen-injector, Inject 25 Units into the skin every 12 (twelve) hours., Disp: 45 mL, Rfl: 0    atorvastatin 10 MG Oral Tab, TAKE ONE TABLET BY MOUTH ONCE NIGHTLY, Disp: 90 tablet, Rfl: 1    Continuous Glucose Sensor (DEXCOM G6 SENSOR) Does not apply Misc, USE EVERY 10 DAYS AS DIRECTED, Disp: 9 each, Rfl: 1    Insulin Disposable Pump (OMNIPOD 5 [id number] PODS GEN 5) Does not apply Misc, 1 each daily. 1 UNDER THE SKIN DAILY, Disp: 30 each, Rfl: 2    ALPRAZolam 1 MG Oral Tab, Take 1 tablet (1 mg total) by mouth 2 (two) times daily as needed for Anxiety or Sleep., Disp: 60 tablet, Rfl: 3    Phentermine HCl 37.5 MG Oral Tab, Take 1 tablet (37.5 mg total) by mouth before breakfast., Disp: 30 tablet, Rfl: 2    Glucose Blood (ONETOUCH VERIO) In Vitro Strip, Use to test blood sugar up to 4 times daily., Disp: 400 strip, Rfl: 0    montelukast 10 MG Oral Tab, Take 1 tablet (10 mg total) by mouth nightly., Disp: 90 tablet, Rfl: 1    diclofenac 50 MG Oral Tab EC, Take 1 tablet (50 mg total) by mouth 3 (three) times daily as needed., Disp: 45 tablet, Rfl: 0    Glucose Blood (ONETOUCH ULTRA) In Vitro Strip, Check blood sugar up to 4x daily, Disp: 400 strip, Rfl: 0    insulin lispro 100 UNIT/ML Injection Solution, Use up to 120 units daily via insulin pump, Disp: 40 mL, Rfl: 2    Insulin Lispro, 1 Unit Dial, 100 UNIT/ML Subcutaneous Solution Pen-injector, INJECT UNDER THE SKIN BASED ON CARB RATIO AND CORRECTION FACTOR*MAX DAILY DOSE IS 50 UNITS* to use in case of pump failure, Disp: 15 mL, Rfl: 0    Insulin Pen Needle 32G X 4 MM Does not apply Misc, Inject insulin 5 times a day, Disp: 500 each, Rfl: 0    Continuous Blood Gluc Sensor (DEXCOM G6  SENSOR) Does not apply Misc, 1 each Every 10 days., Disp: 9 each, Rfl: 3    ONETOUCH VERIO REFLECT w/Device Does not apply Kit, USE AS DIRECTED, Disp: 1 kit, Rfl: 0    Insulin Disposable Pump (OMNIPOD 5 G6 POD, GEN 5,) Does not apply Misc, 1 Device every other day., Disp: 45 each, Rfl: 0    Insulin Pen Needle (TRUEPLUS PEN NEEDLES) 32G X 4 MM Does not apply Misc, Take 1 each by mouth in the morning and 1 each before bedtime., Disp: 100 each, Rfl: 2    Continuous Blood Gluc Transmit (DEXCOM G6 TRANSMITTER) Does not apply Misc, Use 1 transmitter every 90 days, Disp: 1 each, Rfl: 3    Insulin Disposable Pump (OMNIPOD 5 G6 INTRO, GEN 5,) Does not apply Kit, 1 each daily., Disp: 1 kit, Rfl: 0    Continuous Blood Gluc  (DEXCOM G6 ) Does not apply Device, 1 each daily., Disp: 1 each, Rfl: 0    Insulin Lispro, 1 Unit Dial, 100 UNIT/ML Subcutaneous Solution Pen-injector, INJECT UNDER THE SKIN BASED ON CARB RATIO AND CORRECTION FACTOR*MAX DAILY DOSE IS 50 UNITS*, Disp: 15 mL, Rfl: 0    OneTouch Delica Lancets 33G Does not apply Misc, Check blood sugar up to 4x daily, Disp: 400 each, Rfl: 0    topiramate 50 MG Oral Tab, Take 1 tablet (50 mg total) by mouth 2 (two) times daily., Disp: 180 tablet, Rfl: 1    Insulin Disposable Pump (OMNIPOD 5 G6 INTRO, GEN 5,) Does not apply Kit, 1 each As Directed., Disp: 1 kit, Rfl: 0    Continuous Blood Gluc  (DEXCOM G6 ) Does not apply Device, Use as directed with sensors and transmitter, Disp: 1 Device, Rfl: 0    ONETOUCH DELICA PLUS [id number] Does not apply Misc, USE AS DIRECTED, Disp: 400 each, Rfl: 2  Medical:  has a past medical history of History of blood transfusion, History of  delivery (2021), S/P repeat low transverse , and Type 1 diabetes mellitus (HCC).  Surgical:  has a past surgical history that includes other surgical history (Right, ) and .  Family: family history includes Cancer in her mother; Multiple  sclerosis in her mother; Other in her father.  Social:  reports that she has been smoking cigarettes. She started smoking about 13 years ago. She has a 7 pack-year smoking history. She has never used smokeless tobacco. She reports that she does not currently use alcohol. She reports that she does not use drugs.  Wt Readings from Last 6 Encounters:   12/12/24 223 lb 9.6 oz (101.4 kg)   10/10/24 220 lb 3.2 oz (99.9 kg)   08/05/24 223 lb (101.2 kg)   08/05/24 223 lb 3.2 oz (101.2 kg)   07/30/24 225 lb 12.8 oz (102.4 kg)   01/16/24 224 lb (101.6 kg)     EXAM:   /76 (BP Location: Left arm, Patient Position: Sitting, Cuff Size: large)   Pulse 83   Temp 97.2 °F (36.2 °C) (Temporal)   Resp 16   Ht 5' 2\" (1.575 m)   Wt 223 lb 9.6 oz (101.4 kg)   LMP 11/20/2024 (Exact Date)   SpO2 100%   Breastfeeding No   BMI 40.90 kg/m²   GENERAL: Alert and oriented, well developed, well nourished,in no apparent distress  HEENT: atraumatic, PERRLA, EOMI, normal lid and conjunctiva; fullness/erythema of both tympanic membranes, ethmoidal sinus tenderness, tender cervical lymphadenopathy  NECK: supple, no jvd, no thyromegaly  LUNGS: clear to auscultation bilaterally, no wheezing/rubs  CARDIO: RRR without murmurs.  No clubbing, cyanosis or edema.  GI: soft non tender nondistended no hepatosplenomegaly, bowel sounds throughout  Bilateral barefoot skin diabetic exam is normal, visualized feet and the appearance is normal.  Bilateral monofilament/sensation of both feet is normal.  Pulsation pedal pulse exam of both lower legs/feet is normal as well.  PSYCH: pleasant, appropriate mood and affect  ASSESSMENT AND PLAN:   1. Acute rhinosinusitis  2. Lightheaded  Patient with acute URI type symptoms, sinus pressure, ear pain/pressure, congestion.  Some sinus tenderness on examination, some erythema of tympanic membranes, possible otitis as well.  She had episode of dizziness/lightheadedness earlier this week.  Will check general labs.   Recommend strong hydration.  Will start on Augmentin.    - amoxicillin clavulanate 875-125 MG Oral Tab; Take 1 tablet by mouth 2 (two) times daily for 7 days.  Dispense: 14 tablet; Refill: 0  - Comp Metabolic Panel (14); Future  - CBC With Differential With Platelet; Future    3. Uncontrolled type 1 diabetes mellitus with hyperglycemia (HCC)  4. DM type 1 with diabetic peripheral neuropathy (HCC)  Following with [facility].  On insulin, also on Rybelsus, per patient they are looking into Ozempic.  - Comp Metabolic Panel (14); Future  - Hemoglobin A1C; Future    5. Generalized anxiety disorder  6. MDD (major depressive disorder), recurrent episode, moderate (HCC)  Chronic issues.  No homicidal/suicidal ideations.  She stopped her bupropion as she was worried as her mother had side effects on this medication.  Will resume bupropion 150 mg every day.  Her mother is on sertraline but patient wants to avoid that in light of drowsiness/weight gain side effects.  On alprazolam 1 mg BID.  - buPROPion  MG Oral Tablet 24 Hr; Take 1 tablet (150 mg total) by mouth daily.  Dispense: 90 tablet; Refill: 1    7. Chronic allergic rhinitis  Will continue with montelukast 10 mg qhs.    8. Morbid obesity (HCC)  Body mass index is 40.9 kg/m².  Will continue with phentermine for now.  Refills in system through January.    9. Screening for tuberculosis  Needs TB screening for work.  Quant Gold ordered.  Aside from sinus tenderness/URI findings her physical examination is reasonable.  - Quantiferon TB Plus; Future    Patient Care Team:  Patti Barger MD as PCP - General (Internal Medicine)  Atul Tilley MD as Obstetrician (OBSTETRICS & GYNECOLOGY)  Barbi Bello MD as Consulting Physician (ENDOCRINOLOGY)  The patient indicates understanding of these issues and agrees to the plan.  The patient is asked to return to clinic in 6 months for follow up on chronic issues, or earlier if acute issues arise.    Patti  MD [place]           [1] No Known Allergies

## 2024-12-12 NOTE — PATIENT INSTRUCTIONS
- Get blood tests done today  - Will fill out work form once lab results are back  - Will start antibiotics (Augmentin) for sinus issues. Take 1 capsule twice daily for 1 week.  - There should be refills on file for Xanax and Phentermine through January  - Bupropion (Wellbutrin) refill sent in for anxiety and depression.  Take 1 tablet daily.  - Continue montelukast tablet nightly for allergies.  Refills on file through February.    It was a pleasure seeing you in the clinic today.  Thank you for choosing the [facility] [place] office for your healthcare needs. Please call at [phone] with any questions or concerns.    Patti Barger MD

## 2024-12-13 ENCOUNTER — PATIENT MESSAGE (OUTPATIENT)
Dept: INTERNAL MEDICINE CLINIC | Facility: CLINIC | Age: 34
End: 2024-12-13

## 2024-12-13 LAB
MEV IGG SER-ACNC: 160 AU/ML (ref 16.5–?)
MUV IGG SER IA-ACNC: 8.6 AU/ML (ref 11–?)
VZV IGG SER IA-ACNC: 13.3 (ref 1–?)

## 2024-12-14 LAB
M TB IFN-G CD4+ T-CELLS BLD-ACNC: 0.01 IU/ML
M TB TUBERC IFN-G BLD QL: NEGATIVE
M TB TUBERC IGNF/MITOGEN IGNF CONTROL: >10 IU/ML
QFT TB1 AG MINUS NIL: 0.03 IU/ML
QFT TB2 AG MINUS NIL: 0.01 IU/ML

## 2024-12-14 RX ORDER — PROCHLORPERAZINE 25 MG/1
1 SUPPOSITORY RECTAL DAILY
Qty: 1 EACH | Refills: 0 | Status: SHIPPED | OUTPATIENT
Start: 2024-12-14

## 2024-12-14 RX ORDER — PROCHLORPERAZINE 25 MG/1
SUPPOSITORY RECTAL
Qty: 1 EACH | Refills: 3 | Status: SHIPPED | OUTPATIENT
Start: 2024-12-14

## 2024-12-16 NOTE — TELEPHONE ENCOUNTER
Faxed Employee Health Information and Release Form to [facility] (Fax #[phone]).

## 2024-12-16 NOTE — TELEPHONE ENCOUNTER
Work form completed, in my out basket.  Patient needs MMR booster, can schedule nurse visit for this

## 2024-12-17 ENCOUNTER — MED REC SCAN ONLY (OUTPATIENT)
Dept: INTERNAL MEDICINE CLINIC | Facility: CLINIC | Age: 34
End: 2024-12-17

## 2025-01-05 ENCOUNTER — E-VISIT (OUTPATIENT)
Dept: TELEHEALTH | Age: 35
End: 2025-01-05
Payer: MEDICAID

## 2025-01-05 DIAGNOSIS — N89.8 VAGINAL DISCHARGE: Primary | ICD-10-CM

## 2025-01-06 NOTE — PROGRESS NOTES
HPI:  Vira Johnson is a 34 year old female who presents for an evisit.  See [employer] above.    Current Outpatient Medications   Medication Sig Dispense Refill    Continuous Glucose  (DEXCOM G6 ) Does not apply Device 1 each daily. 1 each 0    Continuous Glucose Transmitter (DEXCOM G6 TRANSMITTER) Does not apply Misc Use 1 transmitter every 90 days 1 each 3    buPROPion  MG Oral Tablet 24 Hr Take 1 tablet (150 mg total) by mouth daily. 90 tablet 1    RYBELSUS 7 MG Oral Tab TAKE ONE TABLET BY MOUTH DAILY ON AN EMPTY STOMACH WITH NO MORE THAN 4 OZ. OF PLAIN WATER. WAIT AT LEAST 30 MIN. BEFORE FOOD, BEVERAGE, OR OTHER MEDICATIONS. 90 tablet 1    insulin glargine (LANTUS SOLOSTAR) 100 UNIT/ML Subcutaneous Solution Pen-injector Inject 25 Units into the skin 2 (two) times daily. 50 mL 0    Insulin Pen Needle 32G X 4 MM Does not apply Misc Inject insulin 5 times a day when insulin pump is not working 500 each 0    insulin detemir (LEVEMIR FLEXPEN) 100 UNIT/ML Subcutaneous Solution Pen-injector Inject 25 Units into the skin every 12 (twelve) hours. 45 mL 0    atorvastatin 10 MG Oral Tab TAKE ONE TABLET BY MOUTH ONCE NIGHTLY 90 tablet 1    Continuous Glucose Sensor (DEXCOM G6 SENSOR) Does not apply Misc USE EVERY 10 DAYS AS DIRECTED 9 each 1    Insulin Disposable Pump (OMNIPOD 5 [id number] PODS GEN 5) Does not apply Misc 1 each daily. 1 UNDER THE SKIN DAILY 30 each 2    ALPRAZolam 1 MG Oral Tab Take 1 tablet (1 mg total) by mouth 2 (two) times daily as needed for Anxiety or Sleep. 60 tablet 3    Phentermine HCl 37.5 MG Oral Tab Take 1 tablet (37.5 mg total) by mouth before breakfast. 30 tablet 2    Glucose Blood (ONETOUCH VERIO) In Vitro Strip Use to test blood sugar up to 4 times daily. 400 strip 0    montelukast 10 MG Oral Tab Take 1 tablet (10 mg total) by mouth nightly. 90 tablet 1    diclofenac 50 MG Oral Tab EC Take 1 tablet (50 mg total) by mouth 3 (three) times daily as  needed. 45 tablet 0    Glucose Blood (ONETOUCH ULTRA) In Vitro Strip Check blood sugar up to 4x daily 400 strip 0    insulin lispro 100 UNIT/ML Injection Solution Use up to 120 units daily via insulin pump 40 mL 2    Insulin Lispro, 1 Unit Dial, 100 UNIT/ML Subcutaneous Solution Pen-injector INJECT UNDER THE SKIN BASED ON CARB RATIO AND CORRECTION FACTOR*MAX DAILY DOSE IS 50 UNITS* to use in case of pump failure 15 mL 0    Insulin Pen Needle 32G X 4 MM Does not apply Misc Inject insulin 5 times a day 500 each 0    Continuous Blood Gluc Sensor (DEXCOM G6 SENSOR) Does not apply Misc 1 each Every 10 days. 9 each 3    ONETOUCH VERIO REFLECT w/Device Does not apply Kit USE AS DIRECTED 1 kit 0    Insulin Disposable Pump (OMNIPOD 5 G6 POD, GEN 5,) Does not apply Misc 1 Device every other day. 45 each 0    Insulin Pen Needle (TRUEPLUS PEN NEEDLES) 32G X 4 MM Does not apply Misc Take 1 each by mouth in the morning and 1 each before bedtime. 100 each 2    Insulin Disposable Pump (OMNIPOD 5 G6 INTRO, GEN 5,) Does not apply Kit 1 each daily. 1 kit 0    Insulin Lispro, 1 Unit Dial, 100 UNIT/ML Subcutaneous Solution Pen-injector INJECT UNDER THE SKIN BASED ON CARB RATIO AND CORRECTION FACTOR*MAX DAILY DOSE IS 50 UNITS* 15 mL 0    OneTouch Delica Lancets 33G Does not apply Misc Check blood sugar up to 4x daily 400 each 0    topiramate 50 MG Oral Tab Take 1 tablet (50 mg total) by mouth 2 (two) times daily. 180 tablet 1    Insulin Disposable Pump (OMNIPOD 5 G6 INTRO, GEN 5,) Does not apply Kit 1 each As Directed. 1 kit 0    Continuous Blood Gluc  (DEXCOM G6 ) Does not apply Device Use as directed with sensors and transmitter 1 Device 0    ONETOUCH DELICA PLUS [id number] Does not apply Misc USE AS DIRECTED 400 each 2     Past Medical History:    History of blood transfusion        History of  delivery    X 3 [x ] Repeat CS at 36 weeks- 21 [ ] bilateral salpingectomy - IPA consent signed 2021      S/P repeat low transverse     Type 1 diabetes mellitus (HCC)     Past Surgical History:   Procedure Laterality Date          x3    Other surgical history Right 2011    R arm surgery after trauma       Social History     Socioeconomic History    Marital status: Single   Tobacco Use    Smoking status: Every Day     Current packs/day: 0.50     Average packs/day: 0.5 packs/day for 14.0 years (7.0 ttl pk-yrs)     Types: Cigarettes     Start date:     Smokeless tobacco: Never   Vaping Use    Vaping status: Former    Substances: Nicotine    Devices: Pre-filled pod   Substance and Sexual Activity    Alcohol use: Not Currently    Drug use: No    Sexual activity: Yes     Partners: Male   Other Topics Concern    Caffeine Concern Yes     Comment: 16 oz of diet coke every other day and 1 medium coffee from [employer] once daily.    Exercise Yes     Comment: sometimes    Seat Belt Yes    Special Diet No    Stress Concern Yes    Weight Concern Yes          No results found for this or any previous visit (from the past 24 hours).    ASSESSMENT AND PLAN:      ASSESSMENT:   Encounter Diagnosis   Name Primary?    Vaginal discharge Yes       PLAN: Meds as below.  See patient Instructions  -4 minutes spent.  Patient complaining of yeast-like symptoms, but also reporting back pain.  Further evaluation advised.      Meds & Refills for this Visit:  Requested Prescriptions      No prescriptions requested or ordered in this encounter       Risks, benefits, and side effects of medication explained and discussed.    There are no Patient Instructions on file for this visit.    The patient indicates understanding of these issues and agrees to the plan.  See attached patient references.  The patient is asked to return if sx's persist or worsen.    Virajosé miguel Schultz Johnson understands evisit evaluation is not a substitute for face-to-face examination or emergency care. Patient advised to go to ER or call 911 for worsening symptoms or  acute distress.

## 2025-01-08 ENCOUNTER — TELEMEDICINE (OUTPATIENT)
Dept: TELEHEALTH | Age: 35
End: 2025-01-08
Payer: MEDICAID

## 2025-01-08 DIAGNOSIS — N76.0 ACUTE VAGINITIS: Primary | ICD-10-CM

## 2025-01-08 DIAGNOSIS — B37.31 VAGINAL YEAST INFECTION: ICD-10-CM

## 2025-01-08 DIAGNOSIS — E11.65 TYPE 2 DIABETES MELLITUS WITH HYPERGLYCEMIA, WITH LONG-TERM CURRENT USE OF INSULIN (HCC): Primary | ICD-10-CM

## 2025-01-08 DIAGNOSIS — Z79.4 TYPE 2 DIABETES MELLITUS WITH HYPERGLYCEMIA, WITH LONG-TERM CURRENT USE OF INSULIN (HCC): Primary | ICD-10-CM

## 2025-01-08 PROCEDURE — 99213 OFFICE O/P EST LOW 20 MIN: CPT | Performed by: NURSE PRACTITIONER

## 2025-01-08 RX ORDER — FLUCONAZOLE 150 MG/1
150 TABLET ORAL ONCE
Qty: 1 TABLET | Refills: 0 | Status: SHIPPED | OUTPATIENT
Start: 2025-01-08 | End: 2025-01-08

## 2025-01-08 NOTE — PATIENT INSTRUCTIONS
Medication as prescribed  Follow up in person for any new/ worsening symptoms or if not improving over the next few days.

## 2025-01-08 NOTE — PROGRESS NOTES
Telehealth Verbal Consent   I conducted a telehealth visit with Vira Johnson today, 01/08/25, which was completed using two-way, real-time interactive audio and video communication. This has been done in good og to provide continuity of care in the best interest of the provider-patient relationship. Every conscious effort was taken to allow for sufficient and adequate time to complete the visit.  The patient was made aware of the limitations of the telehealth visit, including treatment limitations as physical exam through video visit is limited.  The patient was advised to call 911 or to go to the ER in case there was an emergency.  The patient was also advised of the potential privacy & security concerns related to the telehealth platform.   The patient was made aware of where to find [facility]'s notice of privacy practices, telehealth consent form and other related consent forms and documents.  which are located on the [facility] website. The patient verbally agreed to telehealth consent form, related consents and the risks discussed.    Lastly, the patient confirmed that they were in [place].   Included in this visit, time may have been spent reviewing labs, medications, radiology tests and decision making. Appropriate medical decision-making and tests are ordered as detailed in the plan of care above.  Coding/billing information is submitted for this visit based on complexity of care and/or time spent for the visit.    CHIEF COMPLAINT:     Chief Complaint   Patient presents with    Vaginal Discharge       HPI:   Vira Johnson is a 34 year old female who presents for a video visit.  Patient reports vaginal itching, small amount of thick white discharge. Has treated with AZO for yeast treatment which did not help.  Patient denies flank pain, pelvic pain, back pain, dysuria or urinary frequency.  LMP 12/15. Denies any concerns for pregnancy. Denies concern for STD's. Reports hx of yeast infections,  requesting diflucan which has helped in the past. Has hx of DM and reports over the weekend was out of her Dexcom and sugars were more elevated which she thinks contributed to her sxs. Now has her dexcom back and sugars under control. Under 200 today, reports currently her blood sugar is 150 .    Current Outpatient Medications   Medication Sig Dispense Refill    Continuous Glucose  (DEXCOM G6 ) Does not apply Device 1 each daily. 1 each 0    Continuous Glucose Transmitter (DEXCOM G6 TRANSMITTER) Does not apply Misc Use 1 transmitter every 90 days 1 each 3    buPROPion  MG Oral Tablet 24 Hr Take 1 tablet (150 mg total) by mouth daily. 90 tablet 1    RYBELSUS 7 MG Oral Tab TAKE ONE TABLET BY MOUTH DAILY ON AN EMPTY STOMACH WITH NO MORE THAN 4 OZ. OF PLAIN WATER. WAIT AT LEAST 30 MIN. BEFORE FOOD, BEVERAGE, OR OTHER MEDICATIONS. 90 tablet 1    insulin glargine (LANTUS SOLOSTAR) 100 UNIT/ML Subcutaneous Solution Pen-injector Inject 25 Units into the skin 2 (two) times daily. 50 mL 0    Insulin Pen Needle 32G X 4 MM Does not apply Misc Inject insulin 5 times a day when insulin pump is not working 500 each 0    insulin detemir (LEVEMIR FLEXPEN) 100 UNIT/ML Subcutaneous Solution Pen-injector Inject 25 Units into the skin every 12 (twelve) hours. 45 mL 0    atorvastatin 10 MG Oral Tab TAKE ONE TABLET BY MOUTH ONCE NIGHTLY 90 tablet 1    Continuous Glucose Sensor (DEXCOM G6 SENSOR) Does not apply Misc USE EVERY 10 DAYS AS DIRECTED 9 each 1    Insulin Disposable Pump (OMNIPOD 5 [id number] PODS GEN 5) Does not apply Misc 1 each daily. 1 UNDER THE SKIN DAILY 30 each 2    ALPRAZolam 1 MG Oral Tab Take 1 tablet (1 mg total) by mouth 2 (two) times daily as needed for Anxiety or Sleep. 60 tablet 3    Phentermine HCl 37.5 MG Oral Tab Take 1 tablet (37.5 mg total) by mouth before breakfast. 30 tablet 2    Glucose Blood (ONETOUCH VERIO) In Vitro Strip Use to test blood sugar up to 4 times daily. 400 strip 0     montelukast 10 MG Oral Tab Take 1 tablet (10 mg total) by mouth nightly. 90 tablet 1    diclofenac 50 MG Oral Tab EC Take 1 tablet (50 mg total) by mouth 3 (three) times daily as needed. 45 tablet 0    Glucose Blood (ONETOUCH ULTRA) In Vitro Strip Check blood sugar up to 4x daily 400 strip 0    insulin lispro 100 UNIT/ML Injection Solution Use up to 120 units daily via insulin pump 40 mL 2    Insulin Lispro, 1 Unit Dial, 100 UNIT/ML Subcutaneous Solution Pen-injector INJECT UNDER THE SKIN BASED ON CARB RATIO AND CORRECTION FACTOR*MAX DAILY DOSE IS 50 UNITS* to use in case of pump failure 15 mL 0    Insulin Pen Needle 32G X 4 MM Does not apply Misc Inject insulin 5 times a day 500 each 0    Continuous Blood Gluc Sensor (DEXCOM G6 SENSOR) Does not apply Misc 1 each Every 10 days. 9 each 3    ONETOUCH VERIO REFLECT w/Device Does not apply Kit USE AS DIRECTED 1 kit 0    Insulin Disposable Pump (OMNIPOD 5 G6 POD, GEN 5,) Does not apply Misc 1 Device every other day. 45 each 0    Insulin Pen Needle (TRUEPLUS PEN NEEDLES) 32G X 4 MM Does not apply Misc Take 1 each by mouth in the morning and 1 each before bedtime. 100 each 2    Insulin Disposable Pump (OMNIPOD 5 G6 INTRO, GEN 5,) Does not apply Kit 1 each daily. 1 kit 0    Insulin Lispro, 1 Unit Dial, 100 UNIT/ML Subcutaneous Solution Pen-injector INJECT UNDER THE SKIN BASED ON CARB RATIO AND CORRECTION FACTOR*MAX DAILY DOSE IS 50 UNITS* 15 mL 0    OneTouch Delica Lancets 33G Does not apply Misc Check blood sugar up to 4x daily 400 each 0    topiramate 50 MG Oral Tab Take 1 tablet (50 mg total) by mouth 2 (two) times daily. 180 tablet 1    Insulin Disposable Pump (OMNIPOD 5 G6 INTRO, GEN 5,) Does not apply Kit 1 each As Directed. 1 kit 0    Continuous Blood Gluc  (DEXCOM G6 ) Does not apply Device Use as directed with sensors and transmitter 1 Device 0    ONETOUCH DELICA PLUS [id number] Does not apply Misc USE AS DIRECTED 400 each 2      Past Medical  History:    History of blood transfusion        History of  delivery    X 3 [x ] Repeat CS at 36 weeks- 21 [ ] bilateral salpingectomy - IPA consent signed 2021     S/P repeat low transverse     Type 1 diabetes mellitus (HCC)      Past Surgical History:   Procedure Laterality Date          x3    Other surgical history Right 2011    R arm surgery after trauma         Social History     Socioeconomic History    Marital status: Single   Tobacco Use    Smoking status: Every Day     Current packs/day: 0.50     Average packs/day: 0.5 packs/day for 14.0 years (7.0 ttl pk-yrs)     Types: Cigarettes     Start date:     Smokeless tobacco: Never   Vaping Use    Vaping status: Former    Substances: Nicotine    Devices: Pre-filled pod   Substance and Sexual Activity    Alcohol use: Not Currently    Drug use: No    Sexual activity: Yes     Partners: Male   Other Topics Concern    Caffeine Concern Yes     Comment: 16 oz of diet coke every other day and 1 medium coffee from [employer] once daily.    Exercise Yes     Comment: sometimes    Seat Belt Yes    Special Diet No    Stress Concern Yes    Weight Concern Yes         REVIEW OF SYSTEMS:   GENERAL: no fever. Feels well otherwise  SKIN: no rashes or abnormal skin lesions  HEENT: See HPI  GI: denies N/V/C or abdominal pain  : see hpi, no sores/lesions on genitals      EXAM:   General: Alert, Well-appearing, and In no acute distress  Respiratory:   Speaking in full sentences comfortably  Normal work of breathing  Head: Normocephalic  Nose: No obvious nasal discharge.  Skin: No obvious rashes or lesions from what observed.     No results found for this or any previous visit (from the past 24 hours).    ASSESSMENT AND PLAN:   Vira Johnson is a 34 year old female who presents with symptoms that are consistent with    ASSESSMENT:   Encounter Diagnoses   Name Primary?    Acute vaginitis Yes    Vaginal yeast infection        PLAN: Empiric  treatment as below for vaginal yeast based on sxs.   Advised in person follow up for new/ worsening symptoms or if symptoms are not improving over the next 2-3 days.     Meds & Refills for this Visit:  Requested Prescriptions     Signed Prescriptions Disp Refills    fluconazole 150 MG Oral Tab 1 tablet 0     Sig: Take 1 tablet (150 mg total) by mouth once for 1 dose.       Risks, benefits, and side effects of medication explained and discussed.        The patient indicates understanding of these issues and agrees to the plan.  The patient is asked to return if sx's persist or worsen.    Vira Johnson understands video visit evaluation is not a substitute for face-to-face examination or emergency care. Patient advised to go to ER or call 911 for worsening symptoms or acute distress.

## 2025-01-09 RX ORDER — BLOOD SUGAR DIAGNOSTIC
STRIP MISCELLANEOUS
Qty: 400 STRIP | Refills: 0 | Status: SHIPPED | OUTPATIENT
Start: 2025-01-09

## 2025-01-09 NOTE — TELEPHONE ENCOUNTER
Endocrine Refill protocol for Glucose testing supplies     Protocol Criteria: PASSED Reason: N/A    If below requirement is met, send a 90-day supply with 1 refill per provider protocol.    Verify appointment with Endocrinology completed in the last 6 months or scheduled in the next 3 months.    Last completed office visit: 8/5/2024 Barbi Bello MD   Next scheduled Follow up:   Future Appointments   Date Time Provider Department Center   3/10/2025  8:15 AM Barbi Bello MD [facility]

## 2025-01-13 ENCOUNTER — TELEPHONE (OUTPATIENT)
Dept: ENDOCRINOLOGY CLINIC | Facility: CLINIC | Age: 35
End: 2025-01-13

## 2025-01-13 NOTE — TELEPHONE ENCOUNTER
Drug (not listed) : OZEMPIC 0.25 MG or 0.5 MG dos (2mg/3ml)  Sig: Inject 0.25 mg under the skin once a week for 30 days ,then 0.5mg once a week for 15 days.  Qty: 3  Pharmacy message: Plan does not cover this medication.Please call plan at [phone] to initiate prior authorization or call/fax pharmacy to change medication.Patient ID# is [id number]

## 2025-01-18 NOTE — TELEPHONE ENCOUNTER
Disregarding PA request as patient is not on ozempic per chart review.  Pt is currently on [facility].

## 2025-01-20 ENCOUNTER — PATIENT MESSAGE (OUTPATIENT)
Dept: ENDOCRINOLOGY CLINIC | Facility: CLINIC | Age: 35
End: 2025-01-20

## 2025-01-22 NOTE — TELEPHONE ENCOUNTER
Last office visit on 8/5/24. Next follow up scheduled for 3/10/25.  - Reorder patient pods  - Increase Rybelsus to 7mg qam  - As soon as she has her Omnipod, she will contact us so that we can help her with her settings.     Called patient and scheduled for diabetes education on 1/27/25. Patient currently not using Omnipod.

## 2025-01-27 ENCOUNTER — NURSE ONLY (OUTPATIENT)
Dept: ENDOCRINOLOGY CLINIC | Facility: CLINIC | Age: 35
End: 2025-01-27
Payer: MEDICAID

## 2025-01-27 ENCOUNTER — PATIENT MESSAGE (OUTPATIENT)
Dept: ENDOCRINOLOGY CLINIC | Facility: CLINIC | Age: 35
End: 2025-01-27

## 2025-01-27 DIAGNOSIS — E10.65 UNCONTROLLED TYPE 1 DIABETES MELLITUS WITH HYPERGLYCEMIA (HCC): Primary | ICD-10-CM

## 2025-01-27 DIAGNOSIS — E10.8 TYPE 1 DIABETES MELLITUS WITH COMPLICATION, WITH LONG TERM CURRENT USE OF INSULIN PUMP (HCC): Primary | ICD-10-CM

## 2025-01-27 DIAGNOSIS — Z96.41 TYPE 1 DIABETES MELLITUS WITH COMPLICATION, WITH LONG TERM CURRENT USE OF INSULIN PUMP (HCC): Primary | ICD-10-CM

## 2025-01-27 DIAGNOSIS — E10.65 UNCONTROLLED TYPE 1 DIABETES MELLITUS WITH HYPERGLYCEMIA (HCC): ICD-10-CM

## 2025-01-27 DIAGNOSIS — O24.012: ICD-10-CM

## 2025-01-27 RX ORDER — GLUCAGON INJECTION, SOLUTION 1 MG/.2ML
1 INJECTION, SOLUTION SUBCUTANEOUS ONCE AS NEEDED
Qty: 1 EACH | Refills: 0 | Status: SHIPPED | OUTPATIENT
Start: 2025-01-27 | End: 2025-01-27

## 2025-01-27 RX ORDER — INSULIN PMP CART,AUT,G6/7,CNTR
1 EACH SUBCUTANEOUS DAILY
Qty: 45 EACH | Refills: 2 | Status: SHIPPED | OUTPATIENT
Start: 2025-01-27

## 2025-01-27 RX ORDER — INSULIN LISPRO 100 [IU]/ML
INJECTION, SOLUTION INTRAVENOUS; SUBCUTANEOUS
Qty: 40 ML | Refills: 2 | Status: SHIPPED | OUTPATIENT
Start: 2025-01-27

## 2025-01-27 NOTE — PROGRESS NOTES
Continuous Glucose Monitor Download - Dexcom G6    Vira Johnson  12/21/1990    Patient brought 3 yr-old daughter to visit. Visit completed with multiple interruptions.   Endo provider has a cancellation tomorrow 1/28/25. Offered appointment to patient, but she lives 1.5 hrs away and unable to return for appointment due to family scheduled. Patient will follow up 3/10/25 as scheduled.     Medical Background        Lab Results   Component Value Date    A1C 10.9 (H) 12/12/2024    A1C 10.5 (A) 08/05/2024    A1C 11.3 (A) 01/16/2024    A1C 10.6 (A) 09/18/2023    A1C 11.6 (A) 02/02/2023         Patient has T1DM, seen today regarding glucose levels  Diagnosed 10 yrs ago      Medication Review      DM Meds: Insulin Lispro (1 Unit Dial) Sopn - 100 UNIT/ML, 100 UNIT/ML; insulin lispro Soln - 100 UNIT/ML; Lantus SoloStar Sopn - 100 UNIT/ML; Levemir FlexPen Sopn - 100 UNIT/ML; Rybelsus Tabs - 7 MG         Medications at last endocrinology appointment:  Rybelsus 7 mg daily  MDI  Pods were reordered.       Patient Currently Taking:   Lantus 25 units twice daily  Humalog starts at 4 units for first 100. If glucose in the 500s will take 9 units  Started fasting one week ago, from 2000 - 1200. Is using less insulin  Requests refill for Omnipod  sent to [facility] pharmacy.    Endocrine Refill protocol for Omnipod insulin pumps and supplies    Protocol Criteria:  PASSED  Reason: N/A    If below requirement is met, send a 90-day supply with 1 refill per provider protocol.    Verify appointment with Endocrinology completed in the last 6 months or scheduled in the next 3 months.    Last completed office visit:8/5/2024 Barbi Bello MD   Next scheduled Follow up:   Future Appointments   Date Time Provider Department Center   3/10/2025  8:15 AM Barbi Bello MD [facility]      Endocrine refill protocol for rapid acting, regular, intermediate, and mixed insulin:    Protocol Criteria:  FAILED Reason: Elevated  A1C    If all below requirements are met, send a 90-day supply with 1 refill per provider protocol.    Verify appointment with Endocrinology completed in the last 6 months or scheduled in the next 3 months.  Verify A1C has been completed within the last 6 months and is below 8.5%    -May substitute prescriptions for Novolog and Humalog unless documented allergy (pens and vials) at the same dose and concentration per insurance preference and provider protocol.   -May substitute prescriptions for Novolin R and Humulin R unless documented allergy (pens and vials) at the same dose and concentration per insurance preference and provider protocol.   -May substitute prescriptions for Novolin N and Humulin N unless documented allergy (pens and vials) at the same dose and concentration per insurance preference and provider protocol.   -May substitute prescriptions for Humulin and Novolin 70/30 insulin unless documented allergy at the same dose and concentration per insurance preference and provider protocol.    Last completed office visit: 8/5/2024 Barbi Bello MD   Next scheduled Follow up:   Future Appointments   Date Time Provider Department Center   3/10/2025  8:15 AM Barbi Bello MD [facility]      Last A1C result: 10.9% done 12/12/2024.        Blood Glucose Review          Interpretation of Data:  Patient is 32% in range, 37% very high. GMI of 8.4. Hypoglycemia noted overnight and early morning. Patient states sometimes she takes humalog after meal. Reviewed with patient importance of taking insulin prior to meals to prevent hypoglycemia. Patient voiced understanding.                             Patient Concerns      Pain and burning to toes and fingers for the past 2 wks. Worse when she gets up and better with food.         Recommendations / Plan / Goals    - Requests refill for insulin pods - sent to pharmacy per protocol  -  Requests novolog vial - failed protocol, will pend for provider.    - Glucagon pen pended - education given.   - Give humalog before meal  - Wants to start Ozempic. Has tried victoza, trulicity, and rybelsus.         Next Follow up scheduled for 3/10/25      Routed to provider for review.      1/27/2025  Belinda SANABRIA RN [facility]  Diabetes Care &      A total of 35 minutes was spent with the patient including chart review, discussion and education / advisement pertinent to patient and provider specified concerns as documented above.     Note to patient: The 21 Century Cures Act makes medical notes like these available to patients in the interest of transparency. However, be advised this is a medical document. It is intended as peer to peer communication. It is written in medical language and may contain abbreviations or verbiage that are unfamiliar. It may appear blunt or direct. Medical documents are intended to carry relevant information, facts as evident, and the clinical opinion of the practitioner.

## 2025-01-27 NOTE — TELEPHONE ENCOUNTER
Patient requests refill of insulin for insulin pump. Pended for provider due to failed protocol for elevated A1C of 10.6 on 12/2024. Glucagon pen also pended.

## 2025-01-28 NOTE — TELEPHONE ENCOUNTER
Message routed to provider. Patient had appointment for diabetes education. Message had been sent to provider regarding ozempic. Waiting advise.

## 2025-01-30 ENCOUNTER — TELEPHONE (OUTPATIENT)
Dept: ENDOCRINOLOGY CLINIC | Facility: CLINIC | Age: 35
End: 2025-01-30

## 2025-01-30 DIAGNOSIS — E10.65 UNCONTROLLED TYPE 1 DIABETES MELLITUS WITH HYPERGLYCEMIA (HCC): ICD-10-CM

## 2025-01-30 RX ORDER — PROCHLORPERAZINE 25 MG/1
SUPPOSITORY RECTAL
Qty: 9 EACH | Refills: 1 | Status: SHIPPED | OUTPATIENT
Start: 2025-01-30

## 2025-01-30 RX ORDER — SEMAGLUTIDE 0.68 MG/ML
0.25 INJECTION, SOLUTION SUBCUTANEOUS WEEKLY
Qty: 3 ML | Refills: 0 | Status: SHIPPED | OUTPATIENT
Start: 2025-01-30

## 2025-01-30 NOTE — TELEPHONE ENCOUNTER
Endocrine Refill protocol for CGM supplies     Protocol Criteria:  PASSED     If below requirement is met, send a 90-day supply with 1 refill per provider protocol.     Verify appointment with Endocrinology completed in the last 12 months or scheduled in the next 6 months     Last completed office visit:8/5/2024 Barbi Bello MD   Next scheduled Follow up:   Future Appointments   Date Time Provider Department Center   3/10/2025  8:15 AM Barbi Bello MD [facility]

## 2025-01-30 NOTE — TELEPHONE ENCOUNTER
Current Outpatient Medications   Medication Sig Dispense Refill                                                                                 Continuous Glucose Sensor (DEXCOM G6 SENSOR) Does not apply Misc USE EVERY 10 DAYS AS DIRECTED 9 each 1

## 2025-02-03 ENCOUNTER — TELEMEDICINE (OUTPATIENT)
Dept: TELEHEALTH | Age: 35
End: 2025-02-03
Payer: MEDICAID

## 2025-02-03 ENCOUNTER — E-VISIT (OUTPATIENT)
Dept: TELEHEALTH | Age: 35
End: 2025-02-03
Payer: MEDICAID

## 2025-02-03 DIAGNOSIS — Z02.89 ENCOUNTER TO OBTAIN EXCUSE FROM WORK: ICD-10-CM

## 2025-02-03 DIAGNOSIS — B34.9 VIRAL SYNDROME: Primary | ICD-10-CM

## 2025-02-03 DIAGNOSIS — Z02.9 ADMINISTRATIVE ENCOUNTER: Primary | ICD-10-CM

## 2025-02-03 NOTE — PROGRESS NOTES
Telehealth Verbal Consent   I conducted a telehealth visit with Vira Johnson today, 02/03/25, which was completed using two-way, real-time interactive audio and video communication. This has been done in good og to provide continuity of care in the best interest of the provider-patient relationship, due to the COVID -19 public health crisis/national emergency where restrictions of face-to-face office visits are ongoing. Every conscious effort was taken to allow for sufficient and adequate time to complete the visit.  The patient was made aware of the limitations of the telehealth visit, including treatment limitations as no physical exam could be performed.  The patient was advised to call 911 or to go to the ER in case there was an emergency.  The patient was also advised of the potential privacy & security concerns related to the telehealth platform.   The patient was made aware of where to find [facility]'s notice of privacy practices, telehealth consent form and other related consent forms and documents.  which are located on the [facility] website. The patient verbally agreed to telehealth consent form, related consents and the risks discussed.    Lastly, the patient confirmed that they were in [place].   Included in this visit, time may have been spent reviewing labs, medications, radiology tests and decision making. Appropriate medical decision-making and tests are ordered as detailed in the plan of care above.  Coding/billing information is submitted for this visit based on complexity of care and/or time spent for the visit.    CHIEF COMPLAINT:     Chief Complaint   Patient presents with    Viral Syndrome              HPI:   Vira Johnson is a 34 year old female who presents with URI symptoms for 1 days.  Covid test  negative.      Fever:     Yes []     No [x]    +slight body aches- also could be from work.     Chills:       Yes []    No [x]       Sweats:     Yes []     No [x]     Fatigue: Yes [x]      No []   Stuffy nose : Yes [x]     No []      Sinus pressure: Yes [x]     No []  Slight    Sore throat:   Yes []      No [x]      Ear Pain:  Yes []      No [x]    Slight pain around ear, but not in ear.    Cough:    Yes [x]     No []    +PND  Shortness of breath:  Yes []   No [x]     Wheezing:   Yes []   No [x]     Chest pain/pressure:     Yes []     No [x]        GI symptoms: Yes []     No [x]                     Nausea  []; Vomiting  []; Diarrhea  [] ; Upset stomach [];   Abdominal Pain  []       Patient has tried motrin for symptoms.   Current Outpatient Medications   Medication Sig Dispense Refill    Insulin Disposable Pump (OMNIPOD 5 [id number] PODS GEN 5) Does not apply Misc 1 each daily. 1 UNDER THE SKIN DAILY 45 each 2    insulin lispro 100 UNIT/ML Injection Solution Use up to 120 units daily via insulin pump 40 mL 2    buPROPion  MG Oral Tablet 24 Hr Take 1 tablet (150 mg total) by mouth daily. 90 tablet 1    RYBELSUS 7 MG Oral Tab TAKE ONE TABLET BY MOUTH DAILY ON AN EMPTY STOMACH WITH NO MORE THAN 4 OZ. OF PLAIN WATER. WAIT AT LEAST 30 MIN. BEFORE FOOD, BEVERAGE, OR OTHER MEDICATIONS. 90 tablet 1    insulin glargine (LANTUS SOLOSTAR) 100 UNIT/ML Subcutaneous Solution Pen-injector Inject 25 Units into the skin 2 (two) times daily. 50 mL 0    insulin detemir (LEVEMIR FLEXPEN) 100 UNIT/ML Subcutaneous Solution Pen-injector Inject 25 Units into the skin every 12 (twelve) hours. 45 mL 0    atorvastatin 10 MG Oral Tab TAKE ONE TABLET BY MOUTH ONCE NIGHTLY 90 tablet 1    montelukast 10 MG Oral Tab Take 1 tablet (10 mg total) by mouth nightly. 90 tablet 1    Insulin Lispro, 1 Unit Dial, 100 UNIT/ML Subcutaneous Solution Pen-injector INJECT UNDER THE SKIN BASED ON CARB RATIO AND CORRECTION FACTOR*MAX DAILY DOSE IS 50 UNITS* to use in case of pump failure 15 mL 0    Insulin Disposable Pump (OMNIPOD 5 G6 POD, GEN 5,) Does not apply Misc 1 Device every other day. 45 each 0    Insulin Disposable Pump (OMNIPOD 5 G6  INTRO, GEN 5,) Does not apply Kit 1 each daily. 1 kit 0    Insulin Lispro, 1 Unit Dial, 100 UNIT/ML Subcutaneous Solution Pen-injector INJECT UNDER THE SKIN BASED ON CARB RATIO AND CORRECTION FACTOR*MAX DAILY DOSE IS 50 UNITS* 15 mL 0    topiramate 50 MG Oral Tab Take 1 tablet (50 mg total) by mouth 2 (two) times daily. 180 tablet 1    Insulin Disposable Pump (OMNIPOD 5 G6 INTRO, GEN 5,) Does not apply Kit 1 each As Directed. 1 kit 0    semaglutide (OZEMPIC, 0.25 OR 0.5 MG/DOSE,) 2 MG/3ML Subcutaneous Solution Pen-injector Inject 0.25 mg into the skin once a week. 3 mL 0    Continuous Glucose Sensor (DEXCOM G6 SENSOR) Does not apply Misc USE EVERY 10 DAYS AS DIRECTED 9 each 1    Glucose Blood (ONETOUCH VERIO) In Vitro Strip USE TO TEST BLOOD SUGAR UP TO FOUR TIMES DAILY 400 strip 0    Continuous Glucose  (DEXCOM G6 ) Does not apply Device 1 each daily. 1 each 0    Continuous Glucose Transmitter (DEXCOM G6 TRANSMITTER) Does not apply Misc Use 1 transmitter every 90 days 1 each 3    Insulin Pen Needle 32G X 4 MM Does not apply Misc Inject insulin 5 times a day when insulin pump is not working 500 each 0    ALPRAZolam 1 MG Oral Tab Take 1 tablet (1 mg total) by mouth 2 (two) times daily as needed for Anxiety or Sleep. 60 tablet 3    Phentermine HCl 37.5 MG Oral Tab Take 1 tablet (37.5 mg total) by mouth before breakfast. (Patient not taking: Reported on 2/3/2025) 30 tablet 2    diclofenac 50 MG Oral Tab EC Take 1 tablet (50 mg total) by mouth 3 (three) times daily as needed. 45 tablet 0    Glucose Blood (ONETOUCH ULTRA) In Vitro Strip Check blood sugar up to 4x daily 400 strip 0    Insulin Pen Needle 32G X 4 MM Does not apply Misc Inject insulin 5 times a day 500 each 0    Continuous Blood Gluc Sensor (DEXCOM G6 SENSOR) Does not apply Misc 1 each Every 10 days. 9 each 3    ONETOUCH VERIO REFLECT w/Device Does not apply Kit USE AS DIRECTED 1 kit 0    Insulin Pen Needle (TRUEPLUS PEN NEEDLES) 32G X 4 MM  Does not apply Misc Take 1 each by mouth in the morning and 1 each before bedtime. 100 each 2    OneTouch Delica Lancets 33G Does not apply Misc Check blood sugar up to 4x daily 400 each 0    Continuous Blood Gluc  (DEXCOM G6 ) Does not apply Device Use as directed with sensors and transmitter 1 Device 0    ONETOUCH DELICA PLUS [id number] Does not apply Misc USE AS DIRECTED 400 each 2      Past Medical History:    History of blood transfusion    2007    History of  delivery    X 3 [x ] Repeat CS at 36 weeks- 21 [ ] bilateral salpingectomy - IPA consent signed 2021     S/P repeat low transverse     Type 1 diabetes mellitus (HCC)      Past Surgical History:   Procedure Laterality Date          x3    Other surgical history Right 2011    R arm surgery after trauma         Social History     Socioeconomic History    Marital status: Single   Tobacco Use    Smoking status: Every Day     Current packs/day: 0.50     Average packs/day: 0.5 packs/day for 14.1 years (7.0 ttl pk-yrs)     Types: Cigarettes     Start date:     Smokeless tobacco: Never   Vaping Use    Vaping status: Former    Substances: Nicotine    Devices: Pre-filled pod   Substance and Sexual Activity    Alcohol use: Not Currently    Drug use: No    Sexual activity: Yes     Partners: Male   Other Topics Concern    Caffeine Concern Yes    Exercise Yes    Seat Belt Yes    Special Diet No    Stress Concern Yes    Weight Concern Yes         REVIEW OF SYSTEMS:   GENERAL: Normal appetite  SKIN: no rashes or abnormal skin lesions  HEENT: See HPI  LUNGS: denies shortness of breath or wheezing, See HPI  CARDIOVASCULAR: denies chest pain or palpitations   GI: denies N/V/C or abdominal pain  NEURO: + sinus headaches    EXAM:   General: Alert  Respiratory:   Speaking in full sentences comfortably  Normal work of breathing  No cough during visit  Head: Normocephalic  Nose: No obvious nasal discharge.  Skin: No obvious  rashes or lesions from what observed.     No results found for this or any previous visit (from the past 24 hours).    ASSESSMENT AND PLAN:   Vira Johnson is a 34 year old female who presents with symptoms that are consistent with    ASSESSMENT:   Encounter Diagnoses   Name Primary?    Viral syndrome Yes    Encounter to obtain excuse from work        PLAN: Meds as below.  See patient Instructions    Meds & Refills for this Visit:  Requested Prescriptions      No prescriptions requested or ordered in this encounter       Risks, benefits, and side effects of medication explained and discussed.    Patient Instructions   -Mucinex  -Flonase  -Neilmed sinus rinse  -Push fluids  -Cool mist humidifier  -Tea with honey  -Tylenol/motrin as needed  -Must be seen with worsening symptoms        The patient indicates understanding of these issues and agrees to the plan.  The patient is asked to return if sx's persist or worsen.        Vira Johnson advised to follow CDC guidelines for self isolation and symptomatic treatment as outlined on CDC Patient Guidelines. Vira Johnson understands video visit evaluation is not a substitute for face-to-face examination or emergency care. Patient advised to go to ER or call 911 for worsening symptoms or acute distress.

## 2025-02-03 NOTE — PATIENT INSTRUCTIONS
-Mucinex  -Flonase  -Neilmed sinus rinse  -Push fluids  -Cool mist humidifier  -Tea with honey  -Tylenol/motrin as needed  -Must be seen with worsening symptoms

## 2025-02-06 ENCOUNTER — PATIENT MESSAGE (OUTPATIENT)
Dept: ENDOCRINOLOGY CLINIC | Facility: CLINIC | Age: 35
End: 2025-02-06

## 2025-02-09 DIAGNOSIS — F41.1 GENERALIZED ANXIETY DISORDER: ICD-10-CM

## 2025-02-09 DIAGNOSIS — F51.01 PRIMARY INSOMNIA: ICD-10-CM

## 2025-02-10 RX ORDER — ALPRAZOLAM 1 MG/1
1 TABLET ORAL 2 TIMES DAILY PRN
Qty: 60 TABLET | Refills: 3 | Status: SHIPPED | OUTPATIENT
Start: 2025-02-10

## 2025-02-10 NOTE — TELEPHONE ENCOUNTER
LOV: 12/12/2024 with Dr. Barger  RTC: 6 months  Last Relevant Labs: 12/12/2024  Filled: 10/20/2024    #60 with 3 refills    Future Appointments   Date Time Provider Department Center   3/10/2025  8:15 AM Barbi Bello MD [facility]

## 2025-02-25 ENCOUNTER — TELEMEDICINE (OUTPATIENT)
Dept: TELEHEALTH | Age: 35
End: 2025-02-25
Payer: MEDICAID

## 2025-02-25 DIAGNOSIS — R07.89 CHEST TIGHTNESS: ICD-10-CM

## 2025-02-25 DIAGNOSIS — K52.9 GASTROENTERITIS: Primary | ICD-10-CM

## 2025-02-25 DIAGNOSIS — Z02.89 ENCOUNTER TO OBTAIN EXCUSE FROM WORK: ICD-10-CM

## 2025-02-25 PROCEDURE — 99213 OFFICE O/P EST LOW 20 MIN: CPT | Performed by: PHYSICIAN ASSISTANT

## 2025-02-25 RX ORDER — GLUCAGON INJECTION, SOLUTION 1 MG/.2ML
INJECTION, SOLUTION SUBCUTANEOUS
COMMUNITY
Start: 2025-01-27

## 2025-02-25 RX ORDER — ALBUTEROL SULFATE 90 UG/1
2 INHALANT RESPIRATORY (INHALATION) EVERY 4 HOURS PRN
Qty: 1 EACH | Refills: 0 | Status: SHIPPED | OUTPATIENT
Start: 2025-02-25

## 2025-02-25 NOTE — PATIENT INSTRUCTIONS
Hydration is the most important factor while having diarrhea!  Take small amounts of clear fluids frequently, sips every 15 minutes as tolerated  Probiotic use can shorten the duration of diarrhea.  I like Florastor brand probiotic for stomach viruses. You can take 1 pill with each loose stool until the diarrhea begins to normalize.   Drink 1-2 oz. every hour gradually increasing to 8 oz. every hour when tolerated (if no vomiting). You can drink 1 to 2 electrolyte replacement drinks per day as well--examples:, Liquid IV, Gatorade or Pedialyte   Advance diet slowly as tolerated, start with small amounts of bland foods  You can follow a BRAT diet, but newer research shows that return to a normal diet actually helps symptoms resolve quicker.  If you feel any certain food upsets your stomach you can avoid it for the time being.   Typically anti-diarrheal medications are not recommended as this can prolong the infection.  However, if you fear you are becoming dehydrated due to inability to keep up with fluid loss from diarrhea you can take a dose or two of ImodiumAD over the counter.  You should remain home until you have been symptom free for at least 24 hours without anti-diarrheal medications.   You should be diligent with hand washing with soap and water  Diluted bleach is the best way to disinfect surfaces when you have a stomach virus.   If vomiting occurs, allow stomach to rest briefly then start slowly again as above.  If unable to hold down fluids then go directly to ER.  To Emergency Room if any worsening of condition, persistent vomiting or diarrhea, any blood in stool or vomit, if any fever, abdominal or flank pain develops, if you have any difficulty urinating or having bowel movement or any other new or concerning symptoms

## 2025-02-25 NOTE — PROGRESS NOTES
Virtual/Telephone Check-In    Vira Johnson verbally consents to a Virtual/Telephone Check-In service on 02/25/25.  Patient has been referred to the [facility] website at [email or web address] to review the yearly Consent to Treat document.  Patient understands and accepts financial responsibility for any deductible, co-insurance and/or co-pays associated with this service.       Telehealth Verbal Consent   I conducted a telehealth visit with Vira Johnson today, 02/25/25, which was completed using two-way, real-time interactive audio and video communication. This has been done in good og to provide continuity of care in the best interest of the provider-patient relationship, due to the COVID -19 public health crisis/national emergency where restrictions of face-to-face office visits are ongoing. Every conscious effort was taken to allow for sufficient and adequate time to complete the visit.  The patient was made aware of the limitations of the telehealth visit, including treatment limitations as no physical exam could be performed.  The patient was advised to call 911 or to go to the ER in case there was an emergency.  The patient was also advised of the potential privacy & security concerns related to the telehealth platform.   The patient was made aware of where to find [facility]'s notice of privacy practices, telehealth consent form and other related consent forms and documents.  which are located on the [facility] website. The patient verbally agreed to telehealth consent form, related consents and the risks discussed.    Lastly, the patient confirmed that they were in [place].   Included in this visit, time may have been spent reviewing labs, medications, radiology tests and decision making. Appropriate medical decision-making and tests are ordered as detailed in the plan of care above.  Coding/billing information is submitted for this visit based on complexity of care and/or time spent for the  visit.    CHIEF COMPLAINT:     Chief Complaint   Patient presents with    Diarrhea       HPI:   Vira Johnson is a 34 year old female who presents for a video visit.  Patient reports since Sunday 2/23/25 she was feeling fatigued most of the day.  Then she had significant amount of vomiting Sunday.  She had dark brown, watery stools.  She took Pepto Bismol since Sunday until Monday and noticed some dark/almost black stools.  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  She is overall feeling better today but noticed some chest tightness.  No wheezing/SOB.  Hx of anxiety.  Hx of RAD/needing albuterol with coughs.  She is requesting work note       Current Outpatient Medications   Medication Sig Dispense Refill    GVOKE HYPOPEN 2-PACK 1 MG/0.2ML Subcutaneous injection       ALPRAZolam 1 MG Oral Tab Take 1 tablet (1 mg total) by mouth 2 (two) times daily as needed for Anxiety or Sleep. 60 tablet 3    semaglutide (OZEMPIC, 0.25 OR 0.5 MG/DOSE,) 2 MG/3ML Subcutaneous Solution Pen-injector Inject 0.25 mg into the skin once a week. 3 mL 0    Continuous Glucose Sensor (DEXCOM G6 SENSOR) Does not apply Misc USE EVERY 10 DAYS AS DIRECTED 9 each 1    Insulin Disposable Pump (OMNIPOD 5 [id number] PODS GEN 5) Does not apply Misc 1 each daily. 1 UNDER THE SKIN DAILY 45 each 2    insulin lispro 100 UNIT/ML Injection Solution Use up to 120 units daily via insulin pump 40 mL 2    Glucose Blood (ONETOUCH VERIO) In Vitro Strip USE TO TEST BLOOD SUGAR UP TO FOUR TIMES DAILY 400 strip 0    Continuous Glucose  (DEXCOM G6 ) Does not apply Device 1 each daily. 1 each 0    Continuous Glucose Transmitter (DEXCOM G6 TRANSMITTER) Does not apply Misc Use 1 transmitter every 90 days 1 each 3    buPROPion  MG Oral Tablet 24 Hr Take 1 tablet (150 mg total) by mouth daily. 90 tablet 1    RYBELSUS 7 MG Oral Tab TAKE ONE TABLET BY MOUTH DAILY ON AN EMPTY STOMACH WITH NO MORE THAN 4 OZ. OF PLAIN  WATER. WAIT AT LEAST 30 MIN. BEFORE FOOD, BEVERAGE, OR OTHER MEDICATIONS. 90 tablet 1    insulin glargine (LANTUS SOLOSTAR) 100 UNIT/ML Subcutaneous Solution Pen-injector Inject 25 Units into the skin 2 (two) times daily. 50 mL 0    Insulin Pen Needle 32G X 4 MM Does not apply Misc Inject insulin 5 times a day when insulin pump is not working 500 each 0    insulin detemir (LEVEMIR FLEXPEN) 100 UNIT/ML Subcutaneous Solution Pen-injector Inject 25 Units into the skin every 12 (twelve) hours. 45 mL 0    atorvastatin 10 MG Oral Tab TAKE ONE TABLET BY MOUTH ONCE NIGHTLY 90 tablet 1    Phentermine HCl 37.5 MG Oral Tab Take 1 tablet (37.5 mg total) by mouth before breakfast. (Patient not taking: Reported on 2/3/2025) 30 tablet 2    montelukast 10 MG Oral Tab Take 1 tablet (10 mg total) by mouth nightly. 90 tablet 1    diclofenac 50 MG Oral Tab EC Take 1 tablet (50 mg total) by mouth 3 (three) times daily as needed. 45 tablet 0    Glucose Blood (ONETOUCH ULTRA) In Vitro Strip Check blood sugar up to 4x daily 400 strip 0    Insulin Lispro, 1 Unit Dial, 100 UNIT/ML Subcutaneous Solution Pen-injector INJECT UNDER THE SKIN BASED ON CARB RATIO AND CORRECTION FACTOR*MAX DAILY DOSE IS 50 UNITS* to use in case of pump failure 15 mL 0    Insulin Pen Needle 32G X 4 MM Does not apply Misc Inject insulin 5 times a day 500 each 0    Continuous Blood Gluc Sensor (DEXCOM G6 SENSOR) Does not apply Misc 1 each Every 10 days. 9 each 3    ONETOUCH VERIO REFLECT w/Device Does not apply Kit USE AS DIRECTED 1 kit 0    Insulin Disposable Pump (OMNIPOD 5 G6 POD, GEN 5,) Does not apply Misc 1 Device every other day. 45 each 0    Insulin Pen Needle (TRUEPLUS PEN NEEDLES) 32G X 4 MM Does not apply Misc Take 1 each by mouth in the morning and 1 each before bedtime. 100 each 2    Insulin Disposable Pump (OMNIPOD 5 G6 INTRO, GEN 5,) Does not apply Kit 1 each daily. 1 kit 0    Insulin Lispro, 1 Unit Dial, 100 UNIT/ML Subcutaneous Solution Pen-injector  INJECT UNDER THE SKIN BASED ON CARB RATIO AND CORRECTION FACTOR*MAX DAILY DOSE IS 50 UNITS* 15 mL 0    OneTouch Delica Lancets 33G Does not apply Misc Check blood sugar up to 4x daily 400 each 0    topiramate 50 MG Oral Tab Take 1 tablet (50 mg total) by mouth 2 (two) times daily. 180 tablet 1    Insulin Disposable Pump (OMNIPOD 5 G6 INTRO, GEN 5,) Does not apply Kit 1 each As Directed. 1 kit 0    Continuous Blood Gluc  (DEXCOM G6 ) Does not apply Device Use as directed with sensors and transmitter 1 Device 0    ONETOUCH DELICA PLUS [id number] Does not apply Misc USE AS DIRECTED 400 each 2      Past Medical History:    History of blood transfusion    2007    History of  delivery    X 3 [x ] Repeat CS at 36 weeks- 21 [ ] bilateral salpingectomy - IPA consent signed 2021     S/P repeat low transverse     Type 1 diabetes mellitus (HCC)      Past Surgical History:   Procedure Laterality Date          x3    Other surgical history Right 2011    R arm surgery after trauma         Social History     Socioeconomic History    Marital status: Single   Tobacco Use    Smoking status: Every Day     Current packs/day: 0.50     Average packs/day: 0.5 packs/day for 14.2 years (7.1 ttl pk-yrs)     Types: Cigarettes     Start date:     Smokeless tobacco: Never   Vaping Use    Vaping status: Former    Substances: Nicotine    Devices: Pre-filled pod   Substance and Sexual Activity    Alcohol use: Not Currently    Drug use: No    Sexual activity: Yes     Partners: Male   Other Topics Concern    Caffeine Concern Yes    Exercise Yes    Seat Belt Yes    Special Diet No    Stress Concern Yes    Weight Concern Yes         REVIEW OF SYSTEMS:   GENERAL:  improving PO, no fevers  LUNGS: See HPI  CARDIOVASCULAR: denies chest pain or palpitations   GI: see HPI    EXAM:   General: Alert, Well-appearing, and In no acute distress  Respiratory:   Speaking in full sentences comfortably  Normal work  of breathing  No cough during visit  Head: Normocephalic  Nose: No obvious nasal discharge.  Skin: No obvious rashes or lesions from what observed.     No results found for this or any previous visit (from the past 24 hours).    ASSESSMENT AND PLAN:   Vira Johnson is a 34 year old female who presents with symptoms that are consistent with    ASSESSMENT:   Encounter Diagnoses   Name Primary?    Gastroenteritis Yes    Chest tightness     Encounter to obtain excuse from work        PLAN: gastroenteritis resolving.  Discussed fluids and diet. RTW guidelines discussed.  Chest tightness, sore from vomiting? Vs RAD vs anxiety.  Patient very well appearing and not SOB.  Will have patient start albuterol.  Consider prn anxiety medication.  To IC/ED for worsening chest tightness.  Work note provided per patient request.     Meds as below.  See patient Instructions    Meds & Refills for this Visit:  Requested Prescriptions      No prescriptions requested or ordered in this encounter       Risks, benefits, and side effects of medication explained and discussed.    The patient indicates understanding of these issues and agrees to the plan.  The patient is asked to return if sx's persist or worsen.    Face to face time spent on Video Visit: 8  Total Time spent on visit including reviewing history, ordering labs/medication, patient examination and education: 15    Vira Johnson understands video visit evaluation is not a substitute for face-to-face examination or emergency care. Patient advised to go to ER or call 911 for worsening symptoms or acute distress.

## 2025-03-03 NOTE — TELEPHONE ENCOUNTER
Denied ozempic PA was done prior to patient t/f trulicity, victoza and rybelsus - patient has since t/f all three  New expedited PA completed via [email or web address]:  Case Id  [id number]  Reference Id  [id number]  Priority  Priority: Expedited    MC sent updating patient

## 2025-03-03 NOTE — TELEPHONE ENCOUNTER
Per [employer] for: Ozempic 0.25 mg or 0.5 mg (2 mg/3 mL) subcutaneous pen injector  The case has been Approved from [id number] to [id number]    MC sent updating patient

## 2025-03-05 ENCOUNTER — TELEPHONE (OUTPATIENT)
Dept: ENDOCRINOLOGY CLINIC | Facility: CLINIC | Age: 35
End: 2025-03-05

## 2025-03-05 RX ORDER — INSULIN GLARGINE 100 [IU]/ML
25 INJECTION, SOLUTION SUBCUTANEOUS 2 TIMES DAILY
Qty: 45 ML | Refills: 1 | Status: CANCELLED | OUTPATIENT
Start: 2025-03-05

## 2025-03-05 NOTE — TELEPHONE ENCOUNTER
Dr. Bello,     Please review pended script for Lantus 25 units BID    Endocrine refill protocol for rapid acting, regular, intermediate, and mixed insulin:    Protocol Criteria:  FAILED Reason: Elevated A1C    If all below requirements are met, send a 90-day supply with 1 refill per provider protocol.    Verify appointment with Endocrinology completed in the last 6 months or scheduled in the next 3 months.  Verify A1C has been completed within the last 6 months and is below 8.5%    -May substitute prescriptions for Novolog and Humalog unless documented allergy (pens and vials) at the same dose and concentration per insurance preference and provider protocol.   -May substitute prescriptions for Novolin R and Humulin R unless documented allergy (pens and vials) at the same dose and concentration per insurance preference and provider protocol.   -May substitute prescriptions for Novolin N and Humulin N unless documented allergy (pens and vials) at the same dose and concentration per insurance preference and provider protocol.   -May substitute prescriptions for Humulin and Novolin 70/30 insulin unless documented allergy at the same dose and concentration per insurance preference and provider protocol.    Last completed office visit: 8/5/2024 Barbi Bello MD   Next scheduled Follow up:   Future Appointments   Date Time Provider Department Center   3/10/2025  8:20 AM Barbi Bello MD [facility]      Last A1C result: 10.9% done 12/12/2024.

## 2025-03-05 NOTE — TELEPHONE ENCOUNTER
Patient is requesting for refill is out of the medicine insulin lantus solostar please follow up

## 2025-03-10 ENCOUNTER — OFFICE VISIT (OUTPATIENT)
Dept: ENDOCRINOLOGY CLINIC | Facility: CLINIC | Age: 35
End: 2025-03-10

## 2025-03-10 VITALS
SYSTOLIC BLOOD PRESSURE: 133 MMHG | BODY MASS INDEX: 41.77 KG/M2 | DIASTOLIC BLOOD PRESSURE: 78 MMHG | HEART RATE: 81 BPM | WEIGHT: 227 LBS | HEIGHT: 62 IN

## 2025-03-10 DIAGNOSIS — E78.00 HYPERCHOLESTEROLEMIA: ICD-10-CM

## 2025-03-10 DIAGNOSIS — E11.65 TYPE 2 DIABETES MELLITUS WITH HYPERGLYCEMIA, WITH LONG-TERM CURRENT USE OF INSULIN (HCC): ICD-10-CM

## 2025-03-10 DIAGNOSIS — E10.65 UNCONTROLLED TYPE 1 DIABETES MELLITUS WITH HYPERGLYCEMIA (HCC): Primary | ICD-10-CM

## 2025-03-10 DIAGNOSIS — E88.819 INSULIN RESISTANCE: ICD-10-CM

## 2025-03-10 DIAGNOSIS — Z79.4 TYPE 2 DIABETES MELLITUS WITH HYPERGLYCEMIA, WITH LONG-TERM CURRENT USE OF INSULIN (HCC): ICD-10-CM

## 2025-03-10 LAB
AMB EXT GMI: 9.2 %
GLUCOSE BLOOD: 345
HEMOGLOBIN A1C: 10.9 % (ref 4.3–5.6)
TEST STRIP EXPIRATION DATE: NORMAL DATE
TEST STRIP LOT #: NORMAL NUMERIC

## 2025-03-10 RX ORDER — PROCHLORPERAZINE 25 MG/1
SUPPOSITORY RECTAL
Qty: 9 EACH | Refills: 1 | Status: SHIPPED | OUTPATIENT
Start: 2025-03-10

## 2025-03-10 RX ORDER — PROCHLORPERAZINE 25 MG/1
SUPPOSITORY RECTAL
Qty: 1 EACH | Refills: 3 | Status: SHIPPED | OUTPATIENT
Start: 2025-03-10

## 2025-03-10 NOTE — PROGRESS NOTES
Name: Vira Johnson  Date: 8/05/24    Referring Physician: No ref. provider found    HISTORY OF PRESENT ILLNESS   Vira Johnson is a 34 year old female who presents for evaluation and management of type 1 diabetes. She was diagnosed with diabetes many years ago.     At the last visit, I had increased the Rybelsus from 3 to 7mg qday. She is not doing well on this medication.       Blood Glucose Today: 200  HbA1C or glycohemoglobin is 10.9 today (and it was 10.5 on 8/05/24 and it was 11.3 on 1/16/24 and it was 10.6 on 9/18/23)  Type 1 or Type 2?: Type 1 with insulin resistance  Medications for DM: Omnipod; Rybelsus 7mg  Fasting- 150  Before meals, 250s   Dexcom Clarity  -----------------------------  Elizabeth Constantino    YOB: 1990    Generated at: Mon, Mar 10, 2025 9:06 AM CDT    Reporting period: Sun Feb 9, 2025 - Mon Mar 10, 2025  -----------------------------  Glucose Details    Average glucose: 246 mg/dL    GMI: 9.2%    Standard deviation: 66 mg/dL    Coefficient of Variation: 27.0%  -----------------------------  Time in Range    Very High: 49%    High: 35%    In Range: 16%    Low: 0%    Very Low: 0%     Target Range   mg/dL     -----------------------------  Sensor usage    Days with data: 16/30    Time active: 86%    Avg. calibrations per day: 0.0     Episodes of hypoglycemia: occasionally in the middle of the night    Dietary compliance: she is trying to eat healthier    Exercise: she is trying to exercise more    Polyuria/polydipsia: none    Blurred vision: none    Flu Vaccine This Season: yes    Covid Vaccine: yes    REVIEW OF SYSTEMS  CV: Cardiovascular disease present: none         Hypertension present: not at goal, not on meds         Hyperlipidemia present: at goal, not on meds (8/05/24)         Peripheral Vascular Disease present: none    : Nephropathy present: MAC: none (2/12/24) Creatinine:- 0.78 (9/29/24)     Neuro: Neuropathy present: none    Eyes: Diabetic  retinopathy present: unknown            Most recent visit to eye doctor in last 12 months: she has to make an appt    Skin: Infection or ulceration: none    Osteoporosis/ Osteopenia: none Vitamin D: 28.5 (5/10/23)    Thyroid disease: none TSH: 0.524 (9/29/24)    Medications:     Current Outpatient Medications:     Continuous Glucose Transmitter (DEXCOM G6 TRANSMITTER) Does not apply Misc, Use 1 transmitter every 90 days, Disp: 1 each, Rfl: 3    Continuous Glucose Sensor (DEXCOM G6 SENSOR) Does not apply Misc, USE EVERY 10 DAYS AS DIRECTED, Disp: 9 each, Rfl: 1    GVOKE HYPOPEN 2-PACK 1 MG/0.2ML Subcutaneous injection, , Disp: , Rfl:     albuterol 108 (90 Base) MCG/ACT Inhalation Aero Soln, Inhale 2 puffs into the lungs every 4 (four) hours as needed for Shortness of Breath (chest tightness)., Disp: 1 each, Rfl: 0    ALPRAZolam 1 MG Oral Tab, Take 1 tablet (1 mg total) by mouth 2 (two) times daily as needed for Anxiety or Sleep., Disp: 60 tablet, Rfl: 3    semaglutide (OZEMPIC, 0.25 OR 0.5 MG/DOSE,) 2 MG/3ML Subcutaneous Solution Pen-injector, Inject 0.25 mg into the skin once a week., Disp: 3 mL, Rfl: 0    Insulin Disposable Pump (OMNIPOD 5 [id number] PODS GEN 5) Does not apply Misc, 1 each daily. 1 UNDER THE SKIN DAILY, Disp: 45 each, Rfl: 2    insulin lispro 100 UNIT/ML Injection Solution, Use up to 120 units daily via insulin pump, Disp: 40 mL, Rfl: 2    Glucose Blood (ONETOUCH VERIO) In Vitro Strip, USE TO TEST BLOOD SUGAR UP TO FOUR TIMES DAILY, Disp: 400 strip, Rfl: 0    Continuous Glucose  (DEXCOM G6 ) Does not apply Device, 1 each daily., Disp: 1 each, Rfl: 0    buPROPion  MG Oral Tablet 24 Hr, Take 1 tablet (150 mg total) by mouth daily., Disp: 90 tablet, Rfl: 1    RYBELSUS 7 MG Oral Tab, TAKE ONE TABLET BY MOUTH DAILY ON AN EMPTY STOMACH WITH NO MORE THAN 4 OZ. OF PLAIN WATER. WAIT AT LEAST 30 MIN. BEFORE FOOD, BEVERAGE, OR OTHER MEDICATIONS., Disp: 90 tablet, Rfl: 1    insulin glargine  (LANTUS SOLOSTAR) 100 UNIT/ML Subcutaneous Solution Pen-injector, Inject 25 Units into the skin 2 (two) times daily., Disp: 50 mL, Rfl: 0    Insulin Pen Needle 32G X 4 MM Does not apply Misc, Inject insulin 5 times a day when insulin pump is not working, Disp: 500 each, Rfl: 0    insulin detemir (LEVEMIR FLEXPEN) 100 UNIT/ML Subcutaneous Solution Pen-injector, Inject 25 Units into the skin every 12 (twelve) hours. (Patient not taking: Reported on 3/10/2025), Disp: 45 mL, Rfl: 0    atorvastatin 10 MG Oral Tab, TAKE ONE TABLET BY MOUTH ONCE NIGHTLY, Disp: 90 tablet, Rfl: 1    Phentermine HCl 37.5 MG Oral Tab, Take 1 tablet (37.5 mg total) by mouth before breakfast. (Patient not taking: Reported on 2/3/2025), Disp: 30 tablet, Rfl: 2    montelukast 10 MG Oral Tab, Take 1 tablet (10 mg total) by mouth nightly., Disp: 90 tablet, Rfl: 1    diclofenac 50 MG Oral Tab EC, Take 1 tablet (50 mg total) by mouth 3 (three) times daily as needed., Disp: 45 tablet, Rfl: 0    Glucose Blood (ONETOUCH ULTRA) In Vitro Strip, Check blood sugar up to 4x daily, Disp: 400 strip, Rfl: 0    Insulin Lispro, 1 Unit Dial, 100 UNIT/ML Subcutaneous Solution Pen-injector, INJECT UNDER THE SKIN BASED ON CARB RATIO AND CORRECTION FACTOR*MAX DAILY DOSE IS 50 UNITS* to use in case of pump failure, Disp: 15 mL, Rfl: 0    Insulin Pen Needle 32G X 4 MM Does not apply Misc, Inject insulin 5 times a day, Disp: 500 each, Rfl: 0    Continuous Blood Gluc Sensor (DEXCOM G6 SENSOR) Does not apply Misc, 1 each Every 10 days., Disp: 9 each, Rfl: 3    ONETOUCH VERIO REFLECT w/Device Does not apply Kit, USE AS DIRECTED, Disp: 1 kit, Rfl: 0    Insulin Disposable Pump (OMNIPOD 5 G6 POD, GEN 5,) Does not apply Misc, 1 Device every other day., Disp: 45 each, Rfl: 0    Insulin Pen Needle (TRUEPLUS PEN NEEDLES) 32G X 4 MM Does not apply Misc, Take 1 each by mouth in the morning and 1 each before bedtime., Disp: 100 each, Rfl: 2    Insulin Disposable Pump (OMNIPOD 5 G6  INTRO, GEN 5,) Does not apply Kit, 1 each daily., Disp: 1 kit, Rfl: 0    Insulin Lispro, 1 Unit Dial, 100 UNIT/ML Subcutaneous Solution Pen-injector, INJECT UNDER THE SKIN BASED ON CARB RATIO AND CORRECTION FACTOR*MAX DAILY DOSE IS 50 UNITS*, Disp: 15 mL, Rfl: 0    OneTouch Delica Lancets 33G Does not apply Misc, Check blood sugar up to 4x daily, Disp: 400 each, Rfl: 0    topiramate 50 MG Oral Tab, Take 1 tablet (50 mg total) by mouth 2 (two) times daily., Disp: 180 tablet, Rfl: 1    Insulin Disposable Pump (OMNIPOD 5 G6 INTRO, GEN 5,) Does not apply Kit, 1 each As Directed., Disp: 1 kit, Rfl: 0    Continuous Blood Gluc  (DEXCOM G6 ) Does not apply Device, Use as directed with sensors and transmitter, Disp: 1 Device, Rfl: 0    ONETOUCH DELICA PLUS [id number] Does not apply Misc, USE AS DIRECTED, Disp: 400 each, Rfl: 2     Allergies:   No Known Allergies    Social History:   Social History     Socioeconomic History    Marital status: Single   Tobacco Use    Smoking status: Every Day     Current packs/day: 0.50     Average packs/day: 0.5 packs/day for 14.2 years (7.1 ttl pk-yrs)     Types: Cigarettes     Start date:     Smokeless tobacco: Never   Vaping Use    Vaping status: Former    Substances: Nicotine    Devices: Pre-filled pod   Substance and Sexual Activity    Alcohol use: Not Currently    Drug use: No    Sexual activity: Yes     Partners: Male   Other Topics Concern    Caffeine Concern Yes    Exercise Yes    Seat Belt Yes    Special Diet No    Stress Concern Yes    Weight Concern Yes       Medical History:   Past Medical History:    History of blood transfusion        History of  delivery    X 3 [x ] Repeat CS at 36 weeks- 21 [ ] bilateral salpingectomy - IPA consent signed 2021     S/P repeat low transverse     Type 1 diabetes mellitus (HCC)       Surgical history:   Past Surgical History:   Procedure Laterality Date          x3    Other surgical  history Right 2011    R arm surgery after trauma         PHYSICAL EXAM  Vitals:    03/10/25 0852   BP: 133/78   Pulse: 81   Weight: 227 lb (103 kg)   Height: 5' 2\" (1.575 m)     General Appearance:  alert, well developed, in no acute distress  Eyes:  normal conjunctivae, sclera., normal sclera and normal pupils  Ears/Nose/Mouth/Throat/Neck:  no palpable thyroid nodules or cervical lymphadenopathy  Neck: Trachea midline: Normal  Psychiatric:  oriented to time, self, and place  Nutritional:  no abnormal weight gain or loss    Lab Data:   Lab Results   Component Value Date     (H) 12/12/2024    A1C 10.9 (H) 12/12/2024     Lab Results   Component Value Date     (H) 12/12/2024    BUN 13 12/12/2024    BUNCREA 16.0 08/05/2024    CREATSERUM 0.89 12/12/2024    ANIONGAP 9 12/12/2024     07/12/2017    GFRNAA 120 07/07/2022    GFRAA 138 07/07/2022    CA 9.2 12/12/2024    OSMOCALC 296 (H) 12/12/2024    ALKPHO 120 (H) 12/12/2024    AST 14 12/12/2024    ALT 13 12/12/2024    BILT 0.6 12/12/2024    TP 7.1 12/12/2024    ALB 3.9 12/12/2024    GLOBULIN 3.2 12/12/2024     (L) 12/12/2024    K 4.3 12/12/2024     12/12/2024    CO2 24.0 12/12/2024     Lab Results   Component Value Date    CHOLEST 170 08/05/2024    TRIG 99 08/05/2024    HDL 46 08/05/2024     (H) 08/05/2024    VLDL 17 08/05/2024    TCHDLRATIO 3.22 03/16/2018    NONHDLC 124 08/05/2024     Lab Results   Component Value Date    MALBP 0.64 02/12/2024    CREUR 116.00 02/12/2024         ASSESSMENT/PLAN:  This is a 34 year-old woman here for evaluation and management of uncontrolled type 2 diabetes. We discussed the ABCs of DM.     1.) Hyperglycemia Management- We discussed the importance of glycemic control to prevent complications of diabetes. We discussed the importance of SBGM. I offered and provided patient education materials and offered a blood glucose log book.   - Reorder patient pods  - She is not using the Omnipod anymore. She is  using long-acting and short-acting insulin  - As soon as she has her Omnipod, she will contact us so that we can help her with her settings.   - increase the Ozempic to 0.5mg qweekly    2.) Management of Diabetic Complications- We discussed the complications of diabetes include retinopathy, neuropathy, nephropathy and cardiovascular disease.   - Ophtho- she will make appt  - Flu and Covid vaccine- up to date  - BP- not at goal, will monitor  - Lipids- not at goal, check again in 3 months  - MAC- at goal, check again in 3 months  - CMP- check again in 3 months  - Neuropathy- none  - CAD- none    3.) Lifestyle Management for Diabetes- We discussed importance of a low CHO diet, and recommend 45gm per meal or 135gm per day. We discussed the importance of trying to follow a Mediterranean diet, with an emphasis on vegetables at every meal, with lots whole grains, and protein from either plant-based sources, or poultry and fish.   - Diet- She will try and eat healthy  - Exercise- she will try and exercise    Return to clinic in 3 months    Prior to this encounter, I spent over 15 minutes with preparing for the visit, including reviewing documents from other specialties as well as from PCP and going over test results. During the face to face encounter, I spent an additional 15 minutes which were determined for follow-up. Greater than 50% of the time was spent in counseling, anticipatory guidance, and coordination of care. Patient concerns were answered to the best of my knowledge.         3/10/25  Barbi Bello MD

## 2025-03-10 NOTE — PROGRESS NOTES
-----------------------------  Dexcom Clarity  -----------------------------  Elizabeth LISSETTE Constantino    YOB: 1990    Generated at: Mon, Mar 10, 2025 9:06 AM CDT    Reporting period: Sun Feb 9, 2025 - Mon Mar 10, 2025  -----------------------------  Glucose Details    Average glucose: 246 mg/dL    GMI: 9.2%    Standard deviation: 66 mg/dL    Coefficient of Variation: 27.0%  -----------------------------  Time in Range    Very High: 49%    High: 35%    In Range: 16%    Low: 0%    Very Low: 0%    Target Range   mg/dL    -----------------------------  Sensor usage    Days with data: 16/30    Time active: 86%    Avg. calibrations per day: 0.0

## 2025-03-13 DIAGNOSIS — E10.65 UNCONTROLLED TYPE 1 DIABETES MELLITUS WITH HYPERGLYCEMIA (HCC): Primary | ICD-10-CM

## 2025-03-13 RX ORDER — INSULIN GLARGINE 100 [IU]/ML
25 INJECTION, SOLUTION SUBCUTANEOUS 2 TIMES DAILY
Qty: 50 ML | Refills: 0 | Status: SHIPPED | OUTPATIENT
Start: 2025-03-13

## 2025-03-13 NOTE — TELEPHONE ENCOUNTER
Per Dr. Bello: ok to send lantus pens for 25 units BID  RX for lantus sent - see refill request dtd 3/13/25  Spoke to patient to update

## 2025-03-13 NOTE — TELEPHONE ENCOUNTER
Patient is following up is out of the medicine it is urgent and please call patient when medicine is sent thanks

## 2025-03-14 ENCOUNTER — TELEPHONE (OUTPATIENT)
Dept: ENDOCRINOLOGY CLINIC | Facility: CLINIC | Age: 35
End: 2025-03-14

## 2025-03-14 NOTE — TELEPHONE ENCOUNTER
Fax dated 03/12/2025--    Drug: Levemir FlexPen injection 3ML  Sig: Administer 25 units under the skin every 12 hours    (Not seen in current med list)    Message:    Not available from manufacture. Please send new erx for an alternative.

## 2025-03-15 NOTE — TELEPHONE ENCOUNTER
Spoke with Kaylene from [facility], she states that the alternatives for the Levemir is Lantus or Semglee, message forwarded to provider, please advise

## 2025-03-20 RX ORDER — INSULIN GLARGINE 100 [IU]/ML
25 INJECTION, SOLUTION SUBCUTANEOUS 2 TIMES DAILY
Qty: 30 ML | Refills: 1 | Status: SHIPPED | OUTPATIENT
Start: 2025-03-20

## 2025-03-22 PROBLEM — E11.65 TYPE 2 DIABETES MELLITUS WITH HYPERGLYCEMIA, WITH LONG-TERM CURRENT USE OF INSULIN (HCC): Status: ACTIVE | Noted: 2025-03-22

## 2025-03-22 PROBLEM — Z79.4 TYPE 2 DIABETES MELLITUS WITH HYPERGLYCEMIA, WITH LONG-TERM CURRENT USE OF INSULIN (HCC): Status: ACTIVE | Noted: 2025-03-22

## 2025-03-22 RX ORDER — INSULIN GLARGINE 100 [IU]/ML
25 INJECTION, SOLUTION SUBCUTANEOUS 2 TIMES DAILY
Qty: 50 ML | Refills: 1 | Status: SHIPPED | OUTPATIENT
Start: 2025-03-22

## 2025-03-22 RX ORDER — SEMAGLUTIDE 0.68 MG/ML
0.5 INJECTION, SOLUTION SUBCUTANEOUS WEEKLY
Qty: 9 ML | Refills: 0 | Status: SHIPPED | OUTPATIENT
Start: 2025-03-22 | End: 2025-06-20

## 2025-04-09 ENCOUNTER — TELEPHONE (OUTPATIENT)
Dept: ENDOCRINOLOGY CLINIC | Facility: CLINIC | Age: 35
End: 2025-04-09

## 2025-04-09 NOTE — TELEPHONE ENCOUNTER
Medications - Current[1]    Continuous Glucose Sensor (DEXCOM G6 SENSOR) Does not apply Misc, USE EVERY 10 DAYS AS DIRECTED, Disp: 9 each, Rfl: 1  PA needed       [1]   Current Outpatient Medications:     insulin glargine (LANTUS SOLOSTAR) 100 UNIT/ML Subcutaneous Solution Pen-injector, Inject 25 Units into the skin 2 (two) times daily., Disp: 50 mL, Rfl: 1    semaglutide (OZEMPIC, 0.25 OR 0.5 MG/DOSE,) 2 MG/3ML Subcutaneous Solution Pen-injector, Inject 0.5 mg into the skin once a week., Disp: 9 mL, Rfl: 0    insulin glargine (LANTUS SOLOSTAR) 100 UNIT/ML Subcutaneous Solution Pen-injector, Inject 25 Units into the skin 2 (two) times daily., Disp: 30 mL, Rfl: 1    insulin glargine (LANTUS SOLOSTAR) 100 UNIT/ML Subcutaneous Solution Pen-injector, Inject 25 Units into the skin 2 (two) times daily., Disp: 50 mL, Rfl: 0    Continuous Glucose Transmitter (DEXCOM G6 TRANSMITTER) Does not apply Misc, Use 1 transmitter every 90 days, Disp: 1 each, Rfl: 3    Continuous Glucose Sensor (DEXCOM G6 SENSOR) Does not apply Misc, USE EVERY 10 DAYS AS DIRECTED, Disp: 9 each, Rfl: 1    GVOKE HYPOPEN 2-PACK 1 MG/0.2ML Subcutaneous injection, , Disp: , Rfl:     albuterol 108 (90 Base) MCG/ACT Inhalation Aero Soln, Inhale 2 puffs into the lungs every 4 (four) hours as needed for Shortness of Breath (chest tightness)., Disp: 1 each, Rfl: 0    ALPRAZolam 1 MG Oral Tab, Take 1 tablet (1 mg total) by mouth 2 (two) times daily as needed for Anxiety or Sleep., Disp: 60 tablet, Rfl: 3    semaglutide (OZEMPIC, 0.25 OR 0.5 MG/DOSE,) 2 MG/3ML Subcutaneous Solution Pen-injector, Inject 0.25 mg into the skin once a week., Disp: 3 mL, Rfl: 0    Insulin Disposable Pump (OMNIPOD 5 [id number] PODS GEN 5) Does not apply Misc, 1 each daily. 1 UNDER THE SKIN DAILY, Disp: 45 each, Rfl: 2    insulin lispro 100 UNIT/ML Injection Solution, Use up to 120 units daily via insulin pump, Disp: 40 mL, Rfl: 2    Glucose Blood (ONETOUCH VERIO) In Vitro Strip, USE TO  TEST BLOOD SUGAR UP TO FOUR TIMES DAILY, Disp: 400 strip, Rfl: 0    Continuous Glucose  (DEXCOM G6 ) Does not apply Device, 1 each daily., Disp: 1 each, Rfl: 0    buPROPion  MG Oral Tablet 24 Hr, Take 1 tablet (150 mg total) by mouth daily., Disp: 90 tablet, Rfl: 1    RYBELSUS 7 MG Oral Tab, TAKE ONE TABLET BY MOUTH DAILY ON AN EMPTY STOMACH WITH NO MORE THAN 4 OZ. OF PLAIN WATER. WAIT AT LEAST 30 MIN. BEFORE FOOD, BEVERAGE, OR OTHER MEDICATIONS., Disp: 90 tablet, Rfl: 1    Insulin Pen Needle 32G X 4 MM Does not apply Misc, Inject insulin 5 times a day when insulin pump is not working, Disp: 500 each, Rfl: 0    atorvastatin 10 MG Oral Tab, TAKE ONE TABLET BY MOUTH ONCE NIGHTLY, Disp: 90 tablet, Rfl: 1    Phentermine HCl 37.5 MG Oral Tab, Take 1 tablet (37.5 mg total) by mouth before breakfast. (Patient not taking: Reported on 2/3/2025), Disp: 30 tablet, Rfl: 2    montelukast 10 MG Oral Tab, Take 1 tablet (10 mg total) by mouth nightly., Disp: 90 tablet, Rfl: 1    diclofenac 50 MG Oral Tab EC, Take 1 tablet (50 mg total) by mouth 3 (three) times daily as needed., Disp: 45 tablet, Rfl: 0    Glucose Blood (ONETOUCH ULTRA) In Vitro Strip, Check blood sugar up to 4x daily, Disp: 400 strip, Rfl: 0    Insulin Lispro, 1 Unit Dial, 100 UNIT/ML Subcutaneous Solution Pen-injector, INJECT UNDER THE SKIN BASED ON CARB RATIO AND CORRECTION FACTOR*MAX DAILY DOSE IS 50 UNITS* to use in case of pump failure, Disp: 15 mL, Rfl: 0    Insulin Pen Needle 32G X 4 MM Does not apply Misc, Inject insulin 5 times a day, Disp: 500 each, Rfl: 0    Continuous Blood Gluc Sensor (DEXCOM G6 SENSOR) Does not apply Misc, 1 each Every 10 days., Disp: 9 each, Rfl: 3    ONETOUCH VERIO REFLECT w/Device Does not apply Kit, USE AS DIRECTED, Disp: 1 kit, Rfl: 0    Insulin Disposable Pump (OMNIPOD 5 G6 POD, GEN 5,) Does not apply Misc, 1 Device every other day., Disp: 45 each, Rfl: 0    Insulin Pen Needle (TRUEPLUS PEN NEEDLES) 32G X 4  MM Does not apply Misc, Take 1 each by mouth in the morning and 1 each before bedtime., Disp: 100 each, Rfl: 2    Insulin Disposable Pump (OMNIPOD 5 G6 INTRO, GEN 5,) Does not apply Kit, 1 each daily., Disp: 1 kit, Rfl: 0    Insulin Lispro, 1 Unit Dial, 100 UNIT/ML Subcutaneous Solution Pen-injector, INJECT UNDER THE SKIN BASED ON CARB RATIO AND CORRECTION FACTOR*MAX DAILY DOSE IS 50 UNITS*, Disp: 15 mL, Rfl: 0    OneTouch Delica Lancets 33G Does not apply Misc, Check blood sugar up to 4x daily, Disp: 400 each, Rfl: 0    topiramate 50 MG Oral Tab, Take 1 tablet (50 mg total) by mouth 2 (two) times daily., Disp: 180 tablet, Rfl: 1    Insulin Disposable Pump (OMNIPOD 5 G6 INTRO, GEN 5,) Does not apply Kit, 1 each As Directed., Disp: 1 kit, Rfl: 0    Continuous Blood Gluc  (DEXCOM G6 ) Does not apply Device, Use as directed with sensors and transmitter, Disp: 1 Device, Rfl: 0    ONETOUCH DELICA PLUS [id number] Does not apply Misc, USE AS DIRECTED, Disp: 400 each, Rfl: 2

## 2025-04-10 DIAGNOSIS — E11.65 TYPE 2 DIABETES MELLITUS WITH HYPERGLYCEMIA, WITH LONG-TERM CURRENT USE OF INSULIN (HCC): ICD-10-CM

## 2025-04-10 DIAGNOSIS — Z79.4 TYPE 2 DIABETES MELLITUS WITH HYPERGLYCEMIA, WITH LONG-TERM CURRENT USE OF INSULIN (HCC): ICD-10-CM

## 2025-04-11 DIAGNOSIS — E10.65 UNCONTROLLED TYPE 1 DIABETES MELLITUS WITH HYPERGLYCEMIA (HCC): ICD-10-CM

## 2025-04-11 RX ORDER — SEMAGLUTIDE 1.34 MG/ML
1 INJECTION, SOLUTION SUBCUTANEOUS WEEKLY
Qty: 9 ML | Refills: 1 | Status: SHIPPED | OUTPATIENT
Start: 2025-04-11

## 2025-04-11 RX ORDER — SEMAGLUTIDE 0.68 MG/ML
0.5 INJECTION, SOLUTION SUBCUTANEOUS WEEKLY
Qty: 9 ML | Refills: 0 | OUTPATIENT
Start: 2025-04-11 | End: 2025-07-10

## 2025-04-11 RX ORDER — INSULIN GLARGINE 100 [IU]/ML
25 INJECTION, SOLUTION SUBCUTANEOUS 2 TIMES DAILY
Qty: 50 ML | Refills: 1 | Status: CANCELLED | OUTPATIENT
Start: 2025-04-11

## 2025-04-11 NOTE — TELEPHONE ENCOUNTER
Approved    Prior authorization approved  Payer: [employer] [facility] Case ID: [id number]    [phone]  Note from payer: The case has been Approved [id number]  [id number]  Approval Details    Authorized from January 11, 2025 to April 11, 2026  Electronic appeal: Not supported  View History    Yipit message sent to patient.

## 2025-04-11 NOTE — TELEPHONE ENCOUNTER
Patient Comment: \"You refilled my ozempic but frank already done .25 and .5 i want to do the next higher dose. \"    Please advise.

## 2025-04-26 DIAGNOSIS — E10.65 UNCONTROLLED TYPE 1 DIABETES MELLITUS WITH HYPERGLYCEMIA (HCC): ICD-10-CM

## 2025-05-01 DIAGNOSIS — E11.65 TYPE 2 DIABETES MELLITUS WITH HYPERGLYCEMIA, WITH LONG-TERM CURRENT USE OF INSULIN (HCC): ICD-10-CM

## 2025-05-01 DIAGNOSIS — Z79.4 TYPE 2 DIABETES MELLITUS WITH HYPERGLYCEMIA, WITH LONG-TERM CURRENT USE OF INSULIN (HCC): ICD-10-CM

## 2025-05-01 RX ORDER — SEMAGLUTIDE 1.34 MG/ML
1 INJECTION, SOLUTION SUBCUTANEOUS WEEKLY
Qty: 9 ML | Refills: 1 | Status: SHIPPED | OUTPATIENT
Start: 2025-05-01

## 2025-05-01 NOTE — TELEPHONE ENCOUNTER
Endocrine Refill protocol for oral and injectable diabetic medications    Protocol Criteria:  FAILED  Reason: Elevated A1C    If all below requirements are met, send a 90-day supply with 1 refill per provider protocol.    Verify appointment with Endocrinology completed in the last 6 months or scheduled in the next 3 months.  Verify A1C has been completed within the last 6 months and is below 8.5%     Last completed office visit: 3/10/2025 Barbi Bello MD   Next scheduled Follow up: No future appointments.   Last A1c result: Last A1c value was 10.9% done 3/10/2025.

## 2025-05-01 NOTE — TELEPHONE ENCOUNTER
semaglutide (OZEMPIC, 1 MG/DOSE,) 4 MG/3ML Subcutaneous Solution Pen-injector Inject 1 mg into the skin once a week. 9 mL 1

## 2025-05-07 RX ORDER — SEMAGLUTIDE 0.68 MG/ML
INJECTION, SOLUTION SUBCUTANEOUS
Qty: 3 ML | Refills: 0 | OUTPATIENT
Start: 2025-05-07

## 2025-05-12 DIAGNOSIS — E11.65 TYPE 2 DIABETES MELLITUS WITH HYPERGLYCEMIA, WITH LONG-TERM CURRENT USE OF INSULIN (HCC): ICD-10-CM

## 2025-05-12 DIAGNOSIS — Z79.4 TYPE 2 DIABETES MELLITUS WITH HYPERGLYCEMIA, WITH LONG-TERM CURRENT USE OF INSULIN (HCC): ICD-10-CM

## 2025-05-12 RX ORDER — BLOOD SUGAR DIAGNOSTIC
STRIP MISCELLANEOUS
Qty: 400 STRIP | Refills: 1 | Status: SHIPPED | OUTPATIENT
Start: 2025-05-12

## 2025-05-12 NOTE — TELEPHONE ENCOUNTER
Endocrine Refill protocol for Glucose testing supplies     Protocol Criteria: PASSED Reason: N/A    If below requirement is met, send a 90-day supply with 1 refill per provider protocol.    Verify appointment with Endocrinology completed in the last 6 months or scheduled in the next 3 months.    Last completed office visit:3/10/2025 Barbi Bello MD   Last completed telemed visit: Visit date not found  Next scheduled Follow up: No future appointments.

## 2025-05-24 ENCOUNTER — TELEMEDICINE (OUTPATIENT)
Dept: TELEHEALTH | Age: 35
End: 2025-05-24
Payer: MEDICAID

## 2025-05-24 DIAGNOSIS — L02.91 ABSCESS: Primary | ICD-10-CM

## 2025-05-24 RX ORDER — FLUCONAZOLE 150 MG/1
150 TABLET ORAL ONCE
Qty: 2 TABLET | Refills: 0 | Status: SHIPPED | OUTPATIENT
Start: 2025-05-24 | End: 2025-05-24

## 2025-05-24 RX ORDER — CEPHALEXIN 500 MG/1
500 CAPSULE ORAL 4 TIMES DAILY
Qty: 28 CAPSULE | Refills: 0 | Status: SHIPPED | OUTPATIENT
Start: 2025-05-24 | End: 2025-05-31

## 2025-05-24 NOTE — PROGRESS NOTES
Virtual/Telephone Check-In    Vira Johnson verbally consents to a Virtual/Telephone Check-In service on 05/24/25.  Patient has been referred to the [facility] website at [email or web address] to review the yearly Consent to Treat document.  Patient understands and accepts financial responsibility for any deductible, co-insurance and/or co-pays associated with this service.       Telehealth Verbal Consent   I conducted a telehealth visit with Vira Johnson today, 05/24/25, which was completed using two-way, real-time interactive audio and video communication. This has been done in good og to provide continuity of care in the best interest of the provider-patient relationship, due to the COVID -19 public health crisis/national emergency where restrictions of face-to-face office visits are ongoing. Every conscious effort was taken to allow for sufficient and adequate time to complete the visit.  The patient was made aware of the limitations of the telehealth visit, including treatment limitations as no physical exam could be performed.  The patient was advised to call 911 or to go to the ER in case there was an emergency.  The patient was also advised of the potential privacy & security concerns related to the telehealth platform.   The patient was made aware of where to find [facility]'s notice of privacy practices, telehealth consent form and other related consent forms and documents.  which are located on the [facility] website. The patient verbally agreed to telehealth consent form, related consents and the risks discussed.    Lastly, the patient confirmed that they were in [place].   Included in this visit, time may have been spent reviewing labs, medications, radiology tests and decision making. Appropriate medical decision-making and tests are ordered as detailed in the plan of care above.  Coding/billing information is submitted for this visit based on complexity of care and/or time spent for the  visit.    CHIEF COMPLAINT:  Chief Complaint   Patient presents with    Abscess       HPI:  Vira Johnson is a 34 year old female who presents for a video visit.  Patient reports abscess to left inguinal area x3 days. Reports did spontaneously drainage yesterday, but still a bit painful. Reports does not want to go to IC, but feels needs abx. Pt. Also reports that sugars have been elevated (hx of DM type 1).  Reports having vaginal yeast infection symptoms as well, reports does not tolerate vaginal creams and requesting diflucan.  Patient denies fevers, chills, aches.  Current Medications[1]  Past Medical History[2]  Past Surgical History[3]    Short Social Hx on File[4]     REVIEW OF SYSTEMS:  GENERAL: ok appetite  SKIN: no rashes or abnormal skin lesions  HEENT: See HPI  LUNGS: denies shortness of breath or wheezing, See HPI  CARDIOVASCULAR: denies chest pain or palpitations   GI: denies N/V/C or abdominal pain  NEURO: Denies headaches    EXAM:  General: Alert, Well-appearing, and In no acute distress  Respiratory:   Speaking in full sentences comfortably  Normal work of breathing  No cough during visit  Head: Normocephalic  Nose: No obvious nasal discharge.  Skin: No obvious rashes or lesions from what observed.     No results found for this or any previous visit (from the past 24 hours).    ASSESSMENT AND PLAN:  Vira Johnson is a 34 year old female who presents with symptoms that are consistent with    ASSESSMENT:   Encounter Diagnosis   Name Primary?    Abscess Yes       PLAN: Due to patient setting and location of abscess no video exam preformed of the area. Meds as below. Advised if no improvement in 1-2 days will need to be seen in person for possible I&D.  Pt. Verbalized understanding.  See patient Instructions.  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    fluconazole (DIFLUCAN) 150 MG Oral Tab 2 tablet 0     Sig: Take 1 tablet (150 mg total) by mouth once for 1 dose. Repeat single pill in 72 hours if symptoms persist.       Risks, benefits, and side effects of medication explained and discussed.    There are no Patient Instructions on file for this visit.    The patient indicates understanding of these issues and agrees to the plan.  The patient is asked to return if sx's persist or worsen.    Vira Johnson understands video visit evaluation is not a substitute for face-to-face examination or emergency care. Patient advised to go to ER or call 911 for worsening symptoms or acute distress.            [1]   Current Outpatient Medications   Medication Sig Dispense Refill    cephALEXin 500 MG Oral Cap Take 1 capsule (500 mg total) by mouth 4 (four) times daily for 7 days. 28 capsule 0    fluconazole (DIFLUCAN) 150 MG Oral Tab Take 1 tablet (150 mg total) by mouth once for 1 dose. Repeat single pill in 72 hours if symptoms persist. 2 tablet 0    Glucose Blood (ONETOUCH VERIO) In Vitro Strip USE TO TEST BLOOD SUGAR UP TO 4 TIMES DAILY 400 strip 1    semaglutide (OZEMPIC, 1 MG/DOSE,) 4 MG/3ML Subcutaneous Solution Pen-injector Inject 1 mg into the skin once a week. 9 mL 1    insulin glargine (LANTUS SOLOSTAR) 100 UNIT/ML Subcutaneous Solution Pen-injector Inject 25 Units into the skin 2 (two) times daily. 50 mL 1    semaglutide (OZEMPIC, 0.25 OR 0.5 MG/DOSE,) 2 MG/3ML Subcutaneous Solution Pen-injector Inject 0.5 mg into the skin once a week. 9 mL 0    insulin glargine (LANTUS SOLOSTAR) 100 UNIT/ML Subcutaneous Solution Pen-injector Inject 25 Units into the skin 2 (two) times daily. 30 mL 1    insulin glargine (LANTUS SOLOSTAR) 100 UNIT/ML Subcutaneous Solution Pen-injector Inject 25 Units into the skin 2 (two) times daily. 50 mL 0    Continuous Glucose Transmitter (DEXCOM  G6 TRANSMITTER) Does not apply Misc Use 1 transmitter every 90 days 1 each 3    Continuous Glucose Sensor (DEXCOM G6 SENSOR) Does not apply Misc USE EVERY 10 DAYS AS DIRECTED 9 each 1    GVOKE HYPOPEN 2-PACK 1 MG/0.2ML Subcutaneous injection       albuterol 108 (90 Base) MCG/ACT Inhalation Aero Soln Inhale 2 puffs into the lungs every 4 (four) hours as needed for Shortness of Breath (chest tightness). 1 each 0    ALPRAZolam 1 MG Oral Tab Take 1 tablet (1 mg total) by mouth 2 (two) times daily as needed for Anxiety or Sleep. 60 tablet 3    semaglutide (OZEMPIC, 0.25 OR 0.5 MG/DOSE,) 2 MG/3ML Subcutaneous Solution Pen-injector Inject 0.25 mg into the skin once a week. 3 mL 0    Insulin Disposable Pump (OMNIPOD 5 [id number] PODS GEN 5) Does not apply Misc 1 each daily. 1 UNDER THE SKIN DAILY 45 each 2    insulin lispro 100 UNIT/ML Injection Solution Use up to 120 units daily via insulin pump 40 mL 2    Continuous Glucose  (DEXCOM G6 ) Does not apply Device 1 each daily. 1 each 0    buPROPion  MG Oral Tablet 24 Hr Take 1 tablet (150 mg total) by mouth daily. 90 tablet 1    RYBELSUS 7 MG Oral Tab TAKE ONE TABLET BY MOUTH DAILY ON AN EMPTY STOMACH WITH NO MORE THAN 4 OZ. OF PLAIN WATER. WAIT AT LEAST 30 MIN. BEFORE FOOD, BEVERAGE, OR OTHER MEDICATIONS. 90 tablet 1    Insulin Pen Needle 32G X 4 MM Does not apply Misc Inject insulin 5 times a day when insulin pump is not working 500 each 0    atorvastatin 10 MG Oral Tab TAKE ONE TABLET BY MOUTH ONCE NIGHTLY 90 tablet 1    Phentermine HCl 37.5 MG Oral Tab Take 1 tablet (37.5 mg total) by mouth before breakfast. (Patient not taking: Reported on 2/3/2025) 30 tablet 2    montelukast 10 MG Oral Tab Take 1 tablet (10 mg total) by mouth nightly. 90 tablet 1    diclofenac 50 MG Oral Tab EC Take 1 tablet (50 mg total) by mouth 3 (three) times daily as needed. 45 tablet 0    Glucose Blood (ONETOUCH ULTRA) In Vitro Strip Check blood sugar up to 4x daily 400 strip 0     Insulin Lispro, 1 Unit Dial, 100 UNIT/ML Subcutaneous Solution Pen-injector INJECT UNDER THE SKIN BASED ON CARB RATIO AND CORRECTION FACTOR*MAX DAILY DOSE IS 50 UNITS* to use in case of pump failure 15 mL 0    Insulin Pen Needle 32G X 4 MM Does not apply Misc Inject insulin 5 times a day 500 each 0    Continuous Blood Gluc Sensor (DEXCOM G6 SENSOR) Does not apply Misc 1 each Every 10 days. 9 each 3    ONETOUCH VERIO REFLECT w/Device Does not apply Kit USE AS DIRECTED 1 kit 0    Insulin Disposable Pump (OMNIPOD 5 G6 POD, GEN 5,) Does not apply Misc 1 Device every other day. 45 each 0    Insulin Pen Needle (TRUEPLUS PEN NEEDLES) 32G X 4 MM Does not apply Misc Take 1 each by mouth in the morning and 1 each before bedtime. 100 each 2    Insulin Disposable Pump (OMNIPOD 5 G6 INTRO, GEN 5,) Does not apply Kit 1 each daily. 1 kit 0    Insulin Lispro, 1 Unit Dial, 100 UNIT/ML Subcutaneous Solution Pen-injector INJECT UNDER THE SKIN BASED ON CARB RATIO AND CORRECTION FACTOR*MAX DAILY DOSE IS 50 UNITS* 15 mL 0    OneTouch Delica Lancets 33G Does not apply Misc Check blood sugar up to 4x daily 400 each 0    topiramate 50 MG Oral Tab Take 1 tablet (50 mg total) by mouth 2 (two) times daily. 180 tablet 1    Insulin Disposable Pump (OMNIPOD 5 G6 INTRO, GEN 5,) Does not apply Kit 1 each As Directed. 1 kit 0    Continuous Blood Gluc  (DEXCOM G6 ) Does not apply Device Use as directed with sensors and transmitter 1 Device 0    ONETOUCH DELICA PLUS [id number] Does not apply Misc USE AS DIRECTED 400 each 2   [2]   Past Medical History:   History of blood transfusion        History of  delivery    X 3 [x ] Repeat CS at 36 weeks- 21 [ ] bilateral salpingectomy - IPA consent signed 2021     S/P repeat low transverse     Type 1 diabetes mellitus (HCC)   [3]   Past Surgical History:  Procedure Laterality Date          x3    Other surgical history Right 2011    R arm surgery after  trauma   [4]   Social History  Socioeconomic History    Marital status: Single   Tobacco Use    Smoking status: Every Day     Current packs/day: 0.50     Average packs/day: 0.5 packs/day for 14.4 years (7.2 ttl pk-yrs)     Types: Cigarettes     Start date: 2011    Smokeless tobacco: Never   Vaping Use    Vaping status: Former    Substances: Nicotine    Devices: Pre-filled pod   Substance and Sexual Activity    Alcohol use: Not Currently    Drug use: No    Sexual activity: Yes     Partners: Male   Other Topics Concern    Caffeine Concern Yes    Exercise Yes    Seat Belt Yes    Special Diet No    Stress Concern Yes    Weight Concern Yes

## 2025-05-27 DIAGNOSIS — E10.8 TYPE 1 DIABETES MELLITUS WITH COMPLICATION, WITH LONG TERM CURRENT USE OF INSULIN PUMP (HCC): ICD-10-CM

## 2025-05-27 DIAGNOSIS — E10.65 UNCONTROLLED TYPE 1 DIABETES MELLITUS WITH HYPERGLYCEMIA (HCC): ICD-10-CM

## 2025-05-27 DIAGNOSIS — Z96.41 TYPE 1 DIABETES MELLITUS WITH COMPLICATION, WITH LONG TERM CURRENT USE OF INSULIN PUMP (HCC): ICD-10-CM

## 2025-05-27 RX ORDER — INSULIN PMP CART,AUT,G6/7,CNTR
1 EACH SUBCUTANEOUS AS DIRECTED
Qty: 1 KIT | Refills: 0 | Status: CANCELLED | OUTPATIENT
Start: 2025-05-27

## 2025-05-28 DIAGNOSIS — Z96.41 TYPE 1 DIABETES MELLITUS WITH COMPLICATION, WITH LONG TERM CURRENT USE OF INSULIN PUMP (HCC): ICD-10-CM

## 2025-05-28 DIAGNOSIS — E10.8 TYPE 1 DIABETES MELLITUS WITH COMPLICATION, WITH LONG TERM CURRENT USE OF INSULIN PUMP (HCC): ICD-10-CM

## 2025-05-28 RX ORDER — INSULIN PMP CART,AUT,G6/7,CNTR
1 EACH SUBCUTANEOUS DAILY
Qty: 90 EACH | Refills: 1 | Status: SHIPPED | OUTPATIENT
Start: 2025-05-28

## 2025-05-28 NOTE — TELEPHONE ENCOUNTER
Endocrine Refill protocol for Omnipod insulin pumps and supplies    Protocol Criteria:  PASSED  Reason: N/A    If below requirement is met, send a 90-day supply with 1 refill per provider protocol.    Verify appointment with Endocrinology completed in the last 6 months or scheduled in the next 3 months.    Last completed office visit:3/10/2025 Barbi Bello MD   Last completed telemed visit: Visit date not found  Next scheduled Follow up: No future appointments.

## 2025-05-28 NOTE — TELEPHONE ENCOUNTER
RX sent for omnipod pods - change pod every day  Spoke to pharmacist - they have one box in stock - may have to order   Spoke to patient to update - she stated understanding and stated she will schedule follow-up apt via [email or web address]

## 2025-05-29 RX ORDER — INSULIN PMP CART,AUT,G6/7,CNTR
EACH SUBCUTANEOUS
Qty: 30 EACH | Refills: 0 | OUTPATIENT
Start: 2025-05-29

## 2025-06-08 DIAGNOSIS — E10.65 UNCONTROLLED TYPE 1 DIABETES MELLITUS WITH HYPERGLYCEMIA (HCC): ICD-10-CM

## 2025-06-09 RX ORDER — INSULIN GLARGINE 100 [IU]/ML
INJECTION, SOLUTION SUBCUTANEOUS
Qty: 45 ML | Refills: 0 | Status: SHIPPED | OUTPATIENT
Start: 2025-06-09

## 2025-06-09 NOTE — TELEPHONE ENCOUNTER
Endocrine refill protocol for basal insulins     Protocol Criteria: FAILED Reason: Elevated A1C    If all below requirements are met, send a 90-day supply with 1 refill per provider protocol.       Verify Appointment with Endocrinology completed in the last 6 months or scheduled in the next 3 months.  Verify A1C has been completed within the last 6 months and is below 8.5%     Last completed office visit:3/10/2025 Barbi Bello MD   Last completed telemed visit: Visit date not found  Next scheduled Follow up: No future appointments.   Last A1c result: Last A1c value was 10.9% done 3/10/2025.

## 2025-06-11 DIAGNOSIS — F51.01 PRIMARY INSOMNIA: ICD-10-CM

## 2025-06-11 DIAGNOSIS — F41.1 GENERALIZED ANXIETY DISORDER: ICD-10-CM

## 2025-06-11 NOTE — TELEPHONE ENCOUNTER
Requesting   ALPRAZolam 1 MG Oral Tab          Sig: Take 1 tablet (1 mg total) by mouth 2 (two) times daily as needed for Anxiety or Sleep.    Disp: 60 tablet    Refills: 3    Start: 6/11/2025    Class: Normal    Non-formulary For: Generalized anxiety disorder, Primary insomnia    Last ordered: 4 months ago (2/10/2025) by Patti Barger MD    Controlled Substance Medication [id number]/2025 01:05 PM    This medication is a controlled substance - forward to provider to refill    Medication is active on med list        LOV: 12/12/2024  RTC: 6 months   Last Relevant Labs: n/a   Filled: 5/17/2025 #30 with 0 refills    No future appointments.    5. Generalized anxiety disorder  6. MDD (major depressive disorder), recurrent episode, moderate (HCC)  Chronic issues.  No homicidal/suicidal ideations.  She stopped her bupropion as she was worried as her mother had side effects on this medication.  Will resume bupropion 150 mg every day.  Her mother is on sertraline but patient wants to avoid that in light of drowsiness/weight gain side effects.  On alprazolam 1 mg BID.  - buPROPion  MG Oral Tablet 24 Hr; Take 1 tablet (150 mg total) by mouth daily.  Dispense: 90 tablet; Refill: 1

## 2025-06-12 RX ORDER — ALPRAZOLAM 1 MG/1
1 TABLET ORAL 2 TIMES DAILY PRN
Qty: 60 TABLET | Refills: 3 | Status: SHIPPED | OUTPATIENT
Start: 2025-06-12

## 2025-07-01 ENCOUNTER — PATIENT MESSAGE (OUTPATIENT)
Dept: ENDOCRINOLOGY CLINIC | Facility: CLINIC | Age: 35
End: 2025-07-01

## 2025-07-01 DIAGNOSIS — R53.83 FATIGUE, UNSPECIFIED TYPE: Primary | ICD-10-CM

## 2025-07-01 DIAGNOSIS — E78.00 HYPERCHOLESTEROLEMIA: ICD-10-CM

## 2025-07-01 DIAGNOSIS — E88.819 INSULIN RESISTANCE: ICD-10-CM

## 2025-07-01 DIAGNOSIS — Z79.4 TYPE 2 DIABETES MELLITUS WITH HYPERGLYCEMIA, WITH LONG-TERM CURRENT USE OF INSULIN (HCC): ICD-10-CM

## 2025-07-01 DIAGNOSIS — E11.65 TYPE 2 DIABETES MELLITUS WITH HYPERGLYCEMIA, WITH LONG-TERM CURRENT USE OF INSULIN (HCC): ICD-10-CM

## 2025-07-01 DIAGNOSIS — E10.65 UNCONTROLLED TYPE 1 DIABETES MELLITUS WITH HYPERGLYCEMIA (HCC): ICD-10-CM

## 2025-07-02 NOTE — TELEPHONE ENCOUNTER
Dr. Bello --     Pt is asking if additional labs can be added? See [employer] message below, pt has visit tomorrow    The patient premedicated with 13 hrs pre Prednisone and Benadryl for iodine allergy, no reaction after injection.

## 2025-07-02 NOTE — TELEPHONE ENCOUNTER
Hi!  I have added the orders. She can have them done at the [facility] lab, no later than 9AM, for them to be accurate. Thank you!

## 2025-07-03 ENCOUNTER — TELEPHONE (OUTPATIENT)
Dept: ENDOCRINOLOGY CLINIC | Facility: CLINIC | Age: 35
End: 2025-07-03

## 2025-07-03 ENCOUNTER — OFFICE VISIT (OUTPATIENT)
Dept: ENDOCRINOLOGY CLINIC | Facility: CLINIC | Age: 35
End: 2025-07-03
Payer: MEDICAID

## 2025-07-03 ENCOUNTER — LAB ENCOUNTER (OUTPATIENT)
Dept: LAB | Age: 35
End: 2025-07-03
Attending: INTERNAL MEDICINE
Payer: MEDICAID

## 2025-07-03 VITALS
SYSTOLIC BLOOD PRESSURE: 117 MMHG | WEIGHT: 206 LBS | HEIGHT: 62 IN | DIASTOLIC BLOOD PRESSURE: 78 MMHG | HEART RATE: 86 BPM | BODY MASS INDEX: 37.91 KG/M2

## 2025-07-03 DIAGNOSIS — E10.65 UNCONTROLLED TYPE 1 DIABETES MELLITUS WITH HYPERGLYCEMIA (HCC): ICD-10-CM

## 2025-07-03 DIAGNOSIS — E88.819 INSULIN RESISTANCE: ICD-10-CM

## 2025-07-03 DIAGNOSIS — E11.65 TYPE 2 DIABETES MELLITUS WITH HYPERGLYCEMIA, WITH LONG-TERM CURRENT USE OF INSULIN (HCC): Primary | ICD-10-CM

## 2025-07-03 DIAGNOSIS — E78.00 HYPERCHOLESTEROLEMIA: Chronic | ICD-10-CM

## 2025-07-03 DIAGNOSIS — Z79.4 TYPE 2 DIABETES MELLITUS WITH HYPERGLYCEMIA, WITH LONG-TERM CURRENT USE OF INSULIN (HCC): ICD-10-CM

## 2025-07-03 DIAGNOSIS — E11.65 TYPE 2 DIABETES MELLITUS WITH HYPERGLYCEMIA, WITH LONG-TERM CURRENT USE OF INSULIN (HCC): ICD-10-CM

## 2025-07-03 DIAGNOSIS — Z79.4 TYPE 2 DIABETES MELLITUS WITH HYPERGLYCEMIA, WITH LONG-TERM CURRENT USE OF INSULIN (HCC): Primary | ICD-10-CM

## 2025-07-03 DIAGNOSIS — R53.83 FATIGUE, UNSPECIFIED TYPE: ICD-10-CM

## 2025-07-03 DIAGNOSIS — E78.00 HYPERCHOLESTEROLEMIA: ICD-10-CM

## 2025-07-03 LAB
ALBUMIN SERPL-MCNC: 4.1 G/DL (ref 3.2–4.8)
ALBUMIN/GLOB SERPL: 1.6 {RATIO} (ref 1–2)
ALP LIVER SERPL-CCNC: 86 U/L (ref 37–98)
ALT SERPL-CCNC: 7 U/L (ref 10–49)
AMB EXT GMI: 9.1 %
ANION GAP SERPL CALC-SCNC: 7 MMOL/L (ref 0–18)
AST SERPL-CCNC: 12 U/L (ref ?–34)
BASOPHILS # BLD AUTO: 0.05 X10(3) UL (ref 0–0.2)
BASOPHILS NFR BLD AUTO: 0.5 %
BILIRUB SERPL-MCNC: 0.3 MG/DL (ref 0.3–1.2)
BUN BLD-MCNC: 11 MG/DL (ref 9–23)
CALCIUM BLD-MCNC: 9.1 MG/DL (ref 8.7–10.6)
CHLORIDE SERPL-SCNC: 106 MMOL/L (ref 98–112)
CHOLEST SERPL-MCNC: 192 MG/DL (ref ?–200)
CO2 SERPL-SCNC: 26 MMOL/L (ref 21–32)
CORTIS SERPL-MCNC: 21 UG/DL
CREAT BLD-MCNC: 0.76 MG/DL (ref 0.55–1.02)
CREAT UR-SCNC: 297.6 MG/DL
DEPRECATED HBV CORE AB SER IA-ACNC: 12 NG/ML (ref 50–306)
EGFRCR SERPLBLD CKD-EPI 2021: 107 ML/MIN/1.73M2 (ref 60–?)
EOSINOPHIL # BLD AUTO: 0.59 X10(3) UL (ref 0–0.7)
EOSINOPHIL NFR BLD AUTO: 6.2 %
ERYTHROCYTE [DISTWIDTH] IN BLOOD BY AUTOMATED COUNT: 14.1 %
FASTING PATIENT LIPID ANSWER: YES
FASTING STATUS PATIENT QL REPORTED: YES
GLOBULIN PLAS-MCNC: 2.6 G/DL (ref 2–3.5)
GLUCOSE BLD-MCNC: 256 MG/DL (ref 70–99)
GLUCOSE BLOOD: 336
HCT VFR BLD AUTO: 42.7 % (ref 35–48)
HDLC SERPL-MCNC: 45 MG/DL (ref 40–59)
HEMOGLOBIN A1C: 10.5 % (ref 4.3–5.6)
HGB BLD-MCNC: 14.3 G/DL (ref 12–16)
IMM GRANULOCYTES # BLD AUTO: 0.02 X10(3) UL (ref 0–1)
IMM GRANULOCYTES NFR BLD: 0.2 %
IRON SATN MFR SERPL: 7 % (ref 15–50)
IRON SERPL-MCNC: 24 UG/DL (ref 50–170)
LDLC SERPL CALC-MCNC: 126 MG/DL (ref ?–100)
LYMPHOCYTES # BLD AUTO: 3.4 X10(3) UL (ref 1–4)
LYMPHOCYTES NFR BLD AUTO: 35.5 %
MCH RBC QN AUTO: 29.8 PG (ref 26–34)
MCHC RBC AUTO-ENTMCNC: 33.5 G/DL (ref 31–37)
MCV RBC AUTO: 89 FL (ref 80–100)
MICROALBUMIN UR-MCNC: 5.7 MG/DL
MICROALBUMIN/CREAT 24H UR-RTO: 19.2 UG/MG (ref ?–30)
MONOCYTES # BLD AUTO: 0.92 X10(3) UL (ref 0.1–1)
MONOCYTES NFR BLD AUTO: 9.6 %
NEUTROPHILS # BLD AUTO: 4.6 X10 (3) UL (ref 1.5–7.7)
NEUTROPHILS # BLD AUTO: 4.6 X10(3) UL (ref 1.5–7.7)
NEUTROPHILS NFR BLD AUTO: 48 %
NONHDLC SERPL-MCNC: 147 MG/DL (ref ?–130)
OSMOLALITY SERPL CALC.SUM OF ELEC: 296 MOSM/KG (ref 275–295)
PLATELET # BLD AUTO: 200 10(3)UL (ref 150–450)
POTASSIUM SERPL-SCNC: 3.8 MMOL/L (ref 3.5–5.1)
PROT SERPL-MCNC: 6.7 G/DL (ref 5.7–8.2)
RBC # BLD AUTO: 4.8 X10(6)UL (ref 3.8–5.3)
SODIUM SERPL-SCNC: 139 MMOL/L (ref 136–145)
T3FREE SERPL-MCNC: 2.78 PG/ML (ref 2.4–4.2)
T4 FREE SERPL-MCNC: 1.2 NG/DL (ref 0.8–1.7)
TEST STRIP EXPIRATION DATE: NORMAL DATE
TEST STRIP LOT #: NORMAL NUMERIC
TOTAL IRON BINDING CAPACITY: 328 UG/DL (ref 250–425)
TRANSFERRIN SERPL-MCNC: 260 MG/DL (ref 250–380)
TRIGL SERPL-MCNC: 118 MG/DL (ref 30–149)
TSI SER-ACNC: 2.06 UIU/ML (ref 0.55–4.78)
VIT B12 SERPL-MCNC: 423 PG/ML (ref 211–911)
VIT D+METAB SERPL-MCNC: 33.7 NG/ML (ref 30–100)
VLDLC SERPL CALC-MCNC: 21 MG/DL (ref 0–30)
WBC # BLD AUTO: 9.6 X10(3) UL (ref 4–11)

## 2025-07-03 PROCEDURE — 82728 ASSAY OF FERRITIN: CPT | Performed by: INTERNAL MEDICINE

## 2025-07-03 PROCEDURE — 99214 OFFICE O/P EST MOD 30 MIN: CPT | Performed by: INTERNAL MEDICINE

## 2025-07-03 PROCEDURE — 84439 ASSAY OF FREE THYROXINE: CPT | Performed by: INTERNAL MEDICINE

## 2025-07-03 PROCEDURE — 80053 COMPREHEN METABOLIC PANEL: CPT

## 2025-07-03 PROCEDURE — 36415 COLL VENOUS BLD VENIPUNCTURE: CPT

## 2025-07-03 PROCEDURE — 83550 IRON BINDING TEST: CPT | Performed by: INTERNAL MEDICINE

## 2025-07-03 PROCEDURE — 82570 ASSAY OF URINE CREATININE: CPT

## 2025-07-03 PROCEDURE — 82947 ASSAY GLUCOSE BLOOD QUANT: CPT | Performed by: INTERNAL MEDICINE

## 2025-07-03 PROCEDURE — 84481 FREE ASSAY (FT-3): CPT | Performed by: INTERNAL MEDICINE

## 2025-07-03 PROCEDURE — 82043 UR ALBUMIN QUANTITATIVE: CPT

## 2025-07-03 PROCEDURE — 82024 ASSAY OF ACTH: CPT

## 2025-07-03 PROCEDURE — 83540 ASSAY OF IRON: CPT | Performed by: INTERNAL MEDICINE

## 2025-07-03 PROCEDURE — 84443 ASSAY THYROID STIM HORMONE: CPT | Performed by: INTERNAL MEDICINE

## 2025-07-03 PROCEDURE — 82306 VITAMIN D 25 HYDROXY: CPT

## 2025-07-03 PROCEDURE — 82607 VITAMIN B-12: CPT | Performed by: INTERNAL MEDICINE

## 2025-07-03 PROCEDURE — 80061 LIPID PANEL: CPT

## 2025-07-03 PROCEDURE — 82533 TOTAL CORTISOL: CPT

## 2025-07-03 PROCEDURE — 85025 COMPLETE CBC W/AUTO DIFF WBC: CPT

## 2025-07-03 PROCEDURE — 83036 HEMOGLOBIN GLYCOSYLATED A1C: CPT | Performed by: INTERNAL MEDICINE

## 2025-07-03 RX ORDER — ATORVASTATIN CALCIUM 20 MG/1
20 TABLET, FILM COATED ORAL NIGHTLY
Qty: 90 TABLET | Refills: 2 | Status: SHIPPED | OUTPATIENT
Start: 2025-07-03

## 2025-07-03 NOTE — PROGRESS NOTES
-----------------------------  Dexcom Clarity  -----------------------------  Johnson LISSETTE Constantino    YOB: 1990    Generated at: Thu, Jul 3, 2025 11:58 AM CDT    Reporting period: Wed Jun 4, 2025 - Thu Jul 3, 2025  -----------------------------  Glucose Details    Average glucose: 242 mg/dL    GMI: 9.1%    Standard deviation: 81 mg/dL    Coefficient of Variation: 33.6%  -----------------------------  Time in Range    Very High: 45%    High: 31%    In Range: 24%    Low: 0%    Very Low: <1%    Target Range   mg/dL    -----------------------------  Sensor usage    Days with data: 25/30    Time active: 87%    Avg. calibrations per day: 0.0

## 2025-07-03 NOTE — PROGRESS NOTES
Name: Vira Johnson  Date: 7/03/25    Referring Physician: No ref. provider found    HISTORY OF PRESENT ILLNESS   Vira Johnson is a 34 year old female who presents for evaluation and management of type 1 diabetes. She was diagnosed with diabetes many years ago.     At the last visit, I had increased the Rybelsus from 3 to 7mg qday. She is not doing well on this medication. Since then, I had been able to change this to Ozempic. She is now taking Ozempic 1mg qweekly and she does feel tired and has hardly any appetite. She eats only one meal a day.      Blood Glucose Today: 336  HbA1C or glycohemoglobin is 10.5 today (and it was 10.9 and it was 10.5 on 8/05/24 and it was 11.3 on 1/16/24 and it was 10.6 on 9/18/23)  Type 1 or Type 2?: Type 1 with insulin resistance  Medications for DM: lantus 15 units twice a day; CF scale if she eats; Ozempic 1mg qweekly.  Fasting- 150  Before meals, 250s   -----------------------------  Dexcom Clarity  -----------------------------  Eliazbeth Constantino    YOB: 1990    Generated at: Thu, Jul 3, 2025 11:58 AM CDT    Reporting period: Wed Jun 4, 2025 - Thu Jul 3, 2025  -----------------------------  Glucose Details    Average glucose: 242 mg/dL    GMI: 9.1%    Standard deviation: 81 mg/dL    Coefficient of Variation: 33.6%  -----------------------------  Time in Range    Very High: 45%    High: 31%    In Range: 24%    Low: 0%    Very Low: <1%     Target Range   mg/dL     -----------------------------  Sensor usage    Days with data: 25/30    Time active: 87%    Avg. calibrations per day: 0.0     Episodes of hypoglycemia: occasionally in the middle of the night    Dietary compliance: she is trying to eat healthier    Exercise: she is trying to exercise more    Polyuria/polydipsia: none    Blurred vision: none    Flu Vaccine This Season: yes    Covid Vaccine: yes    REVIEW OF SYSTEMS  CV: Cardiovascular disease present: none         Hypertension present:  not at goal, not on meds         Hyperlipidemia present: not at goal (HDL- 45; LDL- 126), not on meds (7/03/25)         Peripheral Vascular Disease present: none    : Nephropathy present: MAC: none (7/03/25) Creatinine:- 0.76     Neuro: Neuropathy present: none    Eyes: Diabetic retinopathy present: unknown            Most recent visit to eye doctor in last 12 months: she has to make an appt    Skin: Infection or ulceration: none    Osteoporosis/ Osteopenia: none Vitamin D: 33.7 (7/03/25)    Thyroid disease: none TSH: 2.060 (7/03/25)    Medications:     Current Outpatient Medications:     semaglutide (OZEMPIC, 1 MG/DOSE,) 4 MG/3ML Subcutaneous Solution Pen-injector, Inject 1 mg into the skin once a week., Disp: 9 mL, Rfl: 1    insulin glargine (LANTUS SOLOSTAR) 100 UNIT/ML Subcutaneous Solution Pen-injector, Inject 25 Units into the skin 2 (two) times daily. (Patient taking differently: Inject 15 Units into the skin in the morning and 15 Units before bedtime.), Disp: 30 mL, Rfl: 1    Continuous Blood Gluc  (DEXCOM G6 ) Does not apply Device, Use as directed with sensors and transmitter, Disp: 1 Device, Rfl: 0    ALPRAZolam 1 MG Oral Tab, Take 1 tablet (1 mg total) by mouth 2 (two) times daily as needed for Anxiety or Sleep., Disp: 60 tablet, Rfl: 3    LANTUS SOLOSTAR 100 UNIT/ML Subcutaneous Solution Pen-injector, INJECT 25 UNITS UNDER THE SKIN 2 TIMES A DAY, Disp: 45 mL, Rfl: 0    Insulin Disposable Pump (OMNIPOD 5 [id number] PODS GEN 5) Does not apply Misc, 1 each daily., Disp: 90 each, Rfl: 1    Glucose Blood (ONETOUCH VERIO) In Vitro Strip, USE TO TEST BLOOD SUGAR UP TO 4 TIMES DAILY, Disp: 400 strip, Rfl: 1    insulin glargine (LANTUS SOLOSTAR) 100 UNIT/ML Subcutaneous Solution Pen-injector, Inject 25 Units into the skin 2 (two) times daily., Disp: 50 mL, Rfl: 1    semaglutide (OZEMPIC, 0.25 OR 0.5 MG/DOSE,) 2 MG/3ML Subcutaneous Solution Pen-injector, Inject 0.5 mg into the skin once a week.,  Disp: 9 mL, Rfl: 0    Continuous Glucose Transmitter (DEXCOM G6 TRANSMITTER) Does not apply Misc, Use 1 transmitter every 90 days, Disp: 1 each, Rfl: 3    Continuous Glucose Sensor (DEXCOM G6 SENSOR) Does not apply Misc, USE EVERY 10 DAYS AS DIRECTED, Disp: 9 each, Rfl: 1    GVOKE HYPOPEN 2-PACK 1 MG/0.2ML Subcutaneous injection, , Disp: , Rfl:     albuterol 108 (90 Base) MCG/ACT Inhalation Aero Soln, Inhale 2 puffs into the lungs every 4 (four) hours as needed for Shortness of Breath (chest tightness)., Disp: 1 each, Rfl: 0    semaglutide (OZEMPIC, 0.25 OR 0.5 MG/DOSE,) 2 MG/3ML Subcutaneous Solution Pen-injector, Inject 0.25 mg into the skin once a week., Disp: 3 mL, Rfl: 0    insulin lispro 100 UNIT/ML Injection Solution, Use up to 120 units daily via insulin pump, Disp: 40 mL, Rfl: 2    Continuous Glucose  (DEXCOM G6 ) Does not apply Device, 1 each daily., Disp: 1 each, Rfl: 0    buPROPion  MG Oral Tablet 24 Hr, Take 1 tablet (150 mg total) by mouth daily., Disp: 90 tablet, Rfl: 1    RYBELSUS 7 MG Oral Tab, TAKE ONE TABLET BY MOUTH DAILY ON AN EMPTY STOMACH WITH NO MORE THAN 4 OZ. OF PLAIN WATER. WAIT AT LEAST 30 MIN. BEFORE FOOD, BEVERAGE, OR OTHER MEDICATIONS. (Patient not taking: Reported on 7/3/2025), Disp: 90 tablet, Rfl: 1    Insulin Pen Needle 32G X 4 MM Does not apply Misc, Inject insulin 5 times a day when insulin pump is not working, Disp: 500 each, Rfl: 0    atorvastatin 10 MG Oral Tab, TAKE ONE TABLET BY MOUTH ONCE NIGHTLY, Disp: 90 tablet, Rfl: 1    Phentermine HCl 37.5 MG Oral Tab, Take 1 tablet (37.5 mg total) by mouth before breakfast. (Patient not taking: Reported on 2/3/2025), Disp: 30 tablet, Rfl: 2    montelukast 10 MG Oral Tab, Take 1 tablet (10 mg total) by mouth nightly., Disp: 90 tablet, Rfl: 1    diclofenac 50 MG Oral Tab EC, Take 1 tablet (50 mg total) by mouth 3 (three) times daily as needed., Disp: 45 tablet, Rfl: 0    Glucose Blood (ONETOUCH ULTRA) In Vitro  Strip, Check blood sugar up to 4x daily, Disp: 400 strip, Rfl: 0    Insulin Lispro, 1 Unit Dial, 100 UNIT/ML Subcutaneous Solution Pen-injector, INJECT UNDER THE SKIN BASED ON CARB RATIO AND CORRECTION FACTOR*MAX DAILY DOSE IS 50 UNITS* to use in case of pump failure, Disp: 15 mL, Rfl: 0    Insulin Pen Needle 32G X 4 MM Does not apply Misc, Inject insulin 5 times a day, Disp: 500 each, Rfl: 0    Continuous Blood Gluc Sensor (DEXCOM G6 SENSOR) Does not apply Misc, 1 each Every 10 days., Disp: 9 each, Rfl: 3    ONETOUCH VERIO REFLECT w/Device Does not apply Kit, USE AS DIRECTED, Disp: 1 kit, Rfl: 0    Insulin Disposable Pump (OMNIPOD 5 G6 POD, GEN 5,) Does not apply Misc, 1 Device every other day., Disp: 45 each, Rfl: 0    Insulin Pen Needle (TRUEPLUS PEN NEEDLES) 32G X 4 MM Does not apply Misc, Take 1 each by mouth in the morning and 1 each before bedtime., Disp: 100 each, Rfl: 2    Insulin Disposable Pump (OMNIPOD 5 G6 INTRO, GEN 5,) Does not apply Kit, 1 each daily., Disp: 1 kit, Rfl: 0    Insulin Lispro, 1 Unit Dial, 100 UNIT/ML Subcutaneous Solution Pen-injector, INJECT UNDER THE SKIN BASED ON CARB RATIO AND CORRECTION FACTOR*MAX DAILY DOSE IS 50 UNITS*, Disp: 15 mL, Rfl: 0    OneTouch Delica Lancets 33G Does not apply Misc, Check blood sugar up to 4x daily, Disp: 400 each, Rfl: 0    topiramate 50 MG Oral Tab, Take 1 tablet (50 mg total) by mouth 2 (two) times daily., Disp: 180 tablet, Rfl: 1    Insulin Disposable Pump (OMNIPOD 5 G6 INTRO, GEN 5,) Does not apply Kit, 1 each As Directed., Disp: 1 kit, Rfl: 0    ONETOUCH DELICA PLUS [id number] Does not apply Misc, USE AS DIRECTED, Disp: 400 each, Rfl: 2     Allergies:   No Known Allergies    Social History:   Social History     Socioeconomic History    Marital status: Single   Tobacco Use    Smoking status: Every Day     Current packs/day: 0.50     Average packs/day: 0.5 packs/day for 14.5 years (7.3 ttl pk-yrs)     Types: Cigarettes     Start date: 2011     Smokeless tobacco: Never   Vaping Use    Vaping status: Former    Substances: Nicotine    Devices: Pre-filled pod   Substance and Sexual Activity    Alcohol use: Not Currently    Drug use: No    Sexual activity: Yes     Partners: Male   Other Topics Concern    Caffeine Concern Yes    Exercise Yes    Seat Belt Yes    Special Diet No    Stress Concern Yes    Weight Concern Yes       Medical History:   Past Medical History:    History of blood transfusion        History of  delivery    X 3 [x ] Repeat CS at 36 weeks- 21 [ ] bilateral salpingectomy - IPA consent signed 2021     S/P repeat low transverse     Type 1 diabetes mellitus (HCC)       Surgical history:   Past Surgical History:   Procedure Laterality Date          x3    Other surgical history Right 2011    R arm surgery after trauma         PHYSICAL EXAM  Vitals:    25 1154   BP: 117/78   Pulse: 86   Weight: 206 lb (93.4 kg)   Height: 5' 2\" (1.575 m)     General Appearance:  alert, well developed, in no acute distress  Eyes:  normal conjunctivae, sclera., normal sclera and normal pupils  Ears/Nose/Mouth/Throat/Neck:  no palpable thyroid nodules or cervical lymphadenopathy  Neck: Trachea midline: Normal  Psychiatric:  oriented to time, self, and place  Nutritional:  no abnormal weight gain or loss    Lab Data:   Lab Results   Component Value Date     (H) 2024    A1C 10.9 (A) 03/10/2025     Lab Results   Component Value Date     (H) 2024    BUN 13 2024    BUNCREA 16.0 2024    CREATSERUM 0.89 2024    ANIONGAP 9 2024     2017    GFRNAA 120 2022    GFRAA 138 2022    CA 9.2 2024    OSMOCALC 296 (H) 2024    ALKPHO 120 (H) 2024    AST 14 2024    ALT 13 2024    BILT 0.6 2024    TP 7.1 2024    ALB 3.9 2024    GLOBULIN 3.2 2024     (L) 2024    K 4.3 2024     2024    CO2  24.0 12/12/2024     Lab Results   Component Value Date    CHOLEST 170 08/05/2024    TRIG 99 08/05/2024    HDL 46 08/05/2024     (H) 08/05/2024    VLDL 17 08/05/2024    TCHDLRATIO 3.22 03/16/2018    NONHDLC 124 08/05/2024     Lab Results   Component Value Date    MALBP 0.64 02/12/2024    CREUR 116.00 02/12/2024         ASSESSMENT/PLAN:  This is a 34 year-old woman here for evaluation and management of uncontrolled type 2 diabetes. We discussed the ABCs of DM.     1.) Hyperglycemia Management- We discussed the importance of glycemic control to prevent complications of diabetes. We discussed the importance of SBGM. I offered and provided patient education materials and offered a blood glucose log book.   - She is using long-acting and short-acting insulin  - I will have our CDE contact her to help with pump settings  - Continue the Ozempic 1mg qweekly    2.) Management of Diabetic Complications- We discussed the complications of diabetes include retinopathy, neuropathy, nephropathy and cardiovascular disease.   - Ophtho- she will make appt  - Flu and Covid vaccine- up to date  - BP- at goal, will monitor  - Lipids- not at goal, check again in 3 months, increase atorvastatin from 10 to 20mg qhs  - MAC- at goal, check again in 3 months  - CMP- check again in 3 months  - Neuropathy- none  - CAD- none    3.) Lifestyle Management for Diabetes- We discussed importance of a low CHO diet, and recommend 45gm per meal or 135gm per day. We discussed the importance of trying to follow a Mediterranean diet, with an emphasis on vegetables at every meal, with lots whole grains, and protein from either plant-based sources, or poultry and fish.   - Diet- She will try and eat healthy  - Exercise- she will try and exercise    4.) Fatigue  - Iron studies were abnormal- start on iron supplements  - vitamin B12 deficiency- start vitamin B12  - She should start vitamin D  - Cortisol level is normal    Return to clinic in 3  months    Prior to this encounter, I spent over 15 minutes with preparing for the visit, including reviewing documents from other specialties as well as from PCP and going over test results. During the face to face encounter, I spent an additional 15 minutes which were determined for follow-up. Greater than 50% of the time was spent in counseling, anticipatory guidance, and coordination of care. Patient concerns were answered to the best of my knowledge.         7/03/25  Barbi Bello MD

## 2025-07-04 NOTE — TELEPHONE ENCOUNTER
Hi!    Please let patient know that I have reviewed her blood tests and:    - LDL is elevated and I would like to increase the atorvastatin from 10 to 20mg at bedtime    - The iron studies are abnormal, and she should start taking a multivitamin, since the vitamin D and the vitamin B12 levels are also low.     - Cortisol level is also normal, as is thyroid function    - We will also have our CDE reach out to her so that the patient can restart her pump.     We will check blood work in 3 months. Please let patient also know that to lose weight, she must eat:    1417 calories per day and try to have these from the following macronutrients:    106g of protein    55g of fats    124g of carbs per day.

## 2025-07-06 LAB — ACTH: 97.2 PG/ML

## 2025-07-09 ENCOUNTER — TELEPHONE (OUTPATIENT)
Dept: ENDOCRINOLOGY CLINIC | Facility: CLINIC | Age: 35
End: 2025-07-09

## 2025-07-09 RX ORDER — ATORVASTATIN CALCIUM 20 MG/1
20 TABLET, FILM COATED ORAL NIGHTLY
Qty: 90 TABLET | Refills: 2 | Status: SHIPPED | OUTPATIENT
Start: 2025-07-09

## 2025-07-09 NOTE — TELEPHONE ENCOUNTER
Patient requesting prescription  Atorvastatin to be sent instead to:  [facility] [id number] - [place], [place] - [address], [phone], [phone] [[id number]]   Please cancel other request that was sent to [facility]. Patient would like to be updated through her mychart, thanks.

## 2025-07-09 NOTE — TELEPHONE ENCOUNTER
See TE from 7/3/2025. Patient has been prescribed an increased dose of atorvastatin by Dr. Chan. Prescribed cancelled with [facility] and resent to preferred pharmacy. Pt updated via [employer].

## 2025-07-10 DIAGNOSIS — E10.65 UNCONTROLLED TYPE 1 DIABETES MELLITUS WITH HYPERGLYCEMIA (HCC): Primary | ICD-10-CM

## 2025-07-11 NOTE — TELEPHONE ENCOUNTER
Endocrine Refill protocol for Glucose testing supplies     Protocol Criteria: PASSED Reason: N/A    If below requirement is met, send a 90-day supply with 1 refill per provider protocol.    Verify appointment with Endocrinology completed in the last 6 months or scheduled in the next 3 months.    Last completed office visit:7/3/2025 Barbi Bello MD   Last completed telemed visit: Visit date not found  Next scheduled Follow up:   Future Appointments   Date Time Provider Department Center   7/17/2025  2:00 PM [facility] CDE [facility]      Pen needles refilled pre protocol

## 2025-07-16 ENCOUNTER — TELEPHONE (OUTPATIENT)
Dept: ENDOCRINOLOGY CLINIC | Facility: CLINIC | Age: 35
End: 2025-07-16

## 2025-07-16 NOTE — TELEPHONE ENCOUNTER
Patient is scheduled for insulin pump restart tomorrow 7/17/15. Currently on MDI. Need insulin doses. Called patient. No answer. Left message for patient to call back. MC sent.

## 2025-07-24 ENCOUNTER — TELEPHONE (OUTPATIENT)
Dept: ENDOCRINOLOGY CLINIC | Facility: CLINIC | Age: 35
End: 2025-07-24

## 2025-07-24 NOTE — TELEPHONE ENCOUNTER
Pt called to cancel her appt with IVETTE Trevino. Pt says her Omnipod is not working. Pt has enough LA and SA insulin, and she denies requiring any meds or supplies. Pt to reach out to [facility] for help.    Email sent to [facility] Tesfaye de leon.

## 2025-07-31 ENCOUNTER — PATIENT MESSAGE (OUTPATIENT)
Dept: ENDOCRINOLOGY CLINIC | Facility: CLINIC | Age: 35
End: 2025-07-31

## (undated) DIAGNOSIS — E66.01 MORBID OBESITY (HCC): ICD-10-CM

## (undated) DIAGNOSIS — F41.1 GENERALIZED ANXIETY DISORDER: ICD-10-CM

## (undated) DIAGNOSIS — F51.01 PRIMARY INSOMNIA: ICD-10-CM

## (undated) DIAGNOSIS — E10.65 UNCONTROLLED TYPE 1 DIABETES MELLITUS WITH HYPERGLYCEMIA (HCC): ICD-10-CM

## (undated) DIAGNOSIS — E66.01 SEVERE OBESITY (BMI 35.0-39.9) WITH COMORBIDITY (HCC): ICD-10-CM

## (undated) DIAGNOSIS — Z51.81 THERAPEUTIC DRUG MONITORING: ICD-10-CM

## (undated) DEVICE — STAPLER SKIN INSORB 2030

## (undated) DEVICE — LARGE, DISPOSABLE ALEXIS O C-SECTION PROTECTOR - RETRACTOR: Brand: ALEXIS ® O C-SECTION PROTECTOR - RETRACTOR

## (undated) DEVICE — CURVED, SMALL JAW, OPEN SEALER/DIVIDER: Brand: LIGASURE

## (undated) NOTE — MR AVS SNAPSHOT
[facility],[address], [address] [place] [address] Rd  [phone]               Thank you for choosing us for your health care visit with Maldonado Patrick NP.   We are glad to serve you and happy to provide you with this neuropathy, with long-term current use of insulin (HCC)   Insulin pump fitting or adjustment   Insulin pump in place   Order:  Ophthalmology - External    Kovach, Corinne Bence, MD   [address] [place] [address]   Phone:  [phone]   Fax:  [phone] None      Allergies as of Jun 19, 2017     No Known Allergies                Today's Vital Signs     BP Pulse Temp Height Weight BMI    110/80 mmHg 84 98.3 °F (36.8 °C) (Oral) 63\" 214 lb 37.92 kg/m2    Breastfeeding?                    No              Cur and this prescription was added. Make sure you understand how and when to take each.    Commonly known as:  Arte Manifiesto CONTOUR NEXT TEST           Insulin Lispro 100 UNIT/ML Soln   Use up to 80 units daily via insulin pump   Commonly known as:  HUMALOG and this prescription was added. Make sure you understand how and when to take each.            Phentermine HCl 37.5 MG Tabs   TAKE 1 TABLET BY MOUTH EVERY MORNING BEFORE BREAKFAST   Commonly known as:  ADIPEX-P           PROAIR  (90 Base) MCG/ACT Ae

## (undated) NOTE — LETTER
Date: 2/3/2025    Patient Name: Vira Johnson          To Whom it may concern:    The above patient was seen at [facility] for treatment of a medical condition.    This patient should be excused from attending work 2/4/25-2/6/25.        Sincerely,    PIETRO Estrada

## (undated) NOTE — LETTER
Date: 3/10/2020    Patient Name: Allan Arzate     To Whom it may concern: The above patient was seen at the [facility] for treatment of a medical condition. This patient should be excused from attending work today (3/10/20).     [facility]

## (undated) NOTE — Clinical Note
Beronica, she's due to see you next week. She's lost 6.5 pounds since I last saw her so hopefully w/ continued weight loss over time, her BGs will get better.   MG

## (undated) NOTE — LETTER
Dear Rema Marin missed you! The nurses of [facility] have tried to reach you by phone to ask if you had any questions regarding your health or the care of your new little one.     Please feel free to call your doctor with an

## (undated) NOTE — LETTER
Date: 10/10/2024    Patient Name: Vira Johnson  Address: [address] [place] [address]  Account number: [id number]    To Whom it may concern:    The above patient is seen at [facility] for treatment of her medical conditions.    This patient, Vira Johnson, needs continued water supply for medical reasons.  She has multiple medical conditions which require continued water service.      Sincerely,      Patti Barger MD

## (undated) NOTE — LETTER
Date: 5/24/2025    Patient Name: Vira Johnson          To Whom it may concern:    This letter has been written at the patient's request. The above patient was seen at [facility] for treatment of a medical condition.    This patient should be excused from attending work 5/24-5/25.    The patient may return to work/school on 5/26/25 with the following limitations:none.        Sincerely,         BALDO Donaldson

## (undated) NOTE — LETTER
11/07/18        [address] [facility]      Dear Ham Thomas records indicate that you have outstanding lab work and or testing that was ordered for you and has not yet been completed:  Orders Placed This Encounter

## (undated) NOTE — MR AVS SNAPSHOT
[place]  [address]  [address] [facility] [address] [phone]               Thank you for choosing us for your health care visit with Fabien Woodson MD.  We are glad to serve you and happy to provide you with this summa 3. Add new agent of Wellbutrin which is designed for both depression and weight loss. 4. Double up on the Alprazolam (2 pills at a time) to increase the milligrams which will hopefully improve the sleep.   5. I will send a letter to Dr. Miguel Hayward on everything Inject 1 pen into the skin 4 (four) times daily.    Commonly known as:  BD PEN NEEDLE CARLOS U/F           LANTUS 100 UNIT/ML Soln   Generic drug:  insulin glargine   30 units daily in case of insulin pump failure to go to 24 hour emergency pharmacy If you've recently had a stay at the Hospital you can access your discharge instructions in [employer] by going to Visits < Admission Summaries.  If you've been to the Emergency Department or your doctor's office, you can view your past visit information in My

## (undated) NOTE — MR AVS SNAPSHOT
[facility],[address], [address] [place] [address] Rd  [phone]               Thank you for choosing us for your health care visit with Shobha Jovel NP.   We are glad to serve you and happy to provide you with this Albuterol Sulfate  (90 Base) MCG/ACT Aers   Inhale 2 puffs into the lungs every 4 (four) hours as needed for Wheezing. Commonly known as:  PROAIR HFA           Atorvastatin Calcium 20 MG Tabs   Take 1 tablet (20 mg total) by mouth nightly. Commonly known as:  ADIPEX-P           TRINESSA (28) 0.18/0.215/0.25 MG-35 MCG Tabs   Generic drug:  Norgestim-Eth Estrad Triphasic   TAKE 1 TABLET BY MOUTH DAILY                   MyChart     Visit MyChart  You can access your MyChart to more actively man

## (undated) NOTE — Clinical Note
Lost  18 lb since December with phentermine and Victoza, BG significantly better, still high but more stable, today maxed Victoza and continue to adjust pump. Ordered annual labs. Will keep you posted.  Today A1C at 8.9%

## (undated) NOTE — Clinical Note
Jorge A Zavala, I'm just \"CC-ing\" you on this note. I told patient I'd curbside you. You ever heard of a patient describe a \"powder-like\" brown vaginal discharge on a nearly daily basis for > 1 month? I did not do a pelvic exam on her.   Any thoughts off the to

## (undated) NOTE — MR AVS SNAPSHOT
[place]  [address]  [address] [facility] [address] [phone]               Thank you for choosing us for your health care visit with Yahaira Newton MD.  We are glad to serve you and happy to provide you with this summa This list is accurate as of: 3/3/17  7:57 PM.  Always use your most recent med list.                Acetone (Urine) Test Strp   Test urine when sugar is above 250 mg/dl twice in a row   Commonly known as:  KETOSTIX           Albuterol Sulfate  (90 B TRINESSA (28) 0.18/0.215/0.25 MG-35 MCG Tabs   Generic drug:  Norgestim-Eth Estrad Triphasic   TAKE 1 TABLET BY MOUTH DAILY                Where to Get Your Medications      You can get these medications from any pharmacy     Bring a paper prescription fo

## (undated) NOTE — MR AVS SNAPSHOT
[place]  [address]  [address] [facility] [address] [phone]               Thank you for choosing us for your health care visit with Suzie Merida MD.  We are glad to serve you and happy to provide you with this summa results and a list of medications patient is taking. Should you have questions about insurance coverage, call the [phone] number on the back  of your insurance card. BILLING CODES This appointment will be coded as Diabetes Self Management Training (DSMT).  H FREESTFly Fishing Hunter FREEDOM LITE W/DEVICE Kit   1 Device by Other route 2 (two) times daily. Use as directed. gabapentin 250 MG/5ML Soln   Take 300 mg by mouth nightly.    Commonly known as:  NEURONTIN           Glucose Blood Strp   Test before meals and a view more details from this visit by going to [email or web address]. If you've recently had a stay at the Hospital you can access your discharge instructions in [email or web address] by going to Visits < Admission Summaries.  If you've been to the Emergency Depar

## (undated) NOTE — LETTER
Date: 3/21/2022    Patient Name: Redd Barros    To Whom it may concern: The above patient was seen at the [facility] for treatment of a medical condition. This patient should be excused from attending work  from 3/21/22 through 3/26/22. The patient may return to work on 3/28/22 with no limitations or restrictions.     Sincerely,      Tomas Daniel MD

## (undated) NOTE — LETTER
Date: 2/7/2024    Patient Name: Vira Johnson          To Whom it may concern:    This letter has been written at the patient's request. The above patient was seen at the [facility] for treatment of a medical condition.    This patient should be excused from attending work 2/2/24- 2/9/24    The patient may return to work on 2/12/24    Sincerely,    Umer Mares Jr., KITN

## (undated) NOTE — Clinical Note
Came 2 weeks in a row, is more engaged with her DM, BG extremely high,  Stated has abscess to gum, gave referral to [facility] dental clinic she has not gone, but needs to address this. Weight continues to climb. ( up 3 lb in a week)

## (undated) NOTE — LETTER
Date: 5/15/2021    Patient Name: Claudia Sellers    To Whom it may concern: The above patient was evaluated by the [facility] for treatment of a medical condition on 5/6/21 and 5/14/21.     The above patient was seen at the [facility]

## (undated) NOTE — MR AVS SNAPSHOT
[facility],[address], [address] [place] [address]  [phone]               Thank you for choosing us for your health care visit with Sam Cage NP.   We are glad to serve you and happy to provide you with this BD INSULIN SYRINGE ULTRAFINE 31G X 15/64\" 0.5 ML Misc   Generic drug:  Insulin Syringe-Needle U-100   1 each by Does not apply route daily as needed.  In case of insulin pump failure           Cefuroxime Axetil 250 MG Tabs   Take 1 tablet (250 mg total) b You can access your MyChart to more actively manage your health care and view more details from this visit by going to [email or web address].   If you've recently had a stay at the Hospital you can access your discharge instructions in [address] by monica

## (undated) NOTE — LETTER
06/20/18        [phone]           Date: 6/20/2018     Patient Name: Miladis Wright    [address] [facility]                              Account [de-identified]   [facility], [address]     To Whom it may concern:     The above patient is seen at the [facility] NO

## (undated) NOTE — ED AVS SNAPSHOT
[facility] in [address]    Phone:  [phone]    Fax:  [phone]           Andrew Denise   MRN: [id number]    Department:  THE MEDICAL [facility] in [facility]   Date of Visit:  6/15/201 Quantity:  2 Box   Commonly known as:  BD PEN NEEDLE CARLOS U/F   Inject 1 pen into the skin 4 (four) times daily.        ONETOUCH DELICA LANCETS 63F Misc   Quantity:  1 Box   Test 7 times daily has insulin pump       ONETOUCH ULTRA 2 w/Device Kit   Quantity: If you have any problems with your follow-up, please call our  at [phone]. Si usted tiene algun problema con dunlap sequimiento, por favor llame a nuestro adminstrador de casos al [phone].     Expect to receive an electronic reques Celeste Hendrix [address] ([address]) Yazmin ([address] [place] [address] [facility]9    [address] ([address] [facility]) [address]  (Do Summaries. If you've been to the Emergency Department or your doctor's office, you can view your past visit information in rankur by going to Visits < Visit Summaries. rankur questions? Call [phone] for help.   [employer]ur is NOT to be used for urge

## (undated) NOTE — Clinical Note
Hi MG. Her BG was 600 mg/dl today in office, started her on lexapro, very flat affect and depressed. Going to work over the phone with her and try to get her on pump as she really seems to want this, hoping she will follow through.  Mentioned someone from y

## (undated) NOTE — Clinical Note
BG high think attributed to infections in mouth. Not consistent in f/u appt but is using pump more consistently. She is a challenging one.

## (undated) NOTE — LETTER
Date & Time: 10/24/2018, 11:27 AM  Patient: Salud Downey  Encounter Provider(s):    PIETRO Phelps       To Whom It May Concern: Maria C Mcelroy was seen and treated in our department on 10/24/2018.  She may return to work on Friday if feeling

## (undated) NOTE — Clinical Note
Doing a bit better, needs teeth pulled badly but no resources, gave her all I knew, encouraged to go to [facility]. Had eye exam report requested.

## (undated) NOTE — Clinical Note
Finally came in for f/u she is back on pump and states she wants to  Continue explained she needs to try not to become overwhelmed to the point she wants to quit, going to have her see  The pump  to help and hopefully she will begin attending visits

## (undated) NOTE — MR AVS SNAPSHOT
[facility],[address], [address] [place] [address] Rd  [phone]               Thank you for choosing us for your health care visit with Babs Olmedo NP.   We are glad to serve you and happy to provide you with this Commonly known as:  KETOSTIX           Albuterol Sulfate  (90 Base) MCG/ACT Aers   Inhale 2 puffs into the lungs every 4 (four) hours as needed for Wheezing.    Commonly known as:  PROAIR HFA           Atorvastatin Calcium 20 MG Tabs   Take 1 tablet Where to Get Your Medications      Information about where to get these medications is not yet available     !  Ask your nurse or doctor about these medications    - insulin aspart 100 UNIT/ML Soln            Results of Recent Testing     HEMOGLOBIN A1C

## (undated) NOTE — Clinical Note
I forgot to mention that pt is interested in starting Ozempic as well. Would you like her to start before she sees you in March? She said she has tried victoza, trulicity, and rybelsus. I couldn't find victoza on her chart.

## (undated) NOTE — LETTER
Date: 6/7/2021    Patient Name: Neftaly Arenas    To Whom it may concern:     The above patient was seen at the [facility] for treatment of a medical condition.     This patient should be excused from attending work from 5/1/21 through 6/14/

## (undated) NOTE — LETTER
21          RE:  My Davis  :  1990      To Whom It May Concern: My Davis  is currently under our care for pregnancy. It is permissible at her gestational age for dental work to be performed.   However, if x-rays ar

## (undated) NOTE — Clinical Note
Continue care with Dr. Lisandra Colon additional weight gain to 10 pounds a month  Fetal heart study at 22-24 wks    Monthly growth US starting at 26-28 weeks  Weekly NST at    32 wks / twice weekly at  34  wks      Hgb A1C q trimester  EKG     Ophthalmolog

## (undated) NOTE — LETTER
Date: 6/24/2021    Patient Name: Sean Thomas    To Whom it may concern: This patient should be excused from attending work from 5/1/21 through 7/1/21. She continues to have active medical conditions/sypmtoms.   She was not able to make her spec

## (undated) NOTE — LETTER
Date: 10/2/2020    Patient Name: Sonja Shaw          To Whom it may concern: This letter has been written at the patient's request. The above patient was seen at the [facility] for treatment of a medical condition.     This patient sh

## (undated) NOTE — LETTER
3/15/2019    Personal and Confidential  Via Certified Mail    [address] [place] [address].  Dereck Luna [address]    Dear Ms. Sweta Dolan,     Please accept this correspondence as notice that [place] [address] will no longer provide medical services to yo

## (undated) NOTE — MR AVS SNAPSHOT
[place]  [facility] 100  [address] [facility] [address] [phone]               Thank you for choosing us for your health care visit with Jeromy Freitas MD.  We are glad to serve you and happy to provide you with this s Instructions and Information about Your Health    - Stop Lexapro  - Start Duloxetine (Cymbalta). Take 1 capsule twice daily  - Take xanax as needed for acute anxiety/panic attacks, no more than two tablets in a day  - I have refilled your phentermine. 30 units daily in case of insulin pump failure to go to 24 hour emergency pharmacy           MetFORMIN HCl 1000 MG Tabs   Take 1 tablet (1,000 mg total) by mouth 2 (two) times daily with meals.    Commonly known as:  GLUCOPHAGE           methocarbamol 500 M Call [phone] for help. [place] is NOT to be used for urgent needs. For medical emergencies, dial 911.            Visit [facility] online at  [email or web address]

## (undated) NOTE — LETTER
03/04/22        [address] [facility],[address]  [address]      Dear Tonio Flanagan records indicate that you have outstanding lab work and or testing that was ordered for you and has not yet been completed:  Orders Placed This Encounter      Hemoglobin A1C      Microalb/Creat Ratio, Random Urine      Lipid Panel      Comp Metabolic Panel (14)    To provide you with the best possible care, please complete these orders at your earliest convenience. If you have recently completed these orders please disregard this letter. If you have any questions please call the office at Dept: [phone].      Thank you,       Sherrell Herrera MD

## (undated) NOTE — MR AVS SNAPSHOT
[facility] [address], [address] [place] [address] Rd  [phone]               Thank you for choosing us for your health care visit with Román Navarrete NP.   We are glad to serve you and happy to provide you with this BP Pulse Temp Height Weight BMI    124/80 mmHg 84 98.3 °F (36.8 °C) (Oral) 63\" 212 lb 37.56 kg/m2    Breastfeeding?                    No              Current Medications          This list is accurate as of: 5/19/17 12:53 PM.  Always use your most recent methocarbamol 500 MG Tabs   Take 1 tablet (500 mg total) by mouth 3 (three) times daily. Commonly known as:  ROBAXIN           Montelukast Sodium 10 MG Tabs   Take 1 tablet (10 mg total) by mouth nightly.    Commonly known as:  Iris Gibbs Call [phone] for help. [employer] is NOT to be used for urgent needs. For medical emergencies, dial 911.            Visit [facility] online at  [email or web address]

## (undated) NOTE — LETTER
21      Vira Flynn        :  1990        To Whom It May Concern: Ryan Arizmendi  has recently been treated for a medical condition.  At this time, I have determined she may return to work effective 21, without restricti

## (undated) NOTE — Clinical Note
Girma Bello, pt came in for CGM review, available on my notes. Pt is 32% in range, 37% very high. GMI of 8.4. Hypoglycemia noted overnight and early morning. Pt states sometimes she takes humalog after meal. She started intermittent fasting one week ago and states glucose levels are much better. Eats twice daily between 4167-5170. Pt brought her 3 yr old daughter and appt was challenging. Pt is taking lantus 25 units twice daily and humalog she starts at 4 units. It doesn't seem she uses a correction scale or carb ratio. She said sometimes she doesn't need insulin. She also requested refill on Omnipod and wants to start using pump again. She'll decrease lantus to 20 units in the evening. I sent pod refills to pharmacy and will pend insulin and glucagon pen for you in a separate TE. She has a f/u with you on 3/10/25. Please advise on insulin doses.

## (undated) NOTE — LETTER
Date: 2/25/2025    Patient Name: Vira Johnson          To Whom it may concern:    This letter has been written at the patient's request. The above patient was seen at [facility] for treatment of a medical condition.    This patient should be excused from attending work 2/24/25-2/26/25.    The patient may return to work 2/27/25.         Sincerely,     Carrol Daniels PA-C

## (undated) NOTE — LETTER
3/15/2021              [address] [facility] [address]         To Whom It May Concern,  Tiffany Chester has two doctor's appointments on Friday, March 3/19/21.     Sincerely,    Anatoly Hernandez MD  [facility] MEDIC

## (undated) NOTE — LETTER
Date: 6/20/2018    Patient Name: Chad Pavon    To Whom it may concern: The above patient is seen at the [facility] for treatment of her chronic medical conditions. The patient has diabetes, and is on chronic insulin therapy.   Her in

## (undated) NOTE — LETTER
11/19/2018    Personal and Confidential  Via Certified Mail    [place] [address]  [address] [place] [address]    Dear Ms. Callejas Courser,     [facility], patient care is our priority.   To best serve our patients, our mission is to be a partn

## (undated) NOTE — MR AVS SNAPSHOT
[place]  [facility] 100  [address] [facility] [address] [phone]               Thank you for choosing us for your health care visit with Edwin Castanon MD.  We are glad to serve you and happy to provide you with this summa Jr Singleton MD   [address]   Phone:  [phone]   Fax:  [phone]    Diagnoses:  Severe obesity (BMI 35.0-39. 9) with comorbidity ([address]   Uncontrolled type 1 diabetes mellitus with diabetic autonomic neuropathy, wi Sita [facility], [address] [place] [address]     [phone]      [address]  [address] 34    [address] [address], 209 [facility], [address] [place] [address]     [phone]    14 Mere Muñozis your appointment immediately. However, if you are unsure about the requirements for authorization, please wait 5-7 days and then contact your physician's office.  At that time, you will be provided with any authorization numbers or be assured that none are Take 1 tablet (150 mg total) by mouth 2 (two) times daily. Commonly known as:  WELLBUTRIN SR           gabapentin 250 MG/5ML Soln   Take 300 mg by mouth nightly.    Commonly known as:  NEURONTIN           Glucose Blood Strp   Test sugar 7 times daily uses [phone] [address] [facility] [address] [address]     Phone:  [phone]    - topiramate 50 MG Tabs      You can get these medications from any pharmacy     Bring a paper prescription for each of these medications    - Phentermine HCl 37.5 MG Ta

## (undated) NOTE — MR AVS SNAPSHOT
[facility] [address], [address] [place] [address] Rd  [phone]               Thank you for choosing us for your health care visit with Odette Frankel, NP.   We are glad to serve you and happy to provide you with this Acetone (Urine) Test Strp   Test urine when sugar is above 250 mg/dl twice in a row   Commonly known as:  KETOSTIX           ALPRAZolam 0.5 MG Tabs   Take 1 tablet (0.5 mg total) by mouth nightly as needed for Sleep.    Commonly known as:  Ne Momin TAKE 1 TABLET BY MOUTH EVERY MORNING BEFORE BREAKFAST   Commonly known as:  ADIPEX-P           PROAIR  (90 Base) MCG/ACT Aers   Generic drug:  Albuterol Sulfate HFA   INHALE 2 PUFFS INTO THE LUNGS EVERY 4 HOURS AS NEEDED FOR WHEEZING           LUZ ELENA

## (undated) NOTE — LETTER
11/19/2018    Personal and Confidential  Via Certified Mail    [address] [place] [address].  Ray Sesay [address]    Dear Ms. Gilda Babb,     At [place] [address], patient care is our priority.   To best serve our patients, our mission is to be a partn Dept Fax: [phone]

## (undated) NOTE — Clinical Note
Today her A1C was at 8.9 from 11.6 she cried as she hasn't seen this in a long time.  She still has more to achieve but we are going in the right direction

## (undated) NOTE — MR AVS SNAPSHOT
[facility],[address], [address] [place] [address] Rd  [phone]               Thank you for choosing us for your health care visit with Hemanth Price, SHANA.   We are glad to serve you and happy to provide you with this [facility] ( [facility])    [address], [address] [facility] [address]   [phone]           TOPICS DISCUSSED IN CLASS/APPOINTMENT Pump follow-up appointment are to be scheduled 1,2, and 4 weeks afte Acetone (Urine) Test Strp   Test urine when sugar is above 250 mg/dl twice in a row   Commonly known as:  KETOSTIX           Albuterol Sulfate  (90 BASE) MCG/ACT Aers   Inhale 2 puffs into the lungs every 4 (four) hours as needed for Wheezing.    Co Take 1 tablet (1,000 mg total) by mouth 2 (two) times daily with meals. Commonly known as:  GLUCOPHAGE           Montelukast Sodium 10 MG Tabs   Take 1 tablet (10 mg total) by mouth nightly.    Commonly known as:  SINGULAIR           NOVOLOG FLEXPEN 100 U If you've recently had a stay at the Hospital you can access your discharge instructions in [employer] by going to Visits < Admission Summaries.  If you've been to the Emergency Department or your doctor's office, you can view your past visit information in My

## (undated) NOTE — Clinical Note
Hi!    I met with Danuta Zuñiga today because of the wide fluctuations in her blood sugars. She really has not been using her pump settings (except basal) because she was having low blood sugars previously. I loosened her ICR and ISF so she would \"trust\" her pump more. I also increased her basal settings during the day. We did a lot of diabetes education and I will call her on Thursday to touch base.  A1c went up to 11.6%    Thanks,  [employer]

## (undated) NOTE — LETTER
11/07/20        [address] [facility]      Dear Pily Hendrickson records indicate that you have outstanding lab work and or testing that was ordered for you and has not yet been completed:  Orders Placed This Encounter

## (undated) NOTE — Clinical Note
Was making progress, but she needs a lot of reinforcement to use her pump correctly, as she slips back to using incorrectly rapidly.

## (undated) NOTE — Clinical Note
Was doing great on Victoza now not consistently taking and BG are extremely high again, agrees to take it as prescribed and f/u in 2 weeks to use otc cgm sensor feels she can afford having labs today, concerned about recent labs at your office results, rep

## (undated) NOTE — Clinical Note
Doing a little better, seems to do better with frequent f/u and reinforcements.  Making slow progress, her affect is a lot better

## (undated) NOTE — LETTER
Date: 5/6/2021    Patient Name: Werner Malcolm    To Whom it may concern: The above patient was seen at the [facility] for treatment of a medical condition.     This patient should be excused from attending work from 5/1/21 through 5/16/2

## (undated) NOTE — ED AVS SNAPSHOT
[facility] in [address]    Phone:  [phone]    Fax:  [phone]           Tristin Mcdonald   MRN: [id number]    Department:  [facility]   Date of Visit:  4/8/2017 Insulin Pen Needle 32G X 4 MM Misc   Quantity:  2 Box   Commonly known as:  BD PEN NEEDLE CARLOS U/F   Inject 1 pen into the skin 4 (four) times daily.             Where to Get Your Medications      These [facility] [address] [facility] 1   [phone]       To Check ER Wait Times:  TEXT 'ERwait' to 66289      Click [email or web address]      Or call [phone]    If you have any problems with your follow-up, please call our  at [phone].     Eliezer franklin I have read and understand the instructions given to me by my caregivers. 24-Hour Pharmacies        Pharmacy Address Phone Number   [facility] [address] ([address]) Yazmin Felipe If you've recently had a stay at the Hospital you can access your discharge instructions in [employer] by going to Visits < Admission Summaries.  If you've been to the Emergency Department or your doctor's office, you can view your past visit information in My

## (undated) NOTE — MR AVS SNAPSHOT
[address] [place] [address] 16               Thank you for choosing us for your health care visit with Salvador Herman PA-C.   We are glad to serve you and happy to provide you with this dunlap ? Please allow the office 48-72 hours to fill the prescription. ? Patient must present photo ID at time of .   If a designated family member will be picking up prescription, office must be given name of individual in advance and they must present a [facility] [address] [phone]           TOPICS DISCUSSED IN CLASS/APPOINTMENT Pump follow-up appointment are to be scheduled 1,2, and 4 weeks after an insulin start.  A certified diabetes educator will teach the patient how to use \"care link\" fo Inhale 2 puffs into the lungs every 4 (four) hours as needed for Wheezing. Commonly known as:  PROAIR HFA           Atorvastatin Calcium 20 MG Tabs   Take 1 tablet (20 mg total) by mouth nightly.    Commonly known as:  LIPITOR           Clobetasol Propion INJECT 17 UNITS WITH BREAKFAST 17 UNITS WITH LUNCH           Phentermine HCl 37.5 MG Tabs   TAKE 1 TABLET BY MOUTH EVERY MORNING BEFORE BREAKFAST   Commonly known as:  ADIPEX-P           TRINESSA (28) 0.18/0.215/0.25 MG-35 MCG Tabs   Generic drug:  Norgest

## (undated) NOTE — MR AVS SNAPSHOT
[facility] [address], [address] [place] [address] Rd  [phone]               Thank you for choosing us for your health care visit with Rubens Gardner NP.   We are glad to serve you and happy to provide you with this Commonly known as:  KETOSTIX           Albuterol Sulfate  (90 Base) MCG/ACT Aers   Inhale 2 puffs into the lungs every 4 (four) hours as needed for Wheezing.    Commonly known as:  PROAIR HFA           Atorvastatin Calcium 20 MG Tabs   Take 1 tablet [place] - [address] [facility], [phone], [address], [address] [address]     Phone:  [phone]    - Insulin Lispro 100 UNIT/ML Lavon Anglint     Visit MyChart  You can access your [facility]